# Patient Record
Sex: FEMALE | Race: WHITE | Employment: OTHER | URBAN - METROPOLITAN AREA
[De-identification: names, ages, dates, MRNs, and addresses within clinical notes are randomized per-mention and may not be internally consistent; named-entity substitution may affect disease eponyms.]

---

## 2017-08-04 ENCOUNTER — OFFICE VISIT (OUTPATIENT)
Dept: NEUROLOGY | Age: 57
End: 2017-08-04

## 2017-08-04 VITALS
DIASTOLIC BLOOD PRESSURE: 90 MMHG | RESPIRATION RATE: 14 BRPM | BODY MASS INDEX: 29.08 KG/M2 | OXYGEN SATURATION: 98 % | SYSTOLIC BLOOD PRESSURE: 140 MMHG | HEIGHT: 62 IN | WEIGHT: 158 LBS | HEART RATE: 82 BPM

## 2017-08-04 DIAGNOSIS — G89.4 CHRONIC PAIN SYNDROME: ICD-10-CM

## 2017-08-04 DIAGNOSIS — Q85.01 NEUROFIBROMATOSIS, TYPE 1 (HCC): Primary | ICD-10-CM

## 2017-08-04 RX ORDER — FENTANYL 75 UG/H
1 PATCH TRANSDERMAL
COMMUNITY
End: 2017-08-04 | Stop reason: SDUPTHER

## 2017-08-04 RX ORDER — OXYCODONE HYDROCHLORIDE 30 MG/1
30 TABLET ORAL
Qty: 120 TAB | Refills: 0 | Status: SHIPPED | OUTPATIENT
Start: 2017-08-04 | End: 2022-03-28

## 2017-08-04 RX ORDER — ESCITALOPRAM OXALATE 10 MG/1
10 TABLET ORAL DAILY
COMMUNITY
End: 2021-09-22

## 2017-08-04 RX ORDER — OXYCODONE HYDROCHLORIDE 30 MG/1
30 TABLET ORAL
Qty: 120 TAB | Refills: 0 | Status: SHIPPED | OUTPATIENT
Start: 2017-08-04 | End: 2017-08-04 | Stop reason: SDUPTHER

## 2017-08-04 RX ORDER — SIMVASTATIN 40 MG/1
TABLET, FILM COATED ORAL
COMMUNITY
End: 2022-03-28

## 2017-08-04 RX ORDER — VENLAFAXINE HYDROCHLORIDE 150 MG/1
150 CAPSULE, EXTENDED RELEASE ORAL DAILY
COMMUNITY

## 2017-08-04 RX ORDER — GABAPENTIN 600 MG/1
TABLET ORAL 3 TIMES DAILY
COMMUNITY
End: 2021-10-01

## 2017-08-04 RX ORDER — CARVEDILOL 6.25 MG/1
TABLET ORAL 2 TIMES DAILY WITH MEALS
COMMUNITY

## 2017-08-04 RX ORDER — OXYCODONE HYDROCHLORIDE 30 MG/1
30 TABLET ORAL
COMMUNITY
End: 2017-08-04 | Stop reason: SDUPTHER

## 2017-08-04 RX ORDER — LEVOTHYROXINE SODIUM 125 UG/1
TABLET ORAL
COMMUNITY

## 2017-08-04 RX ORDER — FENTANYL 75 UG/H
1 PATCH TRANSDERMAL
Qty: 20 PATCH | Refills: 1 | Status: SHIPPED | OUTPATIENT
Start: 2017-08-04 | End: 2017-08-04 | Stop reason: SDUPTHER

## 2017-08-04 RX ORDER — BUSPIRONE HYDROCHLORIDE 30 MG/1
30 TABLET ORAL 2 TIMES DAILY
COMMUNITY

## 2017-08-04 RX ORDER — CARBAMAZEPINE 300 MG/1
300 CAPSULE, EXTENDED RELEASE ORAL 2 TIMES DAILY
COMMUNITY

## 2017-08-04 RX ORDER — FENTANYL 75 UG/H
2 PATCH TRANSDERMAL
Qty: 20 PATCH | Refills: 1 | Status: SHIPPED | OUTPATIENT
Start: 2017-08-04 | End: 2021-09-22

## 2017-08-04 NOTE — PATIENT INSTRUCTIONS

## 2017-08-04 NOTE — PROGRESS NOTES
Chief Complaint   Patient presents with    Neurologic Problem         HISTORY OF PRESENT ILLNESS  Shawnee Tapia is a 60 y.o. female who  was diagnosed with neurofibromatosis type I at age 41. She has been living in [place] until recently she has. Extensive nerve sheath tumors at the nerve roots along the cervical and lumbar spine. She has had cervical decompression surgery to help allow for tumor growth. She has chronic pain in her spine and extremities and has also developed balance difficulties. She used to follow-up with pain management in [place] and is now trying to find someone who can manage her. She is on a high dose of fentanyl patch and oxycodone. She does not have any tumors in the brain, she states. No skin lesions or ocular lesions. There is no family history. She also has seizure disorder and her seizures are mainly partial complex type/staring spells. She does not remember having any generalized tonic-clonic events with loss of consciousness. She is on carbamazepine. Past Medical History:   Diagnosis Date    Arthritis     Depression     Epilepsy ([facility]     Hypertension     Thyroid disease      Current Outpatient Prescriptions   Medication Sig    carBAMazepine ER (CARBATROL ER) 300 mg capsule Take 300 mg by mouth two (2) times a day.  gabapentin (NEURONTIN) 600 mg tablet Take  by mouth three (3) times daily.  carvedilol (COREG) 6.25 mg tablet Take  by mouth two (2) times daily (with meals).  escitalopram oxalate (LEXAPRO) 10 mg tablet Take 10 mg by mouth daily.  simvastatin (ZOCOR) 40 mg tablet Take  by mouth nightly.  levothyroxine (SYNTHROID) 125 mcg tablet Take  by mouth Daily (before breakfast).  busPIRone (BUSPAR) 30 mg tablet Take 30 mg by mouth daily.  venlafaxine-SR (EFFEXOR XR) 150 mg capsule Take  by mouth daily.  fentaNYL (DURAGESIC) 75 mcg/hr 1 Patch by TransDERmal route every seventy-two (72) hours. Max Daily Amount: 1 Patch.     oxyCODONE IR (OXY-IR) 30 mg immediate release tablet Take 1 Tab by mouth every six (6) hours as needed for Pain. Max Daily Amount: 120 mg. No current facility-administered medications for this visit. Allergies   Allergen Reactions    Codeine Nausea and Vomiting    Methadone Hives    Morphine Other (comments)     psychosis     History reviewed. No pertinent family history. Social History   Substance Use Topics    Smoking status: Never Smoker    Smokeless tobacco: Never Used    Alcohol use No     Past Surgical History:   Procedure Laterality Date    HX GYN      HX ORTHOPAEDIC      NEUROLOGICAL PROCEDURE UNLISTED           REVIEW OF SYSTEMS  Review of Systems - History obtained from the patient  Psychological ROS: negative  ENT ROS: negative  Hematological and Lymphatic ROS: negative  Endocrine ROS: negative  Respiratory ROS: no cough, shortness of breath, or wheezing  Cardiovascular ROS: no chest pain or dyspnea on exertion  Gastrointestinal ROS: no abdominal pain, change in bowel habits, or black or bloody stools  Genito-Urinary ROS: no dysuria, trouble voiding, or hematuria  Musculoskeletal ROS: negative  positive for - pain in back - bilateral, leg - both sides and neck - bilateral  Dermatological ROS: negative      PHYSICAL EXAMINATION:    Visit Vitals    /90    Pulse 82    Resp 14    Ht 5' 1.5\" (1.562 m)    Wt 71.7 kg (158 lb)    SpO2 98%    BMI 29.37 kg/m2     General:  Well defined, nourished, and groomed individual in no acute distress. Neck: Supple, nontender, thyroid within normal limits, no JVD, no bruits, no pain with resistance to active range of motion. Heart: Regular rate and rhythm, no murmurs, rub, or gallop. Normal S1S2. Lungs:  Clear to auscultation bilaterally with equal chest expansion, no cough, no wheeze  Musculoskeletal:  Extremities revealed no edema and had full range of motion of joints.     Psych:  Good mood and bright affect    NEUROLOGICAL EXAMINATION: Mental Status:   Alert and oriented to person, place, and time with recent and remote memory intact. Attention span and concentration are normal. Speech is fluent with a full fund of knowledge. Cranial Nerves:    II, III, IV, VI:  Visual acuity grossly intact. Visual fields are normal.    Pupils are equal, round, and reactive to light and accommodation. Extra-ocular movements are full and fluid. Fundoscopic exam was benign, no ptosis or nystagmus. V-XII: Hearing is grossly intact. Facial features are symmetric, with normal sensation and strength. The palate rises symmetrically and the tongue protrudes midline. Sternocleidomastoids 5/5. Motor Examination: Normal tone, bulk, and strength. 5/5 muscle strength throughout. No cogwheel rigidity or clonus present. Sensory exam:  Normal throughout to pinprick, temperature, and vibration sense. Normal proprioception. Coordination:  Finger to nose and rapid arm movement testing was normal.   No resting or intention tremor    Gait and Station:  Steady while walking on toes, heels, and with tandem walking. Normal arm swing. Positive Rhomberg. No muscle wasting or fasiculations noted. Reflexes:  DTRs 2+ throughout. Toes downgoing. LABS / IMAGING  Reports of cervical and lumbar spine imaging was reviewed. There are extensive nerve root neurofibromas from C2 through C7 and also in L3, L4 and L5    ASSESSMENT    ICD-10-CM ICD-9-CM    1. [facility] Q85.01 237.71 fentaNYL (DURAGESIC) 75 mcg/hr      oxyCODONE IR (OXY-IR) 30 mg immediate release tablet   2. Chronic pain syndrome G89.4 338.4 fentaNYL (DURAGESIC) 75 mcg/hr      oxyCODONE IR (OXY-IR) 30 mg immediate release tablet       DISCUSSION  Ms. Sulema Russell has chronic pain syndrome related to neurofibromatosis type I. She is status post cervical decompression surgery to allow for tumor growth. She is currently on a high dose of narcotic analgesics.    Her main issue at this time is pain control  I discussed that she would be best served by seeing a pain specialist and I have temporarily provided her with a refill  She should also discuss other treatment options such as localized injections so that some of these medications could be weaned off    Basilio Moctezuma MD  Diplomate, [address] [place] [address] Rd., [address] of Psychiatry & Neurology (Neurology)  Diplomate, American Board of Psychiatry & Neurology (Clinical Neurophysiology)  Diplomate, American Board of Electrodiagnostic Medicine

## 2017-08-04 NOTE — MR AVS SNAPSHOT
Visit Information Date & Time Provider Department Dept. Phone Encounter #  
 8/4/2017  1:00 PM Gloria Chan MD [facility] at [facility] [phone] Follow-up Instructions Return if symptoms worsen or fail to improve. Upcoming Health Maintenance Date Due Hepatitis C Screening 1960 DTaP/Tdap/Td series (1 - Tdap) 8/11/1979 PAP AKA CERVICAL CYTOLOGY 8/11/1979 BREAST CANCER SCRN MAMMOGRAM 8/11/2008 FOBT Q 1 YEAR AGE 50-75 8/11/2008 INFLUENZA AGE 9 TO ADULT 8/1/2017 Allergies as of 8/4/2017  Review Complete On: 8/4/2017 By: Gloria Chan MD  
  
 Severity Noted Reaction Type Reactions Codeine  08/04/2017    Nausea and Vomiting Methadone  08/04/2017    Hives Morphine  08/04/2017    Other (comments) psychosis Current Immunizations  Never Reviewed No immunizations on file. Not reviewed this visit You Were Diagnosed With   
  
 Codes Comments [facility]    -  Primary ICD-10-CM: Q85.01 
ICD-9-CM: 237.71 Chronic pain syndrome     ICD-10-CM: G89.4 ICD-9-CM: 338. 4 Vitals BP Pulse Resp Height(growth percentile) Weight(growth percentile) SpO2  
 140/90 82 14 5' 1.5\" (1.562 m) 158 lb (71.7 kg) 98% BMI OB Status Smoking Status 29.37 kg/m2 Postmenopausal Never Smoker Vitals History BMI and BSA Data Body Mass Index Body Surface Area  
 29.37 kg/m 2 1.76 m 2 Your Updated Medication List  
  
   
This list is accurate as of: 8/4/17  1:42 PM.  Always use your most recent med list.  
  
  
  
  
 busPIRone 30 mg tablet Commonly known as:  BUSPAR Take 30 mg by mouth daily. carBAMazepine  mg capsule Commonly known as:  CARBATROL ER Take 300 mg by mouth two (2) times a day. carvedilol 6.25 mg tablet Commonly known as:  Carrolyn Peabody Take  by mouth two (2) times daily (with meals). EFFEXOR  mg capsule Generic drug:  venlafaxine-SR Take  by mouth daily. fentaNYL 75 mcg/hr Commonly known as:  DURAGESIC  
1 Patch by TransDERmal route every seventy-two (72) hours. Max Daily Amount: 1 Patch.  
  
 gabapentin 600 mg tablet Commonly known as:  NEURONTIN Take  by mouth three (3) times daily. LEXAPRO 10 mg tablet Generic drug:  escitalopram oxalate Take 10 mg by mouth daily. oxyCODONE IR 30 mg immediate release tablet Commonly known as:  OXY-IR Take 1 Tab by mouth every six (6) hours as needed for Pain. Max Daily Amount: 120 mg.  
  
 simvastatin 40 mg tablet Commonly known as:  ZOCOR Take  by mouth nightly. SYNTHROID 125 mcg tablet Generic drug:  levothyroxine Take  by mouth Daily (before breakfast). Prescriptions Printed Refills  
 fentaNYL (DURAGESIC) 75 mcg/hr 1 Si Patch by TransDERmal route every seventy-two (72) hours. Max Daily Amount: 1 Patch. Class: Print Route: TransDERmal  
 oxyCODONE IR (OXY-IR) 30 mg immediate release tablet 0 Sig: Take 1 Tab by mouth every six (6) hours as needed for Pain. Max Daily Amount: 120 mg.  
 Class: Print Route: Oral  
  
Follow-up Instructions Return if symptoms worsen or fail to improve. Patient Instructions A Healthy Lifestyle: Care Instructions Your Care Instructions A healthy lifestyle can help you feel good, stay at a healthy weight, and have plenty of energy for both work and play. A healthy lifestyle is something you can share with your whole family. A healthy lifestyle also can lower your risk for serious health problems, such as high blood pressure, heart disease, and diabetes. You can follow a few steps listed below to improve your health and the health of your family. Follow-up care is a key part of your treatment and safety.  Be sure to make and go to all appointments, and call your doctor if you are having problems. Its also a good idea to know your test results and keep a list of the medicines you take. How can you care for yourself at home? · Do not eat too much sugar, fat, or fast foods. You can still have dessert and treats now and then. The goal is moderation. · Start small to improve your eating habits. Pay attention to portion sizes, drink less juice and soda pop, and eat more fruits and vegetables. ¨ Eat a healthy amount of food. A 3-ounce serving of meat, for example, is about the size of a deck of cards. Fill the rest of your plate with vegetables and whole grains. ¨ Limit the amount of soda and sports drinks you have every day. Drink more water when you are thirsty. ¨ Eat at least 5 servings of fruits and vegetables every day. It may seem like a lot, but it is not hard to reach this goal. A serving or helping is 1 piece of fruit, 1 cup of vegetables, or 2 cups of leafy, raw vegetables. Have an apple or some carrot sticks as an afternoon snack instead of a candy bar. Try to have fruits and/or vegetables at every meal. 
· Make exercise part of your daily routine. You may want to start with simple activities, such as walking, bicycling, or slow swimming. Try to be active 30 to 60 minutes every day. You do not need to do all 30 to 60 minutes all at once. For example, you can exercise 3 times a day for 10 or 20 minutes. Moderate exercise is safe for most people, but it is always a good idea to talk to your doctor before starting an exercise program. 
· Keep moving. Caleen Newer the lawn, work in the garden, or [place]. Take the stairs instead of the elevator at work. · If you smoke, quit. People who smoke have an increased risk for heart attack, stroke, cancer, and other lung illnesses. Quitting is hard, but there are ways to boost your chance of quitting tobacco for good. ¨ Use nicotine gum, patches, or lozenges. ¨ Ask your doctor about stop-smoking programs and medicines. ¨ Keep trying. In addition to reducing your risk of diseases in the future, you will notice some benefits soon after you stop using tobacco. If you have shortness of breath or asthma symptoms, they will likely get better within a few weeks after you quit. · Limit how much alcohol you drink. Moderate amounts of alcohol (up to 2 drinks a day for men, 1 drink a day for women) are okay. But drinking too much can lead to liver problems, high blood pressure, and other health problems. Family health If you have a family, there are many things you can do together to improve your health. · Eat meals together as a family as often as possible. · Eat healthy foods. This includes fruits, vegetables, lean meats and dairy, and whole grains. · Include your family in your fitness plan. Most people think of activities such as jogging or tennis as the way to fitness, but there are many ways you and your family can be more active. Anything that makes you breathe hard and gets your heart pumping is exercise. Here are some tips: 
¨ Walk to do errands or to take your child to school or the bus. ¨ Go for a family bike ride after dinner instead of watching TV. Where can you learn more? Go to http://kayla-elio.info/. Enter A112 in the search box to learn more about \"A Healthy Lifestyle: Care Instructions. \" Current as of: July 26, 2016 Content Version: 11.3 © 2956-2746 Healthwise, Incorporated. Care instructions adapted under license by [employer] have [employer]! (which disclaims liability or warranty for this information). If you have questions about a medical condition or this instruction, always ask your healthcare professional. [place] [address] any warranty or liability for your use of this information. Introducing [facility]! Tristan Lujan introduces [employer] patient portal. Now you can access parts of your medical record, email your doctor's office, and request medication refills online. 1. In your internet browser, go to [email or web address] 2. Click on the First Time User? Click Here link in the Sign In box. You will see the New Member Sign Up page. 3. Enter your [employer] Access Code exactly as it appears below. You will not need to use this code after youve completed the sign-up process. If you do not sign up before the expiration date, you must request a new code. · [employer] Access Code: [email or web address] Expires: 11/2/2017  1:40 PM 
 
4. Enter the last four digits of your Social Security Number (xxxx) and Date of Birth (mm/dd/yyyy) as indicated and click Submit. You will be taken to the next sign-up page. 5. Create a Retrac Enterprisest ID. This will be your Y[employer]Lotto login ID and cannot be changed, so think of one that is secure and easy to remember. 6. Create a [employer] password. You can change your password at any time. 7. Enter your Password Reset Question and Answer. This can be used at a later time if you forget your password. 8. Enter your e-mail address. You will receive e-mail notification when new information is available in [address] 9. Click Sign Up. You can now view and download portions of your medical record. 10. Click the Download Summary menu link to download a portable copy of your medical information. If you have questions, please visit the Frequently Asked Questions section of the [employer] website. Remember, [employer] is NOT to be used for urgent needs. For medical emergencies, dial 911. Now available from your iPhone and Android! Please provide this summary of care documentation to your next provider. If you have any questions after today's visit, please call [phone].

## 2017-08-07 ENCOUNTER — TELEPHONE (OUTPATIENT)
Dept: PALLATIVE CARE | Age: 57
End: 2017-08-07

## 2017-08-07 NOTE — TELEPHONE ENCOUNTER
Patient is calling to speak to referral coordinator to see if she qualifies for our out patient clinic. She is new to area and does not want pain management clinic wants pain care.

## 2017-08-07 NOTE — TELEPHONE ENCOUNTER
Nurse returned pt's call. Pt has neurofibromatosis type 1. She recently moved here from [place] to be with her family. She has been on Fentanyl patch and oxycodone for chronic pain in her spine. Pt was seen by Dr. Sandra Silva MD on 8/4/17. According to Ms. Kiarra Fuentes, Dr. Silvana Person prescribed one month of opioids and gave her a list of pain management doctors. She has called everyone on the list and no one on the list she has manages the pain of neurofibromatosis. Nurse explained out outpatient Palliative Medicine criteria:    A life-limiting illness with life expectancy of less than 2 years and in addition to the limited life expectancy, the presence of 1 or more COPE needs (Care Decisions, Overwhelming Symptoms, Psychosocial Distress, and End-Stage Disease). Nurse will ask out Palliative Medicine physicians tomorrow if they know of any other MD's that handle medical management of chronic pain and will call pt back.

## 2017-08-08 ENCOUNTER — TELEPHONE (OUTPATIENT)
Dept: NEUROLOGY | Age: 57
End: 2017-08-08

## 2017-08-08 NOTE — TELEPHONE ENCOUNTER
Attempted to contact patient by phone to inform her Dr Amanda Ace only offers procedural pain management and not medication mgt. No answer, left call back number on voice mail.

## 2017-08-10 ENCOUNTER — OFFICE VISIT (OUTPATIENT)
Dept: NEUROLOGY | Age: 57
End: 2017-08-10

## 2017-08-10 VITALS
WEIGHT: 158 LBS | DIASTOLIC BLOOD PRESSURE: 72 MMHG | HEIGHT: 62 IN | HEART RATE: 81 BPM | BODY MASS INDEX: 29.08 KG/M2 | OXYGEN SATURATION: 98 % | SYSTOLIC BLOOD PRESSURE: 110 MMHG

## 2017-08-10 DIAGNOSIS — Q85.00 NEUROFIBROMATOSIS (HCC): ICD-10-CM

## 2017-08-10 DIAGNOSIS — G89.4 CHRONIC PAIN SYNDROME: Primary | ICD-10-CM

## 2017-08-10 DIAGNOSIS — Q85.01 NEUROFIBROMATOSIS, TYPE 1 (HCC): ICD-10-CM

## 2017-08-10 NOTE — PROGRESS NOTES
Name:  Yesi Mcrae      :  1960    PCP:   No primary care provider on file. Referring:  Joselin Levin MD/ Neurology  MRN:   [id number]    Chief Complaint:   Chief Complaint   Patient presents with    Neurologic Problem       HISTORY OF PRESENT ILLNESS:     This is a 60 y.o. female who presents for evaluation of chronic, multi-focal pain. Reviewed recent clinic note by Dr. Bi Parham. He notes that she has chronic pain related to neurofibromatosis type I. She is status post cervical decompression surgery to allow for tumor growth and currently on a high dose of narcotic analgesics (Fentanyl 75 mcg/ hour TWO patches every 72 hours, Oxycodone IR 30 mg one tablet every 6 hours prn). Dr Bi Parham had given her a 1 time Rx for her pain medications and advised her that she would need to find another provider to manage her chronic pain symptoms. She recently had to move unexpectedly from [place], [place] to [place] due to family reasons and has been seeking a pain management provider to prescribe her pain medications but has been unable to find one. My nurse called patient yesterday and informed her that I do not do chronic pain medication management but she still wanted to come in and discuss her chronic pain symptoms. She presented with her daughter today to discuss her pain management. Reviewed some her clinic notes from her last pain management provider in [place], Dr. Suzy Calderon (sent all notes from 2017). Patient appears to have been compliant with all her pain management visits. MRIs from C-spine to L-spine show multiple neurofibromas in the spinal canal and foramina. Underwent cervical spinal decompression from C2 to T1.        Complete Review of Systems: + anxiety, constipation, depression, falls, fatigue, hearing loss, joint pain, muscle pain, muscle weakness, neuropathy, decreased appetite, tinnitus, dyspnea, snoring, dysphagia; otherwise as noted in HPI     Allergies   Allergen Reactions    Codeine Nausea and Vomiting    Methadone Hives    Morphine Other (comments)     psychosis     Past Medical History:   Diagnosis Date    Arthritis     Depression     Epile[facility]     Hypertension     Thyroid disease      Current Outpatient Prescriptions   Medication Sig Dispense Refill    carBAMazepine ER (CARBATROL ER) 300 mg capsule Take 300 mg by mouth two (2) times a day.  gabapentin (NEURONTIN) 600 mg tablet Take  by mouth three (3) times daily.  carvedilol (COREG) 6.25 mg tablet Take  by mouth two (2) times daily (with meals).  escitalopram oxalate (LEXAPRO) 10 mg tablet Take 10 mg by mouth daily.  simvastatin (ZOCOR) 40 mg tablet Take  by mouth nightly.  levothyroxine (SYNTHROID) 125 mcg tablet Take  by mouth Daily (before breakfast).  busPIRone (BUSPAR) 30 mg tablet Take 30 mg by mouth daily.  venlafaxine-SR (EFFEXOR XR) 150 mg capsule Take  by mouth daily.  fentaNYL (DURAGESIC) 75 mcg/hr 2 Patches by TransDERmal route every seventy-two (72) hours. Max Daily Amount: 2 Patches. 20 Patch 1    oxyCODONE IR (OXY-IR) 30 mg immediate release tablet Take 1 Tab by mouth every six (6) hours as needed for Pain. Max Daily Amount: 120 mg. 120 Tab 0     Past Surgical History:   Procedure Laterality Date    HX GYN      HX ORTHOPAEDIC      NEUROLOGICAL PROCEDURE UNLISTED       History reviewed. No pertinent family history. Social History     Social History    Marital status: UNKNOWN     Spouse name: N/A    Number of children: N/A    Years of education: N/A     Occupational History    Not on file.      Social History Main Topics    Smoking status: Never Smoker    Smokeless tobacco: Never Used    Alcohol use No    Drug use: Not on file    Sexual activity: Not on file     Other Topics Concern    Not on file     Social History Narrative       PHYSICAL EXAM  Vitals:    08/10/17 0810   BP: 110/72   BP 1 Location: Left arm   BP Patient Position: Sitting   Pulse: 81 SpO2: 98%   Weight: 71.7 kg (158 lb)   Height: 5' 1.5\" (1.562 m)     General: awake, alert, oriented, conversant  Appears distressed due to having difficulty finding a pain management provider    Focused Neurological Exam     Mental status: Alert and oriented to person, place situation. Language: normal fluency and comprehension; no dysarthria. CNs:   Visual fields grossly normal  Extraocular movements intact, no nystagmus  Face appears symmetric and facial strength normal.    Hearing is intact to casual conversation. Sensory: not examined  Motor: Moves all limbs without difficulty, at least 4+/ 5    Reflexes: not examed  Gait: normal      Reviewed notes and imaging reports as noted above    ASSESSMENT AND PLAN    ICD-10-CM ICD-9-CM    1. Chronic pain syndrome G89.4 338.4    2. Neurofibromatosis ([facility] Q85.00 237.70    3. [facility] Q85.01 237.71        60 y.o. female with hx of Neurofibromatosis Type 1, multiple fibromas in spinal canal and formaina  Chronic, multi-focal pain and underwent C-spine laminectomy from C2-T1 while living in [place]  On high-dose pain medications as noted above  Recently received a 1 time Rx of her pain meds from Dr. Silvana Person (8-4-17)    D/w patient-daughter that I cannot help her with her chronic pain medication management and there is not an injection that I can think of that would significantly address/ reduce her chronic multi-focal pain. Advised her that she/ daughter will need to continue looking for a chronic pain management provider with her pain control. Given the names of some providers around town, but patient says she's tried to get appt with them and no answer yet or they're not taking new patients. I then recollected that [facility]elia Gutierres has a Pain Clinic in [place] so I gave them that number and they will pursue that to see if she can get in with them.   No neurology follow-up visits needed with me, though she may choose to follow up with Dr. Thea Hernandez for any non-pain, neurologic issues.         Sincerely,  Linda Morse MD

## 2017-08-11 ENCOUNTER — TELEPHONE (OUTPATIENT)
Dept: NEUROLOGY | Age: 57
End: 2017-08-11

## 2017-08-11 NOTE — TELEPHONE ENCOUNTER
----- Message from Susannah Khalil sent at 8/11/2017 10:04 AM EDT -----  Regarding: Dr. Phelps Ask, daughter would like a call back from the nurse regarding the pain management specialist. All of the pain management specialist that Ijeoma Donaldson provided were not able to help the pt. Mrs. Malena Burt would like to know if there are any other pain specialist the pt can be referred to. Ms. Malena Burt can be reached at [phone].

## 2017-08-14 NOTE — TELEPHONE ENCOUNTER
Spoke with daughter, informed her that  mentioned that she should go to the pain clinic in [facility] that's affiliated with [facility], she verbalized understanding.

## 2017-10-03 ENCOUNTER — OP HISTORICAL/CONVERTED ENCOUNTER (OUTPATIENT)
Dept: OTHER | Age: 57
End: 2017-10-03

## 2017-10-24 ENCOUNTER — OP HISTORICAL/CONVERTED ENCOUNTER (OUTPATIENT)
Dept: OTHER | Age: 57
End: 2017-10-24

## 2017-12-20 ENCOUNTER — OP HISTORICAL/CONVERTED ENCOUNTER (OUTPATIENT)
Dept: OTHER | Age: 57
End: 2017-12-20

## 2019-07-05 ENCOUNTER — OP HISTORICAL/CONVERTED ENCOUNTER (OUTPATIENT)
Dept: OTHER | Age: 59
End: 2019-07-05

## 2021-09-22 ENCOUNTER — APPOINTMENT (OUTPATIENT)
Dept: CT IMAGING | Age: 61
DRG: 871 | End: 2021-09-22
Attending: STUDENT IN AN ORGANIZED HEALTH CARE EDUCATION/TRAINING PROGRAM
Payer: MEDICARE

## 2021-09-22 ENCOUNTER — HOSPITAL ENCOUNTER (INPATIENT)
Age: 61
LOS: 8 days | Discharge: NURSING, REHAB OR LONG-TERM CARE | DRG: 871 | End: 2021-10-01
Attending: STUDENT IN AN ORGANIZED HEALTH CARE EDUCATION/TRAINING PROGRAM | Admitting: HOSPITALIST
Payer: MEDICARE

## 2021-09-22 DIAGNOSIS — R10.9 ABDOMINAL PAIN, UNSPECIFIED ABDOMINAL LOCATION: Primary | ICD-10-CM

## 2021-09-22 DIAGNOSIS — K59.00 CONSTIPATION, UNSPECIFIED CONSTIPATION TYPE: ICD-10-CM

## 2021-09-22 DIAGNOSIS — A41.9 SEPTIC SHOCK (HCC): ICD-10-CM

## 2021-09-22 DIAGNOSIS — K81.9 CHOLECYSTITIS: ICD-10-CM

## 2021-09-22 DIAGNOSIS — R65.21 SEPTIC SHOCK (HCC): ICD-10-CM

## 2021-09-22 DIAGNOSIS — R78.81 GRAM-NEGATIVE BACTEREMIA: ICD-10-CM

## 2021-09-22 DIAGNOSIS — Q85.00 NEUROFIBROMATOSIS (HCC): ICD-10-CM

## 2021-09-22 DIAGNOSIS — G89.4 CHRONIC PAIN SYNDROME: ICD-10-CM

## 2021-09-22 DIAGNOSIS — R10.84 GENERALIZED ABDOMINAL PAIN: ICD-10-CM

## 2021-09-22 DIAGNOSIS — J69.0 ASPIRATION PNEUMONIA, UNSPECIFIED ASPIRATION PNEUMONIA TYPE, UNSPECIFIED LATERALITY, UNSPECIFIED PART OF LUNG (HCC): ICD-10-CM

## 2021-09-22 LAB
ABO + RH BLD: NORMAL
ALBUMIN SERPL-MCNC: 3.7 G/DL (ref 3.5–5)
ALBUMIN/GLOB SERPL: 1.3 {RATIO} (ref 1.1–2.2)
ALP SERPL-CCNC: 133 U/L (ref 45–117)
ALT SERPL-CCNC: 29 U/L (ref 12–78)
ANION GAP SERPL CALC-SCNC: 8 MMOL/L (ref 5–15)
APTT PPP: 29.2 SEC (ref 21.2–34.1)
AST SERPL W P-5'-P-CCNC: 19 U/L (ref 15–37)
BASOPHILS # BLD: 0.1 K/UL (ref 0–0.1)
BASOPHILS NFR BLD: 1 % (ref 0–1)
BILIRUB SERPL-MCNC: 0.3 MG/DL (ref 0.2–1)
BLOOD GROUP ANTIBODIES SERPL: NEGATIVE
BUN SERPL-MCNC: 16 MG/DL (ref 6–20)
BUN/CREAT SERPL: 28 (ref 12–20)
CA-I BLD-MCNC: 9.1 MG/DL (ref 8.5–10.1)
CHLORIDE SERPL-SCNC: 100 MMOL/L (ref 97–108)
CO2 SERPL-SCNC: 27 MMOL/L (ref 21–32)
CREAT SERPL-MCNC: 0.57 MG/DL (ref 0.55–1.02)
DIFFERENTIAL METHOD BLD: ABNORMAL
EOSINOPHIL # BLD: 0.3 K/UL (ref 0–0.4)
EOSINOPHIL NFR BLD: 3 % (ref 0–7)
ERYTHROCYTE [DISTWIDTH] IN BLOOD BY AUTOMATED COUNT: 12.8 % (ref 11.5–14.5)
GLOBULIN SER CALC-MCNC: 2.9 G/DL (ref 2–4)
GLUCOSE SERPL-MCNC: 110 MG/DL (ref 65–100)
HCT VFR BLD AUTO: 41.2 % (ref 35–47)
HGB BLD-MCNC: 13.7 G/DL (ref 11.5–16)
IMM GRANULOCYTES # BLD AUTO: 0.1 K/UL (ref 0–0.04)
IMM GRANULOCYTES NFR BLD AUTO: 1 % (ref 0–0.5)
INR PPP: 1.2 (ref 0.9–1.1)
LYMPHOCYTES # BLD: 1.3 K/UL (ref 0.8–3.5)
LYMPHOCYTES NFR BLD: 10 % (ref 12–49)
MCH RBC QN AUTO: 28 PG (ref 26–34)
MCHC RBC AUTO-ENTMCNC: 33.3 G/DL (ref 30–36.5)
MCV RBC AUTO: 84.1 FL (ref 80–99)
MONOCYTES # BLD: 0.9 K/UL (ref 0–1)
MONOCYTES NFR BLD: 7 % (ref 5–13)
NEUTS SEG # BLD: 9.7 K/UL (ref 1.8–8)
NEUTS SEG NFR BLD: 78 % (ref 32–75)
NRBC # BLD: 0 K/UL (ref 0–0.01)
NRBC BLD-RTO: 0 PER 100 WBC
PLATELET # BLD AUTO: 388 K/UL (ref 150–400)
PMV BLD AUTO: 9.9 FL (ref 8.9–12.9)
POTASSIUM SERPL-SCNC: 3.8 MMOL/L (ref 3.5–5.1)
PROT SERPL-MCNC: 6.6 G/DL (ref 6.4–8.2)
PROTHROMBIN TIME: 14.5 SEC (ref 11.9–14.7)
RBC # BLD AUTO: 4.9 M/UL (ref 3.8–5.2)
SODIUM SERPL-SCNC: 135 MMOL/L (ref 136–145)
SPECIMEN EXP DATE BLD: NORMAL
THERAPEUTIC RANGE,PTTT: NORMAL SEC (ref 82–109)
WBC # BLD AUTO: 12.4 K/UL (ref 3.6–11)

## 2021-09-22 PROCEDURE — 96375 TX/PRO/DX INJ NEW DRUG ADDON: CPT

## 2021-09-22 PROCEDURE — 80053 COMPREHEN METABOLIC PANEL: CPT

## 2021-09-22 PROCEDURE — 74011000636 HC RX REV CODE- 636: Performed by: STUDENT IN AN ORGANIZED HEALTH CARE EDUCATION/TRAINING PROGRAM

## 2021-09-22 PROCEDURE — 96374 THER/PROPH/DIAG INJ IV PUSH: CPT

## 2021-09-22 PROCEDURE — 86901 BLOOD TYPING SEROLOGIC RH(D): CPT

## 2021-09-22 PROCEDURE — 85025 COMPLETE CBC W/AUTO DIFF WBC: CPT

## 2021-09-22 PROCEDURE — 93005 ELECTROCARDIOGRAM TRACING: CPT

## 2021-09-22 PROCEDURE — 74174 CTA ABD&PLVS W/CONTRAST: CPT

## 2021-09-22 PROCEDURE — 85730 THROMBOPLASTIN TIME PARTIAL: CPT

## 2021-09-22 PROCEDURE — 99285 EMERGENCY DEPT VISIT HI MDM: CPT

## 2021-09-22 PROCEDURE — 71275 CT ANGIOGRAPHY CHEST: CPT

## 2021-09-22 PROCEDURE — 85610 PROTHROMBIN TIME: CPT

## 2021-09-22 PROCEDURE — 74011250636 HC RX REV CODE- 250/636: Performed by: STUDENT IN AN ORGANIZED HEALTH CARE EDUCATION/TRAINING PROGRAM

## 2021-09-22 PROCEDURE — 36415 COLL VENOUS BLD VENIPUNCTURE: CPT

## 2021-09-22 RX ORDER — ONDANSETRON 2 MG/ML
4 INJECTION INTRAMUSCULAR; INTRAVENOUS
Status: COMPLETED | OUTPATIENT
Start: 2021-09-22 | End: 2021-09-22

## 2021-09-22 RX ORDER — MORPHINE SULFATE 4 MG/ML
8 INJECTION INTRAVENOUS ONCE
Status: COMPLETED | OUTPATIENT
Start: 2021-09-22 | End: 2021-09-22

## 2021-09-22 RX ORDER — HYDROMORPHONE HYDROCHLORIDE 1 MG/ML
1 INJECTION, SOLUTION INTRAMUSCULAR; INTRAVENOUS; SUBCUTANEOUS ONCE
Status: COMPLETED | OUTPATIENT
Start: 2021-09-22 | End: 2021-09-22

## 2021-09-22 RX ORDER — FAMOTIDINE 20 MG/1
20 TABLET, FILM COATED ORAL 2 TIMES DAILY
COMMUNITY

## 2021-09-22 RX ORDER — CELECOXIB 200 MG/1
100 CAPSULE ORAL 2 TIMES DAILY
COMMUNITY

## 2021-09-22 RX ORDER — GLUCOSAMINE SULFATE 1500 MG
POWDER IN PACKET (EA) ORAL DAILY
COMMUNITY
End: 2022-03-28

## 2021-09-22 RX ORDER — GUAIFENESIN 100 MG/5ML
81 LIQUID (ML) ORAL
COMMUNITY

## 2021-09-22 RX ADMIN — IOPAMIDOL 100 ML: 755 INJECTION, SOLUTION INTRAVENOUS at 23:35

## 2021-09-22 RX ADMIN — HYDROMORPHONE HYDROCHLORIDE 1 MG: 1 INJECTION, SOLUTION INTRAMUSCULAR; INTRAVENOUS; SUBCUTANEOUS at 23:01

## 2021-09-22 RX ADMIN — ONDANSETRON 4 MG: 2 INJECTION INTRAMUSCULAR; INTRAVENOUS at 22:25

## 2021-09-22 RX ADMIN — MORPHINE SULFATE 8 MG: 4 INJECTION INTRAVENOUS at 22:25

## 2021-09-23 ENCOUNTER — APPOINTMENT (OUTPATIENT)
Dept: GENERAL RADIOLOGY | Age: 61
DRG: 871 | End: 2021-09-23
Attending: INTERNAL MEDICINE
Payer: MEDICARE

## 2021-09-23 ENCOUNTER — APPOINTMENT (OUTPATIENT)
Dept: CT IMAGING | Age: 61
DRG: 871 | End: 2021-09-23
Attending: HOSPITALIST
Payer: MEDICARE

## 2021-09-23 PROBLEM — R10.9 ABDOMINAL PAIN: Status: ACTIVE | Noted: 2021-09-23

## 2021-09-23 LAB
ALBUMIN SERPL-MCNC: 3 G/DL (ref 3.5–5)
ALBUMIN/GLOB SERPL: 1.1 {RATIO} (ref 1.1–2.2)
ALP SERPL-CCNC: 198 U/L (ref 45–117)
ALT SERPL-CCNC: 316 U/L (ref 12–78)
AMMONIA PLAS-SCNC: 44 UMOL/L
ANION GAP SERPL CALC-SCNC: 9 MMOL/L (ref 5–15)
APTT PPP: 34.4 SEC (ref 21.2–34.1)
AST SERPL W P-5'-P-CCNC: 399 U/L (ref 15–37)
ATRIAL RATE: 83 BPM
BASOPHILS # BLD: 0 K/UL (ref 0–0.1)
BASOPHILS NFR BLD: 0 % (ref 0–1)
BILIRUB SERPL-MCNC: 1.2 MG/DL (ref 0.2–1)
BUN SERPL-MCNC: 15 MG/DL (ref 6–20)
BUN/CREAT SERPL: 23 (ref 12–20)
CA-I BLD-MCNC: 8.5 MG/DL (ref 8.5–10.1)
CALCULATED P AXIS, ECG09: 34 DEGREES
CALCULATED R AXIS, ECG10: -55 DEGREES
CALCULATED T AXIS, ECG11: 80 DEGREES
CHLORIDE SERPL-SCNC: 102 MMOL/L (ref 97–108)
CO2 SERPL-SCNC: 26 MMOL/L (ref 21–32)
COVID-19 RAPID TEST, COVR: NOT DETECTED
CREAT SERPL-MCNC: 0.66 MG/DL (ref 0.55–1.02)
DIAGNOSIS, 93000: NORMAL
DIFFERENTIAL METHOD BLD: ABNORMAL
EOSINOPHIL # BLD: 0 K/UL (ref 0–0.4)
EOSINOPHIL NFR BLD: 0 % (ref 0–7)
ERYTHROCYTE [DISTWIDTH] IN BLOOD BY AUTOMATED COUNT: 12.9 % (ref 11.5–14.5)
GLOBULIN SER CALC-MCNC: 2.8 G/DL (ref 2–4)
GLUCOSE SERPL-MCNC: 104 MG/DL (ref 65–100)
HCT VFR BLD AUTO: 37 % (ref 35–47)
HGB BLD-MCNC: 12.8 G/DL (ref 11.5–16)
IMM GRANULOCYTES # BLD AUTO: 0 K/UL (ref 0–0.04)
IMM GRANULOCYTES NFR BLD AUTO: 0 % (ref 0–0.5)
INR PPP: 1.3 (ref 0.9–1.1)
LYMPHOCYTES # BLD: 0.4 K/UL (ref 0.8–3.5)
LYMPHOCYTES NFR BLD: 4 % (ref 12–49)
MCH RBC QN AUTO: 28.4 PG (ref 26–34)
MCHC RBC AUTO-ENTMCNC: 34.6 G/DL (ref 30–36.5)
MCV RBC AUTO: 82 FL (ref 80–99)
MONOCYTES # BLD: 0.5 K/UL (ref 0–1)
MONOCYTES NFR BLD: 5 % (ref 5–13)
NEUTS SEG # BLD: 8.5 K/UL (ref 1.8–8)
NEUTS SEG NFR BLD: 91 % (ref 32–75)
NRBC # BLD: 0 K/UL (ref 0–0.01)
NRBC BLD-RTO: 0 PER 100 WBC
P-R INTERVAL, ECG05: 124 MS
PLATELET # BLD AUTO: 269 K/UL (ref 150–400)
PMV BLD AUTO: 9.5 FL (ref 8.9–12.9)
POTASSIUM SERPL-SCNC: 3.4 MMOL/L (ref 3.5–5.1)
PROT SERPL-MCNC: 5.8 G/DL (ref 6.4–8.2)
PROTHROMBIN TIME: 16 SEC (ref 11.9–14.7)
Q-T INTERVAL, ECG07: 388 MS
QRS DURATION, ECG06: 96 MS
QTC CALCULATION (BEZET), ECG08: 455 MS
RBC # BLD AUTO: 4.51 M/UL (ref 3.8–5.2)
SODIUM SERPL-SCNC: 137 MMOL/L (ref 136–145)
SPECIMEN SOURCE: NORMAL
THERAPEUTIC RANGE,PTTT: ABNORMAL SEC (ref 82–109)
VENTRICULAR RATE, ECG03: 83 BPM
WBC # BLD AUTO: 9.5 K/UL (ref 3.6–11)

## 2021-09-23 PROCEDURE — 85025 COMPLETE CBC W/AUTO DIFF WBC: CPT

## 2021-09-23 PROCEDURE — 82140 ASSAY OF AMMONIA: CPT

## 2021-09-23 PROCEDURE — 74011000258 HC RX REV CODE- 258: Performed by: INTERNAL MEDICINE

## 2021-09-23 PROCEDURE — 87077 CULTURE AEROBIC IDENTIFY: CPT

## 2021-09-23 PROCEDURE — 74019 RADEX ABDOMEN 2 VIEWS: CPT

## 2021-09-23 PROCEDURE — 74011250637 HC RX REV CODE- 250/637: Performed by: INTERNAL MEDICINE

## 2021-09-23 PROCEDURE — 87635 SARS-COV-2 COVID-19 AMP PRB: CPT

## 2021-09-23 PROCEDURE — 74011250636 HC RX REV CODE- 250/636: Performed by: STUDENT IN AN ORGANIZED HEALTH CARE EDUCATION/TRAINING PROGRAM

## 2021-09-23 PROCEDURE — 74011250637 HC RX REV CODE- 250/637: Performed by: STUDENT IN AN ORGANIZED HEALTH CARE EDUCATION/TRAINING PROGRAM

## 2021-09-23 PROCEDURE — 87186 SC STD MICRODIL/AGAR DIL: CPT

## 2021-09-23 PROCEDURE — 74011250636 HC RX REV CODE- 250/636: Performed by: INTERNAL MEDICINE

## 2021-09-23 PROCEDURE — 96376 TX/PRO/DX INJ SAME DRUG ADON: CPT

## 2021-09-23 PROCEDURE — 74011250636 HC RX REV CODE- 250/636: Performed by: HOSPITALIST

## 2021-09-23 PROCEDURE — 85610 PROTHROMBIN TIME: CPT

## 2021-09-23 PROCEDURE — 74011250637 HC RX REV CODE- 250/637: Performed by: HOSPITALIST

## 2021-09-23 PROCEDURE — 87040 BLOOD CULTURE FOR BACTERIA: CPT

## 2021-09-23 PROCEDURE — 85730 THROMBOPLASTIN TIME PARTIAL: CPT

## 2021-09-23 PROCEDURE — 65270000029 HC RM PRIVATE

## 2021-09-23 PROCEDURE — 70450 CT HEAD/BRAIN W/O DYE: CPT

## 2021-09-23 PROCEDURE — 36415 COLL VENOUS BLD VENIPUNCTURE: CPT

## 2021-09-23 PROCEDURE — 80053 COMPREHEN METABOLIC PANEL: CPT

## 2021-09-23 RX ORDER — CARVEDILOL 3.12 MG/1
6.25 TABLET ORAL 2 TIMES DAILY WITH MEALS
Status: DISCONTINUED | OUTPATIENT
Start: 2021-09-23 | End: 2021-10-01 | Stop reason: HOSPADM

## 2021-09-23 RX ORDER — SODIUM CHLORIDE 0.9 % (FLUSH) 0.9 %
5-40 SYRINGE (ML) INJECTION AS NEEDED
Status: DISCONTINUED | OUTPATIENT
Start: 2021-09-23 | End: 2021-10-01 | Stop reason: HOSPADM

## 2021-09-23 RX ORDER — ATORVASTATIN CALCIUM 20 MG/1
20 TABLET, FILM COATED ORAL
Status: DISCONTINUED | OUTPATIENT
Start: 2021-09-23 | End: 2021-10-01 | Stop reason: HOSPADM

## 2021-09-23 RX ORDER — POLYETHYLENE GLYCOL 3350 17 G/17G
17 POWDER, FOR SOLUTION ORAL ONCE
Status: COMPLETED | OUTPATIENT
Start: 2021-09-23 | End: 2021-09-23

## 2021-09-23 RX ORDER — MAGNESIUM CITRATE
296 SOLUTION, ORAL ORAL
Status: COMPLETED | OUTPATIENT
Start: 2021-09-23 | End: 2021-09-23

## 2021-09-23 RX ORDER — ACETAMINOPHEN 650 MG/1
650 SUPPOSITORY RECTAL
Status: DISCONTINUED | OUTPATIENT
Start: 2021-09-23 | End: 2021-10-01 | Stop reason: HOSPADM

## 2021-09-23 RX ORDER — LORAZEPAM 2 MG/ML
1 INJECTION INTRAMUSCULAR
Status: DISCONTINUED | OUTPATIENT
Start: 2021-09-23 | End: 2021-09-25

## 2021-09-23 RX ORDER — HYDROMORPHONE HYDROCHLORIDE 1 MG/ML
1 INJECTION, SOLUTION INTRAMUSCULAR; INTRAVENOUS; SUBCUTANEOUS ONCE
Status: COMPLETED | OUTPATIENT
Start: 2021-09-23 | End: 2021-09-23

## 2021-09-23 RX ORDER — ONDANSETRON 2 MG/ML
4 INJECTION INTRAMUSCULAR; INTRAVENOUS
Status: DISCONTINUED | OUTPATIENT
Start: 2021-09-23 | End: 2021-10-01 | Stop reason: HOSPADM

## 2021-09-23 RX ORDER — MELATONIN
1000 DAILY
Status: DISCONTINUED | OUTPATIENT
Start: 2021-09-23 | End: 2021-10-01 | Stop reason: HOSPADM

## 2021-09-23 RX ORDER — OXYCODONE HYDROCHLORIDE 10 MG/1
30 TABLET ORAL ONCE
Status: COMPLETED | OUTPATIENT
Start: 2021-09-23 | End: 2021-09-23

## 2021-09-23 RX ORDER — LANOLIN ALCOHOL/MO/W.PET/CERES
400 CREAM (GRAM) TOPICAL DAILY
Status: DISCONTINUED | OUTPATIENT
Start: 2021-09-24 | End: 2021-10-01 | Stop reason: HOSPADM

## 2021-09-23 RX ORDER — CARBAMAZEPINE 300 MG/1
300 CAPSULE, EXTENDED RELEASE ORAL 2 TIMES DAILY
Status: DISCONTINUED | OUTPATIENT
Start: 2021-09-23 | End: 2021-09-25

## 2021-09-23 RX ORDER — SODIUM CHLORIDE 9 MG/ML
500 INJECTION, SOLUTION INTRAVENOUS ONCE
Status: COMPLETED | OUTPATIENT
Start: 2021-09-24 | End: 2021-09-23

## 2021-09-23 RX ORDER — OXYCODONE HYDROCHLORIDE 10 MG/1
30 TABLET ORAL
Status: DISCONTINUED | OUTPATIENT
Start: 2021-09-23 | End: 2021-10-01 | Stop reason: HOSPADM

## 2021-09-23 RX ORDER — SODIUM CHLORIDE 9 MG/ML
75 INJECTION, SOLUTION INTRAVENOUS CONTINUOUS
Status: DISCONTINUED | OUTPATIENT
Start: 2021-09-23 | End: 2021-09-24

## 2021-09-23 RX ORDER — DOCUSATE SODIUM 100 MG/1
200 CAPSULE, LIQUID FILLED ORAL DAILY
Status: DISCONTINUED | OUTPATIENT
Start: 2021-09-23 | End: 2021-09-26

## 2021-09-23 RX ORDER — KETOROLAC TROMETHAMINE 15 MG/ML
15 INJECTION, SOLUTION INTRAMUSCULAR; INTRAVENOUS
Status: DISCONTINUED | OUTPATIENT
Start: 2021-09-23 | End: 2021-09-23

## 2021-09-23 RX ORDER — POLYETHYLENE GLYCOL 3350 17 G/17G
17 POWDER, FOR SOLUTION ORAL DAILY
Status: DISCONTINUED | OUTPATIENT
Start: 2021-09-23 | End: 2021-09-23

## 2021-09-23 RX ORDER — ACETAMINOPHEN 325 MG/1
650 TABLET ORAL
Status: DISCONTINUED | OUTPATIENT
Start: 2021-09-23 | End: 2021-10-01 | Stop reason: HOSPADM

## 2021-09-23 RX ORDER — GABAPENTIN 300 MG/1
600 CAPSULE ORAL 3 TIMES DAILY
Status: DISCONTINUED | OUTPATIENT
Start: 2021-09-23 | End: 2021-10-01

## 2021-09-23 RX ORDER — SODIUM CHLORIDE 0.9 % (FLUSH) 0.9 %
5-40 SYRINGE (ML) INJECTION EVERY 8 HOURS
Status: DISCONTINUED | OUTPATIENT
Start: 2021-09-23 | End: 2021-09-23

## 2021-09-23 RX ORDER — HYDROMORPHONE HYDROCHLORIDE 1 MG/ML
2 INJECTION, SOLUTION INTRAMUSCULAR; INTRAVENOUS; SUBCUTANEOUS ONCE
Status: COMPLETED | OUTPATIENT
Start: 2021-09-23 | End: 2021-09-23

## 2021-09-23 RX ORDER — HYDROMORPHONE HYDROCHLORIDE 1 MG/ML
0.5 INJECTION, SOLUTION INTRAMUSCULAR; INTRAVENOUS; SUBCUTANEOUS
Status: DISCONTINUED | OUTPATIENT
Start: 2021-09-23 | End: 2021-10-01 | Stop reason: HOSPADM

## 2021-09-23 RX ORDER — FAMOTIDINE 20 MG/1
20 TABLET, FILM COATED ORAL 2 TIMES DAILY
Status: DISCONTINUED | OUTPATIENT
Start: 2021-09-23 | End: 2021-10-01 | Stop reason: HOSPADM

## 2021-09-23 RX ORDER — VENLAFAXINE HYDROCHLORIDE 75 MG/1
150 CAPSULE, EXTENDED RELEASE ORAL DAILY
Status: DISCONTINUED | OUTPATIENT
Start: 2021-09-23 | End: 2021-09-26

## 2021-09-23 RX ORDER — BUSPIRONE HYDROCHLORIDE 10 MG/1
30 TABLET ORAL DAILY
Status: DISCONTINUED | OUTPATIENT
Start: 2021-09-23 | End: 2021-09-23

## 2021-09-23 RX ORDER — GUAIFENESIN 100 MG/5ML
81 LIQUID (ML) ORAL DAILY
Status: DISCONTINUED | OUTPATIENT
Start: 2021-09-23 | End: 2021-10-01 | Stop reason: HOSPADM

## 2021-09-23 RX ADMIN — OXYCODONE HYDROCHLORIDE 30 MG: 10 TABLET ORAL at 09:00

## 2021-09-23 RX ADMIN — SODIUM CHLORIDE 75 ML/HR: 9 INJECTION, SOLUTION INTRAVENOUS at 12:18

## 2021-09-23 RX ADMIN — HYDROMORPHONE HYDROCHLORIDE 1 MG: 1 INJECTION, SOLUTION INTRAMUSCULAR; INTRAVENOUS; SUBCUTANEOUS at 00:10

## 2021-09-23 RX ADMIN — LORAZEPAM 1 MG: 2 INJECTION INTRAMUSCULAR; INTRAVENOUS at 16:50

## 2021-09-23 RX ADMIN — POLYETHYLENE GLYCOL 3350 17 G: 17 POWDER, FOR SOLUTION ORAL at 02:08

## 2021-09-23 RX ADMIN — GABAPENTIN 600 MG: 300 CAPSULE ORAL at 09:00

## 2021-09-23 RX ADMIN — HYDROMORPHONE HYDROCHLORIDE 0.5 MG: 1 INJECTION, SOLUTION INTRAMUSCULAR; INTRAVENOUS; SUBCUTANEOUS at 18:07

## 2021-09-23 RX ADMIN — DOCUSATE SODIUM 200 MG: 100 CAPSULE, LIQUID FILLED ORAL at 09:00

## 2021-09-23 RX ADMIN — ACETAMINOPHEN 650 MG: 650 SUPPOSITORY RECTAL at 21:07

## 2021-09-23 RX ADMIN — OXYCODONE HYDROCHLORIDE 30 MG: 10 TABLET ORAL at 02:57

## 2021-09-23 RX ADMIN — Medication 1000 UNITS: at 09:00

## 2021-09-23 RX ADMIN — CARVEDILOL 6.25 MG: 3.12 TABLET, FILM COATED ORAL at 09:00

## 2021-09-23 RX ADMIN — HYDROMORPHONE HYDROCHLORIDE 2 MG: 1 INJECTION, SOLUTION INTRAMUSCULAR; INTRAVENOUS; SUBCUTANEOUS at 04:28

## 2021-09-23 RX ADMIN — FAMOTIDINE 20 MG: 20 TABLET ORAL at 09:00

## 2021-09-23 RX ADMIN — BUSPIRONE HYDROCHLORIDE 15 MG: 10 TABLET ORAL at 09:00

## 2021-09-23 RX ADMIN — ONDANSETRON 4 MG: 2 INJECTION INTRAMUSCULAR; INTRAVENOUS at 11:39

## 2021-09-23 RX ADMIN — KETOROLAC TROMETHAMINE 15 MG: 15 INJECTION, SOLUTION INTRAMUSCULAR; INTRAVENOUS at 12:18

## 2021-09-23 RX ADMIN — CARBAMAZEPINE 300 MG: 300 CAPSULE, EXTENDED RELEASE ORAL at 09:00

## 2021-09-23 RX ADMIN — PIPERACILLIN AND TAZOBACTAM 3.38 G: 3; .375 INJECTION, POWDER, LYOPHILIZED, FOR SOLUTION INTRAVENOUS at 18:07

## 2021-09-23 RX ADMIN — LACTULOSE 15 ML: 20 SOLUTION ORAL at 08:59

## 2021-09-23 RX ADMIN — SODIUM CHLORIDE 500 ML/HR: 9 INJECTION, SOLUTION INTRAVENOUS at 21:10

## 2021-09-23 RX ADMIN — BUSPIRONE HYDROCHLORIDE 15 MG: 10 TABLET ORAL at 04:02

## 2021-09-23 RX ADMIN — MAGNESIUM CITRATE 296 ML: 1.75 LIQUID ORAL at 04:04

## 2021-09-23 RX ADMIN — ASPIRIN 81 MG CHEWABLE TABLET 81 MG: 81 TABLET CHEWABLE at 09:00

## 2021-09-23 RX ADMIN — LEVOTHYROXINE SODIUM 125 MCG: 0.03 TABLET ORAL at 05:57

## 2021-09-23 RX ADMIN — VENLAFAXINE HYDROCHLORIDE 150 MG: 75 CAPSULE, EXTENDED RELEASE ORAL at 09:00

## 2021-09-23 RX ADMIN — Medication 10 ML: at 05:58

## 2021-09-23 NOTE — MED STUDENT NOTES
Hospitalist Progress Note         Gini Infante          Daily Progress Note: 9/23/2021      Subjective: The patient is seen for follow up. Patient is a 58 yo female with a past medical history of neurofibromatosis type I, chronic pain syndrome, neuropathic pain, benign essential HTN, depression, and hypothyroidism who presented to the ED with acute abdominal pain that was unlike anything she had experienced before. She is on high dose immediate release oxycodone for chronic pain but this episode was not relieved by it. Normal CT angiogram of the abdomen and pelvis. Sequela of neurofibromatosis. Constipation without obstruction    Normal CTA of the chest with no pulmonary embolism or acute aortic abnormalities identified. no acute cardiopulmonary findings. Sequela of neurofibromatosis. Patient was seen in room in acute distress. She had her knees to her chest and was moaning in pain. She says that the pain is still sharp and constant and rates it as an 8/10. She points to her sides and hypogastric region when asked where the pain is. She has not passed any bowel movements. She is on colace 200mg po every day, lactulose 10mg/15mL po tid  Daughter at bedside.           Problem List:  Problem List as of 9/23/2021 Date Reviewed: 9/23/2021        Codes Class Noted - Resolved    Abdominal pain ICD-10-CM: R10.9  ICD-9-CM: 789.00  9/23/2021 - Present        Chronic pain syndrome ICD-10-CM: G89.4  ICD-9-CM: 338.4  8/10/2017 - Present        [facility] ICD-10-CM: Q85.00  ICD-9-CM: 237.70  8/10/2017 - Present              Medications reviewed  Current Facility-Administered Medications   Medication Dose Route Frequency    carBAMazepine ER (CARBATROL ER) capsule 300 mg  300 mg Oral BID    gabapentin (NEURONTIN) capsule 600 mg  600 mg Oral TID    carvediloL (COREG) tablet 6.25 mg  6.25 mg Oral BID WITH MEALS    atorvastatin (LIPITOR) tablet 20 mg  20 mg Oral QHS    levothyroxine (SYNTHROID) tablet 125 mcg  125 mcg Oral 6am    venlafaxine-SR (EFFEXOR-XR) capsule 150 mg  150 mg Oral DAILY    oxyCODONE IR (ROXICODONE) tablet 30 mg  30 mg Oral Q6H PRN    famotidine (PEPCID) tablet 20 mg  20 mg Oral BID    aspirin chewable tablet 81 mg  81 mg Oral DAILY    cholecalciferol (VITAMIN D3) (1000 Units /25 mcg) tablet 1,000 Units  1,000 Units Oral DAILY    sodium chloride (NS) flush 5-40 mL  5-40 mL IntraVENous PRN    acetaminophen (TYLENOL) tablet 650 mg  650 mg Oral Q6H PRN    ondansetron (ZOFRAN) injection 4 mg  4 mg IntraVENous Q4H PRN    docusate sodium (COLACE) capsule 200 mg  200 mg Oral DAILY    [START ON 2021] magnesium oxide (MAG-OX) tablet 400 mg  400 mg Oral DAILY    busPIRone (BUSPAR) tablet 15 mg  15 mg Oral TID    lactulose (CHRONULAC) 10 gram/15 mL solution 15 mL  15 mL Oral TID    ketorolac (TORADOL) injection 15 mg  15 mg IntraVENous Q6H PRN       Review of Systems:   A comprehensive review of systems was negative except for that written in the HPI. Objective:   Physical Exam:     Visit Vitals  BP (!) 153/81 (BP 1 Location: Right upper arm, BP Patient Position: At rest)   Pulse 77   Temp 97.1 °F (36.2 °C)   Resp 20   Ht 5' 1\" (1.549 m)   Wt 144 lb 13.5 oz (65.7 kg)   SpO2 99%   Breastfeeding No   BMI 27.37 kg/m²      O2 Device: None (Room air)    Temp (24hrs), Av.8 °F (36.6 °C), Min:97.1 °F (36.2 °C), Max:98.2 °F (36.8 °C)    No intake/output data recorded. No intake/output data recorded. General:  Alert, cooperative, no distress, appears stated age. Lungs:   Clear to auscultation bilaterally. Chest wall:  No tenderness or deformity. Heart:  Regular rate and rhythm, S1, S2 normal, no murmur, click, rub or gallop. Abdomen:   Soft, non-tender. Bowel sounds normal. No masses,  No organomegaly. Extremities: Extremities normal, atraumatic, no cyanosis or edema. Pulses: 2+ and symmetric all extremities.    Skin: Skin color, texture, turgor normal. No rashes or lesions   Neurologic: CNII-XII intact. No gross sensory or motor deficits     Data Review:       Recent Days:  Recent Labs     09/22/21 2150   WBC 12.4*   HGB 13.7   HCT 41.2        Recent Labs     09/22/21 2224 09/22/21 2150   NA  --  135*   K  --  3.8   CL  --  100   CO2  --  27   GLU  --  110*   BUN  --  16   CREA  --  0.57   CA  --  9.1   ALB  --  3.7   TBILI  --  0.3   ALT  --  29   INR 1.2*  --      No results for input(s): PH, PCO2, PO2, HCO3, FIO2 in the last 72 hours. 24 Hour Results:  Recent Results (from the past 24 hour(s))   EKG, 12 LEAD, INITIAL    Collection Time: 09/22/21  9:30 PM   Result Value Ref Range    Ventricular Rate 83 BPM    Atrial Rate 83 BPM    P-R Interval 124 ms    QRS Duration 96 ms    Q-T Interval 388 ms    QTC Calculation (Bezet) 455 ms    Calculated P Axis 34 degrees    Calculated R Axis -55 degrees    Calculated T Axis 80 degrees    Diagnosis       Normal sinus rhythm  Low voltage QRS  Left anterior fascicular block  Septal infarct , age undetermined  Abnormal ECG  No previous ECGs available  [id number] on 9/23/2021 10:22:59 AM     CBC WITH AUTOMATED DIFF    Collection Time: 09/22/21  9:50 PM   Result Value Ref Range    WBC 12.4 (H) 3.6 - 11.0 K/uL    RBC 4.90 3.80 - 5.20 M/uL    HGB 13.7 11.5 - 16.0 g/dL    HCT 41.2 35.0 - 47.0 %    MCV 84.1 80.0 - 99.0 FL    MCH 28.0 26.0 - 34.0 PG    MCHC 33.3 30.0 - 36.5 g/dL    RDW 12.8 11.5 - 14.5 %    PLATELET 618 906 - 853 K/uL    MPV 9.9 8.9 - 12.9 FL    NRBC 0.0 0.0  WBC    ABSOLUTE NRBC 0.00 0.00 - 0.01 K/uL    NEUTROPHILS 78 (H) 32 - 75 %    LYMPHOCYTES 10 (L) 12 - 49 %    MONOCYTES 7 5 - 13 %    EOSINOPHILS 3 0 - 7 %    BASOPHILS 1 0 - 1 %    IMMATURE GRANULOCYTES 1 (H) 0 - 0.5 %    ABS. NEUTROPHILS 9.7 (H) 1.8 - 8.0 K/UL    ABS. LYMPHOCYTES 1.3 0.8 - 3.5 K/UL    ABS. MONOCYTES 0.9 0.0 - 1.0 K/UL    ABS. EOSINOPHILS 0.3 0.0 - 0.4 K/UL    ABS.  BASOPHILS 0.1 0.0 - 0.1 K/UL    ABS. IMM. GRANS. 0.1 (H) 0.00 - 0.04 K/UL    DF AUTOMATED     METABOLIC PANEL, COMPREHENSIVE    Collection Time: 09/22/21  9:50 PM   Result Value Ref Range    Sodium 135 (L) 136 - 145 mmol/L    Potassium 3.8 3.5 - 5.1 mmol/L    Chloride 100 97 - 108 mmol/L    CO2 27 21 - 32 mmol/L    Anion gap 8 5 - 15 mmol/L    Glucose 110 (H) 65 - 100 mg/dL    BUN 16 6 - 20 mg/dL    Creatinine 0.57 0.55 - 1.02 mg/dL    BUN/Creatinine ratio 28 (H) 12 - 20      GFR est AA >60 >60 ml/min/1.73m2    GFR est non-AA >60 >60 ml/min/1.73m2    Calcium 9.1 8.5 - 10.1 mg/dL    Bilirubin, total 0.3 0.2 - 1.0 mg/dL    AST (SGOT) 19 15 - 37 U/L    ALT (SGPT) 29 12 - 78 U/L    Alk. phosphatase 133 (H) 45 - 117 U/L    Protein, total 6.6 6.4 - 8.2 g/dL    Albumin 3.7 3.5 - 5.0 g/dL    Globulin 2.9 2.0 - 4.0 g/dL    A-G Ratio 1.3 1.1 - 2.2     TYPE & SCREEN    Collection Time: 09/22/21 10:24 PM   Result Value Ref Range    Crossmatch Expiration 09/25/2021,2359     ABO/Rh(D) [place] Positive     Antibody screen Negative    PROTHROMBIN TIME + INR    Collection Time: 09/22/21 10:24 PM   Result Value Ref Range    Prothrombin time 14.5 11.9 - 14.7 sec    INR 1.2 (H) 0.9 - 1.1     PTT    Collection Time: 09/22/21 10:24 PM   Result Value Ref Range    aPTT 29.2 21.2 - 34.1 sec    aPTT, therapeutic range   82 - 109 sec           Assessment/   Severe abdominal pain   Severe constipation  Neurofibromatosis type I on regular follow-up with neurology at [facility] [place]. She is also on regular follow-up with the pain specialist outpatient. Severe neuropathic pain  Hypothyroidism  Benign essential hypertension  Depression    Plan:  Monitor for bowel movement   Consult GI for bowel clean out    Care Plan discussed with: Patient/Family    Total time spent with patient: 30 minutes. Elizabeth Anderson  *ATTENTION:  This note has been created by a medical student for educational purposes only.   Please do not refer to the content of this note for clinical decision-making, billing, or other purposes. Please see attending physicians note to obtain clinical information on this patient. *

## 2021-09-23 NOTE — ROUTINE PROCESS
TRANSFER - OUT REPORT:    Verbal report given to Shelly Ding RN on Ken Harding  being transferred to  for routine progression of care       Report consisted of patients Situation, Background, Assessment and   Recommendations(SBAR). Information from the following report(s) SBAR, ED Summary, Procedure Summary, Intake/Output, MAR and Recent Results was reviewed with the receiving nurse. Lines:       Opportunity for questions and clarification was provided.       Patient transported with:   [employer]

## 2021-09-23 NOTE — PROGRESS NOTES
Reason for Admission:  Abdominal pain                     RUR Score:  6%                   Plan for utilizing home health: Will need home health most likely        PCP: First and Last name:  OtherHenry MD     Name of Practice: [facility]   Are you a current patient: Yes/No: yes   Approximate date of last visit: late august/early september    Can you participate in a virtual visit with your PCP: yes                    Current Advanced Directive/Advance Care Plan: Full Code      Healthcare Decision Maker:     Arsh Ramirez [phone]  Click here to complete [address] including selection of the Healthcare Decision Maker Relationship (ie \"Primary\")                             Transition of Care Plan:                    Met with patients arsh Montoya. Patient lives with her daughter along with daughters  and two sons in a 2 story house where she lives on the first floor. She uses a rollator primarily and a wheel chair when she leaves the house. Has a shower chair and has 1 grab bar in and out of shower. Shower is on 2 floor of house and is difficult for patient to get to. Her daughter takes her to appointments. They have used home health before in the past for PT and OT but daughter has expressed needs for PCAs. Daughter gave me the ok to send information to our public benefits department to screen for medicaid. Information has been sent. DC plan is home with home health at this time.

## 2021-09-23 NOTE — ED PROVIDER NOTES
EMERGENCY DEPARTMENT HISTORY AND PHYSICAL EXAM      Date: 9/22/2021  Patient Name: Emmanuelle Lincoln      History of Presenting Illness     Chief Complaint   Patient presents with    Abdominal Pain       History Provided By: Patient and Patient's Daughter    HPI: Emmanuelle Lincoln, 59 y.o. female with PMH of neurofibromatosis, hypertension, hypothyroidism, seizure disorder, depression, chronic pain syndrome due to neurofibromatosis presenting for acute abdominal pain. Patient reports that has been having abdominal pain epigastric, that feels sharp and radiates to her back, no known aggravating or alleviating factors with no associated nausea, vomiting, diarrhea or constipation. Patient reports that she had chronic abdominal pain but this is completely different from baseline. Patient is denying chest pain, shortness of breath, coughing, fever, runny nose, nasal congestion, being close to contacts who are sick. There are no other complaints, changes, or physical findings at this time.     PCP: Henry Santamaria MD    Current Facility-Administered Medications   Medication Dose Route Frequency Provider Last Rate Last Admin    carBAMazepine ER (CARBATROL ER) capsule 300 mg  300 mg Oral BID Zoya Barksdale MD        gabapentin (NEURONTIN) capsule 600 mg  600 mg Oral TID Zoya Barksdale MD        carvediloL (COREG) tablet 6.25 mg  6.25 mg Oral BID WITH MEALS Zoya Barksdale MD        atorvastatin (LIPITOR) tablet 20 mg  20 mg Oral QHS Zoya Barksdale MD        levothyroxine (SYNTHROID) tablet 125 mcg  125 mcg Oral Ken Disla MD   125 mcg at 09/23/21 0557    venlafaxine-SR (EFFEXOR-XR) capsule 150 mg  150 mg Oral DAILY Zoya Barksdale MD        oxyCODONE IR (ROXICODONE) tablet 30 mg  30 mg Oral Q6H PRN Zoya Barksdale MD        famotidine (PEPCID) tablet 20 mg  20 mg Oral BID Zoya Barksdale MD        aspirin chewable tablet 81 mg  81 mg Oral DAILY River Eric Angelo MD        cholecalciferol (VITAMIN D3) (1000 Units /25 mcg) tablet 1,000 Units  1,000 Units Oral DAILY Viktoria Castañeda MD        sodium chloride (NS) flush 5-40 mL  5-40 mL IntraVENous PRN Viktoria Castañeda MD        acetaminophen (TYLENOL) tablet 650 mg  650 mg Oral Q6H PRN Viktoria Castañeda MD        [facility] injection 4 mg  4 mg IntraVENous Q4H PRN Viktoria Castañeda MD        docusate sodium (COLACE) capsule 200 mg  200 mg Oral DAILY Viktoria Castañeda MD       [facility] [START ON 9/24/2021] magnesium oxide (MAG-OX) tablet 400 mg  400 mg Oral DAILY Viktoria Castañeda MD        busPIRone (BUSPAR) tablet 15 mg  15 mg Oral TID Viktoria Castañeda MD   15 mg at 09/23/21 0402    lactulose (CHRONULAC) 10 gram/15 mL solution 15 mL  15 mL Oral TID Vicente Kasper MD           Past History     Past Medical History:  Past Medical History:   Diagnosis Date    Arthritis     Depression     Epilepsy ([address]     Hypertension     Ill-defined condition     neurofibromatosis    Thyroid disease        Past Surgical History:  Past Surgical History:   Procedure Laterality Date    HX GYN      HX ORTHOPAEDIC      NEUROLOGICAL PROCEDURE UNLISTED         Family History:  Family History   Problem Relation Age of Onset    Cancer Mother         glioblastoma    Lung Disease Father     Cancer Father         mesothelioma, testicular cancer       Social History:  Social History     Tobacco Use    Smoking status: Never Smoker    Smokeless tobacco: Never Used   Substance Use Topics    Alcohol use: No    Drug use: Never       Allergies: Allergies   Allergen Reactions    Codeine Nausea and Vomiting    Methadone Hives    Morphine Other (comments)     psychosis         Review of Systems     Review of Systems   Constitutional: Negative for activity change, chills and fever. HENT: Negative for congestion and facial swelling.     Eyes: Negative for discharge and visual disturbance. Respiratory: Negative for chest tightness and shortness of breath. Cardiovascular: Negative for chest pain and leg swelling. Gastrointestinal: Positive for abdominal pain. Negative for diarrhea and vomiting. Genitourinary: Negative for dysuria and flank pain. Musculoskeletal: Negative for back pain and gait problem. Skin: Negative for rash and wound. Neurological: Negative for dizziness, weakness and headaches. Physical Exam     Physical Exam  Constitutional:       Comments: Alert, writhing in bed, elderly  female   HENT:      Head: Normocephalic and atraumatic. Mouth/Throat:      Mouth: Mucous membranes are moist.      Pharynx: Oropharynx is clear. Eyes:      Extraocular Movements: Extraocular movements intact. Pupils: Pupils are equal, round, and reactive to light. Cardiovascular:      Rate and Rhythm: Normal rate and regular rhythm. Pulmonary:      Effort: Pulmonary effort is normal.      Breath sounds: Normal breath sounds. Abdominal:      General: Bowel sounds are normal. There is distension. Palpations: Abdomen is soft. Tenderness: There is abdominal tenderness in the epigastric area. Skin:     General: Skin is warm and dry. Neurological:      General: No focal deficit present. Mental Status: She is oriented to person, place, and time.          Lab and Diagnostic Study Results     Labs -     Recent Results (from the past 12 hour(s))   CBC WITH AUTOMATED DIFF    Collection Time: 09/22/21  9:50 PM   Result Value Ref Range    WBC 12.4 (H) 3.6 - 11.0 K/uL    RBC 4.90 3.80 - 5.20 M/uL    HGB 13.7 11.5 - 16.0 g/dL    HCT 41.2 35.0 - 47.0 %    MCV 84.1 80.0 - 99.0 FL    MCH 28.0 26.0 - 34.0 PG    MCHC 33.3 30.0 - 36.5 g/dL    RDW 12.8 11.5 - 14.5 %    PLATELET 735 720 - 346 K/uL    MPV 9.9 8.9 - 12.9 FL    NRBC 0.0 0.0  WBC    ABSOLUTE NRBC 0.00 0.00 - 0.01 K/uL    NEUTROPHILS 78 (H) 32 - 75 %    LYMPHOCYTES 10 (L) 12 - 49 % MONOCYTES 7 5 - 13 %    EOSINOPHILS 3 0 - 7 %    BASOPHILS 1 0 - 1 %    IMMATURE GRANULOCYTES 1 (H) 0 - 0.5 %    ABS. NEUTROPHILS 9.7 (H) 1.8 - 8.0 K/UL    ABS. LYMPHOCYTES 1.3 0.8 - 3.5 K/UL    ABS. MONOCYTES 0.9 0.0 - 1.0 K/UL    ABS. EOSINOPHILS 0.3 0.0 - 0.4 K/UL    ABS. BASOPHILS 0.1 0.0 - 0.1 K/UL    ABS. IMM. GRANS. 0.1 (H) 0.00 - 0.04 K/UL    DF AUTOMATED     METABOLIC PANEL, COMPREHENSIVE    Collection Time: 09/22/21  9:50 PM   Result Value Ref Range    Sodium 135 (L) 136 - 145 mmol/L    Potassium 3.8 3.5 - 5.1 mmol/L    Chloride 100 97 - 108 mmol/L    CO2 27 21 - 32 mmol/L    Anion gap 8 5 - 15 mmol/L    Glucose 110 (H) 65 - 100 mg/dL    BUN 16 6 - 20 mg/dL    Creatinine 0.57 0.55 - 1.02 mg/dL    BUN/Creatinine ratio 28 (H) 12 - 20      GFR est AA >60 >60 ml/min/1.73m2    GFR est non-AA >60 >60 ml/min/1.73m2    Calcium 9.1 8.5 - 10.1 mg/dL    Bilirubin, total 0.3 0.2 - 1.0 mg/dL    AST (SGOT) 19 15 - 37 U/L    ALT (SGPT) 29 12 - 78 U/L    Alk. phosphatase 133 (H) 45 - 117 U/L    Protein, total 6.6 6.4 - 8.2 g/dL    Albumin 3.7 3.5 - 5.0 g/dL    Globulin 2.9 2.0 - 4.0 g/dL    A-G Ratio 1.3 1.1 - 2.2     TYPE & SCREEN    Collection Time: 09/22/21 10:24 PM   Result Value Ref Range    Crossmatch Expiration 09/25/2021,2359     ABO/Rh(D) Kai Aimee Positive     Antibody screen Negative    PROTHROMBIN TIME + INR    Collection Time: 09/22/21 10:24 PM   Result Value Ref Range    Prothrombin time 14.5 11.9 - 14.7 sec    INR 1.2 (H) 0.9 - 1.1     PTT    Collection Time: 09/22/21 10:24 PM   Result Value Ref Range    aPTT 29.2 21.2 - 34.1 sec    aPTT, therapeutic range   82 - 109 sec       Radiologic Studies -   [unfilled]  CT Results  (Last 48 hours)               09/22/21 2325  CTA CHEST W OR W WO CONT Final result    Impression:  Normal CTA of the chest with no pulmonary embolism or acute aortic   abnormalities identified. no acute cardiopulmonary findings. Sequela of   neurofibromatosis.                HISTORY:  upper abdominal pain radiating to back. r/o dissection   Dose reduction technique: All CT scans at this facility are performed using dose reduction optimization   technique as appropriate on the exam including the following: Automated exposure   control, adjustment of the MA and/or KV according to patient size of use of   iterative reconstructive technique. .       TECHNIQUE: CTA CHEST W OR W WO CONT, CTA ABD PELV W WO CONT. CT angiogram of the   abdomen and pelvis performed and maximum intensity projection images (MIPS)   generated. 100 cc Isovue-370 injected. COMPARISON: None   LIMITATIONS: None       CHEST: See above       AORTA: No aneurysm or dissection. CELIAC AXIS: Patent. SUPERIOR MESENTERIC ARTERY: Patent. RENAL ARTERIES: Patent. INFERIOR MESENTERIC ARTERY: Patent. ILIAC ARTERIES: Patent. Visualized femoral vasculature:  Patent. LIVER: Normal.     GALLBLADDER: Normal.      BILIARY TREE: Normal.      PANCREAS: Normal.     SPLEEN: Normal.     ADRENAL GLANDS: The adrenal glands themselves appear normal.   KIDNEYS/URETERS/BLADDER: Normal. Soft tissue masses along the course of the   bilateral renal arteries similar to retroperitoneal and prevertebral masses   elsewhere   RETROPERITONEUM: Normal.    BOWEL/MESENTERY: Large amount of stool is present throughout the length of the   colon. I do not identify a jamir bowel obstruction. Numerous soft tissue masses   throughout the central abdominal mesentery are noted and may indicate adenopathy   and/or be related to the patient's numerous nerve sheath tumors. APPENDIX: Identified and normal.   PERITONEAL CAVITY: Normal.     REPRODUCTIVE ORGANS: Normal.     BONE/TISSUES: No acute osseous abnormality.  There are however numerous soft   tissue masses of the peripheral nerves at the level of the neural foramina and   extending anterior to them and particularly noted in the presacral region, a couple of these are calcified. These do anteriorly displaced some of the pelvic   contents including the bladder, uterus and sigmoid colon. OTHER: None       IMPRESSION: Normal CT angiogram of the abdomen and pelvis. Sequela of   neurofibromatosis. Constipation without obstruction. [facility]23[facility] 12:12 AM.                   Narrative:  HISTORY:  upper abdominal pain radiating to back. r/o dissection. Per my   conversation with the ER physician the patient has a history of   neurofibromatosis. Dose reduction technique: All CT scans at this facility are performed using dose reduction optimization   technique as appropriate on the exam including the following: Automated exposure   control, adjustment of the MA and/or KV according to patient size and/or use of   iterative reconstructive technique. TECHNIQUE: CTA CHEST W OR W WO CONT, CTA ABD PELV W WO CONT. CT angiogram of   the chest is performed with maximum intensity projection images (MIPS)   generated. 100 cc Isovue-370 injected. COMPARISON: None   LIMITATIONS: None       LINES/TUBES/MEDICAL SUPPORT DEVICES:   None         LUNG PARENCHYMA: Normal   TRACHEA/BRONCHI: Normal   PULMONARY VESSELS: Normal. No definitive persistent central filling defects   identified on multiple contiguous images to diagnose pulmonary embolism. PLEURA: Normal   MEDIASTINUM: Normal   HEART: Normal   AORTA/GREAT VESSELS: No aortic aneurysm or dissection. ESOPHAGUS: Normal   AXILLAE: Normal   BONES/TISSUES: No acute osseous abnormality. There are however numerous presumed   peripheral nerve sheath tumors throughout the thoracic spine characterize as   hypodense soft tissue masses in the posterior mediastinum at at and anterior to   the neural foramen throughout. UPPER ABDOMEN: See below.    OTHER: None           09/22/21 7625  CTA ABD PELV W WO CONT Final result    Impression:  Normal CTA of the chest with no pulmonary embolism or acute aortic   abnormalities identified. no acute cardiopulmonary findings. Sequela of   neurofibromatosis. HISTORY:  upper abdominal pain radiating to back. r/o dissection   Dose reduction technique: All CT scans at this facility are performed using dose reduction optimization   technique as appropriate on the exam including the following: Automated exposure   control, adjustment of the MA and/or KV according to patient size of use of   iterative reconstructive technique. .       TECHNIQUE: CTA CHEST W OR W WO CONT, CTA ABD PELV W WO CONT. CT angiogram of the   abdomen and pelvis performed and maximum intensity projection images (MIPS)   generated. 100 cc Isovue-370 injected. COMPARISON: None   LIMITATIONS: None       CHEST: See above       AORTA: No aneurysm or dissection. CELIAC AXIS: Patent. SUPERIOR MESENTERIC ARTERY: Patent. RENAL ARTERIES: Patent. INFERIOR MESENTERIC ARTERY: Patent. ILIAC ARTERIES: Patent. Visualized femoral vasculature:  Patent. LIVER: Normal.     GALLBLADDER: Normal.      BILIARY TREE: Normal.      PANCREAS: Normal.     SPLEEN: Normal.     ADRENAL GLANDS: The adrenal glands themselves appear normal.   KIDNEYS/URETERS/BLADDER: Normal. Soft tissue masses along the course of the   bilateral renal arteries similar to retroperitoneal and prevertebral masses   elsewhere   RETROPERITONEUM: Normal.    BOWEL/MESENTERY: Large amount of stool is present throughout the length of the   colon. I do not identify a jamir bowel obstruction. Numerous soft tissue masses   throughout the central abdominal mesentery are noted and may indicate adenopathy   and/or be related to the patient's numerous nerve sheath tumors. APPENDIX: Identified and normal.   PERITONEAL CAVITY: Normal.     REPRODUCTIVE ORGANS: Normal.     BONE/TISSUES: No acute osseous abnormality.  There are however numerous soft   tissue masses of the peripheral nerves at the level of the neural foramina and   extending anterior to them and particularly noted in the presacral region, a   couple of these are calcified. These do anteriorly displaced some of the pelvic   contents including the bladder, uterus and sigmoid colon. OTHER: None       IMPRESSION: Normal CT angiogram of the abdomen and pelvis. Sequela of   neurofibromatosis. Constipation without obstruction. Results called to [facility] on 9/23/2021 12:12 AM.                   Narrative:  HISTORY:  upper abdominal pain radiating to back. r/o dissection. Per my   conversation with the ER physician the patient has a history of   neurofibromatosis. Dose reduction technique: All CT scans at this facility are performed using dose reduction optimization   technique as appropriate on the exam including the following: Automated exposure   control, adjustment of the MA and/or KV according to patient size and/or use of   iterative reconstructive technique. TECHNIQUE: CTA CHEST W OR W WO CONT, CTA ABD PELV W WO CONT. CT angiogram of   the chest is performed with maximum intensity projection images (MIPS)   generated. 100 cc Isovue-370 injected. COMPARISON: None   LIMITATIONS: None       LINES/TUBES/MEDICAL SUPPORT DEVICES:   None         LUNG PARENCHYMA: Normal   TRACHEA/BRONCHI: Normal   PULMONARY VESSELS: Normal. No definitive persistent central filling defects   identified on multiple contiguous images to diagnose pulmonary embolism. PLEURA: Normal   MEDIASTINUM: Normal   HEART: Normal   AORTA/GREAT VESSELS: No aortic aneurysm or dissection. ESOPHAGUS: Normal   AXILLAE: Normal   BONES/TISSUES: No acute osseous abnormality.  There are however numerous presumed   peripheral nerve sheath tumors throughout the thoracic spine characterize as   hypodense soft tissue masses in the posterior mediastinum at at and anterior to   the neural foramen throughout. UPPER ABDOMEN: See below. OTHER: None               CXR Results  (Last 48 hours)    None          Medical Decision Making and ED Course   - I am the first and primary provider for this patient AND AM THE PRIMARY PROVIDER OF RECORD. - I reviewed the vital signs, available nursing notes, past medical history, past surgical history, family history and social history. - Initial assessment performed. The patients presenting problems have been discussed, and the staff are in agreement with the care plan formulated and outlined with them. I have encouraged them to ask questions as they arise throughout their visit. Vital Signs-Reviewed the patient's vital signs. Patient Vitals for the past 12 hrs:   Temp Pulse Resp BP SpO2   09/23/21 0713 97.1 °F (36.2 °C) 77 20 (!) 153/81 99 %   09/23/21 0516 98.1 °F (36.7 °C) 80 18 (!) 152/87 99 %   09/23/21 0341 -- 71 17 (!) 160/78 100 %   09/23/21 0000 -- 80 17 (!) 142/95 97 %   09/22/21 2300 -- 79 15 (!) 149/84 97 %   09/22/21 2131 98.2 °F (36.8 °C) 81 18 (!) 168/107 97 %         Records Reviewed: Nursing Notes and Old Medical Records    Provider Notes (Medical Decision Making):   27-year-old female with PMH as above presented to [address] [place] [address] with epigastric abdominal pain radiating to the back. Patient appears to be writhing in bed. Concerns for this point is acute pancreatitis, aortic dissection, or small bowel obstruction. Other differential diagnoses were considered and does not appear to be likely. At this point, we will start by getting CBC, chemistry, PT/INR, type and screen anticipation of blood transfusion and if the patient actually has a dissection. Additionally, we will get CTA chest/abdomen/pelvis dissection protocol. Pain control will be with intravenous opiates. ED Course:   I did review the patient work-up.   Her CBC was normal, coags were normal, chemistry also within normal.  Her CT did not show signs of dissection, pancreatitis or small bowel obstruction. However, the radiologist reports large stool burden without obstruction. Thus, will give the patient soapsuds enema and reassess. Update 3 AM  After 2 Fleet enemas, unsuccessful with have a bowel movement. Patient still requiring medications for pain control. I will admit the patient for pain control and bowel cleanout. Disposition     Disposition: Condition stable  Admitted to Floor Medical Floor the case was discussed with the admitting physician Dr. William Garay          Diagnosis     Clinical Impression:   1. Abdominal pain, unspecified abdominal location    2. Constipation, unspecified constipation type        Attestations: Parish Mora MD    Please note that this dictation was completed with [employer], the computer voice recognition software. Quite often unanticipated grammatical, syntax, homophones, and other interpretive errors are inadvertently transcribed by the computer software. Please disregard these errors. Please excuse any errors that have escaped final proofreading. Thank you.

## 2021-09-23 NOTE — PROGRESS NOTES
Hospitalist Progress Note         Yeny Rosario MD          Daily Progress Note: 9/23/2021      Subjective: The patient is seen for same day follow up. Patient is a 60 yo female with a past medical history of neurofibromatosis type I, chronic pain syndrome, neuropathic pain, benign essential HTN, depression, and hypothyroidism who presented to the ED with acute abdominal pain that was unlike anything she had experienced before. She is on high dose immediate release oxycodone for chronic pain but this episode was not relieved by it.      Normal CT angiogram of the abdomen and pelvis. Sequela of neurofibromatosis. Constipation without obstruction     Normal CTA of the chest with no pulmonary embolism or acute aortic abnormalities identified.  no acute cardiopulmonary findings. Sequela of neurofibromatosis.     Patient was seen in room in acute distress. She had her knees to her chest and was moaning in pain. She says that the pain is still sharp and constant and rates it as an 8/10. She points to her sides and hypogastric region when asked where the pain is. She has not passed any bowel movements. She is on colace 200mg po every day, lactulose 10mg/15mL po tid  Daughter at bedside. No bowel movement from last night.   Received enemas x2 in the ED without good results          Problem List:  Problem List as of 9/23/2021 Date Reviewed: 9/23/2021        Codes Class Noted - Resolved    Abdominal pain ICD-10-CM: R10.9  ICD-9-CM: 789.00  9/23/2021 - Present        Chronic pain syndrome ICD-10-CM: G89.4  ICD-9-CM: 338.4  8/10/2017 - Present        [facility] ICD-10-CM: Q85.00  ICD-9-CM: 237.70  8/10/2017 - Present              Medications reviewed  Current Facility-Administered Medications   Medication Dose Route Frequency    carBAMazepine ER (CARBATROL ER) capsule 300 mg  300 mg Oral BID    gabapentin (NEURONTIN) capsule 600 mg  600 mg Oral TID    carvediloL (COREG) tablet 6.25 mg  6.25 mg Oral BID WITH MEALS    atorvastatin (LIPITOR) tablet 20 mg  20 mg Oral QHS    levothyroxine (SYNTHROID) tablet 125 mcg  125 mcg Oral 6am    venlafaxine-SR (EFFEXOR-XR) capsule 150 mg  150 mg Oral DAILY    oxyCODONE IR (ROXICODONE) tablet 30 mg  30 mg Oral Q6H PRN    famotidine (PEPCID) tablet 20 mg  20 mg Oral BID    aspirin chewable tablet 81 mg  81 mg Oral DAILY    cholecalciferol (VITAMIN D3) (1000 Units /25 mcg) tablet 1,000 Units  1,000 Units Oral DAILY    sodium chloride (NS) flush 5-40 mL  5-40 mL IntraVENous PRN    acetaminophen (TYLENOL) tablet 650 mg  650 mg Oral Q6H PRN    ondansetron (ZOFRAN) injection 4 mg  4 mg IntraVENous Q4H PRN    docusate sodium (COLACE) capsule 200 mg  200 mg Oral DAILY    [START ON 2021] magnesium oxide (MAG-OX) tablet 400 mg  400 mg Oral DAILY    busPIRone (BUSPAR) tablet 15 mg  15 mg Oral TID    lactulose (CHRONULAC) 10 gram/15 mL solution 15 mL  15 mL Oral TID    0.9% sodium chloride infusion  75 mL/hr IntraVENous CONTINUOUS    LORazepam (ATIVAN) injection 1 mg  1 mg IntraVENous Q4H PRN    HYDROmorphone (DILAUDID) syringe 0.5 mg  0.5 mg IntraVENous Q4H PRN    piperacillin-tazobactam (ZOSYN) 3.375 g in 0.9% sodium chloride (MBP/ADV) 100 mL MBP  3.375 g IntraVENous Q8H       Review of Systems:   A comprehensive review of systems was negative except for that written in the HPI. Objective:   Physical Exam:     Visit Vitals  /69 (BP 1 Location: Right upper arm, BP Patient Position: At rest)   Pulse 82   Temp 99.7 °F (37.6 °C)   Resp 20   Ht 5' 1\" (1.549 m)   Wt 65.7 kg (144 lb 13.5 oz)   SpO2 95%   Breastfeeding No   BMI 27.37 kg/m²      O2 Device: None (Room air)    Temp (24hrs), Av.3 °F (36.8 °C), Min:97.1 °F (36.2 °C), Max:99.7 °F (37.6 °C)     07 -  1900  In: 200 [P.O.:200]  Out: -    No intake/output data recorded. General:  Alert, cooperative, no distress, appears stated age.    Lungs:   Clear to auscultation bilaterally. Chest wall:  No tenderness or deformity. Heart:  Regular rate and rhythm, S1, S2 normal, no murmur, click, rub or gallop. Abdomen:   Soft, non-tender. Bowel sounds normal. No masses,  No organomegaly. Extremities: Extremities normal, atraumatic, no cyanosis or edema. Pulses: 2+ and symmetric all extremities. Skin: Skin color, texture, turgor normal. No rashes or lesions   Neurologic: CNII-XII intact. No gross sensory or motor deficits     Data Review:       Recent Days:  Recent Labs     09/22/21 2150   WBC 12.4*   HGB 13.7   HCT 41.2        Recent Labs     09/22/21 2224 09/22/21 2150   NA  --  135*   K  --  3.8   CL  --  100   CO2  --  27   GLU  --  110*   BUN  --  16   CREA  --  0.57   CA  --  9.1   ALB  --  3.7   TBILI  --  0.3   ALT  --  29   INR 1.2*  --      No results for input(s): PH, PCO2, PO2, HCO3, FIO2 in the last 72 hours.     24 Hour Results:  Recent Results (from the past 24 hour(s))   EKG, 12 LEAD, INITIAL    Collection Time: 09/22/21  9:30 PM   Result Value Ref Range    Ventricular Rate 83 BPM    Atrial Rate 83 BPM    P-R Interval 124 ms    QRS Duration 96 ms    Q-T Interval 388 ms    QTC Calculation (Bezet) 455 ms    Calculated P Axis 34 degrees    Calculated R Axis -55 degrees    Calculated T Axis 80 degrees    Diagnosis       Normal sinus rhythm  Low voltage QRS  Left anterior fascicular block  Septal infarct , age undetermined  Abnormal ECG  No previous ECGs available  [id number] on 9/23/2021 10:22:59 AM     CBC WITH AUTOMATED DIFF    Collection Time: 09/22/21  9:50 PM   Result Value Ref Range    WBC 12.4 (H) 3.6 - 11.0 K/uL    RBC 4.90 3.80 - 5.20 M/uL    HGB 13.7 11.5 - 16.0 g/dL    HCT 41.2 35.0 - 47.0 %    MCV 84.1 80.0 - 99.0 FL    MCH 28.0 26.0 - 34.0 PG    MCHC 33.3 30.0 - 36.5 g/dL    RDW 12.8 11.5 - 14.5 %    PLATELET 527 330 - 662 K/uL    MPV 9.9 8.9 - 12.9 FL    NRBC 0.0 0.0  WBC    ABSOLUTE NRBC 0.00 0.00 - 0.01 K/uL    NEUTROPHILS 78 (H) 32 - 75 %    LYMPHOCYTES 10 (L) 12 - 49 %    MONOCYTES 7 5 - 13 %    EOSINOPHILS 3 0 - 7 %    BASOPHILS 1 0 - 1 %    IMMATURE GRANULOCYTES 1 (H) 0 - 0.5 %    ABS. NEUTROPHILS 9.7 (H) 1.8 - 8.0 K/UL    ABS. LYMPHOCYTES 1.3 0.8 - 3.5 K/UL    ABS. MONOCYTES 0.9 0.0 - 1.0 K/UL    ABS. EOSINOPHILS 0.3 0.0 - 0.4 K/UL    ABS. BASOPHILS 0.1 0.0 - 0.1 K/UL    ABS. IMM. GRANS. 0.1 (H) 0.00 - 0.04 K/UL    DF AUTOMATED     METABOLIC PANEL, COMPREHENSIVE    Collection Time: 09/22/21  9:50 PM   Result Value Ref Range    Sodium 135 (L) 136 - 145 mmol/L    Potassium 3.8 3.5 - 5.1 mmol/L    Chloride 100 97 - 108 mmol/L    CO2 27 21 - 32 mmol/L    Anion gap 8 5 - 15 mmol/L    Glucose 110 (H) 65 - 100 mg/dL    BUN 16 6 - 20 mg/dL    Creatinine 0.57 0.55 - 1.02 mg/dL    BUN/Creatinine ratio 28 (H) 12 - 20      GFR est AA >60 >60 ml/min/1.73m2    GFR est non-AA >60 >60 ml/min/1.73m2    Calcium 9.1 8.5 - 10.1 mg/dL    Bilirubin, total 0.3 0.2 - 1.0 mg/dL    AST (SGOT) 19 15 - 37 U/L    ALT (SGPT) 29 12 - 78 U/L    Alk.  phosphatase 133 (H) 45 - 117 U/L    Protein, total 6.6 6.4 - 8.2 g/dL    Albumin 3.7 3.5 - 5.0 g/dL    Globulin 2.9 2.0 - 4.0 g/dL    A-G Ratio 1.3 1.1 - 2.2     TYPE & SCREEN    Collection Time: 09/22/21 10:24 PM   Result Value Ref Range    Crossmatch Expiration 09/25/2021,2359     ABO/Rh(D) [place] Positive     Antibody screen Negative    PROTHROMBIN TIME + INR    Collection Time: 09/22/21 10:24 PM   Result Value Ref Range    Prothrombin time 14.5 11.9 - 14.7 sec    INR 1.2 (H) 0.9 - 1.1     PTT    Collection Time: 09/22/21 10:24 PM   Result Value Ref Range    aPTT 29.2 21.2 - 34.1 sec    aPTT, therapeutic range   82 - 109 sec           Assessment/     Severe abdominal pain likely constipation related  Chronic pain syndrome likely due to neurofibromatosis  Benign essential hypertension  Hypothyroidism  History of depression      Plan:  Continue supportive care including lactulose  Toradol 50 mg x 1 dose given earlier  Empirically begin Zosyn 3.35 mg every 8 hours  We will begin Dilaudid 0.5 mg every 6 hours as needed  1 mg IV Ativan as needed for withdrawal  General surgical consultation for constipation  We will consult neurologist for input  Plan of care discussed with daughter in the presence of Eduardo Jesse Maria discussed with: Nurse    Total time spent with patient: 30 minutes.     Ren Jones MD

## 2021-09-23 NOTE — H&P
History and Physical              Subjective :   Chief Complaint : Abdominal pain. Chronic pain syndrome subjective neurofibromatosis. Source of information : Daughter at bedside as patient is keep crying and unable to hold and talk to me. History of present illness:   57-year-old female with a history of neurofibromatosis and chronic pain on high doses of immediate release oxycodone presents to the emergency room today complaining of significant abdominal pain. States usually the pain medications keep her pain well controlled, but today she continued to have this pain as her stomach is twisting and nauseated. The pain is so severe she was just continuously moaning and holding the abdomen. Unable to get information from her. According to the daughter she is being followed by the nephrology regularly. Past Medical History:   Diagnosis Date    Arthritis     Depression     Epilepsy ([address]     Hypertension     Ill-defined condition     neurofibromatosis    Thyroid disease      Past Surgical History:   Procedure Laterality Date    HX GYN      HX ORTHOPAEDIC      NEUROLOGICAL PROCEDURE UNLISTED       Family History   Problem Relation Age of Onset    Cancer Mother         glioblastoma    Lung Disease Father     Cancer Father         mesothelioma, testicular cancer      Social History     Tobacco Use    Smoking status: Never Smoker    Smokeless tobacco: Never Used   Substance Use Topics    Alcohol use: No       Prior to Admission medications    Medication Sig Start Date End Date Taking? Authorizing Provider   famotidine (Pepcid) 20 mg tablet Take 20 mg by mouth two (2) times a day. Yes Rosalio, MD Henry   celecoxib (CELEBREX) 200 mg capsule Take 200 mg by mouth two (2) times a day. Yes Rosalio, MD Henry   aspirin 81 mg chewable tablet Take 81 mg by mouth daily. Yes Rosalio, MD Henry   cholecalciferol (Vitamin D3) 25 mcg (1,000 unit) cap Take  by mouth daily.    Yes Henry Santamaria MD   carBAMazepine ER (CARBATROL ER) 300 mg capsule Take 300 mg by mouth two (2) times a day. Yes Provider, Historical   gabapentin (NEURONTIN) 600 mg tablet Take  by mouth three (3) times daily. Yes Provider, Historical   carvedilol (COREG) 6.25 mg tablet Take  by mouth two (2) times daily (with meals). Yes Provider, Historical   simvastatin (ZOCOR) 40 mg tablet Take  by mouth nightly. Yes Provider, Historical   levothyroxine (SYNTHROID) 125 mcg tablet Take  by mouth Daily (before breakfast). Yes Provider, Historical   busPIRone (BUSPAR) 30 mg tablet Take 30 mg by mouth daily. Yes Provider, Historical   venlafaxine-SR (EFFEXOR XR) 150 mg capsule Take  by mouth daily. Yes Provider, Historical   oxyCODONE IR (OXY-IR) 30 mg immediate release tablet Take 1 Tab by mouth every six (6) hours as needed for Pain. Max Daily Amount: 120 mg. 8/4/17  Yes Gweneth Hodgkins, MD     Allergies   Allergen Reactions    Codeine Nausea and Vomiting    Methadone Hives    Morphine Other (comments)     psychosis             Review of Systems: Abdominal pain as mentioned, unable to get any other information from patient as she was constantly moaning and crying with pain and not speaking. Vitals:     Patient Vitals for the past 12 hrs:   Temp Pulse Resp BP SpO2   09/23/21 0516 98.1 °F (36.7 °C) 80 18 (!) 152/87 99 %   09/23/21 0341 -- 71 17 (!) 160/78 100 %   09/23/21 0000 -- 80 17 (!) 142/95 97 %   09/22/21 2300 -- 79 15 (!) 149/84 97 %   09/22/21 2131 98.2 °F (36.8 °C) 81 18 (!) 168/107 97 %       Physical Exam:   General : No acute distress noted  HEENT :  dry  oral mucosa, atraumatic normocephalic, Normal ear and nose. Neck : Supple, no JVD, no masses noted, no carotid bruit. Lungs : Breath sounds with moderate air entry bilaterally, no wheezes or rales, no accessory muscle use. CVS : Rhythm rate regular, S1+, S2+, no murmur or gallop. Abdomen : Soft, nontender, no organomegaly, bowel sounds active.   Extremities : No edema noted,  pedal pulses not palpable. Musculoskeletal : Fair range of motion, no joint swelling or effusion, muscle tone and power appears normal.   Skin : Moist, warm, skin turgor, no pathological rash. Lymphatic : No cervical lymphadenopathy. Neurological : Awake, alert, oriented to time place person. [facility] : Mood and affect appears appropriate to the situation. Data Review:   Recent Results (from the past 24 hour(s))   CBC WITH AUTOMATED DIFF    Collection Time: 09/22/21  9:50 PM   Result Value Ref Range    WBC 12.4 (H) 3.6 - 11.0 K/uL    RBC 4.90 3.80 - 5.20 M/uL    HGB 13.7 11.5 - 16.0 g/dL    HCT 41.2 35.0 - 47.0 %    MCV 84.1 80.0 - 99.0 FL    MCH 28.0 26.0 - 34.0 PG    MCHC 33.3 30.0 - 36.5 g/dL    RDW 12.8 11.5 - 14.5 %    PLATELET 527 546 - 432 K/uL    MPV 9.9 8.9 - 12.9 FL    NRBC 0.0 0.0  WBC    ABSOLUTE NRBC 0.00 0.00 - 0.01 K/uL    NEUTROPHILS 78 (H) 32 - 75 %    LYMPHOCYTES 10 (L) 12 - 49 %    MONOCYTES 7 5 - 13 %    EOSINOPHILS 3 0 - 7 %    BASOPHILS 1 0 - 1 %    IMMATURE GRANULOCYTES 1 (H) 0 - 0.5 %    ABS. NEUTROPHILS 9.7 (H) 1.8 - 8.0 K/UL    ABS. LYMPHOCYTES 1.3 0.8 - 3.5 K/UL    ABS. MONOCYTES 0.9 0.0 - 1.0 K/UL    ABS. EOSINOPHILS 0.3 0.0 - 0.4 K/UL    ABS. BASOPHILS 0.1 0.0 - 0.1 K/UL    ABS. IMM. GRANS. 0.1 (H) 0.00 - 0.04 K/UL    DF AUTOMATED     METABOLIC PANEL, COMPREHENSIVE    Collection Time: 09/22/21  9:50 PM   Result Value Ref Range    Sodium 135 (L) 136 - 145 mmol/L    Potassium 3.8 3.5 - 5.1 mmol/L    Chloride 100 97 - 108 mmol/L    CO2 27 21 - 32 mmol/L    Anion gap 8 5 - 15 mmol/L    Glucose 110 (H) 65 - 100 mg/dL    BUN 16 6 - 20 mg/dL    Creatinine 0.57 0.55 - 1.02 mg/dL    BUN/Creatinine ratio 28 (H) 12 - 20      GFR est AA >60 >60 ml/min/1.73m2    GFR est non-AA >60 >60 ml/min/1.73m2    Calcium 9.1 8.5 - 10.1 mg/dL    Bilirubin, total 0.3 0.2 - 1.0 mg/dL    AST (SGOT) 19 15 - 37 U/L    ALT (SGPT) 29 12 - 78 U/L    Alk.  phosphatase 133 (H) 45 - 117 U/L Protein, total 6.6 6.4 - 8.2 g/dL    Albumin 3.7 3.5 - 5.0 g/dL    Globulin 2.9 2.0 - 4.0 g/dL    A-G Ratio 1.3 1.1 - 2.2     TYPE & SCREEN    Collection Time: 09/22/21 10:24 PM   Result Value Ref Range    Crossmatch Expiration 09/25/2021,2359     ABO/Rh(D) [place] Positive     Antibody screen Negative    PROTHROMBIN TIME + INR    Collection Time: 09/22/21 10:24 PM   Result Value Ref Range    Prothrombin time 14.5 11.9 - 14.7 sec    INR 1.2 (H) 0.9 - 1.1     PTT    Collection Time: 09/22/21 10:24 PM   Result Value Ref Range    aPTT 29.2 21.2 - 34.1 sec    aPTT, therapeutic range   82 - 109 sec       Radiologic Studies :   CT Results  (Last 48 hours)               09/22/21 2325  CTA CHEST W OR W WO CONT Final result    Impression:  Normal CTA of the chest with no pulmonary embolism or acute aortic   abnormalities identified. no acute cardiopulmonary findings. Sequela of   neurofibromatosis. HISTORY:  upper abdominal pain radiating to back. r/o dissection   Dose reduction technique: All CT scans at this facility are performed using dose reduction optimization   technique as appropriate on the exam including the following: Automated exposure   control, adjustment of the MA and/or KV according to patient size of use of   iterative reconstructive technique. .       TECHNIQUE: CTA CHEST W OR W WO CONT, CTA ABD PELV W WO CONT. CT angiogram of the   abdomen and pelvis performed and maximum intensity projection images (MIPS)   generated. 100 cc Isovue-370 injected. COMPARISON: None   LIMITATIONS: None       CHEST: See above       AORTA: No aneurysm or dissection. CELIAC AXIS: Patent. SUPERIOR MESENTERIC ARTERY: Patent. RENAL ARTERIES: Patent. INFERIOR MESENTERIC ARTERY: Patent. ILIAC ARTERIES: Patent. Visualized femoral vasculature:  Patent.                 LIVER: Normal.     GALLBLADDER: Normal.      BILIARY TREE: Normal.      PANCREAS: Normal. SPLEEN: Normal.     ADRENAL GLANDS: The adrenal glands themselves appear normal.   KIDNEYS/URETERS/BLADDER: Normal. Soft tissue masses along the course of the   bilateral renal arteries similar to retroperitoneal and prevertebral masses   elsewhere   RETROPERITONEUM: Normal.    BOWEL/MESENTERY: Large amount of stool is present throughout the length of the   colon. I do not identify a jamir bowel obstruction. Numerous soft tissue masses   throughout the central abdominal mesentery are noted and may indicate adenopathy   and/or be related to the patient's numerous nerve sheath tumors. APPENDIX: Identified and normal.   PERITONEAL CAVITY: Normal.     REPRODUCTIVE ORGANS: Normal.     BONE/TISSUES: No acute osseous abnormality. There are however numerous soft   tissue masses of the peripheral nerves at the level of the neural foramina and   extending anterior to them and particularly noted in the presacral region, a   couple of these are calcified. These do anteriorly displaced some of the pelvic   contents including the bladder, uterus and sigmoid colon. OTHER: None       IMPRESSION: Normal CT angiogram of the abdomen and pelvis. Sequela of   neurofibromatosis. Constipation without obstruction. Results called to Marty on 9/23/2021 12:12 AM.                   Narrative:  HISTORY:  upper abdominal pain radiating to back. r/o dissection. Per my   conversation with the ER physician the patient has a history of   neurofibromatosis. Dose reduction technique: All CT scans at this facility are performed using dose reduction optimization   technique as appropriate on the exam including the following: Automated exposure   control, adjustment of the MA and/or KV according to patient size and/or use of   iterative reconstructive technique. TECHNIQUE: CTA CHEST W OR W WO CONT, CTA ABD PELV W WO CONT.   CT angiogram of   the chest is performed with maximum intensity projection images (MIPS) generated. 100 cc Isovue-370 injected. COMPARISON: None   LIMITATIONS: None       LINES/TUBES/MEDICAL SUPPORT DEVICES:   None         LUNG PARENCHYMA: Normal   TRACHEA/BRONCHI: Normal   PULMONARY VESSELS: Normal. No definitive persistent central filling defects   identified on multiple contiguous images to diagnose pulmonary embolism. PLEURA: Normal   MEDIASTINUM: Normal   HEART: Normal   AORTA/GREAT VESSELS: No aortic aneurysm or dissection. ESOPHAGUS: Normal   AXILLAE: Normal   BONES/TISSUES: No acute osseous abnormality. There are however numerous presumed   peripheral nerve sheath tumors throughout the thoracic spine characterize as   hypodense soft tissue masses in the posterior mediastinum at at and anterior to   the neural foramen throughout. UPPER ABDOMEN: See below. OTHER: None           09/22/21 2325  CTA ABD PELV W WO CONT Final result    Impression:  Normal CTA of the chest with no pulmonary embolism or acute aortic   abnormalities identified. no acute cardiopulmonary findings. Sequela of   neurofibromatosis. HISTORY:  upper abdominal pain radiating to back. r/o dissection   Dose reduction technique: All CT scans at this facility are performed using dose reduction optimization   technique as appropriate on the exam including the following: Automated exposure   control, adjustment of the MA and/or KV according to patient size of use of   iterative reconstructive technique. .       TECHNIQUE: CTA CHEST W OR W WO CONT, CTA ABD PELV W WO CONT. CT angiogram of the   abdomen and pelvis performed and maximum intensity projection images (MIPS)   generated. 100 cc Isovue-370 injected. COMPARISON: None   LIMITATIONS: None       CHEST: See above       AORTA: No aneurysm or dissection. CELIAC AXIS: Patent. SUPERIOR MESENTERIC ARTERY: Patent. RENAL ARTERIES: Patent.       INFERIOR MESENTERIC ARTERY: Patent. ILIAC ARTERIES: Patent. Visualized femoral vasculature:  Patent. LIVER: Normal.     GALLBLADDER: Normal.      BILIARY TREE: Normal.      PANCREAS: Normal.     SPLEEN: Normal.     ADRENAL GLANDS: The adrenal glands themselves appear normal.   KIDNEYS/URETERS/BLADDER: Normal. Soft tissue masses along the course of the   bilateral renal arteries similar to retroperitoneal and prevertebral masses   elsewhere   RETROPERITONEUM: Normal.    BOWEL/MESENTERY: Large amount of stool is present throughout the length of the   colon. I do not identify a jamir bowel obstruction. Numerous soft tissue masses   throughout the central abdominal mesentery are noted and may indicate adenopathy   and/or be related to the patient's numerous nerve sheath tumors. APPENDIX: Identified and normal.   PERITONEAL CAVITY: Normal.     REPRODUCTIVE ORGANS: Normal.     BONE/TISSUES: No acute osseous abnormality. There are however numerous soft   tissue masses of the peripheral nerves at the level of the neural foramina and   extending anterior to them and particularly noted in the presacral region, a   couple of these are calcified. These do anteriorly displaced some of the pelvic   contents including the bladder, uterus and sigmoid colon. OTHER: None       IMPRESSION: Normal CT angiogram of the abdomen and pelvis. Sequela of   neurofibromatosis. Constipation without obstruction. Results called to Marty on 9/23/2021 12:12 AM.                   Narrative:  HISTORY:  upper abdominal pain radiating to back. r/o dissection. Per my   conversation with the ER physician the patient has a history of   neurofibromatosis. Dose reduction technique:    All CT scans at this facility are performed using dose reduction optimization   technique as appropriate on the exam including the following: Automated exposure   control, adjustment of the MA and/or KV according to patient size and/or use of   iterative reconstructive technique. TECHNIQUE: CTA CHEST W OR W WO CONT, CTA ABD PELV W WO CONT. CT angiogram of   the chest is performed with maximum intensity projection images (MIPS)   generated. 100 cc Isovue-370 injected. COMPARISON: None   LIMITATIONS: None       LINES/TUBES/MEDICAL SUPPORT DEVICES:   None         LUNG PARENCHYMA: Normal   TRACHEA/BRONCHI: Normal   PULMONARY VESSELS: Normal. No definitive persistent central filling defects   identified on multiple contiguous images to diagnose pulmonary embolism. PLEURA: Normal   MEDIASTINUM: Normal   HEART: Normal   AORTA/GREAT VESSELS: No aortic aneurysm or dissection. ESOPHAGUS: Normal   AXILLAE: Normal   BONES/TISSUES: No acute osseous abnormality. There are however numerous presumed   peripheral nerve sheath tumors throughout the thoracic spine characterize as   hypodense soft tissue masses in the posterior mediastinum at at and anterior to   the neural foramen throughout. UPPER ABDOMEN: See below. OTHER: None                     Assessment and Plan :     Severe abdominal pain: Etiology most likely from neurofibromatosis versus chronic pain syndrome. Severe constipation: Ordered medications hoping it will improve. Hypothyroidism: On supplementation    Benign essential hypertension: On Coreg which we will continue    Severe neuropathic pain on multiple medications including carbamazepine and Neurontin. Neurofibromatosis type I on regular follow-up with neurology at [facility] [place]. She is also on regular follow-up with the pain specialist outpatient. Admitted to medical floor, full CODE STATUS, daughter makes decisions in case if she cannot, has advanced medical directives in place. Home medications reviewed with the patient. CC : Henry Santamaria MD  Signed By: Jj Koenig MD     September 23, 2021      This dictation was done by dragon, computer voice recognition software.   Often unanticipated grammatical, syntax, [place] phones and other interpretive errors are inadvertently transcribed. Please excuse errors that have escaped final proofreading.

## 2021-09-23 NOTE — PROGRESS NOTES
Patient temperature elevated to 103.4 axillary, placed icepacks on patient and removed blankets. O2 sats 90% and placed oxygen 1L, [employer] message into Dr. Leanne Charles will vitals and patient situation, awaiting return message. 2010-Dr. Leanne Charles called and telephone orders received. Will not give SSE at this time due to getting rectal acetaminophen. 2250-Temperature has come down. patient remains drowsy, opens eyes to voice, then closes them. Will be holding medications at this time, unable to awaken to swallow pills. 2258-Lab results all appear to be back and notified Dr. Leanne Charles of elevated liver enzymes and elevated ammonia level. 0015-Because of new drowsiness from today, even with getting ativan, Dr. Leanne Charles wanted to send patient for CT. Taken down for head CT without contrast.  Upon return noted BP 87/46. Also noted patient still hadn't voided and bladder scan was in the 280 ml's. Dr. Leanne Charles stated to give another liter bolus of fluid. 0430-Patient woke up, could tell me the month, the year, and where she was. Starting having a bowel movement in the bed, assisted to the bathroom and had a moderate sized stool there as well as voided. Not steady on her feet and kept trying to fall backwards when let go of. Stated she was cold and given warm blankets. 0600-Edwin Cheung from neurology into see the patient. She is easily arousable and able to follow instructions at this time.

## 2021-09-23 NOTE — PROGRESS NOTES
Problem: Falls - Risk of  Goal: *Absence of Falls  Description: Document Starleen Freeze Fall Risk and appropriate interventions in the flowsheet.   Outcome: Progressing Towards Goal  Note: Fall Risk Interventions:  Mobility Interventions: Bed/chair exit alarm         Medication Interventions: Bed/chair exit alarm    Elimination Interventions: Bed/chair exit alarm    History of Falls Interventions: Bed/chair exit alarm

## 2021-09-24 ENCOUNTER — APPOINTMENT (OUTPATIENT)
Dept: ULTRASOUND IMAGING | Age: 61
DRG: 871 | End: 2021-09-24
Attending: COLON & RECTAL SURGERY
Payer: MEDICARE

## 2021-09-24 ENCOUNTER — APPOINTMENT (OUTPATIENT)
Dept: GENERAL RADIOLOGY | Age: 61
DRG: 871 | End: 2021-09-24
Attending: INTERNAL MEDICINE
Payer: MEDICARE

## 2021-09-24 LAB
ALBUMIN SERPL-MCNC: 2.4 G/DL (ref 3.5–5)
ALBUMIN SERPL-MCNC: 2.6 G/DL (ref 3.5–5)
ALBUMIN/GLOB SERPL: 1 {RATIO} (ref 1.1–2.2)
ALP SERPL-CCNC: 169 U/L (ref 45–117)
ALT SERPL-CCNC: 176 U/L (ref 12–78)
AMMONIA PLAS-SCNC: 33 UMOL/L
ANION GAP SERPL CALC-SCNC: 10 MMOL/L (ref 5–15)
APPEARANCE UR: ABNORMAL
AST SERPL W P-5'-P-CCNC: 141 U/L (ref 15–37)
BACTERIA URNS QL MICRO: NEGATIVE /HPF
BASOPHILS # BLD: 0 K/UL (ref 0–0.1)
BASOPHILS NFR BLD: 0 % (ref 0–1)
BILIRUB DIRECT SERPL-MCNC: 1.3 MG/DL (ref 0–0.2)
BILIRUB SERPL-MCNC: 1.7 MG/DL (ref 0.2–1)
BILIRUB UR QL: NEGATIVE
BUN SERPL-MCNC: 19 MG/DL (ref 6–20)
BUN/CREAT SERPL: 30 (ref 12–20)
CA-I BLD-MCNC: 7.9 MG/DL (ref 8.5–10.1)
CHLORIDE SERPL-SCNC: 107 MMOL/L (ref 97–108)
CO2 SERPL-SCNC: 22 MMOL/L (ref 21–32)
COLOR UR: ABNORMAL
COVID-19 RAPID TEST, COVR: NOT DETECTED
CREAT SERPL-MCNC: 0.63 MG/DL (ref 0.55–1.02)
CRP SERPL HS-MCNC: >9.5 MG/L
CRP SERPL-MCNC: 20.7 MG/DL (ref 0–0.6)
DIFFERENTIAL METHOD BLD: ABNORMAL
EOSINOPHIL # BLD: 0 K/UL (ref 0–0.4)
EOSINOPHIL NFR BLD: 0 % (ref 0–7)
ERYTHROCYTE [DISTWIDTH] IN BLOOD BY AUTOMATED COUNT: 13.3 % (ref 11.5–14.5)
ERYTHROCYTE [SEDIMENTATION RATE] IN BLOOD: 34 MM/HR
GLOBULIN SER CALC-MCNC: 2.6 G/DL (ref 2–4)
GLUCOSE BLD STRIP.AUTO-MCNC: 79 MG/DL (ref 65–117)
GLUCOSE SERPL-MCNC: 85 MG/DL (ref 65–100)
GLUCOSE UR STRIP.AUTO-MCNC: NEGATIVE MG/DL
HCT VFR BLD AUTO: 31.3 % (ref 35–47)
HGB BLD-MCNC: 10.4 G/DL (ref 11.5–16)
HGB UR QL STRIP: ABNORMAL
IMM GRANULOCYTES # BLD AUTO: 0.1 K/UL (ref 0–0.04)
IMM GRANULOCYTES NFR BLD AUTO: 1 % (ref 0–0.5)
KETONES UR QL STRIP.AUTO: NEGATIVE MG/DL
LACTATE SERPL-SCNC: 2.3 MMOL/L (ref 0.4–2)
LEUKOCYTE ESTERASE UR QL STRIP.AUTO: NEGATIVE
LIPASE SERPL-CCNC: 20 U/L (ref 73–393)
LYMPHOCYTES # BLD: 0.4 K/UL (ref 0.8–3.5)
LYMPHOCYTES NFR BLD: 4 % (ref 12–49)
MAGNESIUM SERPL-MCNC: 2.2 MG/DL (ref 1.6–2.4)
MCH RBC QN AUTO: 28 PG (ref 26–34)
MCHC RBC AUTO-ENTMCNC: 33.2 G/DL (ref 30–36.5)
MCV RBC AUTO: 84.4 FL (ref 80–99)
MONOCYTES # BLD: 0.8 K/UL (ref 0–1)
MONOCYTES NFR BLD: 9 % (ref 5–13)
MRSA DNA SPEC QL NAA+PROBE: NOT DETECTED
NEUTS SEG # BLD: 7.8 K/UL (ref 1.8–8)
NEUTS SEG NFR BLD: 86 % (ref 32–75)
NITRITE UR QL STRIP.AUTO: NEGATIVE
NRBC # BLD: 0 K/UL (ref 0–0.01)
NRBC BLD-RTO: 0 PER 100 WBC
PERFORMED BY, TECHID: NORMAL
PH UR STRIP: 5 [PH] (ref 5–8)
PHOSPHATE SERPL-MCNC: 2.1 MG/DL (ref 2.6–4.7)
PHOSPHATE SERPL-MCNC: 2.1 MG/DL (ref 2.6–4.7)
PLATELET # BLD AUTO: 230 K/UL (ref 150–400)
PMV BLD AUTO: 10.4 FL (ref 8.9–12.9)
POTASSIUM SERPL-SCNC: 3.6 MMOL/L (ref 3.5–5.1)
PROCALCITONIN SERPL-MCNC: 4.94 NG/ML
PROT SERPL-MCNC: 5.2 G/DL (ref 6.4–8.2)
PROT UR STRIP-MCNC: 30 MG/DL
RBC # BLD AUTO: 3.71 M/UL (ref 3.8–5.2)
RBC #/AREA URNS HPF: ABNORMAL /HPF (ref 0–5)
SARS-COV-2, COV2: NORMAL
SODIUM SERPL-SCNC: 139 MMOL/L (ref 136–145)
SP GR UR REFRACTOMETRY: 1.03 (ref 1–1.03)
SPECIMEN SOURCE: NORMAL
TSH SERPL DL<=0.05 MIU/L-ACNC: 0.26 UIU/ML (ref 0.36–3.74)
UA: UC IF INDICATED,UAUC: ABNORMAL
UROBILINOGEN UR QL STRIP.AUTO: 0.1 EU/DL (ref 0.1–1)
WBC # BLD AUTO: 9.1 K/UL (ref 3.6–11)
WBC URNS QL MICRO: ABNORMAL /HPF (ref 0–4)

## 2021-09-24 PROCEDURE — 74011250637 HC RX REV CODE- 250/637: Performed by: HOSPITALIST

## 2021-09-24 PROCEDURE — 99221 1ST HOSP IP/OBS SF/LOW 40: CPT | Performed by: COLON & RECTAL SURGERY

## 2021-09-24 PROCEDURE — 76705 ECHO EXAM OF ABDOMEN: CPT

## 2021-09-24 PROCEDURE — 02HV33Z INSERTION OF INFUSION DEVICE INTO SUPERIOR VENA CAVA, PERCUTANEOUS APPROACH: ICD-10-PCS | Performed by: INTERNAL MEDICINE

## 2021-09-24 PROCEDURE — 87040 BLOOD CULTURE FOR BACTERIA: CPT

## 2021-09-24 PROCEDURE — 83735 ASSAY OF MAGNESIUM: CPT

## 2021-09-24 PROCEDURE — 85652 RBC SED RATE AUTOMATED: CPT

## 2021-09-24 PROCEDURE — 84443 ASSAY THYROID STIM HORMONE: CPT

## 2021-09-24 PROCEDURE — 74011250636 HC RX REV CODE- 250/636: Performed by: INTERNAL MEDICINE

## 2021-09-24 PROCEDURE — 81001 URINALYSIS AUTO W/SCOPE: CPT

## 2021-09-24 PROCEDURE — 0D9670Z DRAINAGE OF STOMACH WITH DRAINAGE DEVICE, VIA NATURAL OR ARTIFICIAL OPENING: ICD-10-PCS | Performed by: RADIOLOGY

## 2021-09-24 PROCEDURE — 74011250637 HC RX REV CODE- 250/637: Performed by: INTERNAL MEDICINE

## 2021-09-24 PROCEDURE — 80076 HEPATIC FUNCTION PANEL: CPT

## 2021-09-24 PROCEDURE — 87635 SARS-COV-2 COVID-19 AMP PRB: CPT

## 2021-09-24 PROCEDURE — 74011000250 HC RX REV CODE- 250: Performed by: INTERNAL MEDICINE

## 2021-09-24 PROCEDURE — 82962 GLUCOSE BLOOD TEST: CPT

## 2021-09-24 PROCEDURE — 74011000258 HC RX REV CODE- 258: Performed by: INTERNAL MEDICINE

## 2021-09-24 PROCEDURE — 36569 INSJ PICC 5 YR+ W/O IMAGING: CPT

## 2021-09-24 PROCEDURE — 86141 C-REACTIVE PROTEIN HS: CPT

## 2021-09-24 PROCEDURE — 36573 INSJ PICC RS&I 5 YR+: CPT | Performed by: INTERNAL MEDICINE

## 2021-09-24 PROCEDURE — 80069 RENAL FUNCTION PANEL: CPT

## 2021-09-24 PROCEDURE — 74011250636 HC RX REV CODE- 250/636: Performed by: HOSPITALIST

## 2021-09-24 PROCEDURE — 99223 1ST HOSP IP/OBS HIGH 75: CPT | Performed by: INTERNAL MEDICINE

## 2021-09-24 PROCEDURE — 83690 ASSAY OF LIPASE: CPT

## 2021-09-24 PROCEDURE — 94762 N-INVAS EAR/PLS OXIMTRY CONT: CPT

## 2021-09-24 PROCEDURE — 84145 PROCALCITONIN (PCT): CPT

## 2021-09-24 PROCEDURE — 87641 MR-STAPH DNA AMP PROBE: CPT

## 2021-09-24 PROCEDURE — 82140 ASSAY OF AMMONIA: CPT

## 2021-09-24 PROCEDURE — 36415 COLL VENOUS BLD VENIPUNCTURE: CPT

## 2021-09-24 PROCEDURE — 71045 X-RAY EXAM CHEST 1 VIEW: CPT

## 2021-09-24 PROCEDURE — 51798 US URINE CAPACITY MEASURE: CPT

## 2021-09-24 PROCEDURE — 83605 ASSAY OF LACTIC ACID: CPT

## 2021-09-24 PROCEDURE — 85025 COMPLETE CBC W/AUTO DIFF WBC: CPT

## 2021-09-24 PROCEDURE — 84100 ASSAY OF PHOSPHORUS: CPT

## 2021-09-24 PROCEDURE — 86140 C-REACTIVE PROTEIN: CPT

## 2021-09-24 PROCEDURE — 65610000006 HC RM INTENSIVE CARE

## 2021-09-24 RX ORDER — NOREPINEPHRINE BITARTRATE/D5W 8 MG/250ML
.5-3 PLASTIC BAG, INJECTION (ML) INTRAVENOUS
Status: DISCONTINUED | OUTPATIENT
Start: 2021-09-24 | End: 2021-09-30

## 2021-09-24 RX ORDER — SODIUM CHLORIDE AND POTASSIUM CHLORIDE .9; .15 G/100ML; G/100ML
SOLUTION INTRAVENOUS CONTINUOUS
Status: DISCONTINUED | OUTPATIENT
Start: 2021-09-24 | End: 2021-09-27 | Stop reason: SDUPTHER

## 2021-09-24 RX ORDER — SODIUM CHLORIDE 9 MG/ML
999 INJECTION, SOLUTION INTRAVENOUS ONCE
Status: COMPLETED | OUTPATIENT
Start: 2021-09-24 | End: 2021-09-24

## 2021-09-24 RX ADMIN — CARVEDILOL 6.25 MG: 3.12 TABLET, FILM COATED ORAL at 16:13

## 2021-09-24 RX ADMIN — PIPERACILLIN AND TAZOBACTAM 3.38 G: 3; .375 INJECTION, POWDER, LYOPHILIZED, FOR SOLUTION INTRAVENOUS at 11:03

## 2021-09-24 RX ADMIN — POTASSIUM CHLORIDE AND SODIUM CHLORIDE: 900; 150 INJECTION, SOLUTION INTRAVENOUS at 08:50

## 2021-09-24 RX ADMIN — ACETAMINOPHEN 650 MG: 650 SUPPOSITORY RECTAL at 06:26

## 2021-09-24 RX ADMIN — BUSPIRONE HYDROCHLORIDE 15 MG: 10 TABLET ORAL at 16:13

## 2021-09-24 RX ADMIN — PIPERACILLIN AND TAZOBACTAM 3.38 G: 3; .375 INJECTION, POWDER, LYOPHILIZED, FOR SOLUTION INTRAVENOUS at 18:04

## 2021-09-24 RX ADMIN — CARBAMAZEPINE 300 MG: 300 CAPSULE, EXTENDED RELEASE ORAL at 21:00

## 2021-09-24 RX ADMIN — Medication 400 MG: at 16:13

## 2021-09-24 RX ADMIN — SODIUM CHLORIDE 75 ML/HR: 9 INJECTION, SOLUTION INTRAVENOUS at 05:13

## 2021-09-24 RX ADMIN — POTASSIUM CHLORIDE AND SODIUM CHLORIDE: 900; 150 INJECTION, SOLUTION INTRAVENOUS at 16:13

## 2021-09-24 RX ADMIN — GABAPENTIN 600 MG: 300 CAPSULE ORAL at 21:19

## 2021-09-24 RX ADMIN — BUSPIRONE HYDROCHLORIDE 15 MG: 10 TABLET ORAL at 21:19

## 2021-09-24 RX ADMIN — VANCOMYCIN HYDROCHLORIDE 1000 MG: 1 INJECTION, POWDER, LYOPHILIZED, FOR SOLUTION INTRAVENOUS at 09:30

## 2021-09-24 RX ADMIN — Medication 4 MCG/MIN: at 13:27

## 2021-09-24 RX ADMIN — Medication 4 MCG/MIN: at 16:11

## 2021-09-24 RX ADMIN — OXYCODONE HYDROCHLORIDE 30 MG: 10 TABLET ORAL at 05:13

## 2021-09-24 RX ADMIN — LEVOTHYROXINE SODIUM 125 MCG: 0.03 TABLET ORAL at 05:13

## 2021-09-24 RX ADMIN — PIPERACILLIN AND TAZOBACTAM 3.38 G: 3; .375 INJECTION, POWDER, LYOPHILIZED, FOR SOLUTION INTRAVENOUS at 01:57

## 2021-09-24 RX ADMIN — SODIUM CHLORIDE 500 ML: 9 INJECTION, SOLUTION INTRAVENOUS at 09:30

## 2021-09-24 RX ADMIN — SODIUM CHLORIDE 999 ML/HR: 9 INJECTION, SOLUTION INTRAVENOUS at 00:43

## 2021-09-24 RX ADMIN — HYDROMORPHONE HYDROCHLORIDE 0.5 MG: 1 INJECTION, SOLUTION INTRAMUSCULAR; INTRAVENOUS; SUBCUTANEOUS at 21:36

## 2021-09-24 RX ADMIN — LORAZEPAM 1 MG: 2 INJECTION INTRAMUSCULAR; INTRAVENOUS at 07:46

## 2021-09-24 RX ADMIN — ATORVASTATIN CALCIUM 20 MG: 20 TABLET, FILM COATED ORAL at 21:19

## 2021-09-24 RX ADMIN — LORAZEPAM 1 MG: 2 INJECTION INTRAMUSCULAR; INTRAVENOUS at 18:40

## 2021-09-24 RX ADMIN — LACTULOSE 15 ML: 20 SOLUTION ORAL at 21:20

## 2021-09-24 RX ADMIN — FAMOTIDINE 20 MG: 20 TABLET ORAL at 20:47

## 2021-09-24 RX ADMIN — GABAPENTIN 600 MG: 300 CAPSULE ORAL at 16:13

## 2021-09-24 RX ADMIN — LACTULOSE 15 ML: 20 SOLUTION ORAL at 16:13

## 2021-09-24 NOTE — CONSULTS
Consult Date: 9/24/2021    Consults  Sepsis    Subjective  This is a 59year old female with history of neurofibromatosis and history of chronic abdominal pain, who presented to the ED with severe acute abdominal pain described as epigastric with no N/V/D. She was afebrile with elevated WBC. XR Abdomen showed fecal retention. CT angiogram of abdomen and pelvis showed numerous soft tissue masses of the peripheral nerves, otherwise constipation without obstruction. CT Chest was unremarkable. CT Head showed no evidence of an acute intracranial abnormality. Blood cultures were ordered. Patient was started on Zosyn and Vancomycin. ID has been consulted for this reason. She subsequently spiked to 103.4. Both procal and CRP markedly elevated. CXR today showing RLL infiltrate. Patient seen in the ICU at which time she is somnolent but appears to be resting comfortably.       Past Medical History:   Diagnosis Date    Arthritis     Depression     Epilepsy ([address]     Hypertension     Ill-defined condition     neurofibromatosis    Thyroid disease       Past Surgical History:   Procedure Laterality Date    HX GYN      HX ORTHOPAEDIC      NEUROLOGICAL PROCEDURE UNLISTED       Family History   Problem Relation Age of Onset    Cancer Mother         glioblastoma    Lung Disease Father     Cancer Father         mesothelioma, testicular cancer      Social History     Tobacco Use    Smoking status: Never Smoker    Smokeless tobacco: Never Used   Substance Use Topics    Alcohol use: No       Current Facility-Administered Medications   Medication Dose Route Frequency Provider Last Rate Last Admin    0.9% sodium chloride with KCl 20 mEq/L infusion   IntraVENous CONTINUOUS Ivania Guillaume  mL/hr at 09/24/21 0850 New Bag at 09/24/21 0850    carBAMazepine ER (CARBATROL ER) capsule 300 mg  300 mg Oral BID Atiya Gagnon MD   300 mg at 09/23/21 0900    gabapentin (NEURONTIN) capsule 600 mg  600 mg Oral TID Tamir Acosta MD   600 mg at 09/23/21 0900    carvediloL (COREG) tablet 6.25 mg  6.25 mg Oral BID WITH MEALS Tamir Acosta MD   6.25 mg at 09/23/21 0900    atorvastatin (LIPITOR) tablet 20 mg  20 mg Oral QHS Tamir Acosta MD        levothyroxine (SYNTHROID) tablet 125 mcg  125 mcg Oral Jw Alexander MD   125 mcg at 09/24/21 0513    venlafaxine-SR (EFFEXOR-XR) capsule 150 mg  150 mg Oral DAILY Tamir Acosta MD   150 mg at 09/23/21 0900    oxyCODONE IR (ROXICODONE) tablet 30 mg  30 mg Oral Q6H PRN Tamir Acosta MD   30 mg at 09/24/21 0513    famotidine (PEPCID) tablet 20 mg  20 mg Oral BID Tamir Acosta MD   20 mg at 09/23/21 0900    aspirin chewable tablet 81 mg  81 mg Oral DAILY Tamir Acosta MD   81 mg at 09/23/21 0900    cholecalciferol (VITAMIN D3) (1000 Units /25 mcg) tablet 1,000 Units  1,000 Units Oral DAILY Tamir Acosta MD   1,000 Units at 09/23/21 0900    sodium chloride (NS) flush 5-40 mL  5-40 mL IntraVENous PRN Tamir Acosta MD        acetaminophen (TYLENOL) tablet 650 mg  650 mg Oral Q6H PRN Tamir Acosta MD        ondansetron [facility]) injection 4 mg  4 mg IntraVENous Q4H PRN Tamir Acosta MD   4 mg at 09/23/21 1139    docusate sodium (COLACE) capsule 200 mg  200 mg Oral DAILY Tamri Acosta MD   200 mg at 09/23/21 0900    magnesium oxide (MAG-OX) tablet 400 mg  400 mg Oral DAILY Tamir Acosta MD        busPIRone (BUSPAR) tablet 15 mg  15 mg Oral TID Tamir Acosta MD   15 mg at 09/23/21 0900    lactulose (CHRONULAC) 10 gram/15 mL solution 15 mL  15 mL Oral TID Cami Stahl MD   15 mL at 09/23/21 0859    LORazepam (ATIVAN) injection 1 mg  1 mg IntraVENous Q4H PRN Cami Stahl MD   1 mg at 09/24/21 0795    HYDROmorphone (DILAUDID) syringe 0.5 mg  0.5 mg IntraVENous Q4H PRN Cami Stahl MD   0.5 mg at 09/23/21 1803    piperacillin-tazobactam (ZOSYN) 3.375 g in 0.9% sodium chloride (MBP/ADV) 100 mL MBP  3.375 g IntraVENous Q8H Jose Manuel Stephens MD 25 mL/hr at 09/24/21 1103 3.375 g at 09/24/21 1103    acetaminophen (TYLENOL) suppository 650 mg  650 mg Rectal Q4H PRN Concepción Rogel MD   650 mg at 09/24/21 4343        Review of Systems   Unable to perform ROS: Acuity of condition       Objective     Vital signs for last 24 hours:  Visit Vitals  BP (!) 70/38 (BP 1 Location: Left upper arm, BP Patient Position: At rest)   Pulse (!) 101   Temp (!) 101 °F (38.3 °C)   Resp 16   Ht 5' 1\" (1.549 m)   Wt 144 lb 13.5 oz (65.7 kg)   SpO2 93%   Breastfeeding No   BMI 27.37 kg/m²       Intake/Output this shift:  Current Shift: No intake/output data recorded. Last 3 Shifts: 09/22 1901 - 09/24 0700  In: 3300 [P.O.:200; I.V.:3100]  Out: -     Data Review:   Recent Results (from the past 24 hour(s))   CBC WITH AUTOMATED DIFF    Collection Time: 09/23/21  9:05 PM   Result Value Ref Range    WBC 9.5 3.6 - 11.0 K/uL    RBC 4.51 3.80 - 5.20 M/uL    HGB 12.8 11.5 - 16.0 g/dL    HCT 37.0 35.0 - 47.0 %    MCV 82.0 80.0 - 99.0 FL    MCH 28.4 26.0 - 34.0 PG    MCHC 34.6 30.0 - 36.5 g/dL    RDW 12.9 11.5 - 14.5 %    PLATELET 331 891 - 248 K/uL    MPV 9.5 8.9 - 12.9 FL    NRBC 0.0 0.0  WBC    ABSOLUTE NRBC 0.00 0.00 - 0.01 K/uL    NEUTROPHILS 91 (H) 32 - 75 %    LYMPHOCYTES 4 (L) 12 - 49 %    MONOCYTES 5 5 - 13 %    EOSINOPHILS 0 0 - 7 %    BASOPHILS 0 0 - 1 %    IMMATURE GRANULOCYTES 0 0 - 0.5 %    ABS. NEUTROPHILS 8.5 (H) 1.8 - 8.0 K/UL    ABS. LYMPHOCYTES 0.4 (L) 0.8 - 3.5 K/UL    ABS. MONOCYTES 0.5 0.0 - 1.0 K/UL    ABS. EOSINOPHILS 0.0 0.0 - 0.4 K/UL    ABS. BASOPHILS 0.0 0.0 - 0.1 K/UL    ABS. IMM.  GRANS. 0.0 0.00 - 0.04 K/UL    DF AUTOMATED     METABOLIC PANEL, COMPREHENSIVE    Collection Time: 09/23/21  9:05 PM   Result Value Ref Range    Sodium 137 136 - 145 mmol/L    Potassium 3.4 (L) 3.5 - 5.1 mmol/L    Chloride 102 97 - 108 mmol/L CO2 26 21 - 32 mmol/L    Anion gap 9 5 - 15 mmol/L    Glucose 104 (H) 65 - 100 mg/dL    BUN 15 6 - 20 mg/dL    Creatinine 0.66 0.55 - 1.02 mg/dL    BUN/Creatinine ratio 23 (H) 12 - 20      GFR est AA >60 >60 ml/min/1.73m2    GFR est non-AA >60 >60 ml/min/1.73m2    Calcium 8.5 8.5 - 10.1 mg/dL    Bilirubin, total 1.2 (H) 0.2 - 1.0 mg/dL    AST (SGOT) 399 (H) 15 - 37 U/L    ALT (SGPT) 316 (H) 12 - 78 U/L    Alk. phosphatase 198 (H) 45 - 117 U/L    Protein, total 5.8 (L) 6.4 - 8.2 g/dL    Albumin 3.0 (L) 3.5 - 5.0 g/dL    Globulin 2.8 2.0 - 4.0 g/dL    A-G Ratio 1.1 1.1 - 2.2     PROTHROMBIN TIME + INR    Collection Time: 09/23/21  9:05 PM   Result Value Ref Range    Prothrombin time 16.0 (H) 11.9 - 14.7 sec    INR 1.3 (H) 0.9 - 1.1     PTT    Collection Time: 09/23/21  9:05 PM   Result Value Ref Range    aPTT 34.4 (H) 21.2 - 34.1 sec    aPTT, therapeutic range   82 - 109 sec   AMMONIA    Collection Time: 09/23/21  9:05 PM   Result Value Ref Range    Ammonia 44 (H) <32 umol/L   COVID-19 RAPID TEST    Collection Time: 09/23/21  9:19 PM   Result Value Ref Range    Specimen source Please find results under separate order      COVID-19 rapid test Not Detected Not Detected         Physical Exam  Vitals (Levophed 4 mcg/min) and nursing note reviewed. Constitutional:       General: She is not in acute distress. Appearance: She is ill-appearing. HENT:      Head: Normocephalic and atraumatic. Right Ear: External ear normal.      Left Ear: External ear normal.      Nose: Nose normal.      Comments: NG tube  Eyes:      Comments: closed   Cardiovascular:      Rate and Rhythm: Regular rhythm. Tachycardia present. Heart sounds: Normal heart sounds. No murmur heard. Pulmonary:      Breath sounds: No wheezing, rhonchi or rales. Abdominal:      General: Bowel sounds are normal.      Palpations: Abdomen is soft. Tenderness: There is no abdominal tenderness.    Genitourinary:     Comments: External urinary catheter  Musculoskeletal:      Cervical back: Neck supple. Right lower leg: No edema. Left lower leg: No edema. Comments: Mitts on both hands   Skin:     Findings: No rash. Neurological:      Comments: somnolent   Psychiatric:      Comments: somnolent       ASSESSMENT/PLAN    1. Septic shock with fever, tachycardia, hypotension requiring vasopressors, elevated lactic acid, leukocytosis, elevated procal and CRP  2. Possible aspiration pneumonia with RLL infiltrate  3. Abdominal pain, etiology unclear  4. Altered mental status  5. Neurofibromatosis  6. Distended gallbladder, RUQ US pending  7. Ruling out Covid-19    Comment:  No clear source of infection at this time but in this setting with RLL infiltrate, aspiration pneumonia should be considered. Urinalysis unremarkable. No significant open wounds. 1. Continue IV Vancomycin and Zosyn  2. Follow-up blood cultures  3. In am, repeat CBC, procal and CRP  4. Continue Levophed to maintain MAP >65  5. Follow-up Covid-19 results    Abdulaziz Schaeffer MD

## 2021-09-24 NOTE — CONSULTS
Asked to evaluate this patient with a past medical history of neurofibromatosis type I, chronic pain, neuropathic pain, hypertension, hypothyroidism who was admitted after she presented to the emergency department with complaints of severe abdominal pain. She was noted to have a mildly elevated white blood cell count 12,400. This is since resolved and come down to 9500 yesterday's labs. Electrolytes were unremarkable. She had a CT scan which demonstrated a fair amount of stool within the colon. She also has a distended gallbladder. I reviewed all the images myself. Surgery consulted for constipation. Physical exam: Abdomen is soft, very minimally tender to deep palpation, nondistended    Recent Results (from the past 24 hour(s))   CBC WITH AUTOMATED DIFF    Collection Time: 09/23/21  9:05 PM   Result Value Ref Range    WBC 9.5 3.6 - 11.0 K/uL    RBC 4.51 3.80 - 5.20 M/uL    HGB 12.8 11.5 - 16.0 g/dL    HCT 37.0 35.0 - 47.0 %    MCV 82.0 80.0 - 99.0 FL    MCH 28.4 26.0 - 34.0 PG    MCHC 34.6 30.0 - 36.5 g/dL    RDW 12.9 11.5 - 14.5 %    PLATELET 382 586 - 869 K/uL    MPV 9.5 8.9 - 12.9 FL    NRBC 0.0 0.0  WBC    ABSOLUTE NRBC 0.00 0.00 - 0.01 K/uL    NEUTROPHILS 91 (H) 32 - 75 %    LYMPHOCYTES 4 (L) 12 - 49 %    MONOCYTES 5 5 - 13 %    EOSINOPHILS 0 0 - 7 %    BASOPHILS 0 0 - 1 %    IMMATURE GRANULOCYTES 0 0 - 0.5 %    ABS. NEUTROPHILS 8.5 (H) 1.8 - 8.0 K/UL    ABS. LYMPHOCYTES 0.4 (L) 0.8 - 3.5 K/UL    ABS. MONOCYTES 0.5 0.0 - 1.0 K/UL    ABS. EOSINOPHILS 0.0 0.0 - 0.4 K/UL    ABS. BASOPHILS 0.0 0.0 - 0.1 K/UL    ABS. IMM.  GRANS. 0.0 0.00 - 0.04 K/UL    DF AUTOMATED     METABOLIC PANEL, COMPREHENSIVE    Collection Time: 09/23/21  9:05 PM   Result Value Ref Range    Sodium 137 136 - 145 mmol/L    Potassium 3.4 (L) 3.5 - 5.1 mmol/L    Chloride 102 97 - 108 mmol/L    CO2 26 21 - 32 mmol/L    Anion gap 9 5 - 15 mmol/L    Glucose 104 (H) 65 - 100 mg/dL    BUN 15 6 - 20 mg/dL    Creatinine 0.66 0.55 - 1.02 mg/dL    BUN/Creatinine ratio 23 (H) 12 - 20      GFR est AA >60 >60 ml/min/1.73m2    GFR est non-AA >60 >60 ml/min/1.73m2    Calcium 8.5 8.5 - 10.1 mg/dL    Bilirubin, total 1.2 (H) 0.2 - 1.0 mg/dL    AST (SGOT) 399 (H) 15 - 37 U/L    ALT (SGPT) 316 (H) 12 - 78 U/L    Alk. phosphatase 198 (H) 45 - 117 U/L    Protein, total 5.8 (L) 6.4 - 8.2 g/dL    Albumin 3.0 (L) 3.5 - 5.0 g/dL    Globulin 2.8 2.0 - 4.0 g/dL    A-G Ratio 1.1 1.1 - 2.2     PROTHROMBIN TIME + INR    Collection Time: 09/23/21  9:05 PM   Result Value Ref Range    Prothrombin time 16.0 (H) 11.9 - 14.7 sec    INR 1.3 (H) 0.9 - 1.1     PTT    Collection Time: 09/23/21  9:05 PM   Result Value Ref Range    aPTT 34.4 (H) 21.2 - 34.1 sec    aPTT, therapeutic range   82 - 109 sec   AMMONIA    Collection Time: 09/23/21  9:05 PM   Result Value Ref Range    Ammonia 44 (H) <32 umol/L   COVID-19 RAPID TEST    Collection Time: 09/23/21  9:19 PM   Result Value Ref Range    Specimen source Please find results under separate order      COVID-19 rapid test Not Detected Not Detected         Assessment and plan:  69-year-old female with an extensive past medical history presenting with abdominal pain and constipation. Patient was started on lactulose and is having multiple loose bowel movements currently. Will recommend decreasing lactulose to twice daily. No need for surgical intervention given the fact that she started moving her bowels. I would recommend a right upper quadrant ultrasound just to evaluate her gallbladder given the fact that is distended on CT.

## 2021-09-24 NOTE — PROGRESS NOTES
Pt arrived to ICU at 1200. Pt placed on monitor vitals obtained, physical assessment completed. (see flowsheet) Discussed pt condition with Dr. Johanna Maldonado orders received and completed per MD orders. Daughter updated. Notified Dr. Johanna Maldonado of lab results. No new orders received in reference to lab results. Will continue to monitor.

## 2021-09-24 NOTE — PROGRESS NOTES
PICC Placement Note    PRE-PROCEDURE VERIFICATION  Correct Procedure: yes  Correct Site:  yes  Temperature: Temp: 98.2 °F (36.8 °C), Temperature Source: Temp Source: Axillary  Recent Labs     09/24/21  1200 09/24/21  1026 09/23/21  2105 09/23/21  2105   BUN  --  19   < > 15   CREA  --  0.63   < > 0.66     --    < > 269   INR  --   --   --  1.3*   WBC 9.1  --    < > 9.5    < > = values in this interval not displayed. Allergies: Codeine, Methadone, and Morphine  Education materials for PICC Care given to family: yes. See Patient Education activity for further details. PICC Booklet placed on chart: yes    PROCEDURE DETAIL  A triple lumen PICC line was started for desire for reliable access. The following documentation is in addition to the PICC properties in the lines/airways flowsheet :  Lot #: [id number]  xylocaine used: yes  Mid-Arm Circumference: 29 (cm)  Internal Catheter Length: 34 (cm)  Internal Catheter Total Length: 35 (cm)  Vein Selection for PICC:right basilic  Central Line Bundle followed yes  Complication Related to Insertion: none    The placement was verified by ECG: yes. The ECG indicates the tip location is on the  Right side and the tip overlies the lower superior vena cava.  CAJ    Line is okay to use: yes    Sherryle Civil, RN

## 2021-09-24 NOTE — PROGRESS NOTES
Comprehensive Nutrition Assessment    Type and Reason for Visit: Initial (NST)    Nutrition Recommendations/Plan:   As medically appropriate, initiate TF of Osmolite 1.2 at 20mL/hr       Increase by 20mL q4hr to goal rate 70mL/hr, continuous  Flush with 100mL free water q4hr  Goal feeds provide 2016kcal, 93g protein, 1978mL fluid (>/= 100% est needs)    Document TF rate, protein modular provision, water flushes, and GRVs in EMR    Nutrition Assessment:  Abd pain pta, dx constipation without obstruction. Placed on lactulose regimen- now with BMs and abd pain improved. CXR today showing worsening RLL infiltrate. Worsening resp distress, somnolence. COVID rapid negative, PCR pending. Transferred to ICU for monitoring. Currently on BiPAP. NG placed. RD to order TF. Labs: Phos 2.1. Meds: IVF, norepi, zosyn. Malnutrition Assessment:  Malnutrition Status:  Mild malnutrition    Context:  Acute illness     Findings of the 6 clinical characteristics of malnutrition:   Energy Intake:  7 - 50% or less of est energy requirements for 5 or more days (limited PO x2-3 days)  Weight Loss:  Unable to assess (suspect no wt loss)     Body Fat Loss:  No significant body fat loss,     Muscle Mass Loss:  No significant muscle mass loss,    Fluid Accumulation:  No significant fluid accumulation,      Estimated Daily Nutrient Needs:  Energy (kcal): 1900kcal (25kcal/kg); Weight Used for Energy Requirements: Current  Protein (g): 90g (1.2g/kg); Weight Used for Protein Requirements: Current  Fluid (ml/day): 1900mL; Method Used for Fluid Requirements: 1 ml/kcal    Nutrition Related Findings:  NFPE not performed as pt COVID r/o. No documented hx dysphagia, n/v, or c/d. Last BM 9/24, +BS. No edema documented.       Wounds:    None       Current Nutrition Therapies:  ADULT DIET Regular; High Fiber    Anthropometric Measures:  · Height:  5' 1\" (154.9 cm)  · Current Body Wt:  75.4 kg (166 lb 3.6 oz) (9/24)   · Ideal Body Wt:  105 lbs:  158.3 % · BMI Category:  Obese class 1 (BMI 30.0-34. 9)     Wt Readings from Last 3 Encounters:   09/24/21 75.4 kg (166 lb 3.6 oz)   08/10/17 71.7 kg (158 lb)   08/04/17 71.7 kg (158 lb)   Limited wt hx indicates wt gain x4 years. Nutrition Diagnosis:   · Inadequate oral intake related to impaired respiratory function as evidenced by NPO or clear liquid status due to medical condition    Nutrition Interventions:   Food and/or Nutrient Delivery: Start tube feeding, Modify current diet  Nutrition Education and Counseling: No recommendations at this time  Coordination of Nutrition Care: Continue to monitor while inpatient    Goals:  Meet >75% EENs in 5-7 days, Lytes wnl, Maintain skin integrity       Nutrition Monitoring and Evaluation:   Behavioral-Environmental Outcomes: None identified  Food/Nutrient Intake Outcomes: Enteral nutrition intake/tolerance  Physical Signs/Symptoms Outcomes: Biochemical data, Skin    Discharge Planning:     Too soon to determine     Electronically signed by Fritz Berg on 9/24/2021 at 3:05 PM    Contact:

## 2021-09-24 NOTE — PROGRESS NOTES
Informed attending regarding the abnormal vital signs-ordered to give NS 500ml bolus and he will put in orders.

## 2021-09-24 NOTE — CONSULTS
NEUROLOGY CONSULT    Name Vesna Aragon Age 59 y.o. MRN [id number]  1960     Referring Physician: Jazmyn Henry MD      Chief Complaint:  Neurofibromatosis type I     This is a 59 y.o.  female with history of neurofibromatosis who was admitted overnight because of severe abdominal pain. Neurology was consulted because of NF. She does not have any history of seizures and did not have any while she was here. Her CT of of the abdomen showed chronic fibromas. Patient was complaining more of lower back pain this morning at the time of my evaluation and anything else. Assessment and Plan:  1. Neurofibromatosis: This is a congenital condition and does not seem to be responsible for any of the current symptoms she is having. She was primarily admitted because of severe abdominal pain, the etiology of which is unclear but could be impacted to stools due to constipation as the result of chronic narcotic. A CT scan of the head done overnight was unremarkable for any acute findings. 2.  Severe abdominal pain: Etiology question of impacted stool  3. Hypertension. No additional neurological work-up is indicated at this point.     Thank you for the consult    Allergies   Allergen Reactions    Codeine Nausea and Vomiting    Methadone Hives    Morphine Other (comments)     psychosis           Current Facility-Administered Medications   Medication Dose Route Frequency    0.9% sodium chloride with KCl 20 mEq/L infusion   IntraVENous CONTINUOUS    sodium chloride 0.9 % bolus infusion 500 mL  500 mL IntraVENous ONCE    vancomycin (VANCOCIN) 1,000 mg in 0.9% sodium chloride 250 mL (VIAL-MATE)  1,000 mg IntraVENous ONCE    carBAMazepine ER (CARBATROL ER) capsule 300 mg  300 mg Oral BID    gabapentin (NEURONTIN) capsule 600 mg  600 mg Oral TID    carvediloL (COREG) tablet 6.25 mg  6.25 mg Oral BID WITH MEALS    atorvastatin (LIPITOR) tablet 20 mg  20 mg Oral QHS    levothyroxine (SYNTHROID) tablet 125 mcg  125 mcg Oral 6am    venlafaxine-SR (EFFEXOR-XR) capsule 150 mg  150 mg Oral DAILY    oxyCODONE IR (ROXICODONE) tablet 30 mg  30 mg Oral Q6H PRN    famotidine (PEPCID) tablet 20 mg  20 mg Oral BID    aspirin chewable tablet 81 mg  81 mg Oral DAILY    cholecalciferol (VITAMIN D3) (1000 Units /25 mcg) tablet 1,000 Units  1,000 Units Oral DAILY    sodium chloride (NS) flush 5-40 mL  5-40 mL IntraVENous PRN    acetaminophen (TYLENOL) tablet 650 mg  650 mg Oral Q6H PRN    ondansetron (ZOFRAN) injection 4 mg  4 mg IntraVENous Q4H PRN    docusate sodium (COLACE) capsule 200 mg  200 mg Oral DAILY    magnesium oxide (MAG-OX) tablet 400 mg  400 mg Oral DAILY    busPIRone (BUSPAR) tablet 15 mg  15 mg Oral TID    lactulose (CHRONULAC) 10 gram/15 mL solution 15 mL  15 mL Oral TID    LORazepam (ATIVAN) injection 1 mg  1 mg IntraVENous Q4H PRN    HYDROmorphone (DILAUDID) syringe 0.5 mg  0.5 mg IntraVENous Q4H PRN    piperacillin-tazobactam (ZOSYN) 3.375 g in 0.9% sodium chloride (MBP/ADV) 100 mL MBP  3.375 g IntraVENous Q8H    acetaminophen (TYLENOL) suppository 650 mg  650 mg Rectal Q4H PRN       Past Medical History:   Diagnosis Date    Arthritis     Depression     Epilepsy ([facility] [address]     Hypertension     Ill-defined condition     neurofibromatosis    Thyroid disease         Social History     Tobacco Use    Smoking status: Never Smoker    Smokeless tobacco: Never Used   Substance Use Topics    Alcohol use: No    Drug use: Never            Exam  Visit Vitals  BP (!) 73/37 (BP 1 Location: Right upper arm, BP Patient Position: At rest)   Pulse (!) 105   Temp (!) 102.3 °F (39.1 °C)   Resp 16   Ht 5' 1\" (1.549 m)   Wt 65.7 kg (144 lb 13.5 oz)   SpO2 93%   Breastfeeding No   BMI 27.37 kg/m²         General: Well developed, well nourished.  Patient in no distress   Head: Normocephalic, atraumatic, anicteric sclera   Neck Normal ROM, No thyromegally                         NeurologicExam:  Mental Status: Alert and awake   Speech: Says few words, looks in discomfort, sees has lower back pain. Cranial Nerves:   Pupils are equal round and reactive to light and accommodation, extraocular movements are intact and full, visual fields are intact by confrontation, no nystagmus noted, face is symmetric, sensation on face is intact and symmetric, hearing is intact and symmetric, tongue and uvula are in midline with normal movements, palate is elevating equally, shoulder shrug is intact and symmetric. Motor:  Full and symmetric strength of upper and lower ext . Normal bulk and tone. Reflexes:   Deep tendon reflexes 2+/4 and symmetric. Sensory:   Symmetric and intact    Gait:  Gait is Deffered. Tremor:   No tremor noted. Cerebellar:  Coordination Could not be tested. Neurovascular: No carotid bruits.  No JVD      Lab Review  Lab Results   Component Value Date/Time    WBC 9.5 09/23/2021 09:05 PM    HCT 37.0 09/23/2021 09:05 PM    HGB 12.8 09/23/2021 09:05 PM    PLATELET 012 76/87/4437 09:05 PM     Lab Results   Component Value Date/Time    Sodium 137 09/23/2021 09:05 PM    Potassium 3.4 (L) 09/23/2021 09:05 PM    Chloride 102 09/23/2021 09:05 PM    CO2 26 09/23/2021 09:05 PM    Glucose 104 (H) 09/23/2021 09:05 PM    BUN 15 09/23/2021 09:05 PM    Creatinine 0.66 09/23/2021 09:05 PM    Calcium 8.5 09/23/2021 09:05 PM     No results found for: B12LT, FOL, RBCF  No results found for: LDL, LDLC, DLDLP  No results found for: HBA1C, FZN5JPTY, GBV8IVGR  No components found for: TROPQUANT  No results found for: SILVIA

## 2021-09-24 NOTE — PROGRESS NOTES
Hospitalist Progress Note         Alejandra Bello MD          Daily Progress Note: 9/24/2021      Subjective:    Patient is a 60 yo female with a past medical history of neurofibromatosis type I, chronic pain syndrome, neuropathic pain, benign essential HTN, depression, and hypothyroidism who presented to the ED with acute abdominal pain that was unlike anything she had experienced before. She is on high dose immediate release oxycodone for chronic pain but this episode was not relieved by it.      Normal CT angiogram of the abdomen and pelvis. Sequela of neurofibromatosis. Constipation without obstruction     Normal CTA of the chest with no pulmonary embolism or acute aortic abnormalities identified.  no acute cardiopulmonary findings. Sequela of neurofibromatosis.       Patient seen in follow-up. Overnight events noted. She became more confused and a CT scan of the brain was ordered. CT brain negative for any acute pathology. She had four bowel movements overnight including one this morning. She is arousable but confused. Now with high-grade temperature of 102.7F. IV Zosyn was empirically started yesterday and IV vancomycin will be started this morning. COVID 19 negative. Daughter at bedside during this evaluation       Problem List:  Problem List as of 9/24/2021 Date Reviewed: 9/23/2021        Codes Class Noted - Resolved    Abdominal pain ICD-10-CM: R10.9  ICD-9-CM: 789.00  9/23/2021 - Present        Chronic pain syndrome ICD-10-CM: G89.4  ICD-9-CM: 338.4  8/10/2017 - Present        Neurofibromatosis ([facility] [address] ICD-10-CM: Q85.00  ICD-9-CM: 237.70  8/10/2017 - Present              Medications reviewed  Current Facility-Administered Medications   Medication Dose Route Frequency    0.9% sodium chloride with KCl 20 mEq/L infusion   IntraVENous CONTINUOUS    sodium chloride 0.9 % bolus infusion 500 mL  500 mL IntraVENous ONCE    vancomycin (VANCOCIN) 1,000 mg in 0.9% sodium chloride 250 mL (VIAL-MATE)  1,000 mg IntraVENous ONCE    carBAMazepine ER (CARBATROL ER) capsule 300 mg  300 mg Oral BID    gabapentin (NEURONTIN) capsule 600 mg  600 mg Oral TID    carvediloL (COREG) tablet 6.25 mg  6.25 mg Oral BID WITH MEALS    atorvastatin (LIPITOR) tablet 20 mg  20 mg Oral QHS    levothyroxine (SYNTHROID) tablet 125 mcg  125 mcg Oral 6am    venlafaxine-SR (EFFEXOR-XR) capsule 150 mg  150 mg Oral DAILY    oxyCODONE IR (ROXICODONE) tablet 30 mg  30 mg Oral Q6H PRN    famotidine (PEPCID) tablet 20 mg  20 mg Oral BID    aspirin chewable tablet 81 mg  81 mg Oral DAILY    cholecalciferol (VITAMIN D3) (1000 Units /25 mcg) tablet 1,000 Units  1,000 Units Oral DAILY    sodium chloride (NS) flush 5-40 mL  5-40 mL IntraVENous PRN    acetaminophen (TYLENOL) tablet 650 mg  650 mg Oral Q6H PRN    ondansetron (ZOFRAN) injection 4 mg  4 mg IntraVENous Q4H PRN    docusate sodium (COLACE) capsule 200 mg  200 mg Oral DAILY    magnesium oxide (MAG-OX) tablet 400 mg  400 mg Oral DAILY    busPIRone (BUSPAR) tablet 15 mg  15 mg Oral TID    lactulose (CHRONULAC) 10 gram/15 mL solution 15 mL  15 mL Oral TID    LORazepam (ATIVAN) injection 1 mg  1 mg IntraVENous Q4H PRN    HYDROmorphone (DILAUDID) syringe 0.5 mg  0.5 mg IntraVENous Q4H PRN    piperacillin-tazobactam (ZOSYN) 3.375 g in 0.9% sodium chloride (MBP/ADV) 100 mL MBP  3.375 g IntraVENous Q8H    acetaminophen (TYLENOL) suppository 650 mg  650 mg Rectal Q4H PRN       Review of Systems:   A comprehensive review of systems was negative except for that written in the HPI. Objective:   Physical Exam:     Visit Vitals  BP (!) 74/40   Pulse (!) 105   Temp (!) 102.7 °F (39.3 °C)   Resp 16   Ht 5' 1\" (1.549 m)   Wt 65.7 kg (144 lb 13.5 oz)   SpO2 93%   Breastfeeding No   BMI 27.37 kg/m²      O2 Device: Nasal cannula    Temp (24hrs), Av.2 °F (37.9 °C), Min:97.2 °F (36.2 °C), Max:103.4 °F (39.7 °C)    No intake/output data recorded.    1901 -  0700  In: 3300 [P.O.:200; I.V.:3100]  Out: -     General:  Arpusable but slow to respond   Lungs:   Clear to auscultation bilaterally. Chest wall:  No tenderness or deformity. Heart:  Regular rate and rhythm, S1, S2 normal, no murmur, click, rub or gallop. Abdomen:   Soft, non-tender. Bowel sounds normal. No masses,  No organomegaly. Extremities: Extremities normal, atraumatic, no cyanosis or edema. Pulses: 2+ and symmetric all extremities. Skin: Skin color, texture, turgor normal. No rashes or lesions   Neurologic: CNII-XII intact. No gross sensory or motor deficits     Data Review:       Recent Days:  Recent Labs     09/23/21 2105 09/22/21 2150   WBC 9.5 12.4*   HGB 12.8 13.7   HCT 37.0 41.2    388     Recent Labs     09/23/21 2105 09/22/21 2224 09/22/21 2150     --  135*   K 3.4*  --  3.8     --  100   CO2 26  --  27   *  --  110*   BUN 15  --  16   CREA 0.66  --  0.57   CA 8.5  --  9.1   ALB 3.0*  --  3.7   TBILI 1.2*  --  0.3   *  --  29   INR 1.3* 1.2*  --      No results for input(s): PH, PCO2, PO2, HCO3, FIO2 in the last 72 hours. 24 Hour Results:  Recent Results (from the past 24 hour(s))   CBC WITH AUTOMATED DIFF    Collection Time: 09/23/21  9:05 PM   Result Value Ref Range    WBC 9.5 3.6 - 11.0 K/uL    RBC 4.51 3.80 - 5.20 M/uL    HGB 12.8 11.5 - 16.0 g/dL    HCT 37.0 35.0 - 47.0 %    MCV 82.0 80.0 - 99.0 FL    MCH 28.4 26.0 - 34.0 PG    MCHC 34.6 30.0 - 36.5 g/dL    RDW 12.9 11.5 - 14.5 %    PLATELET 109 315 - 641 K/uL    MPV 9.5 8.9 - 12.9 FL    NRBC 0.0 0.0  WBC    ABSOLUTE NRBC 0.00 0.00 - 0.01 K/uL    NEUTROPHILS 91 (H) 32 - 75 %    LYMPHOCYTES 4 (L) 12 - 49 %    MONOCYTES 5 5 - 13 %    EOSINOPHILS 0 0 - 7 %    BASOPHILS 0 0 - 1 %    IMMATURE GRANULOCYTES 0 0 - 0.5 %    ABS. NEUTROPHILS 8.5 (H) 1.8 - 8.0 K/UL    ABS. LYMPHOCYTES 0.4 (L) 0.8 - 3.5 K/UL    ABS. MONOCYTES 0.5 0.0 - 1.0 K/UL    ABS. EOSINOPHILS 0.0 0.0 - 0.4 K/UL    ABS. BASOPHILS 0.0 0.0 - 0.1 K/UL    ABS. IMM. GRANS. 0.0 0.00 - 0.04 K/UL    DF AUTOMATED     METABOLIC PANEL, COMPREHENSIVE    Collection Time: 09/23/21  9:05 PM   Result Value Ref Range    Sodium 137 136 - 145 mmol/L    Potassium 3.4 (L) 3.5 - 5.1 mmol/L    Chloride 102 97 - 108 mmol/L    CO2 26 21 - 32 mmol/L    Anion gap 9 5 - 15 mmol/L    Glucose 104 (H) 65 - 100 mg/dL    BUN 15 6 - 20 mg/dL    Creatinine 0.66 0.55 - 1.02 mg/dL    BUN/Creatinine ratio 23 (H) 12 - 20      GFR est AA >60 >60 ml/min/1.73m2    GFR est non-AA >60 >60 ml/min/1.73m2    Calcium 8.5 8.5 - 10.1 mg/dL    Bilirubin, total 1.2 (H) 0.2 - 1.0 mg/dL    AST (SGOT) 399 (H) 15 - 37 U/L    ALT (SGPT) 316 (H) 12 - 78 U/L    Alk. phosphatase 198 (H) 45 - 117 U/L    Protein, total 5.8 (L) 6.4 - 8.2 g/dL    Albumin 3.0 (L) 3.5 - 5.0 g/dL    Globulin 2.8 2.0 - 4.0 g/dL    A-G Ratio 1.1 1.1 - 2.2     PROTHROMBIN TIME + INR    Collection Time: 09/23/21  9:05 PM   Result Value Ref Range    Prothrombin time 16.0 (H) 11.9 - 14.7 sec    INR 1.3 (H) 0.9 - 1.1     PTT    Collection Time: 09/23/21  9:05 PM   Result Value Ref Range    aPTT 34.4 (H) 21.2 - 34.1 sec    aPTT, therapeutic range   82 - 109 sec   AMMONIA    Collection Time: 09/23/21  9:05 PM   Result Value Ref Range    Ammonia 44 (H) <32 umol/L   COVID-19 RAPID TEST    Collection Time: 09/23/21  9:19 PM   Result Value Ref Range    Specimen source Please find results under separate order      COVID-19 rapid test Not Detected Not Detected             Assessment/       Sepsis unclear etiology  Severe abdominal pain likely constipation related  Chronic pain syndrome likely due to neurofibromatosis  Benign essential hypertension  Hypothyroidism  History of depression  Hypokalemia.  Repleted      Plan:  Continue supportive care including IV antibiotics  Obtain repeat blood cultures x2  Continue IV Zosyn/vancomycin pending infectious disease input  Transfer patient to ICU for potential vasopressors  Obtain stat COVID-19 testing  DVT GI prophylaxis  She is full code  Clinical updates provided to daughter at bedside  We will continue to keep patient n.p.o. for now. Will consider placing NG while in the ICU    Care Plan discussed with: Nurse    Total time spent with patient: 30 minutes.     Temi Salas MD

## 2021-09-24 NOTE — PROGRESS NOTES
Patient very combative, removing telemetry and IV lines. Bilateral soft limb restraints ordered by attending, daughter George Magallon informed and called and informed nursing supervisor Bisi Gama. Will continue to monitor.

## 2021-09-24 NOTE — PROGRESS NOTES
Pt remains altered and alternates between lethargic but  responsive to voice when alone in room without stimulation. Once pt is aroused pt becomes restless until pt is left alone then settles back down. Safety measures and fall precautions in place. Will continue to monitor.

## 2021-09-24 NOTE — PROGRESS NOTES
Problem: Falls - Risk of  Goal: *Absence of Falls  Description: Document Delphine Hobbs Fall Risk and appropriate interventions in the flowsheet.   Outcome: Progressing Towards Goal  Note: Fall Risk Interventions:  Mobility Interventions: Bed/chair exit alarm         Medication Interventions: Bed/chair exit alarm    Elimination Interventions: Bed/chair exit alarm    History of Falls Interventions: Bed/chair exit alarm

## 2021-09-24 NOTE — PROGRESS NOTES
Patient found having had another bowel movement in bed by her daughter. Patient confused again and very combative, kicking, hitting, removing gown and telemetry, not following directions. Found to have a temperature of 102.3 this time as well. Ice packs placed to armpits and groin, given ativan, placed 2 soft limb wrist restraints. Call placed to Hospitalist Reji by Byron Infante RN.

## 2021-09-25 LAB
ALBUMIN SERPL-MCNC: 2.1 G/DL (ref 3.5–5)
ALBUMIN/GLOB SERPL: 0.8 {RATIO} (ref 1.1–2.2)
ALP SERPL-CCNC: 144 U/L (ref 45–117)
ALT SERPL-CCNC: 99 U/L (ref 12–78)
APPEARANCE UR: CLEAR
AST SERPL W P-5'-P-CCNC: 62 U/L (ref 15–37)
BACTERIA URNS QL MICRO: NEGATIVE /HPF
BASOPHILS # BLD: 0 K/UL (ref 0–0.1)
BASOPHILS NFR BLD: 0 % (ref 0–1)
BILIRUB DIRECT SERPL-MCNC: 0.6 MG/DL (ref 0–0.2)
BILIRUB SERPL-MCNC: 0.9 MG/DL (ref 0.2–1)
BILIRUB UR QL: NEGATIVE
COLOR UR: ABNORMAL
DIFFERENTIAL METHOD BLD: ABNORMAL
EOSINOPHIL # BLD: 0 K/UL (ref 0–0.4)
EOSINOPHIL NFR BLD: 0 % (ref 0–7)
ERYTHROCYTE [DISTWIDTH] IN BLOOD BY AUTOMATED COUNT: 13.4 % (ref 11.5–14.5)
GLOBULIN SER CALC-MCNC: 2.6 G/DL (ref 2–4)
GLUCOSE BLD STRIP.AUTO-MCNC: 96 MG/DL (ref 65–117)
GLUCOSE UR STRIP.AUTO-MCNC: NEGATIVE MG/DL
HCT VFR BLD AUTO: 30.2 % (ref 35–47)
HGB BLD-MCNC: 9.8 G/DL (ref 11.5–16)
HGB UR QL STRIP: ABNORMAL
IMM GRANULOCYTES # BLD AUTO: 0 K/UL
IMM GRANULOCYTES NFR BLD AUTO: 0 %
KETONES UR QL STRIP.AUTO: 20 MG/DL
LEUKOCYTE ESTERASE UR QL STRIP.AUTO: NEGATIVE
LYMPHOCYTES # BLD: 0.3 K/UL (ref 0.8–3.5)
LYMPHOCYTES NFR BLD: 4 % (ref 12–49)
MCH RBC QN AUTO: 28 PG (ref 26–34)
MCHC RBC AUTO-ENTMCNC: 32.5 G/DL (ref 30–36.5)
MCV RBC AUTO: 86.3 FL (ref 80–99)
MONOCYTES # BLD: 0.8 K/UL (ref 0–1)
MONOCYTES NFR BLD: 10 % (ref 5–13)
MUCOUS THREADS URNS QL MICRO: ABNORMAL /LPF
NEUTS SEG # BLD: 6.8 K/UL (ref 1.8–8)
NEUTS SEG NFR BLD: 86 % (ref 32–75)
NITRITE UR QL STRIP.AUTO: NEGATIVE
NRBC # BLD: 0 K/UL (ref 0–0.01)
NRBC BLD-RTO: 0 PER 100 WBC
PERFORMED BY, TECHID: NORMAL
PH UR STRIP: 5 [PH] (ref 5–8)
PLATELET # BLD AUTO: 191 K/UL (ref 150–400)
PMV BLD AUTO: 10.2 FL (ref 8.9–12.9)
PROCALCITONIN SERPL-MCNC: 4.02 NG/ML
PROT SERPL-MCNC: 4.7 G/DL (ref 6.4–8.2)
PROT UR STRIP-MCNC: NEGATIVE MG/DL
RBC # BLD AUTO: 3.5 M/UL (ref 3.8–5.2)
RBC #/AREA URNS HPF: ABNORMAL /HPF (ref 0–5)
RBC MORPH BLD: ABNORMAL
SP GR UR REFRACTOMETRY: 1.02 (ref 1–1.03)
UROBILINOGEN UR QL STRIP.AUTO: 0.1 EU/DL (ref 0.1–1)
WBC # BLD AUTO: 7.9 K/UL (ref 3.6–11)
WBC URNS QL MICRO: ABNORMAL /HPF (ref 0–4)

## 2021-09-25 PROCEDURE — 99232 SBSQ HOSP IP/OBS MODERATE 35: CPT | Performed by: COLON & RECTAL SURGERY

## 2021-09-25 PROCEDURE — 74011250637 HC RX REV CODE- 250/637: Performed by: INTERNAL MEDICINE

## 2021-09-25 PROCEDURE — 65610000006 HC RM INTENSIVE CARE

## 2021-09-25 PROCEDURE — 99232 SBSQ HOSP IP/OBS MODERATE 35: CPT | Performed by: INTERNAL MEDICINE

## 2021-09-25 PROCEDURE — 36415 COLL VENOUS BLD VENIPUNCTURE: CPT

## 2021-09-25 PROCEDURE — 74011250637 HC RX REV CODE- 250/637: Performed by: PSYCHIATRY & NEUROLOGY

## 2021-09-25 PROCEDURE — 80076 HEPATIC FUNCTION PANEL: CPT

## 2021-09-25 PROCEDURE — 74011000250 HC RX REV CODE- 250: Performed by: INTERNAL MEDICINE

## 2021-09-25 PROCEDURE — 74011250637 HC RX REV CODE- 250/637: Performed by: HOSPITALIST

## 2021-09-25 PROCEDURE — 80074 ACUTE HEPATITIS PANEL: CPT

## 2021-09-25 PROCEDURE — 74011000258 HC RX REV CODE- 258: Performed by: INTERNAL MEDICINE

## 2021-09-25 PROCEDURE — 77010033678 HC OXYGEN DAILY

## 2021-09-25 PROCEDURE — 81001 URINALYSIS AUTO W/SCOPE: CPT

## 2021-09-25 PROCEDURE — 87086 URINE CULTURE/COLONY COUNT: CPT

## 2021-09-25 PROCEDURE — 82962 GLUCOSE BLOOD TEST: CPT

## 2021-09-25 PROCEDURE — 74011250636 HC RX REV CODE- 250/636: Performed by: INTERNAL MEDICINE

## 2021-09-25 PROCEDURE — 51798 US URINE CAPACITY MEASURE: CPT

## 2021-09-25 PROCEDURE — 84145 PROCALCITONIN (PCT): CPT

## 2021-09-25 PROCEDURE — 85025 COMPLETE CBC W/AUTO DIFF WBC: CPT

## 2021-09-25 RX ORDER — QUETIAPINE FUMARATE 25 MG/1
25 TABLET, FILM COATED ORAL
Status: DISCONTINUED | OUTPATIENT
Start: 2021-09-25 | End: 2021-10-01 | Stop reason: HOSPADM

## 2021-09-25 RX ORDER — CARBAMAZEPINE 200 MG/10ML
200 SUSPENSION ORAL EVERY 8 HOURS
Status: DISCONTINUED | OUTPATIENT
Start: 2021-09-25 | End: 2021-09-29

## 2021-09-25 RX ADMIN — PIPERACILLIN AND TAZOBACTAM 3.38 G: 3; .375 INJECTION, POWDER, LYOPHILIZED, FOR SOLUTION INTRAVENOUS at 10:38

## 2021-09-25 RX ADMIN — ATORVASTATIN CALCIUM 20 MG: 20 TABLET, FILM COATED ORAL at 21:14

## 2021-09-25 RX ADMIN — FAMOTIDINE 20 MG: 20 TABLET ORAL at 20:51

## 2021-09-25 RX ADMIN — Medication 400 MG: at 07:46

## 2021-09-25 RX ADMIN — GABAPENTIN 600 MG: 300 CAPSULE ORAL at 16:45

## 2021-09-25 RX ADMIN — Medication 4 MCG/MIN: at 14:46

## 2021-09-25 RX ADMIN — OXYCODONE HYDROCHLORIDE 30 MG: 10 TABLET ORAL at 08:05

## 2021-09-25 RX ADMIN — CARVEDILOL 6.25 MG: 3.12 TABLET, FILM COATED ORAL at 16:45

## 2021-09-25 RX ADMIN — PIPERACILLIN AND TAZOBACTAM 3.38 G: 3; .375 INJECTION, POWDER, LYOPHILIZED, FOR SOLUTION INTRAVENOUS at 17:11

## 2021-09-25 RX ADMIN — GABAPENTIN 600 MG: 300 CAPSULE ORAL at 07:46

## 2021-09-25 RX ADMIN — FAMOTIDINE 20 MG: 20 TABLET ORAL at 07:46

## 2021-09-25 RX ADMIN — LEVOTHYROXINE SODIUM 125 MCG: 0.03 TABLET ORAL at 05:55

## 2021-09-25 RX ADMIN — BUSPIRONE HYDROCHLORIDE 15 MG: 10 TABLET ORAL at 16:45

## 2021-09-25 RX ADMIN — LACTULOSE 15 ML: 20 SOLUTION ORAL at 08:04

## 2021-09-25 RX ADMIN — POTASSIUM CHLORIDE AND SODIUM CHLORIDE: 900; 150 INJECTION, SOLUTION INTRAVENOUS at 23:42

## 2021-09-25 RX ADMIN — POTASSIUM CHLORIDE AND SODIUM CHLORIDE: 900; 150 INJECTION, SOLUTION INTRAVENOUS at 10:38

## 2021-09-25 RX ADMIN — PIPERACILLIN AND TAZOBACTAM 3.38 G: 3; .375 INJECTION, POWDER, LYOPHILIZED, FOR SOLUTION INTRAVENOUS at 02:24

## 2021-09-25 RX ADMIN — CARBAMAZEPINE 200 MG: 200 SUSPENSION ORAL at 14:13

## 2021-09-25 RX ADMIN — GABAPENTIN 600 MG: 300 CAPSULE ORAL at 21:15

## 2021-09-25 RX ADMIN — Medication 1000 UNITS: at 07:46

## 2021-09-25 RX ADMIN — CARBAMAZEPINE 200 MG: 200 SUSPENSION ORAL at 21:14

## 2021-09-25 RX ADMIN — Medication 8 MCG/MIN: at 09:04

## 2021-09-25 RX ADMIN — OXYCODONE HYDROCHLORIDE 30 MG: 10 TABLET ORAL at 19:09

## 2021-09-25 RX ADMIN — CARVEDILOL 6.25 MG: 3.12 TABLET, FILM COATED ORAL at 07:45

## 2021-09-25 RX ADMIN — Medication 6 MCG/MIN: at 14:13

## 2021-09-25 RX ADMIN — BUSPIRONE HYDROCHLORIDE 15 MG: 10 TABLET ORAL at 07:46

## 2021-09-25 RX ADMIN — QUETIAPINE FUMARATE 25 MG: 25 TABLET ORAL at 23:42

## 2021-09-25 RX ADMIN — ASPIRIN 81 MG CHEWABLE TABLET 81 MG: 81 TABLET CHEWABLE at 07:46

## 2021-09-25 RX ADMIN — LORAZEPAM 1 MG: 2 INJECTION INTRAMUSCULAR; INTRAVENOUS at 06:36

## 2021-09-25 RX ADMIN — POTASSIUM CHLORIDE AND SODIUM CHLORIDE: 900; 150 INJECTION, SOLUTION INTRAVENOUS at 02:03

## 2021-09-25 RX ADMIN — BUSPIRONE HYDROCHLORIDE 15 MG: 10 TABLET ORAL at 21:14

## 2021-09-25 NOTE — PROGRESS NOTES
Hospitalist Progress Note         Benjamin Sahu MD          Daily Progress Note: 9/25/2021      Subjective:    Patient is a 58 yo female with a past medical history of neurofibromatosis type I, chronic pain syndrome, neuropathic pain, benign essential HTN, depression, and hypothyroidism who presented to the ED with acute abdominal pain that was unlike anything she had experienced before. She is on high dose immediate release oxycodone for chronic pain but this episode was not relieved by it.      Normal CT angiogram of the abdomen and pelvis. Sequela of neurofibromatosis. Constipation without obstruction     Normal CTA of the chest with no pulmonary embolism or acute aortic abnormalities identified.  no acute cardiopulmonary findings. Sequela of neurofibromatosis.       Patient seen in follow-up. T-max of 99.3 °F overnight. Less agitated. Multiple bowel movements overnight.   Tolerating tube feeds        Problem List:  Problem List as of 9/25/2021 Date Reviewed: 9/23/2021        Codes Class Noted - Resolved    Abdominal pain ICD-10-CM: R10.9  ICD-9-CM: 789.00  9/23/2021 - Present        Chronic pain syndrome ICD-10-CM: G89.4  ICD-9-CM: 338.4  8/10/2017 - Present        Neurofibromatosis ([facility] [address] ICD-10-CM: Q85.00  ICD-9-CM: 237.70  8/10/2017 - Present              Medications reviewed  Current Facility-Administered Medications   Medication Dose Route Frequency    lactulose (CHRONULAC) 10 gram/15 mL solution 15 mL  15 mL Oral DAILY PRN    0.9% sodium chloride with KCl 20 mEq/L infusion   IntraVENous CONTINUOUS    NOREPINephrine (LEVOPHED) 8 mg in 5% dextrose 250mL (32 mcg/mL) infusion  0.5-30 mcg/min IntraVENous TITRATE    carBAMazepine ER (CARBATROL ER) capsule 300 mg  300 mg Oral BID    gabapentin (NEURONTIN) capsule 600 mg  600 mg Oral TID    carvediloL (COREG) tablet 6.25 mg  6.25 mg Oral BID WITH MEALS    atorvastatin (LIPITOR) tablet 20 mg  20 mg Oral QHS    levothyroxine (SYNTHROID) tablet 125 mcg  125 mcg Oral 6am    [Held by provider] venlafaxine-SR (EFFEXOR-XR) capsule 150 mg  150 mg Oral DAILY    oxyCODONE IR (ROXICODONE) tablet 30 mg  30 mg Oral Q6H PRN    famotidine (PEPCID) tablet 20 mg  20 mg Oral BID    aspirin chewable tablet 81 mg  81 mg Oral DAILY    cholecalciferol (VITAMIN D3) (1000 Units /25 mcg) tablet 1,000 Units  1,000 Units Oral DAILY    sodium chloride (NS) flush 5-40 mL  5-40 mL IntraVENous PRN    acetaminophen (TYLENOL) tablet 650 mg  650 mg Oral Q6H PRN    ondansetron (ZOFRAN) injection 4 mg  4 mg IntraVENous Q4H PRN    docusate sodium (COLACE) capsule 200 mg  200 mg Oral DAILY    magnesium oxide (MAG-OX) tablet 400 mg  400 mg Oral DAILY    busPIRone (BUSPAR) tablet 15 mg  15 mg Oral TID    LORazepam (ATIVAN) injection 1 mg  1 mg IntraVENous Q4H PRN    HYDROmorphone (DILAUDID) syringe 0.5 mg  0.5 mg IntraVENous Q4H PRN    piperacillin-tazobactam (ZOSYN) 3.375 g in 0.9% sodium chloride (MBP/ADV) 100 mL MBP  3.375 g IntraVENous Q8H    acetaminophen (TYLENOL) suppository 650 mg  650 mg Rectal Q4H PRN       Review of Systems:   A comprehensive review of systems was negative except for that written in the HPI. Objective:   Physical Exam:     Visit Vitals  BP (!) 85/56   Pulse 93   Temp 99.3 °F (37.4 °C)   Resp 21   Ht 5' 1\" (1.549 m)   Wt 75.4 kg (166 lb 3.6 oz)   SpO2 98%   Breastfeeding No   BMI 31.41 kg/m²    O2 Flow Rate (L/min): 1 l/min O2 Device: Nasal cannula    Temp (24hrs), Av.6 °F (37.6 °C), Min:98.2 °F (36.8 °C), Max:101 °F (38.3 °C)    No intake/output data recorded.  1901 -  0700  In: 4304.4 [I.V.:4214.4]  Out: 1150 [Urine:1150]    General:  Awake   Lungs:   Clear to auscultation bilaterally. Chest wall:  No tenderness or deformity. Heart:  Regular rate and rhythm, S1, S2 normal, no murmur, click, rub or gallop. Abdomen:   Soft, non-tender. Bowel sounds normal. No masses,  No organomegaly. Extremities: Extremities normal, atraumatic, no cyanosis or edema. Pulses: 2+ and symmetric all extremities. Skin: Skin color, texture, turgor normal. No rashes or lesions   Neurologic: CNII-XII intact. No gross sensory or motor deficits     Data Review:       Recent Days:  Recent Labs     09/25/21  0304 09/24/21  1200 09/23/21  2105   WBC 7.9 9.1 9.5   HGB 9.8* 10.4* 12.8   HCT 30.2* 31.3* 37.0    230 269     Recent Labs     09/24/21  1310 09/24/21  1026 09/23/21  2105 09/22/21  2224 09/22/21  2150 09/22/21 2150   NA  --  139 137  --   --  135*   K  --  3.6 3.4*  --   --  3.8   CL  --  107 102  --   --  100   CO2  --  22 26  --   --  27   GLU  --  85 104*  --   --  110*   BUN  --  19 15  --   --  16   CREA  --  0.63 0.66  --   --  0.57   CA  --  7.9* 8.5  --   --  9.1   MG  --  2.2  --   --   --   --    PHOS  --  2.1*  2.1*  --   --   --   --    ALB 2.6* 2.4* 3.0*  --    < > 3.7   TBILI 1.7*  --  1.2*  --   --  0.3   *  --  316*  --   --  29   INR  --   --  1.3* 1.2*  --   --     < > = values in this interval not displayed. No results for input(s): PH, PCO2, PO2, HCO3, FIO2 in the last 72 hours.     24 Hour Results:  Recent Results (from the past 24 hour(s))   LIPASE    Collection Time: 09/24/21 10:26 AM   Result Value Ref Range    Lipase 20 (L) 73 - 393 U/L   MAGNESIUM    Collection Time: 09/24/21 10:26 AM   Result Value Ref Range    Magnesium 2.2 1.6 - 2.4 mg/dL   PHOSPHORUS    Collection Time: 09/24/21 10:26 AM   Result Value Ref Range    Phosphorus 2.1 (L) 2.6 - 4.7 mg/dL   RENAL FUNCTION PANEL    Collection Time: 09/24/21 10:26 AM   Result Value Ref Range    Sodium 139 136 - 145 mmol/L    Potassium 3.6 3.5 - 5.1 mmol/L    Chloride 107 97 - 108 mmol/L    CO2 22 21 - 32 mmol/L    Anion gap 10 5 - 15 mmol/L    Glucose 85 65 - 100 mg/dL    BUN 19 6 - 20 mg/dL    Creatinine 0.63 0.55 - 1.02 mg/dL    BUN/Creatinine ratio 30 (H) 12 - 20      GFR est AA >60 >60 ml/min/1.73m2    GFR est non-AA >60 >60 ml/min/1.73m2    Calcium 7.9 (L) 8.5 - 10.1 mg/dL    Phosphorus 2.1 (L) 2.6 - 4.7 mg/dL    Albumin 2.4 (L) 3.5 - 5.0 g/dL   TSH 3RD GENERATION    Collection Time: 09/24/21 10:26 AM   Result Value Ref Range    TSH 0.26 (L) 0.36 - 3.74 uIU/mL   PROCALCITONIN    Collection Time: 09/24/21 10:26 AM   Result Value Ref Range    Procalcitonin 4.94 (H) 0 ng/mL   CRP, HIGH SENSITIVITY    Collection Time: 09/24/21 10:26 AM   Result Value Ref Range    CRP, High sensitivity >9.5 mg/L   SED RATE (ESR)    Collection Time: 09/24/21 10:26 AM   Result Value Ref Range    Sed rate, automated 34 mm/hr   SARS-COV-2    Collection Time: 09/24/21 10:45 AM   Result Value Ref Range    SARS-CoV-2 Please find results under separate order     COVID-19 RAPID TEST    Collection Time: 09/24/21 10:45 AM   Result Value Ref Range    Specimen source Nasopharyngeal      COVID-19 rapid test Not Detected Not Detected     C REACTIVE PROTEIN, QT    Collection Time: 09/24/21 12:00 PM   Result Value Ref Range    C-Reactive protein 20.70 (H) 0.00 - 0.60 mg/dL   CBC WITH AUTOMATED DIFF    Collection Time: 09/24/21 12:00 PM   Result Value Ref Range    WBC 9.1 3.6 - 11.0 K/uL    RBC 3.71 (L) 3.80 - 5.20 M/uL    HGB 10.4 (L) 11.5 - 16.0 g/dL    HCT 31.3 (L) 35.0 - 47.0 %    MCV 84.4 80.0 - 99.0 FL    MCH 28.0 26.0 - 34.0 PG    MCHC 33.2 30.0 - 36.5 g/dL    RDW 13.3 11.5 - 14.5 %    PLATELET 088 979 - 504 K/uL    MPV 10.4 8.9 - 12.9 FL    NRBC 0.0 0.0  WBC    ABSOLUTE NRBC 0.00 0.00 - 0.01 K/uL    NEUTROPHILS 86 (H) 32 - 75 %    LYMPHOCYTES 4 (L) 12 - 49 %    MONOCYTES 9 5 - 13 %    EOSINOPHILS 0 0 - 7 %    BASOPHILS 0 0 - 1 %    IMMATURE GRANULOCYTES 1 (H) 0 - 0.5 %    ABS. NEUTROPHILS 7.8 1.8 - 8.0 K/UL    ABS. LYMPHOCYTES 0.4 (L) 0.8 - 3.5 K/UL    ABS. MONOCYTES 0.8 0.0 - 1.0 K/UL    ABS. EOSINOPHILS 0.0 0.0 - 0.4 K/UL    ABS. BASOPHILS 0.0 0.0 - 0.1 K/UL    ABS. IMM.  GRANS. 0.1 (H) 0.00 - 0.04 K/UL    DF AUTOMATED     MRSA SCREEN - PCR (NASAL) Collection Time: 09/24/21 12:00 PM   Result Value Ref Range    MRSA by PCR, Nasal Not Detected Not Detected     URINALYSIS W/ REFLEX CULTURE    Collection Time: 09/24/21 12:50 PM    Specimen: Urine   Result Value Ref Range    Color Dark Yellow      Appearance Turbid (A) Clear      Specific gravity 1.030 1.003 - 1.030      pH (UA) 5.0 5.0 - 8.0      Protein 30 (A) Negative mg/dL    Glucose Negative Negative mg/dL    Ketone Negative Negative mg/dL    Bilirubin Negative Negative      Blood Small (A) Negative      Urobilinogen 0.1 0.1 - 1.0 EU/dL    Nitrites Negative Negative      Leukocyte Esterase Negative Negative      UA:UC IF INDICATED Culture not indicated by UA result Culture not indicated by UA result      WBC 0-4 0 - 4 /hpf    RBC 0-5 0 - 5 /hpf    Bacteria Negative Negative /hpf   LACTIC ACID    Collection Time: 09/24/21  1:10 PM   Result Value Ref Range    Lactic acid 2.3 (HH) 0.4 - 2.0 mmol/L   AMMONIA    Collection Time: 09/24/21  1:10 PM   Result Value Ref Range    Ammonia 33 (H) <32 umol/L   HEPATIC FUNCTION PANEL    Collection Time: 09/24/21  1:10 PM   Result Value Ref Range    Protein, total 5.2 (L) 6.4 - 8.2 g/dL    Albumin 2.6 (L) 3.5 - 5.0 g/dL    Globulin 2.6 2.0 - 4.0 g/dL    A-G Ratio 1.0 (L) 1.1 - 2.2      Bilirubin, total 1.7 (H) 0.2 - 1.0 mg/dL    Bilirubin, direct 1.3 (H) 0.0 - 0.2 mg/dL    Alk.  phosphatase 169 (H) 45 - 117 U/L    AST (SGOT) 141 (H) 15 - 37 U/L    ALT (SGPT) 176 (H) 12 - 78 U/L   GLUCOSE, POC    Collection Time: 09/24/21  4:12 PM   Result Value Ref Range    Glucose (POC) 79 65 - 117 mg/dL    Performed by Boo Horta    PROCALCITONIN    Collection Time: 09/25/21  3:04 AM   Result Value Ref Range    Procalcitonin 4.02 (H) 0 ng/mL   CBC WITH AUTOMATED DIFF    Collection Time: 09/25/21  3:04 AM   Result Value Ref Range    WBC 7.9 3.6 - 11.0 K/uL    RBC 3.50 (L) 3.80 - 5.20 M/uL    HGB 9.8 (L) 11.5 - 16.0 g/dL    HCT 30.2 (L) 35.0 - 47.0 %    MCV 86.3 80.0 - 99.0 FL    MCH 28.0 26.0 - 34.0 PG    MCHC 32.5 30.0 - 36.5 g/dL    RDW 13.4 11.5 - 14.5 %    PLATELET 191 369 - 372 K/uL    MPV 10.2 8.9 - 12.9 FL    NRBC 0.0 0.0  WBC    ABSOLUTE NRBC 0.00 0.00 - 0.01 K/uL    NEUTROPHILS 86 (H) 32 - 75 %    LYMPHOCYTES 4 (L) 12 - 49 %    MONOCYTES 10 5 - 13 %    EOSINOPHILS 0 0 - 7 %    BASOPHILS 0 0 - 1 %    IMMATURE GRANULOCYTES 0 %    ABS. NEUTROPHILS 6.8 1.8 - 8.0 K/UL    ABS. LYMPHOCYTES 0.3 (L) 0.8 - 3.5 K/UL    ABS. MONOCYTES 0.8 0.0 - 1.0 K/UL    ABS. EOSINOPHILS 0.0 0.0 - 0.4 K/UL    ABS. BASOPHILS 0.0 0.0 - 0.1 K/UL    ABS. IMM. GRANS. 0.0 K/UL    DF Smear Scanned      RBC COMMENTS Normocytic, Normochromic     GLUCOSE, POC    Collection Time: 09/25/21  7:23 AM   Result Value Ref Range    Glucose (POC) 96 65 - 117 mg/dL    Performed by Silas Every            Assessment/       Sepsis likely secondary to aspiration  Septic shock on Levophed  Severe abdominal pain likely constipation related  Chronic pain syndrome likely due to neurofibromatosis  Benign essential hypertension  Hypothyroidism  History of depression  Hypokalemia. Repleted      Plan:  Continue supportive care including IV antibiotics  Wean off vasopressors as tolerated  Continue IV Zosyn/vancomycin pending blood and urine culturesDVT GI prophylaxis  She is full code  Monitor routine electrolytes    Care Plan discussed with: Nurse    Total time spent with patient: 30 minutes.     Dariel Ross MD

## 2021-09-25 NOTE — PROGRESS NOTES
Infectious Disease Progress Note               Subjective:   Pt seen on f/u for Dr Nathanael Gaviria. She is being followed by ID for septic shock due to aspiration PNA with RLL infiltrate on chest imaging. She reported abdominal pain on admission CT abdo/pel revealed constipation w/o obstruction. Abdominal US () showed Gallstone lodged in the neck of the gallbladder, Sludge within the gallbladder and borderline gallbladder wall thickening. ICU staff informed me of GNR isolated from Bcx. Pt was lethargic and somnolent during my assessment, s/p Ativan.  She is on Zosyn for GNR coverage   Objective:   Physical Exam:     Visit Vitals  /68   Pulse 78   Temp 98.7 °F (37.1 °C)   Resp 18   Ht 5' 1\" (1.549 m)   Wt 166 lb 3.6 oz (75.4 kg)   SpO2 100%   Breastfeeding No   BMI 31.41 kg/m²    O2 Flow Rate (L/min): 1 l/min O2 Device: Nasal cannula    Temp (24hrs), Av.1 °F (37.3 °C), Min:98.2 °F (36.8 °C), Max:100.4 °F (38 °C)     07 - 1900  In: 40   Out: -    1901 -  0700  In: 4304.4 [I.V.:4214.4]  Out: 5287 [Urine:1150]    General: NAD, lethargic, decreased responsiveness   HEENT: SARAH, very dry mucosa   Lungs: CTA b/l, decreased at the bases, no wheeze/rhonchi   Heart: S1S2+, RRR, no murmur  Abdo: Soft, NT, ND, +BS   : + mathew cath   Exts: trace edema, + pulses b/l   Skin: No wounds, No rashes or lesions      Data Review:       Recent Days:  Recent Labs     21  0304 21  1200 21  2105   WBC 7.9 9.1 9.5   HGB 9.8* 10.4* 12.8   HCT 30.2* 31.3* 37.0    230 269     Recent Labs     21  1026 21  2105 21  2150   BUN 19 15 16   CREA 0.63 0.66 0.57       Lab Results   Component Value Date/Time    C-Reactive protein 20.70 (H) 2021 12:00 PM          Microbiology     Results     Procedure Component Value Units Date/Time    CULTURE, URINE [[id number]] Collected: 21 1425    Order Status: Completed Specimen: Urine Updated: 21 4213 CULTURE, BLOOD [[id number]] Collected: 09/24/21 1310    Order Status: Completed Specimen: Blood Updated: 09/25/21 1351     Special Requests: Blood        Culture result: No growth after 3 hours       MRSA SCREEN - PCR (NASAL) [[id number]] Collected: 09/24/21 1200    Order Status: Completed Specimen: Swab Updated: 09/24/21 1536     MRSA by PCR, Nasal Not Detected       COVID-19 RAPID TEST [[id number]] Collected: 09/24/21 1045    Order Status: Completed Specimen: Nasopharyngeal Updated: 09/24/21 1228     Specimen source Nasopharyngeal        COVID-19 rapid test Not Detected        Comment: Rapid Abbott ID Now   Rapid NAAT:  The specimen is NEGATIVE for SARS-CoV-2, the novel coronavirus associated with COVID-19. Negative results should be treated as presumptive and, if inconsistent with clinical signs and symptoms or necessary for patient management, should be tested with an alternative molecular assay. Negative results do not preclude SARS-CoV-2 infection and should not be used as the sole basis for patient management decisions. This test has been authorized by the FDA under   an Emergency Use Authorization (EUA) for use by authorized laboratories.  Fact sheet for Healthcare Providers: ConventionUpdate.co.nz Fact sheet for Patients: ConventionUpdate.co.nz   Methodology: Isothermal Nucleic Acid Amplification         CULTURE, BLOOD, PAIRED [[id number]] Collected: 09/24/21 1026    Order Status: Completed Specimen: Blood Updated: 09/25/21 1351     Special Requests: No Special Requests        Culture result: No growth after 22 hours       COVID-19 RAPID TEST [[id number]] Collected: 09/23/21 2119    Order Status: Completed Specimen: Nasopharyngeal Updated: 09/23/21 2217     Specimen source       Please find results under separate order           COVID-19 rapid test Not Detected        Comment: Rapid Abbott ID Now   Rapid NAAT:  The specimen is NEGATIVE for SARS-CoV-2, the novel coronavirus associated with COVID-19. Negative results should be treated as presumptive and, if inconsistent with clinical signs and symptoms or necessary for patient management, should be tested with an alternative molecular assay. Negative results do not preclude SARS-CoV-2 infection and should not be used as the sole basis for patient management decisions. This test has been authorized by the FDA under   an Emergency Use Authorization (EUA) for use by authorized laboratories. Fact sheet for Healthcare Providers: infirst Healthcaredate.co.nz Fact sheet for Patients: [employer].co.nz   Methodology: Isothermal Nucleic Acid Amplification         CULTURE, BLOOD, PAIRED [[id number]] Collected: 09/23/21 2105    Order Status: Completed Specimen: Blood Updated: 09/25/21 1034     Special Requests: No Special Requests        Culture result:       Gram Negative Rods One of four bottles has been flagged positive by instrument. Bottle has been sent to [facility] laboratory to assess for possible growth. CALLED TO AND READ BACK BY Belkis Garcia RN AT [phone] BY JF                   Diagnostics   CXR Results  (Last 48 hours)               09/24/21 1415  XR CHEST PORT Final result    Impression:  Mild cardiomegaly. New right lower lobe interstitial infiltration. Gastric tube tip overlying the distal stomach. Continued follow-up recommended. Narrative:  History is NG tube placement. Patient has a history of neurofibromatosis. Comparison is with the chest CT of 9/22/2021. An AP portable semierect view of the chest demonstrates a gastric tube with the   tip overlying the distal stomach. Cardiac monitor leads are present. The heart   is mildly enlarged. There is mild right lower lobe interstitial infiltration. There is mild elevation of the right hemidiaphragm. There is no pneumothorax or   effusion. The osseous structures appear within normal limits. Assessment/Plan     1. GNR septicemia, suspected intra-abdominal/ source       Tmax of 102.3F in the past 24 hours, has been afebrile today        WBC normalized, on pressor support. Procal 4.02        Urine Cx (09/25) is pending, GNR isolated from Bcx        Continue on Zosyn pending final Bcx results. Repeat Bcx and routine labs  in AM     2. Suspected cholecystitis: Abdominal pain on admission      Gallstone lodged in the neck of the gallbladder, Sludge within the Gallbladder on US       Mildly elevated liver enzymes. Continue conservative mgt w NPO status and IV abx    3. Suspected aspiration PNA w RLL infiltrate on CXR       Decreased responsiveness, on 2L O2. CoVID Ag test neg (09/24)        4. AMS: Minimally responsive during my assessment      Due to metabolic encephalopathy per neurology     5.  H/o Neurofibromatosis, chronic pain syndrome     Fiona Santos MD    9/25/2021

## 2021-09-25 NOTE — PROGRESS NOTES
Pt having multiple bowel movements tonight. Pt noted not to urinate any since beginning of the shift. Bladder scanner showed greater than 507 ml so pt straight cathed. 850ml out ke urine. Pt seems less agitated. Will continue to monitor. Levophed currently off.

## 2021-09-25 NOTE — PROGRESS NOTES
Neurology Progress Note    Patient ID:  Emmanuelle Lincoln  [id number]  2 y.o.  1960    Subjective: The patient is seen in follow-up she still about the same may be more lethargic today because she was given a dose of Ativan 1 mg overnight due to agitation. Her speech is difficult to understand but some words can be understood. Patient did not have any new symptoms.     Current Facility-Administered Medications   Medication Dose Route Frequency    lactulose (CHRONULAC) 10 gram/15 mL solution 15 mL  15 mL Oral DAILY PRN    carBAMazepine (TEGretol) 200 mg/10 mL suspension 200 mg  200 mg Per NG tube Q8H    QUEtiapine (SEROquel) tablet 25 mg  25 mg Oral Q4H PRN    0.9% sodium chloride with KCl 20 mEq/L infusion   IntraVENous CONTINUOUS    NOREPINephrine (LEVOPHED) 8 mg in 5% dextrose 250mL (32 mcg/mL) infusion  0.5-30 mcg/min IntraVENous TITRATE    gabapentin (NEURONTIN) capsule 600 mg  600 mg Oral TID    carvediloL (COREG) tablet 6.25 mg  6.25 mg Oral BID WITH MEALS    atorvastatin (LIPITOR) tablet 20 mg  20 mg Oral QHS    levothyroxine (SYNTHROID) tablet 125 mcg  125 mcg Oral 6am    [Held by provider] venlafaxine-SR (EFFEXOR-XR) capsule 150 mg  150 mg Oral DAILY    oxyCODONE IR (ROXICODONE) tablet 30 mg  30 mg Oral Q6H PRN    famotidine (PEPCID) tablet 20 mg  20 mg Oral BID    aspirin chewable tablet 81 mg  81 mg Oral DAILY    cholecalciferol (VITAMIN D3) (1000 Units /25 mcg) tablet 1,000 Units  1,000 Units Oral DAILY    sodium chloride (NS) flush 5-40 mL  5-40 mL IntraVENous PRN    acetaminophen (TYLENOL) tablet 650 mg  650 mg Oral Q6H PRN    ondansetron (ZOFRAN) injection 4 mg  4 mg IntraVENous Q4H PRN    [Held by provider] docusate sodium (COLACE) capsule 200 mg  200 mg Oral DAILY    magnesium oxide (MAG-OX) tablet 400 mg  400 mg Oral DAILY    busPIRone (BUSPAR) tablet 15 mg  15 mg Oral TID    HYDROmorphone (DILAUDID) syringe 0.5 mg  0.5 mg IntraVENous Q4H PRN    piperacillin-tazobactam (ZOSYN) 3.375 g in 0.9% sodium chloride (MBP/ADV) 100 mL MBP  3.375 g IntraVENous Q8H    acetaminophen (TYLENOL) suppository 650 mg  650 mg Rectal Q4H PRN     Objective:     Patient Vitals for the past 8 hrs:   BP Temp Pulse Resp SpO2   09/25/21 1300 126/72 -- 74 19 99 %   09/25/21 1200 127/68 -- 73 18 99 %   09/25/21 1100 103/66 98.2 °F (36.8 °C) 75 20 99 %   09/25/21 1000 (!) 91/59 -- 73 16 98 %   09/25/21 0918 (!) 85/56 -- -- -- --   09/25/21 0905 (!) 83/54 -- -- -- --   09/25/21 0900 (!) 79/54 -- 81 15 95 %   09/25/21 0820 -- -- -- -- 98 %   09/25/21 0800 118/71 -- 93 23 98 %   09/25/21 0745 133/66 -- 92 -- --   09/25/21 0700 101/66 99.3 °F (37.4 °C) 89 21 99 %     No intake/output data recorded. 09/23 1901 - 09/25 0700  In: 4304.4 [I.V.:4214.4]  Out: 56 [Urine:1150]    Lab Review   Recent Results (from the past 24 hour(s))   GLUCOSE, POC    Collection Time: 09/24/21  4:12 PM   Result Value Ref Range    Glucose (POC) 79 65 - 117 mg/dL    Performed by Roel Males    PROCALCITONIN    Collection Time: 09/25/21  3:04 AM   Result Value Ref Range    Procalcitonin 4.02 (H) 0 ng/mL   CBC WITH AUTOMATED DIFF    Collection Time: 09/25/21  3:04 AM   Result Value Ref Range    WBC 7.9 3.6 - 11.0 K/uL    RBC 3.50 (L) 3.80 - 5.20 M/uL    HGB 9.8 (L) 11.5 - 16.0 g/dL    HCT 30.2 (L) 35.0 - 47.0 %    MCV 86.3 80.0 - 99.0 FL    MCH 28.0 26.0 - 34.0 PG    MCHC 32.5 30.0 - 36.5 g/dL    RDW 13.4 11.5 - 14.5 %    PLATELET 500 022 - 082 K/uL    MPV 10.2 8.9 - 12.9 FL    NRBC 0.0 0.0  WBC    ABSOLUTE NRBC 0.00 0.00 - 0.01 K/uL    NEUTROPHILS 86 (H) 32 - 75 %    LYMPHOCYTES 4 (L) 12 - 49 %    MONOCYTES 10 5 - 13 %    EOSINOPHILS 0 0 - 7 %    BASOPHILS 0 0 - 1 %    IMMATURE GRANULOCYTES 0 %    ABS. NEUTROPHILS 6.8 1.8 - 8.0 K/UL    ABS. LYMPHOCYTES 0.3 (L) 0.8 - 3.5 K/UL    ABS. MONOCYTES 0.8 0.0 - 1.0 K/UL    ABS. EOSINOPHILS 0.0 0.0 - 0.4 K/UL    ABS. BASOPHILS 0.0 0.0 - 0.1 K/UL    ABS. IMM.  GRANS. 0.0 K/UL    DF Smear Scanned      RBC COMMENTS Normocytic, Normochromic     GLUCOSE, POC    Collection Time: 09/25/21  7:23 AM   Result Value Ref Range    Glucose (POC) 96 65 - 117 mg/dL    Performed by Manny Zimmer Close FUNCTION PANEL    Collection Time: 09/25/21 12:20 PM   Result Value Ref Range    Protein, total 4.7 (L) 6.4 - 8.2 g/dL    Albumin 2.1 (L) 3.5 - 5.0 g/dL    Globulin 2.6 2.0 - 4.0 g/dL    A-G Ratio 0.8 (L) 1.1 - 2.2      Bilirubin, total 0.9 0.2 - 1.0 mg/dL    Bilirubin, direct 0.6 (H) 0.0 - 0.2 mg/dL    Alk. phosphatase 144 (H) 45 - 117 U/L    AST (SGOT) 62 (H) 15 - 37 U/L    ALT (SGPT) 99 (H) 12 - 78 U/L     Additional comments: None. NEUROLOGICAL EXAM:    Appearance: The patient is well developed, well nourished. Mental Status:  Patient is still confused but was able to tell her name. Cranial Nerves:   Intact visual fields. SARAH, EOM's full, no nystagmus, no ptosis. Facial movement is symmetric. Hearing is normal bilaterally. Motor:  Good strength in upper and lower proximal and distal muscles. Normal bulk and tone. Reflexes:   Deep tendon reflexes 2+/4 and symmetrical.   Sensory:   Could not be tested   Gait:  Not tested. Tremor:   No tremor noted. Cerebellar:  Not tested. Neurovascular:  Normal heart sounds and regular rhythm,  and no carotid bruits. Assessment:     Active Problems:    Metabolic encephalopathy secondary to metabolic derangements    Abdominal pain (9/23/2021)    Hypertension    Neurofibromatosis    Plan:   1. Order to switch from Ativan to Seroquel as needed. No additional neurological work-up is indicated at this time.   2.  Continue with the management of the abdominal pain and other medical issues    Thank you,    Signed:  Telma Wilkerson MD  9/25/2021  2:10 PM

## 2021-09-25 NOTE — PROGRESS NOTES
Progress Note    Patient: Gucci Marcum MRN: [id number]  SSN: [id number]    YOB: 1960  Age: 59 y.o. Sex: female      Admit Date: 9/22/2021    LOS: 2 days     Subjective:   Patient seen in bed  Drowsy when I saw her secondary to pain medication  Continues on pressor support    Objective:     Vitals:    09/25/21 0800 09/25/21 0820 09/25/21 0905 09/25/21 0918   BP:   (!) 83/54 (!) 85/56   Pulse: 93      Resp:       Temp:       SpO2:  98%     Weight:       Height:            Intake and Output:  Current Shift: No intake/output data recorded. Last three shifts: 09/23 1901 - 09/25 0700  In: 4304.4 [I.V.:4214.4]  Out: 1150 [Urine:1150]    Review of Systems:  ROS     Physical Exam:   Abdomen is soft, nondistended, tender palpation in the right upper abdomen    Lab/Data Review:  Recent Results (from the past 24 hour(s))   C REACTIVE PROTEIN, QT    Collection Time: 09/24/21 12:00 PM   Result Value Ref Range    C-Reactive protein 20.70 (H) 0.00 - 0.60 mg/dL   CBC WITH AUTOMATED DIFF    Collection Time: 09/24/21 12:00 PM   Result Value Ref Range    WBC 9.1 3.6 - 11.0 K/uL    RBC 3.71 (L) 3.80 - 5.20 M/uL    HGB 10.4 (L) 11.5 - 16.0 g/dL    HCT 31.3 (L) 35.0 - 47.0 %    MCV 84.4 80.0 - 99.0 FL    MCH 28.0 26.0 - 34.0 PG    MCHC 33.2 30.0 - 36.5 g/dL    RDW 13.3 11.5 - 14.5 %    PLATELET 936 811 - 346 K/uL    MPV 10.4 8.9 - 12.9 FL    NRBC 0.0 0.0  WBC    ABSOLUTE NRBC 0.00 0.00 - 0.01 K/uL    NEUTROPHILS 86 (H) 32 - 75 %    LYMPHOCYTES 4 (L) 12 - 49 %    MONOCYTES 9 5 - 13 %    EOSINOPHILS 0 0 - 7 %    BASOPHILS 0 0 - 1 %    IMMATURE GRANULOCYTES 1 (H) 0 - 0.5 %    ABS. NEUTROPHILS 7.8 1.8 - 8.0 K/UL    ABS. LYMPHOCYTES 0.4 (L) 0.8 - 3.5 K/UL    ABS. MONOCYTES 0.8 0.0 - 1.0 K/UL    ABS. EOSINOPHILS 0.0 0.0 - 0.4 K/UL    ABS. BASOPHILS 0.0 0.0 - 0.1 K/UL    ABS. IMM.  GRANS. 0.1 (H) 0.00 - 0.04 K/UL    DF AUTOMATED     MRSA SCREEN - PCR (NASAL)    Collection Time: 09/24/21 12:00 PM   Result Value Ref Range    MRSA by PCR, Nasal Not Detected Not Detected     URINALYSIS W/ REFLEX CULTURE    Collection Time: 09/24/21 12:50 PM    Specimen: Urine   Result Value Ref Range    Color Dark Yellow      Appearance Turbid (A) Clear      Specific gravity 1.030 1.003 - 1.030      pH (UA) 5.0 5.0 - 8.0      Protein 30 (A) Negative mg/dL    Glucose Negative Negative mg/dL    Ketone Negative Negative mg/dL    Bilirubin Negative Negative      Blood Small (A) Negative      Urobilinogen 0.1 0.1 - 1.0 EU/dL    Nitrites Negative Negative      Leukocyte Esterase Negative Negative      UA:UC IF INDICATED Culture not indicated by UA result Culture not indicated by UA result      WBC 0-4 0 - 4 /hpf    RBC 0-5 0 - 5 /hpf    Bacteria Negative Negative /hpf   LACTIC ACID    Collection Time: 09/24/21  1:10 PM   Result Value Ref Range    Lactic acid 2.3 (HH) 0.4 - 2.0 mmol/L   AMMONIA    Collection Time: 09/24/21  1:10 PM   Result Value Ref Range    Ammonia 33 (H) <32 umol/L   HEPATIC FUNCTION PANEL    Collection Time: 09/24/21  1:10 PM   Result Value Ref Range    Protein, total 5.2 (L) 6.4 - 8.2 g/dL    Albumin 2.6 (L) 3.5 - 5.0 g/dL    Globulin 2.6 2.0 - 4.0 g/dL    A-G Ratio 1.0 (L) 1.1 - 2.2      Bilirubin, total 1.7 (H) 0.2 - 1.0 mg/dL    Bilirubin, direct 1.3 (H) 0.0 - 0.2 mg/dL    Alk.  phosphatase 169 (H) 45 - 117 U/L    AST (SGOT) 141 (H) 15 - 37 U/L    ALT (SGPT) 176 (H) 12 - 78 U/L   GLUCOSE, POC    Collection Time: 09/24/21  4:12 PM   Result Value Ref Range    Glucose (POC) 79 65 - 117 mg/dL    Performed by Jostin Hernandez    PROCALCITONIN    Collection Time: 09/25/21  3:04 AM   Result Value Ref Range    Procalcitonin 4.02 (H) 0 ng/mL   CBC WITH AUTOMATED DIFF    Collection Time: 09/25/21  3:04 AM   Result Value Ref Range    WBC 7.9 3.6 - 11.0 K/uL    RBC 3.50 (L) 3.80 - 5.20 M/uL    HGB 9.8 (L) 11.5 - 16.0 g/dL    HCT 30.2 (L) 35.0 - 47.0 %    MCV 86.3 80.0 - 99.0 FL    MCH 28.0 26.0 - 34.0 PG    MCHC 32.5 30.0 - 36.5 g/dL    RDW 13.4 11.5 - 14.5 %    PLATELET 631 207 - 106 K/uL    MPV 10.2 8.9 - 12.9 FL    NRBC 0.0 0.0  WBC    ABSOLUTE NRBC 0.00 0.00 - 0.01 K/uL    NEUTROPHILS 86 (H) 32 - 75 %    LYMPHOCYTES 4 (L) 12 - 49 %    MONOCYTES 10 5 - 13 %    EOSINOPHILS 0 0 - 7 %    BASOPHILS 0 0 - 1 %    IMMATURE GRANULOCYTES 0 %    ABS. NEUTROPHILS 6.8 1.8 - 8.0 K/UL    ABS. LYMPHOCYTES 0.3 (L) 0.8 - 3.5 K/UL    ABS. MONOCYTES 0.8 0.0 - 1.0 K/UL    ABS. EOSINOPHILS 0.0 0.0 - 0.4 K/UL    ABS. BASOPHILS 0.0 0.0 - 0.1 K/UL    ABS. IMM. GRANS. 0.0 K/UL    DF Smear Scanned      RBC COMMENTS Normocytic, Normochromic     GLUCOSE, POC    Collection Time: 09/25/21  7:23 AM   Result Value Ref Range    Glucose (POC) 96 65 - 117 mg/dL    Performed by Erik Gray           Assessment:     Active Problems:    Abdominal pain (9/23/2021)        Plan:   Had extensive discussion with the patient's daughter who was present in the room. I explained that I do not believe her abdominal pain was secondary to constipation as she has a chronic longstanding history of constipation which the daughter agreed with. Her daughter did mention that the patient has had intermittent bloody drainage from the ear canal on the side where she had a acoustic neuroma resected. We will get ENT to take a look at her. Her ultrasound shows borderline gallbladder wall thickening. Also concerned that this might be cholecystitis. We will continue with IV antibiotics n.p.o. Hepatitis panel ordered.       Signed By: Merlin Bart, MD     September 25, 2021

## 2021-09-26 LAB
ANION GAP SERPL CALC-SCNC: 11 MMOL/L (ref 5–15)
BACTERIA SPEC CULT: NORMAL
BASOPHILS # BLD: 0 K/UL (ref 0–0.1)
BASOPHILS NFR BLD: 0 % (ref 0–1)
BUN SERPL-MCNC: 9 MG/DL (ref 6–20)
BUN/CREAT SERPL: 32 (ref 12–20)
CA-I BLD-MCNC: 7.9 MG/DL (ref 8.5–10.1)
CHLORIDE SERPL-SCNC: 108 MMOL/L (ref 97–108)
CO2 SERPL-SCNC: 18 MMOL/L (ref 21–32)
CREAT SERPL-MCNC: 0.28 MG/DL (ref 0.55–1.02)
CRP SERPL-MCNC: 17.4 MG/DL (ref 0–0.6)
DIFFERENTIAL METHOD BLD: ABNORMAL
EOSINOPHIL # BLD: 0 K/UL (ref 0–0.4)
EOSINOPHIL NFR BLD: 0 % (ref 0–7)
ERYTHROCYTE [DISTWIDTH] IN BLOOD BY AUTOMATED COUNT: 13.5 % (ref 11.5–14.5)
GLUCOSE SERPL-MCNC: 78 MG/DL (ref 65–100)
HCT VFR BLD AUTO: 28.6 % (ref 35–47)
HGB BLD-MCNC: 9.3 G/DL (ref 11.5–16)
IMM GRANULOCYTES # BLD AUTO: 0 K/UL (ref 0–0.04)
IMM GRANULOCYTES NFR BLD AUTO: 0 % (ref 0–0.5)
LYMPHOCYTES # BLD: 0.3 K/UL (ref 0.8–3.5)
LYMPHOCYTES NFR BLD: 5 % (ref 12–49)
MCH RBC QN AUTO: 28.3 PG (ref 26–34)
MCHC RBC AUTO-ENTMCNC: 32.5 G/DL (ref 30–36.5)
MCV RBC AUTO: 86.9 FL (ref 80–99)
MONOCYTES # BLD: 0.8 K/UL (ref 0–1)
MONOCYTES NFR BLD: 12 % (ref 5–13)
NEUTS SEG # BLD: 5.4 K/UL (ref 1.8–8)
NEUTS SEG NFR BLD: 83 % (ref 32–75)
NRBC # BLD: 0 K/UL (ref 0–0.01)
NRBC BLD-RTO: 0 PER 100 WBC
PLATELET # BLD AUTO: 178 K/UL (ref 150–400)
PMV BLD AUTO: 11.1 FL (ref 8.9–12.9)
POTASSIUM SERPL-SCNC: 3.8 MMOL/L (ref 3.5–5.1)
PROCALCITONIN SERPL-MCNC: 2.75 NG/ML
RBC # BLD AUTO: 3.29 M/UL (ref 3.8–5.2)
SODIUM SERPL-SCNC: 137 MMOL/L (ref 136–145)
SPECIAL REQUESTS,SREQ: NORMAL
WBC # BLD AUTO: 6.5 K/UL (ref 3.6–11)

## 2021-09-26 PROCEDURE — 85025 COMPLETE CBC W/AUTO DIFF WBC: CPT

## 2021-09-26 PROCEDURE — 87040 BLOOD CULTURE FOR BACTERIA: CPT

## 2021-09-26 PROCEDURE — 36415 COLL VENOUS BLD VENIPUNCTURE: CPT

## 2021-09-26 PROCEDURE — 74011250636 HC RX REV CODE- 250/636: Performed by: INTERNAL MEDICINE

## 2021-09-26 PROCEDURE — 74011250637 HC RX REV CODE- 250/637: Performed by: INTERNAL MEDICINE

## 2021-09-26 PROCEDURE — 74011250637 HC RX REV CODE- 250/637: Performed by: HOSPITALIST

## 2021-09-26 PROCEDURE — 74011250637 HC RX REV CODE- 250/637: Performed by: PSYCHIATRY & NEUROLOGY

## 2021-09-26 PROCEDURE — 74011000258 HC RX REV CODE- 258: Performed by: INTERNAL MEDICINE

## 2021-09-26 PROCEDURE — 74011000250 HC RX REV CODE- 250: Performed by: INTERNAL MEDICINE

## 2021-09-26 PROCEDURE — 99232 SBSQ HOSP IP/OBS MODERATE 35: CPT | Performed by: INTERNAL MEDICINE

## 2021-09-26 PROCEDURE — 80048 BASIC METABOLIC PNL TOTAL CA: CPT

## 2021-09-26 PROCEDURE — 84145 PROCALCITONIN (PCT): CPT

## 2021-09-26 PROCEDURE — 3E043XZ INTRODUCTION OF VASOPRESSOR INTO CENTRAL VEIN, PERCUTANEOUS APPROACH: ICD-10-PCS | Performed by: INTERNAL MEDICINE

## 2021-09-26 PROCEDURE — 77010033678 HC OXYGEN DAILY

## 2021-09-26 PROCEDURE — 65610000006 HC RM INTENSIVE CARE

## 2021-09-26 PROCEDURE — 86140 C-REACTIVE PROTEIN: CPT

## 2021-09-26 RX ORDER — VENLAFAXINE 25 MG/1
75 TABLET ORAL 2 TIMES DAILY WITH MEALS
Status: DISCONTINUED | OUTPATIENT
Start: 2021-09-26 | End: 2021-10-01 | Stop reason: HOSPADM

## 2021-09-26 RX ADMIN — Medication 400 MG: at 09:54

## 2021-09-26 RX ADMIN — Medication 1000 UNITS: at 09:54

## 2021-09-26 RX ADMIN — PIPERACILLIN AND TAZOBACTAM 3.38 G: 3; .375 INJECTION, POWDER, LYOPHILIZED, FOR SOLUTION INTRAVENOUS at 16:56

## 2021-09-26 RX ADMIN — OXYCODONE HYDROCHLORIDE 30 MG: 10 TABLET ORAL at 20:16

## 2021-09-26 RX ADMIN — CARBAMAZEPINE 200 MG: 200 SUSPENSION ORAL at 21:33

## 2021-09-26 RX ADMIN — GABAPENTIN 600 MG: 300 CAPSULE ORAL at 16:48

## 2021-09-26 RX ADMIN — BUSPIRONE HYDROCHLORIDE 15 MG: 10 TABLET ORAL at 09:54

## 2021-09-26 RX ADMIN — VANCOMYCIN HYDROCHLORIDE 1500 MG: 10 INJECTION, POWDER, LYOPHILIZED, FOR SOLUTION INTRAVENOUS at 13:33

## 2021-09-26 RX ADMIN — CARBAMAZEPINE 200 MG: 200 SUSPENSION ORAL at 05:03

## 2021-09-26 RX ADMIN — GABAPENTIN 600 MG: 300 CAPSULE ORAL at 21:33

## 2021-09-26 RX ADMIN — Medication 1 MCG/MIN: at 07:40

## 2021-09-26 RX ADMIN — FAMOTIDINE 20 MG: 20 TABLET ORAL at 20:16

## 2021-09-26 RX ADMIN — CARBAMAZEPINE 200 MG: 200 SUSPENSION ORAL at 13:28

## 2021-09-26 RX ADMIN — PIPERACILLIN AND TAZOBACTAM 3.38 G: 3; .375 INJECTION, POWDER, LYOPHILIZED, FOR SOLUTION INTRAVENOUS at 02:31

## 2021-09-26 RX ADMIN — OXYCODONE HYDROCHLORIDE 30 MG: 10 TABLET ORAL at 13:28

## 2021-09-26 RX ADMIN — PIPERACILLIN AND TAZOBACTAM 3.38 G: 3; .375 INJECTION, POWDER, LYOPHILIZED, FOR SOLUTION INTRAVENOUS at 09:54

## 2021-09-26 RX ADMIN — POTASSIUM CHLORIDE AND SODIUM CHLORIDE: 900; 150 INJECTION, SOLUTION INTRAVENOUS at 16:48

## 2021-09-26 RX ADMIN — POTASSIUM CHLORIDE AND SODIUM CHLORIDE: 900; 150 INJECTION, SOLUTION INTRAVENOUS at 07:39

## 2021-09-26 RX ADMIN — ASPIRIN 81 MG CHEWABLE TABLET 81 MG: 81 TABLET CHEWABLE at 09:54

## 2021-09-26 RX ADMIN — VANCOMYCIN HYDROCHLORIDE 750 MG: 750 INJECTION, POWDER, LYOPHILIZED, FOR SOLUTION INTRAVENOUS at 20:15

## 2021-09-26 RX ADMIN — OXYCODONE HYDROCHLORIDE 30 MG: 10 TABLET ORAL at 07:31

## 2021-09-26 RX ADMIN — GABAPENTIN 600 MG: 300 CAPSULE ORAL at 09:54

## 2021-09-26 RX ADMIN — VENLAFAXINE 75 MG: 25 TABLET ORAL at 16:48

## 2021-09-26 RX ADMIN — CARVEDILOL 6.25 MG: 3.12 TABLET, FILM COATED ORAL at 09:54

## 2021-09-26 RX ADMIN — BUSPIRONE HYDROCHLORIDE 15 MG: 10 TABLET ORAL at 16:48

## 2021-09-26 RX ADMIN — BUSPIRONE HYDROCHLORIDE 15 MG: 10 TABLET ORAL at 21:33

## 2021-09-26 RX ADMIN — FAMOTIDINE 20 MG: 20 TABLET ORAL at 09:54

## 2021-09-26 RX ADMIN — LEVOTHYROXINE SODIUM 125 MCG: 0.03 TABLET ORAL at 05:03

## 2021-09-26 RX ADMIN — CARVEDILOL 6.25 MG: 3.12 TABLET, FILM COATED ORAL at 16:48

## 2021-09-26 RX ADMIN — ATORVASTATIN CALCIUM 20 MG: 20 TABLET, FILM COATED ORAL at 21:32

## 2021-09-26 NOTE — PROGRESS NOTES
Neurology Progress Note    Patient ID:  Lele Morfin  [id number]  62 y.o.  1960    Subjective: The patient is seen in follow-up she still about the same may be slightly more awake today because she is off of Ativan. Her speech is difficult to understand but some words can be understood. She denied any headaches. Patient did not have any new symptoms.     Current Facility-Administered Medications   Medication Dose Route Frequency    vancomycin (VANCOCIN) 1,500 mg in 0.9% sodium chloride 500 mL IVPB  1,500 mg IntraVENous ONCE    Followed by   [facility] vancomycin (VANCOCIN) 750 mg in 0.9% sodium chloride 250 mL (VIAL-MATE)  750 mg IntraVENous Q8H    Vancomycin - Pharmacy to Dose   Other Rx Dosing/Monitoring    [START ON 9/27/2021] Vancomycin - Please draw trough prior to dose 9/27 @ 1200   Other ONCE    venlafaxine (EFFEXOR) tablet 75 mg  75 mg Oral BID WITH MEALS    lactulose (CHRONULAC) 10 gram/15 mL solution 15 mL  15 mL Oral DAILY PRN    carBAMazepine (TEGretol) 200 mg/10 mL suspension 200 mg  200 mg Per NG tube Q8H    QUEtiapine (SEROquel) tablet 25 mg  25 mg Oral Q4H PRN    0.9% sodium chloride with KCl 20 mEq/L infusion   IntraVENous CONTINUOUS    NOREPINephrine (LEVOPHED) 8 mg in 5% dextrose 250mL (32 mcg/mL) infusion  0.5-30 mcg/min IntraVENous TITRATE    gabapentin (NEURONTIN) capsule 600 mg  600 mg Oral TID    carvediloL (COREG) tablet 6.25 mg  6.25 mg Oral BID WITH MEALS    atorvastatin (LIPITOR) tablet 20 mg  20 mg Oral QHS    levothyroxine (SYNTHROID) tablet 125 mcg  125 mcg Oral 6am    oxyCODONE IR (ROXICODONE) tablet 30 mg  30 mg Oral Q6H PRN    famotidine (PEPCID) tablet 20 mg  20 mg Oral BID    aspirin chewable tablet 81 mg  81 mg Oral DAILY    cholecalciferol (VITAMIN D3) (1000 Units /25 mcg) tablet 1,000 Units  1,000 Units Oral DAILY    sodium chloride (NS) flush 5-40 mL  5-40 mL IntraVENous PRN    acetaminophen (TYLENOL) tablet 650 mg  650 mg Oral Q6H PRN    ondansetron [facility]) injection 4 mg  4 mg IntraVENous Q4H PRN    magnesium oxide (MAG-OX) tablet 400 mg  400 mg Oral DAILY    busPIRone (BUSPAR) tablet 15 mg  15 mg Oral TID    HYDROmorphone (DILAUDID) syringe 0.5 mg  0.5 mg IntraVENous Q4H PRN    piperacillin-tazobactam (ZOSYN) 3.375 g in 0.9% sodium chloride (MBP/ADV) 100 mL MBP  3.375 g IntraVENous Q8H    acetaminophen (TYLENOL) suppository 650 mg  650 mg Rectal Q4H PRN     Objective:     Patient Vitals for the past 8 hrs:   BP Pulse SpO2   09/26/21 0954 121/62 75 --   09/26/21 0819 -- -- 98 %   09/26/21 0740 124/66 -- --     09/26 0701 - 09/26 1900  In: 70   Out: -   09/24 1901 - 09/26 0700  In: 1919.2 [I.V.:1829.2]  Out: 2120 [Urine:2120]    Lab Review   Recent Results (from the past 24 hour(s))   C REACTIVE PROTEIN, QT    Collection Time: 09/26/21  3:24 AM   Result Value Ref Range    C-Reactive protein 17.40 (H) 0.00 - 0.60 mg/dL   PROCALCITONIN    Collection Time: 09/26/21  3:24 AM   Result Value Ref Range    Procalcitonin 2.75 (H) 0 ng/mL   METABOLIC PANEL, BASIC    Collection Time: 09/26/21  3:24 AM   Result Value Ref Range    Sodium 137 136 - 145 mmol/L    Potassium 3.8 3.5 - 5.1 mmol/L    Chloride 108 97 - 108 mmol/L    CO2 18 (L) 21 - 32 mmol/L    Anion gap 11 5 - 15 mmol/L    Glucose 78 65 - 100 mg/dL    BUN 9 6 - 20 mg/dL    Creatinine 0.28 (L) 0.55 - 1.02 mg/dL    BUN/Creatinine ratio 32 (H) 12 - 20      GFR est AA >60 >60 ml/min/1.73m2    GFR est non-AA >60 >60 ml/min/1.73m2    Calcium 7.9 (L) 8.5 - 10.1 mg/dL   CBC WITH AUTOMATED DIFF    Collection Time: 09/26/21  3:24 AM   Result Value Ref Range    WBC 6.5 3.6 - 11.0 K/uL    RBC 3.29 (L) 3.80 - 5.20 M/uL    HGB 9.3 (L) 11.5 - 16.0 g/dL    HCT 28.6 (L) 35.0 - 47.0 %    MCV 86.9 80.0 - 99.0 FL    MCH 28.3 26.0 - 34.0 PG    MCHC 32.5 30.0 - 36.5 g/dL    RDW 13.5 11.5 - 14.5 %    PLATELET 160 589 - 005 K/uL    MPV 11.1 8.9 - 12.9 FL    NRBC 0.0 0.0  WBC    ABSOLUTE NRBC 0.00 0.00 - 0.01 K/uL NEUTROPHILS 83 (H) 32 - 75 %    LYMPHOCYTES 5 (L) 12 - 49 %    MONOCYTES 12 5 - 13 %    EOSINOPHILS 0 0 - 7 %    BASOPHILS 0 0 - 1 %    IMMATURE GRANULOCYTES 0 0 - 0.5 %    ABS. NEUTROPHILS 5.4 1.8 - 8.0 K/UL    ABS. LYMPHOCYTES 0.3 (L) 0.8 - 3.5 K/UL    ABS. MONOCYTES 0.8 0.0 - 1.0 K/UL    ABS. EOSINOPHILS 0.0 0.0 - 0.4 K/UL    ABS. BASOPHILS 0.0 0.0 - 0.1 K/UL    ABS. IMM. GRANS. 0.0 0.00 - 0.04 K/UL    DF AUTOMATED       Additional comments: None. NEUROLOGICAL EXAM:  Appearance: The patient is well developed, well nourished. Mental Status:  Patient is still confused but was able to tell her name. Cranial Nerves:   Intact visual fields. SARAH, EOM's full, no nystagmus, no ptosis. Facial movement is symmetric. Hearing is normal bilaterally. Motor:  Good strength in upper and lower proximal and distal muscles. Normal bulk and tone. Reflexes:   Deep tendon reflexes 2+/4 and symmetrical.   Sensory:   Could not be tested   Gait:  Not tested. Tremor:   No tremor noted. Cerebellar:  Not tested. Neurovascular:  Normal heart sounds and regular rhythm,  and no carotid bruits. Assessment:     Active Problems:    Metabolic encephalopathy secondary to metabolic derangements    Abdominal pain (9/23/2021)    Hypertension    Neurofibromatosis    Plan:   1. Continue Seroquel as needed. No additional neurological work-up is indicated at this time. 2.  Continue with the management of the abdominal pain and other medical issues. I'll sign off the case for now, however, please do not hesitate to call if needed again.     Thank you,    Signed:  Elijah Garcia MD  9/26/2021  2:10 PM

## 2021-09-26 NOTE — PROGRESS NOTES
Hospitalist Progress Note         Jerrod House MD          Daily Progress Note: 9/26/2021      Subjective:    Patient is a 60 yo female with a past medical history of neurofibromatosis type I, chronic pain syndrome, neuropathic pain, benign essential HTN, depression, and hypothyroidism who presented to the ED with acute abdominal pain that was unlike anything she had experienced before. She is on high dose immediate release oxycodone for chronic pain but this episode was not relieved by it.      Normal CT angiogram of the abdomen and pelvis. Sequela of neurofibromatosis. Constipation without obstruction     Normal CTA of the chest with no pulmonary embolism or acute aortic abnormalities identified.  no acute cardiopulmonary findings. Sequela of neurofibromatosis.       Patient seen in follow-up. T-max of 99.3 °F overnight. Less agitated.       Problem List:  Problem List as of 9/26/2021 Date Reviewed: 9/23/2021        Codes Class Noted - Resolved    Abdominal pain ICD-10-CM: R10.9  ICD-9-CM: 789.00  9/23/2021 - Present        Chronic pain syndrome ICD-10-CM: G89.4  ICD-9-CM: 338.4  8/10/2017 - Present        [facility] ICD-10-CM: Q85.00  ICD-9-CM: 237.70  8/10/2017 - Present              Medications reviewed  Current Facility-Administered Medications   Medication Dose Route Frequency    lactulose (CHRONULAC) 10 gram/15 mL solution 15 mL  15 mL Oral DAILY PRN    carBAMazepine (TEGretol) 200 mg/10 mL suspension 200 mg  200 mg Per NG tube Q8H    QUEtiapine (SEROquel) tablet 25 mg  25 mg Oral Q4H PRN    0.9% sodium chloride with KCl 20 mEq/L infusion   IntraVENous CONTINUOUS    NOREPINephrine (LEVOPHED) 8 mg in 5% dextrose 250mL (32 mcg/mL) infusion  0.5-30 mcg/min IntraVENous TITRATE    gabapentin (NEURONTIN) capsule 600 mg  600 mg Oral TID    carvediloL (COREG) tablet 6.25 mg  6.25 mg Oral BID WITH MEALS    atorvastatin (LIPITOR) tablet 20 mg  20 mg Oral QHS    levothyroxine (SYNTHROID) tablet 125 mcg  125 mcg Oral 6am    [Held by provider] venlafaxine-SR (EFFEXOR-XR) capsule 150 mg  150 mg Oral DAILY    oxyCODONE IR (ROXICODONE) tablet 30 mg  30 mg Oral Q6H PRN    famotidine (PEPCID) tablet 20 mg  20 mg Oral BID    aspirin chewable tablet 81 mg  81 mg Oral DAILY    cholecalciferol (VITAMIN D3) (1000 Units /25 mcg) tablet 1,000 Units  1,000 Units Oral DAILY    sodium chloride (NS) flush 5-40 mL  5-40 mL IntraVENous PRN    acetaminophen (TYLENOL) tablet 650 mg  650 mg Oral Q6H PRN    ondansetron (ZOFRAN) injection 4 mg  4 mg IntraVENous Q4H PRN    [Held by provider] docusate sodium (COLACE) capsule 200 mg  200 mg Oral DAILY    magnesium oxide (MAG-OX) tablet 400 mg  400 mg Oral DAILY    busPIRone (BUSPAR) tablet 15 mg  15 mg Oral TID    HYDROmorphone (DILAUDID) syringe 0.5 mg  0.5 mg IntraVENous Q4H PRN    piperacillin-tazobactam (ZOSYN) 3.375 g in 0.9% sodium chloride (MBP/ADV) 100 mL MBP  3.375 g IntraVENous Q8H    acetaminophen (TYLENOL) suppository 650 mg  650 mg Rectal Q4H PRN       Review of Systems:   A comprehensive review of systems was negative except for that written in the HPI. Objective:   Physical Exam:     Visit Vitals  /66   Pulse 76   Temp 98.5 °F (36.9 °C)   Resp 18   Ht 5' 1\" (1.549 m)   Wt 75.4 kg (166 lb 3.6 oz)   SpO2 98%   Breastfeeding No   BMI 31.41 kg/m²    O2 Flow Rate (L/min): 1 l/min O2 Device: Nasal cannula    Temp (24hrs), Av.8 °F (37.1 °C), Min:98.2 °F (36.8 °C), Max:99.2 °F (37.3 °C)    No intake/output data recorded.  1901 -  0700  In: 1919.2 [I.V.:1829.2]  Out:  [Urine:]    General:  Arousable but somnolent   Lungs:   Clear to auscultation bilaterally. Chest wall:  No tenderness or deformity. Heart:  Regular rate and rhythm, S1, S2 normal, no murmur, click, rub or gallop. Abdomen:   Soft, non-tender. Bowel sounds normal. No masses,  No organomegaly.    Extremities: Extremities normal, atraumatic, no cyanosis or edema. Pulses: 2+ and symmetric all extremities. Skin: Skin color, texture, turgor normal. No rashes or lesions   Neurologic: CNII-XII intact. No gross sensory or motor deficits     Data Review:       Recent Days:  Recent Labs     09/25/21  0304 09/24/21  1200 09/23/21  2105   WBC 7.9 9.1 9.5   HGB 9.8* 10.4* 12.8   HCT 30.2* 31.3* 37.0    230 269     Recent Labs     09/25/21  1220 09/24/21  1310 09/24/21  1026 09/23/21  2105 09/23/21  2105   NA  --   --  139  --  137   K  --   --  3.6  --  3.4*   CL  --   --  107  --  102   CO2  --   --  22  --  26   GLU  --   --  85  --  104*   BUN  --   --  19  --  15   CREA  --   --  0.63  --  0.66   CA  --   --  7.9*  --  8.5   MG  --   --  2.2  --   --    PHOS  --   --  2.1*  2.1*  --   --    ALB 2.1* 2.6* 2.4*   < > 3.0*   TBILI 0.9 1.7*  --   --  1.2*   ALT 99* 176*  --   --  316*   INR  --   --   --   --  1.3*    < > = values in this interval not displayed. No results for input(s): PH, PCO2, PO2, HCO3, FIO2 in the last 72 hours. 24 Hour Results:  Recent Results (from the past 24 hour(s))   HEPATIC FUNCTION PANEL    Collection Time: 09/25/21 12:20 PM   Result Value Ref Range    Protein, total 4.7 (L) 6.4 - 8.2 g/dL    Albumin 2.1 (L) 3.5 - 5.0 g/dL    Globulin 2.6 2.0 - 4.0 g/dL    A-G Ratio 0.8 (L) 1.1 - 2.2      Bilirubin, total 0.9 0.2 - 1.0 mg/dL    Bilirubin, direct 0.6 (H) 0.0 - 0.2 mg/dL    Alk.  phosphatase 144 (H) 45 - 117 U/L    AST (SGOT) 62 (H) 15 - 37 U/L    ALT (SGPT) 99 (H) 12 - 78 U/L   URINALYSIS W/MICROSCOPIC    Collection Time: 09/25/21  2:25 PM   Result Value Ref Range    Color Yellow/Straw      Appearance Clear Clear      Specific gravity 1.020 1.003 - 1.030      pH (UA) 5.0 5.0 - 8.0      Protein Negative Negative mg/dL    Glucose Negative Negative mg/dL    Ketone 20 (A) Negative mg/dL    Bilirubin Negative Negative      Blood Moderate (A) Negative      Urobilinogen 0.1 0.1 - 1.0 EU/dL    Nitrites Negative Negative      Leukocyte Esterase Negative Negative      WBC 0-4 0 - 4 /hpf    RBC 0-5 0 - 5 /hpf    Bacteria Negative Negative /hpf    Mucus Trace /lpf   C REACTIVE PROTEIN, QT    Collection Time: 09/26/21  3:24 AM   Result Value Ref Range    C-Reactive protein 17.40 (H) 0.00 - 0.60 mg/dL   PROCALCITONIN    Collection Time: 09/26/21  3:24 AM   Result Value Ref Range    Procalcitonin 2.75 (H) 0 ng/mL           Assessment/       Sepsis likely secondary to aspiration pneumonia  Metabolic encephalopathy  Septic shock on Levophed  Severe abdominal pain likely constipation related  Chronic pain syndrome likely due to neurofibromatosis  Benign essential hypertension  Hypothyroidism  History of depression  Hypokalemia. Repleted      Plan:  Continue supportive care including IV antibiotics  Wean off vasopressors as tolerated  Continue IV Zosyn/vancomycin pending blood and urine cultures  DVT GI prophylaxis  She is full code  Monitor routine electrolytes    Care Plan discussed with: Nurse    Total time spent with patient: 30 minutes.     Kristina Marrero MD

## 2021-09-26 NOTE — PROGRESS NOTES
Pt currently off levophed. Pt has periods of agitation primarily when awake. Pt will yell out and try and roll around the bed feet in the air. Prn pain medicine and Seroquel seems to help but pt falls asleep and her blood pressure drops. Otherwise vital signs are stable with a low grade fever. Pt still has garbled speech and is very rigid. Mild tremors. Will continue to monitor.

## 2021-09-26 NOTE — PROGRESS NOTES
Day #1 of Vancomycin  Consult provided for this 59 y.o. female for the indication of sepsis likely secondary to aspiration pneumonia  Abx regimen:  Vancomycin + Zosyn  ID Following: Yes  Concomitant nephrotoxic drugs: None  Frequency of BMP: Daily    Recent Labs     21  0304 21  1200 21  1026 21  2105   WBC 7.9 9.1  --  9.5   CREA  --   --  0.63 0.66   BUN  --   --  19 15     Est CrCl: ~75-80 ml/min; UO: 0.6 ml/kg/hr  Temp (24hrs), Av.8 °F (37.1 °C), Min:98.2 °F (36.8 °C), Max:99.2 °F (37.3 °C)    Cultures:    Blood - NGTD, preliminary   Blood - GPC in clusters in 2/4 bottles, preliminary   Urine - pending   Blood - pending    MRSA Swab: Not detected    Target range AUC/ERLIN 400-600, trough 15-20 mcg/mL    Impression/Plan:   One 1000 mg dose given in ED   Will re-initiate therapy with a loading dose of 1500 mg x 1 to be followed by a maintenance dose of 750 mg q8h  Will order a trough level prior to dose  @ 1200  Antimicrobial stop date TBD

## 2021-09-26 NOTE — PROGRESS NOTES
Infectious Disease Progress Note           Subjective:   Remains stable, intermittent moaning , not following commands.  Off pressor support   Objective:   Physical Exam:     Visit Vitals  BP (!) 105/54   Pulse 71   Temp 98.8 °F (37.1 °C)   Resp 18   Ht 5' 1\" (1.549 m)   Wt 166 lb 3.6 oz (75.4 kg)   SpO2 98%   Breastfeeding No   BMI 31.41 kg/m²    O2 Flow Rate (L/min): 1 l/min O2 Device: Nasal cannula    Temp (24hrs), Av.9 °F (37.2 °C), Min:98.5 °F (36.9 °C), Max:99.2 °F (37.3 °C)    701 - 1900  In: 130   Out: -    1901 - 700  In: 9.2 [I.V.:1829.2]  Out:  [Urine:]    General: NAD, confused, intermittent moaning   HEENT: SARAH, very dry mucosa   Lungs: CTA b/l, decreased at the bases, no wheeze/rhonchi   Heart: S1S2+, RRR, no murmur  Abdo: Soft, NT, ND, +BS   : + mathew cath   Exts: trace edema, + pulses b/l   Skin: No wounds, No rashes or lesions      Data Review:       Recent Days:  Recent Labs     21  0324 21  0304 21  1200   WBC 6.5 7.9 9.1   HGB 9.3* 9.8* 10.4*   HCT 28.6* 30.2* 31.3*    191 230     Recent Labs     21  0324 21  1026 21  2105   BUN 9 19 15   CREA 0.28* 0.63 0.66       Lab Results   Component Value Date/Time    C-Reactive protein 17.40 (H) 2021 03:24 AM          Microbiology     Results     Procedure Component Value Units Date/Time    CULTURE, BLOOD [[id number]] Collected: 21 0330    Order Status: Completed Specimen: Blood Updated: 21    CULTURE, URINE [[id number]] Collected: 21 1425    Order Status: Completed Specimen: Urine Updated: 21 1456    CULTURE, BLOOD [[id number]] Collected: 21 1310    Order Status: Completed Specimen: Blood Updated: 21 1439     Special Requests: Blood        Culture result: No growth 1 day       MRSA SCREEN - PCR (NASAL) [[id number]] Collected: 21 1200    Order Status: Completed Specimen: Swab Updated: 21 1536 MRSA by PCR, Nasal Not Detected       COVID-19 RAPID TEST [[id number]] Collected: 09/24/21 1045    Order Status: Completed Specimen: Nasopharyngeal Updated: 09/24/21 1228     Specimen source Nasopharyngeal        COVID-19 rapid test Not Detected        Comment: Rapid Abbott ID Now   Rapid NAAT:  The specimen is NEGATIVE for SARS-CoV-2, the novel coronavirus associated with COVID-19. Negative results should be treated as presumptive and, if inconsistent with clinical signs and symptoms or necessary for patient management, should be tested with an alternative molecular assay. Negative results do not preclude SARS-CoV-2 infection and should not be used as the sole basis for patient management decisions. This test has been authorized by the FDA under   an Emergency Use Authorization (EUA) for use by authorized laboratories. Fact sheet for Healthcare Providers: ConventionUpdate.co.nz Fact sheet for Patients: ConventionUpdate.co.nz   Methodology: Isothermal Nucleic Acid Amplification         CULTURE, BLOOD, PAIRED [[id number]] Collected: 09/24/21 1026    Order Status: Completed Specimen: Blood Updated: 09/26/21 1148     Special Requests: No Special Requests        Culture result:       Gram Negative Rods  Gram pos cocci in clusters  Two of four bottles have been flagged positive by instrument. Bottles have been sent to [facility] laboratory to assess for possible growth.   CALLED TO AND READ BACK BY  AIRAM ORTIZ RN AT 1799 BY JULIANA              Gram Negative Rods GROWING IN THE 1ST BOTTLE OF 4 (SITE=L UPPER FA)                  Gram Positive Cocci in clusters GROWING IN THE 2ND BOTTLE OF 4 (SITE= RT HAND)          COVID-19 RAPID TEST [[id number]] Collected: 09/23/21 2119    Order Status: Completed Specimen: Nasopharyngeal Updated: 09/23/21 2217     Specimen source       Please find results under separate order           COVID-19 rapid test Not Detected        Comment: Rapid Abbott ID Now Rapid NAAT:  The specimen is NEGATIVE for SARS-CoV-2, the novel coronavirus associated with COVID-19. Negative results should be treated as presumptive and, if inconsistent with clinical signs and symptoms or necessary for patient management, should be tested with an alternative molecular assay. Negative results do not preclude SARS-CoV-2 infection and should not be used as the sole basis for patient management decisions. This test has been authorized by the FDA under   an Emergency Use Authorization (EUA) for use by authorized laboratories. Fact sheet for Healthcare Providers: ConventionPlayviewsdate.co.nz Fact sheet for Patients: ConventionPlayviewsdate.co.nz   Methodology: Isothermal Nucleic Acid Amplification         CULTURE, BLOOD, PAIRED [[id number]]  (Abnormal) Collected: 09/23/21 2104    Order Status: Completed Specimen: Blood Updated: 09/26/21 1248     Special Requests: No Special Requests        Culture result:       Gram Negative Rods GROWING IN THIS ANAEROBIC BOTTLE (NO SITE)                  Gram Negative Rods growing in 1 of 4 bottles drawn CALLED TO AND READ BACK BY Kody Matthew AT 3624 9/25/21. JF                   Diagnostics   CXR Results  (Last 48 hours)    None           Assessment/Plan     1. GNR/GPC in clusters bacteremia, unclear source, though suspect /GI       Afebrile, hemodynamically stable off pressor support       GNR isolated from BCx (09/23), and GNR/GPC from repeat on 09/24      WBC normalized on todays labs, repeat BCx from earlier today is pending       Continue on Zosyn, Vanc added for GPC coverage       Routine labs in the morning     2. Suspected cholecystitis: Gallstone lodged in the neck of the gallbladder, Sludge within the Gallbladder on US       Benign abdominal exam today (09/26)       Continue conservative mgt w NPO status and IV abx    3. Suspected aspiration PNA w RLL infiltrate on CXR       CoVID Ag test neg (09/24).  Maintaining O2 sats on RA       Repeat CXR in the morning, continue antibiotics as above        4. AMS: Remains confuse w intermittent episodes of agitation     5. Constipation on admission: Resolved after bowel regimen     6.  H/o Neurofibromatosis, chronic pain syndrome     Rasta Knapp MD    9/26/2021

## 2021-09-27 ENCOUNTER — APPOINTMENT (OUTPATIENT)
Dept: GENERAL RADIOLOGY | Age: 61
DRG: 871 | End: 2021-09-27
Attending: INTERNAL MEDICINE
Payer: MEDICARE

## 2021-09-27 LAB
ANION GAP SERPL CALC-SCNC: 9 MMOL/L (ref 5–15)
BACTERIA SPEC CULT: ABNORMAL
BACTERIA SPEC CULT: ABNORMAL
BACTERIA SPEC CULT: NORMAL
BASOPHILS # BLD: 0 K/UL (ref 0–0.1)
BASOPHILS NFR BLD: 1 % (ref 0–1)
BUN SERPL-MCNC: 6 MG/DL (ref 6–20)
BUN/CREAT SERPL: 35 (ref 12–20)
CA-I BLD-MCNC: 7.5 MG/DL (ref 8.5–10.1)
CHLORIDE SERPL-SCNC: 109 MMOL/L (ref 97–108)
CO2 SERPL-SCNC: 18 MMOL/L (ref 21–32)
CREAT SERPL-MCNC: 0.17 MG/DL (ref 0.55–1.02)
CRP SERPL-MCNC: 14.6 MG/DL (ref 0–0.6)
DATE LAST DOSE: ABNORMAL
DIFFERENTIAL METHOD BLD: ABNORMAL
EOSINOPHIL # BLD: 0.1 K/UL (ref 0–0.4)
EOSINOPHIL NFR BLD: 1 % (ref 0–7)
ERYTHROCYTE [DISTWIDTH] IN BLOOD BY AUTOMATED COUNT: 13.5 % (ref 11.5–14.5)
GLUCOSE SERPL-MCNC: 72 MG/DL (ref 65–100)
HAV IGM SER QL: NONREACTIVE
HBV CORE IGM SER QL: NONREACTIVE
HBV SURFACE AG SER QL: <0.1 INDEX
HBV SURFACE AG SER QL: NEGATIVE
HCT VFR BLD AUTO: 28.3 % (ref 35–47)
HCV AB SER IA-ACNC: 0.06 INDEX
HCV AB SERPL QL IA: NONREACTIVE
HGB BLD-MCNC: 9.3 G/DL (ref 11.5–16)
IMM GRANULOCYTES # BLD AUTO: 0 K/UL (ref 0–0.04)
IMM GRANULOCYTES NFR BLD AUTO: 1 % (ref 0–0.5)
LYMPHOCYTES # BLD: 0.5 K/UL (ref 0.8–3.5)
LYMPHOCYTES NFR BLD: 8 % (ref 12–49)
MCH RBC QN AUTO: 28.4 PG (ref 26–34)
MCHC RBC AUTO-ENTMCNC: 32.9 G/DL (ref 30–36.5)
MCV RBC AUTO: 86.3 FL (ref 80–99)
MONOCYTES # BLD: 1.1 K/UL (ref 0–1)
MONOCYTES NFR BLD: 18 % (ref 5–13)
NEUTS SEG # BLD: 4.3 K/UL (ref 1.8–8)
NEUTS SEG NFR BLD: 71 % (ref 32–75)
NRBC # BLD: 0 K/UL (ref 0–0.01)
NRBC BLD-RTO: 0 PER 100 WBC
PLATELET # BLD AUTO: 170 K/UL (ref 150–400)
PMV BLD AUTO: 10.2 FL (ref 8.9–12.9)
POTASSIUM SERPL-SCNC: 3.8 MMOL/L (ref 3.5–5.1)
PROCALCITONIN SERPL-MCNC: 1.72 NG/ML
RBC # BLD AUTO: 3.28 M/UL (ref 3.8–5.2)
REPORTED DOSE,DOSE: ABNORMAL UNITS
SODIUM SERPL-SCNC: 136 MMOL/L (ref 136–145)
SP1: NORMAL
SP2: NORMAL
SP3: NORMAL
SPECIAL REQUESTS,SREQ: ABNORMAL
SPECIAL REQUESTS,SREQ: NORMAL
VANCOMYCIN TROUGH SERPL-MCNC: 26.3 UG/ML (ref 5–10)
WBC # BLD AUTO: 6 K/UL (ref 3.6–11)

## 2021-09-27 PROCEDURE — 65610000006 HC RM INTENSIVE CARE

## 2021-09-27 PROCEDURE — 74011000258 HC RX REV CODE- 258: Performed by: INTERNAL MEDICINE

## 2021-09-27 PROCEDURE — 85025 COMPLETE CBC W/AUTO DIFF WBC: CPT

## 2021-09-27 PROCEDURE — 74011250637 HC RX REV CODE- 250/637: Performed by: INTERNAL MEDICINE

## 2021-09-27 PROCEDURE — 74011250636 HC RX REV CODE- 250/636: Performed by: INTERNAL MEDICINE

## 2021-09-27 PROCEDURE — 74011250637 HC RX REV CODE- 250/637: Performed by: HOSPITALIST

## 2021-09-27 PROCEDURE — 86140 C-REACTIVE PROTEIN: CPT

## 2021-09-27 PROCEDURE — 80048 BASIC METABOLIC PNL TOTAL CA: CPT

## 2021-09-27 PROCEDURE — 99233 SBSQ HOSP IP/OBS HIGH 50: CPT | Performed by: INTERNAL MEDICINE

## 2021-09-27 PROCEDURE — 84145 PROCALCITONIN (PCT): CPT

## 2021-09-27 PROCEDURE — 77010033678 HC OXYGEN DAILY

## 2021-09-27 PROCEDURE — 71045 X-RAY EXAM CHEST 1 VIEW: CPT

## 2021-09-27 PROCEDURE — 80202 ASSAY OF VANCOMYCIN: CPT

## 2021-09-27 PROCEDURE — 74011250637 HC RX REV CODE- 250/637: Performed by: PSYCHIATRY & NEUROLOGY

## 2021-09-27 PROCEDURE — 36415 COLL VENOUS BLD VENIPUNCTURE: CPT

## 2021-09-27 PROCEDURE — 99232 SBSQ HOSP IP/OBS MODERATE 35: CPT | Performed by: COLON & RECTAL SURGERY

## 2021-09-27 RX ORDER — SODIUM CHLORIDE AND POTASSIUM CHLORIDE .9; .15 G/100ML; G/100ML
SOLUTION INTRAVENOUS CONTINUOUS
Status: DISCONTINUED | OUTPATIENT
Start: 2021-09-27 | End: 2021-09-27 | Stop reason: SDUPTHER

## 2021-09-27 RX ADMIN — POTASSIUM CHLORIDE AND SODIUM CHLORIDE: 900; 150 INJECTION, SOLUTION INTRAVENOUS at 12:46

## 2021-09-27 RX ADMIN — VANCOMYCIN HYDROCHLORIDE 750 MG: 750 INJECTION, POWDER, LYOPHILIZED, FOR SOLUTION INTRAVENOUS at 04:12

## 2021-09-27 RX ADMIN — FAMOTIDINE 20 MG: 20 TABLET ORAL at 08:28

## 2021-09-27 RX ADMIN — VENLAFAXINE 75 MG: 25 TABLET ORAL at 08:28

## 2021-09-27 RX ADMIN — BUSPIRONE HYDROCHLORIDE 15 MG: 10 TABLET ORAL at 21:27

## 2021-09-27 RX ADMIN — LEVOTHYROXINE SODIUM 125 MCG: 0.03 TABLET ORAL at 05:23

## 2021-09-27 RX ADMIN — CARVEDILOL 6.25 MG: 3.12 TABLET, FILM COATED ORAL at 08:28

## 2021-09-27 RX ADMIN — POTASSIUM CHLORIDE AND SODIUM CHLORIDE: 900; 150 INJECTION, SOLUTION INTRAVENOUS at 04:29

## 2021-09-27 RX ADMIN — BUSPIRONE HYDROCHLORIDE 15 MG: 10 TABLET ORAL at 08:28

## 2021-09-27 RX ADMIN — POTASSIUM CHLORIDE: 2 INJECTION, SOLUTION, CONCENTRATE INTRAVENOUS at 22:53

## 2021-09-27 RX ADMIN — OXYCODONE HYDROCHLORIDE 30 MG: 10 TABLET ORAL at 08:28

## 2021-09-27 RX ADMIN — PIPERACILLIN AND TAZOBACTAM 3.38 G: 3; .375 INJECTION, POWDER, LYOPHILIZED, FOR SOLUTION INTRAVENOUS at 09:31

## 2021-09-27 RX ADMIN — BUSPIRONE HYDROCHLORIDE 15 MG: 10 TABLET ORAL at 15:50

## 2021-09-27 RX ADMIN — CARVEDILOL 6.25 MG: 3.12 TABLET, FILM COATED ORAL at 17:43

## 2021-09-27 RX ADMIN — OXYCODONE HYDROCHLORIDE 30 MG: 10 TABLET ORAL at 21:28

## 2021-09-27 RX ADMIN — Medication 400 MG: at 08:28

## 2021-09-27 RX ADMIN — QUETIAPINE FUMARATE 25 MG: 25 TABLET ORAL at 21:27

## 2021-09-27 RX ADMIN — VENLAFAXINE 75 MG: 25 TABLET ORAL at 17:43

## 2021-09-27 RX ADMIN — ASPIRIN 81 MG CHEWABLE TABLET 81 MG: 81 TABLET CHEWABLE at 08:28

## 2021-09-27 RX ADMIN — OXYCODONE HYDROCHLORIDE 30 MG: 10 TABLET ORAL at 02:05

## 2021-09-27 RX ADMIN — QUETIAPINE FUMARATE 25 MG: 25 TABLET ORAL at 08:28

## 2021-09-27 RX ADMIN — GABAPENTIN 600 MG: 300 CAPSULE ORAL at 21:28

## 2021-09-27 RX ADMIN — Medication 1000 UNITS: at 08:28

## 2021-09-27 RX ADMIN — QUETIAPINE FUMARATE 25 MG: 25 TABLET ORAL at 15:49

## 2021-09-27 RX ADMIN — GABAPENTIN 600 MG: 300 CAPSULE ORAL at 15:50

## 2021-09-27 RX ADMIN — PIPERACILLIN AND TAZOBACTAM 3.38 G: 3; .375 INJECTION, POWDER, LYOPHILIZED, FOR SOLUTION INTRAVENOUS at 18:01

## 2021-09-27 RX ADMIN — FAMOTIDINE 20 MG: 20 TABLET ORAL at 21:28

## 2021-09-27 RX ADMIN — QUETIAPINE FUMARATE 25 MG: 25 TABLET ORAL at 04:13

## 2021-09-27 RX ADMIN — OXYCODONE HYDROCHLORIDE 30 MG: 10 TABLET ORAL at 15:49

## 2021-09-27 RX ADMIN — PIPERACILLIN AND TAZOBACTAM 3.38 G: 3; .375 INJECTION, POWDER, LYOPHILIZED, FOR SOLUTION INTRAVENOUS at 02:05

## 2021-09-27 RX ADMIN — CARBAMAZEPINE 200 MG: 200 SUSPENSION ORAL at 05:13

## 2021-09-27 RX ADMIN — GABAPENTIN 600 MG: 300 CAPSULE ORAL at 08:28

## 2021-09-27 RX ADMIN — CARBAMAZEPINE 200 MG: 200 SUSPENSION ORAL at 15:50

## 2021-09-27 RX ADMIN — ATORVASTATIN CALCIUM 20 MG: 20 TABLET, FILM COATED ORAL at 21:27

## 2021-09-27 RX ADMIN — CARBAMAZEPINE 200 MG: 200 SUSPENSION ORAL at 21:28

## 2021-09-27 NOTE — PROGRESS NOTES
Family meeting w/patient's daughter and writer to discuss potential d/c plan and disposition.      -daughter's will have a family meeting to discuss disposition  -possibly relocating to [place]. Will apply for Medicaid in [place]  -will need to establish care w/all new docs in [place]   -desires for the [facility] personal care aide application (PCA) to be canceled at this time. May desire to re-apply for Medicaid PCA       SW to contact Usha Yanez, financial screener to request Medicaid application for PCA be canceled. DARRION Khan                14:15PM Spoke w/Anne (financial screener) re: Medicaid application. Informed of the following:    -over income for regular Medicaid  -falls within the financial guidelines for personal care aides. Application submitted today, on patient's behalf. Possibility of Social Security check being cut to cover the cost of personal care aide service. State review and will determine if the patient is eligible.         DARRION Khan  [id number]

## 2021-09-27 NOTE — PROGRESS NOTES
Progress Note    Patient: Amy Echeverria MRN: [id number]  SSN: [id number]    YOB: 1960  Age: 59 y.o. Sex: female      Admit Date: 9/22/2021    LOS: 4 days     Subjective:   Patient seen in bed arousable and conversant  Denies abdominal pain    Objective:     Vitals:    09/27/21 1000 09/27/21 1100 09/27/21 1201 09/27/21 1300   BP: 107/62 (!) 111/58 118/62 110/62   Pulse: 79 73 72 72   Resp: 14 13 14 16   Temp:  98.3 °F (36.8 °C)     SpO2: 95% 94% 97% 96%   Weight:       Height:            Intake and Output:  Current Shift: 09/27 0701 - 09/27 1900  In: -   Out: 800 [Urine:800]  Last three shifts: 09/25 1901 - 09/27 0700  In: 1630 [I.V.:1500]  Out: 2426 [Urine:2425]    Review of Systems:  ROS     Physical Exam:   Abdomen is soft, nontender, nondistended    Lab/Data Review:  Recent Results (from the past 24 hour(s))   C REACTIVE PROTEIN, QT    Collection Time: 09/27/21  3:16 AM   Result Value Ref Range    C-Reactive protein 14.60 (H) 0.00 - 0.60 mg/dL   CBC WITH AUTOMATED DIFF    Collection Time: 09/27/21  3:16 AM   Result Value Ref Range    WBC 6.0 3.6 - 11.0 K/uL    RBC 3.28 (L) 3.80 - 5.20 M/uL    HGB 9.3 (L) 11.5 - 16.0 g/dL    HCT 28.3 (L) 35.0 - 47.0 %    MCV 86.3 80.0 - 99.0 FL    MCH 28.4 26.0 - 34.0 PG    MCHC 32.9 30.0 - 36.5 g/dL    RDW 13.5 11.5 - 14.5 %    PLATELET 059 842 - 688 K/uL    MPV 10.2 8.9 - 12.9 FL    NRBC 0.0 0.0  WBC    ABSOLUTE NRBC 0.00 0.00 - 0.01 K/uL    NEUTROPHILS 71 32 - 75 %    LYMPHOCYTES 8 (L) 12 - 49 %    MONOCYTES 18 (H) 5 - 13 %    EOSINOPHILS 1 0 - 7 %    BASOPHILS 1 0 - 1 %    IMMATURE GRANULOCYTES 1 (H) 0 - 0.5 %    ABS. NEUTROPHILS 4.3 1.8 - 8.0 K/UL    ABS. LYMPHOCYTES 0.5 (L) 0.8 - 3.5 K/UL    ABS. MONOCYTES 1.1 (H) 0.0 - 1.0 K/UL    ABS. EOSINOPHILS 0.1 0.0 - 0.4 K/UL    ABS. BASOPHILS 0.0 0.0 - 0.1 K/UL    ABS. IMM.  GRANS. 0.0 0.00 - 0.04 K/UL    DF AUTOMATED     METABOLIC PANEL, BASIC    Collection Time: 09/27/21  3:16 AM   Result Value Ref Range Sodium 136 136 - 145 mmol/L    Potassium 3.8 3.5 - 5.1 mmol/L    Chloride 109 (H) 97 - 108 mmol/L    CO2 18 (L) 21 - 32 mmol/L    Anion gap 9 5 - 15 mmol/L    Glucose 72 65 - 100 mg/dL    BUN 6 6 - 20 mg/dL    Creatinine 0.17 (L) 0.55 - 1.02 mg/dL    BUN/Creatinine ratio 35 (H) 12 - 20      GFR est AA >60 >60 ml/min/1.73m2    GFR est non-AA >60 >60 ml/min/1.73m2    Calcium 7.5 (L) 8.5 - 10.1 mg/dL   PROCALCITONIN    Collection Time: 09/27/21  3:43 AM   Result Value Ref Range    Procalcitonin 1.72 (H) 0 ng/mL   VANCOMYCIN, TROUGH    Collection Time: 09/27/21  9:45 AM   Result Value Ref Range    Vancomycin,trough 26.3 (HH) 5.0 - 10.0 ug/mL    Reported dose date Not provided      Reported dose: Not provided Units          Assessment:     Active Problems:    Abdominal pain (9/23/2021)        Plan:     I believe that the source of the patient's sepsis was early cholecystitis. Her right upper quadrant pain is resolved. Her LFTs likewise have normalized. Bilirubin peaked at 1.7 with direct of 1.3, normalized on labs from September 25. Likewise alkaline phosphatase is normalizing. Patient's inflammatory markers are normalizing.   Continue Zosyn  May start tube feeds    Signed By: Darling Scherer MD     September 27, 2021

## 2021-09-27 NOTE — PROGRESS NOTES
Pt remains in icu. Garbled speech but will wake up. Moves all four extremities. Still having bowel movements. May need to start a bowel regiment still. Pt off levophed. Vss no distress noted. Will continue to monitor.

## 2021-09-27 NOTE — PROGRESS NOTES
Hospitalist Progress Note         Kristina Marrero MD          Daily Progress Note: 9/27/2021      Subjective:    Patient is a 58 yo female with a past medical history of neurofibromatosis type I, chronic pain syndrome, neuropathic pain, benign essential HTN, depression, and hypothyroidism who presented to the ED with acute abdominal pain that was unlike anything she had experienced before. She is on high dose immediate release oxycodone for chronic pain but this episode was not relieved by it.      Normal CT angiogram of the abdomen and pelvis. Sequela of neurofibromatosis. Constipation without obstruction     Normal CTA of the chest with no pulmonary embolism or acute aortic abnormalities identified.  no acute cardiopulmonary findings. Sequela of neurofibromatosis.       Patient seen in follow-up. Awake but confused. T-max of 99.0 °F overnight. Less agitated.       Problem List:  Problem List as of 9/27/2021 Date Reviewed: 9/23/2021        Codes Class Noted - Resolved    Abdominal pain ICD-10-CM: R10.9  ICD-9-CM: 789.00  9/23/2021 - Present        Chronic pain syndrome ICD-10-CM: G89.4  ICD-9-CM: 338.4  8/10/2017 - Present        Neurofibromatosis ([facility] [address] ICD-10-CM: Q85.00  ICD-9-CM: 237.70  8/10/2017 - Present              Medications reviewed  Current Facility-Administered Medications   Medication Dose Route Frequency    vancomycin (VANCOCIN) 750 mg in 0.9% sodium chloride 250 mL (VIAL-MATE)  750 mg IntraVENous Q8H    Vancomycin - Pharmacy to Dose   Other Rx Dosing/Monitoring    Vancomycin - Please draw trough prior to dose 9/27 @ 1200   Other ONCE    venlafaxine (EFFEXOR) tablet 75 mg  75 mg Oral BID WITH MEALS    lactulose (CHRONULAC) 10 gram/15 mL solution 15 mL  15 mL Oral DAILY PRN    carBAMazepine (TEGretol) 200 mg/10 mL suspension 200 mg  200 mg Per NG tube Q8H    QUEtiapine (SEROquel) tablet 25 mg  25 mg Oral Q4H PRN    0.9% sodium chloride with KCl 20 mEq/L infusion IntraVENous CONTINUOUS    NOREPINephrine (LEVOPHED) 8 mg in 5% dextrose 250mL (32 mcg/mL) infusion  0.5-30 mcg/min IntraVENous TITRATE    gabapentin (NEURONTIN) capsule 600 mg  600 mg Oral TID    carvediloL (COREG) tablet 6.25 mg  6.25 mg Oral BID WITH MEALS    atorvastatin (LIPITOR) tablet 20 mg  20 mg Oral QHS    levothyroxine (SYNTHROID) tablet 125 mcg  125 mcg Oral 6am    oxyCODONE IR (ROXICODONE) tablet 30 mg  30 mg Oral Q6H PRN    famotidine (PEPCID) tablet 20 mg  20 mg Oral BID    aspirin chewable tablet 81 mg  81 mg Oral DAILY    cholecalciferol (VITAMIN D3) (1000 Units /25 mcg) tablet 1,000 Units  1,000 Units Oral DAILY    sodium chloride (NS) flush 5-40 mL  5-40 mL IntraVENous PRN    acetaminophen (TYLENOL) tablet 650 mg  650 mg Oral Q6H PRN    ondansetron (ZOFRAN) injection 4 mg  4 mg IntraVENous Q4H PRN    magnesium oxide (MAG-OX) tablet 400 mg  400 mg Oral DAILY    busPIRone (BUSPAR) tablet 15 mg  15 mg Oral TID    HYDROmorphone (DILAUDID) syringe 0.5 mg  0.5 mg IntraVENous Q4H PRN    piperacillin-tazobactam (ZOSYN) 3.375 g in 0.9% sodium chloride (MBP/ADV) 100 mL MBP  3.375 g IntraVENous Q8H    acetaminophen (TYLENOL) suppository 650 mg  650 mg Rectal Q4H PRN       Review of Systems:   A comprehensive review of systems was negative except for that written in the HPI. Objective:   Physical Exam:     Visit Vitals  /62 (BP 1 Location: Right upper arm, BP Patient Position: At rest)   Pulse 79   Temp 98.8 °F (37.1 °C)   Resp 14   Ht 5' 1\" (1.549 m)   Wt 75.4 kg (166 lb 3.6 oz)   SpO2 95%   Breastfeeding No   BMI 31.41 kg/m²    O2 Flow Rate (L/min): 1 l/min (placed on room air) O2 Device: None (Room air)    Temp (24hrs), Av.8 °F (37.1 °C), Min:98.4 °F (36.9 °C), Max:99 °F (37.2 °C)    No intake/output data recorded.  1901 -  0700  In: 1630 [I.V.:1500]  Out: 2426 [Urine:2425]    General:  Arousable but somnolent   Lungs:   Clear to auscultation bilaterally.    Chest wall:  No tenderness or deformity. Heart:  Regular rate and rhythm, S1, S2 normal, no murmur, click, rub or gallop. Abdomen:   Soft, non-tender. Bowel sounds normal. No masses,  No organomegaly. Extremities: Extremities normal, atraumatic, no cyanosis or edema. Pulses: 2+ and symmetric all extremities. Skin: Skin color, texture, turgor normal. No rashes or lesions   Neurologic: CNII-XII intact. No gross sensory or motor deficits     Data Review:       Recent Days:  Recent Labs     09/27/21 0316 09/26/21  0324 09/25/21  0304   WBC 6.0 6.5 7.9   HGB 9.3* 9.3* 9.8*   HCT 28.3* 28.6* 30.2*    178 191     Recent Labs     09/27/21  0316 09/26/21  0324 09/25/21  1220 09/24/21  1310    137  --   --    K 3.8 3.8  --   --    * 108  --   --    CO2 18* 18*  --   --    GLU 72 78  --   --    BUN 6 9  --   --    CREA 0.17* 0.28*  --   --    CA 7.5* 7.9*  --   --    ALB  --   --  2.1* 2.6*   TBILI  --   --  0.9 1.7*   ALT  --   --  99* 176*     No results for input(s): PH, PCO2, PO2, HCO3, FIO2 in the last 72 hours. 24 Hour Results:  Recent Results (from the past 24 hour(s))   C REACTIVE PROTEIN, QT    Collection Time: 09/27/21  3:16 AM   Result Value Ref Range    C-Reactive protein 14.60 (H) 0.00 - 0.60 mg/dL   CBC WITH AUTOMATED DIFF    Collection Time: 09/27/21  3:16 AM   Result Value Ref Range    WBC 6.0 3.6 - 11.0 K/uL    RBC 3.28 (L) 3.80 - 5.20 M/uL    HGB 9.3 (L) 11.5 - 16.0 g/dL    HCT 28.3 (L) 35.0 - 47.0 %    MCV 86.3 80.0 - 99.0 FL    MCH 28.4 26.0 - 34.0 PG    MCHC 32.9 30.0 - 36.5 g/dL    RDW 13.5 11.5 - 14.5 %    PLATELET 831 542 - 310 K/uL    MPV 10.2 8.9 - 12.9 FL    NRBC 0.0 0.0  WBC    ABSOLUTE NRBC 0.00 0.00 - 0.01 K/uL    NEUTROPHILS 71 32 - 75 %    LYMPHOCYTES 8 (L) 12 - 49 %    MONOCYTES 18 (H) 5 - 13 %    EOSINOPHILS 1 0 - 7 %    BASOPHILS 1 0 - 1 %    IMMATURE GRANULOCYTES 1 (H) 0 - 0.5 %    ABS. NEUTROPHILS 4.3 1.8 - 8.0 K/UL    ABS.  LYMPHOCYTES 0.5 (L) 0.8 - 3.5 K/UL    ABS. MONOCYTES 1.1 (H) 0.0 - 1.0 K/UL    ABS. EOSINOPHILS 0.1 0.0 - 0.4 K/UL    ABS. BASOPHILS 0.0 0.0 - 0.1 K/UL    ABS. IMM. GRANS. 0.0 0.00 - 0.04 K/UL    DF AUTOMATED     METABOLIC PANEL, BASIC    Collection Time: 09/27/21  3:16 AM   Result Value Ref Range    Sodium 136 136 - 145 mmol/L    Potassium 3.8 3.5 - 5.1 mmol/L    Chloride 109 (H) 97 - 108 mmol/L    CO2 18 (L) 21 - 32 mmol/L    Anion gap 9 5 - 15 mmol/L    Glucose 72 65 - 100 mg/dL    BUN 6 6 - 20 mg/dL    Creatinine 0.17 (L) 0.55 - 1.02 mg/dL    BUN/Creatinine ratio 35 (H) 12 - 20      GFR est AA >60 >60 ml/min/1.73m2    GFR est non-AA >60 >60 ml/min/1.73m2    Calcium 7.5 (L) 8.5 - 10.1 mg/dL   PROCALCITONIN    Collection Time: 09/27/21  3:43 AM   Result Value Ref Range    Procalcitonin 1.72 (H) 0 ng/mL       IMPRESSION  Gallstone lodged in the neck of the gallbladder. Sludge within the  gallbladder. Borderline gallbladder wall thickening. No pericholecystic fluid. The technologist stated there was no sonographic Linder's sign. Assessment/       Sepsis likely secondary to aspiration pneumonia  Metabolic encephalopathy  Probably acute cholecystitis. Septic shock off Levophed  Severe abdominal pain likely constipation related  Chronic pain syndrome likely due to neurofibromatosis  Benign essential hypertension  Hypothyroidism  History of depression  Hypokalemia. Repleted      Plan:  Continue supportive care including IV antibiotics  Continue IV Zosyn/vancomycin pending blood and urine cultures  DVT GI prophylaxis  She is full code  Start tube feeds now  Monitor routine electrolytes    Consider transfer to [facility] floor later today  Care Plan discussed with: Nurse    Total time spent with patient: 30 minutes.     Natalie Guerrero MD

## 2021-09-27 NOTE — PROGRESS NOTES
Progress Note    Patient: Ace Adjutant MRN: [id number]  SSN: [id number]    YOB: 1960  Age: 59 y.o. Sex: female      Admit Date: 9/22/2021    LOS: 4 days     Subjective:   Patient followed for septic shock with possible aspiration pneumonia. Urine culture negative. Blood culture grew Pantoea agglomerans. US showed gallstone lodged in the neck of the gallbladder with sludge and wall thickening. She is being followed by General Surgery. Currently on IV Vancomycin and Zosyn. Afebrile with now normal WBC, decreasing procal and CRP. She is being followed by Neurology for metabolic encephalopathy, but she is awake and responsive, though still confused. Family member at bedside. Objective:     Vitals:    09/27/21 0932 09/27/21 1000 09/27/21 1100 09/27/21 1201   BP: 112/61 107/62 (!) 111/58 118/62   Pulse: 80 79 73 72   Resp: 12 14 13 14   Temp:   98.3 °F (36.8 °C)    SpO2: 93% 95% 94% 97%   Weight:       Height:            Intake and Output:  Current Shift: No intake/output data recorded. Last three shifts: 09/25 1901 - 09/27 0700  In: 1630 [I.V.:1500]  Out: 2426 [Urine:2425]    Physical Exam:   Vitals and nursing note reviewed. Constitutional:       General: She is not in acute distress. Appearance: She is ill-appearing. HENT:      Head: Normocephalic and atraumatic. Right Ear: External ear normal.      Left Ear: External ear normal.      Nose: Nose normal.      Comments: NG tube  Eyes:      Comments: open   Cardiovascular:      Rate and Rhythm: Regular rhythm. Heart sounds: Normal heart sounds. No murmur heard. Pulmonary:      Breath sounds: No wheezing, rhonchi or rales. Abdominal:      General: Bowel sounds are normal.      Palpations: Abdomen is soft. Tenderness: There is no abdominal tenderness. Genitourinary:     Comments: External urinary catheter  Musculoskeletal:      Cervical back: Neck supple. Right lower leg: No edema.       Left lower leg: No edema. Comments: Mitts on both hands   Skin:     Findings: No rash. Neurological: nonfocal, mild confusion  Psychiatric: no agitation      Lab/Data Review:     WBC 6,000    Procal 1.72 <2.75 <4.02  CRP 14.60 <17.40    Blood cultures (9/24) Pantoea agglomerans and coagulase negative Staphylococcus species  Blood cultures (9/24) No growth 2 days  Blood cultures (9/26) Pending  Urine culture (9/24) No growth FINAL    CXR (9/27) Reduced lung volumes. Central vessel crowding. Suspect mild degree central interstitial edema. Assessment:     Active Problems:    Abdominal pain (9/23/2021)    1. Septic shock with fever, tachycardia, hypotension requiring vasopressors, elevated lactic acid, leukocytosis, elevated procal and CRP, Day #5 IV Zosyn and Vancomycin, resolving  2. Presumptive aspiration pneumonia with RLL infiltrate, Day #5 IV Zosyn and Vancomycin  3. Gram negative bacteremia with Pantoea agglomerans, Day #5 IV Zosyn  4. Abdominal pain, etiology unclear  5. Altered mental status  6. Neurofibromatosis  7. Distended gallbladder with choledocholithiasis     Comment:  Zosyn should be adequate coverage for aspiration pneumonia, Gram negative bacteremia and possible cholecystitis. Coagulase negative Staphylococci in blood culture likely represents contaminant. Plan:   1. Continue IV Zosyn  2. Discontinue Vancomycin   3.  In am, repeat procal and CRP       Signed By: Martha Proctor MD     September 27, 2021

## 2021-09-27 NOTE — PROGRESS NOTES
Day #2 of Vancomycin  Consult provided for this 59 y.o. female for the indication of sepsis likely secondary to aspiration pneumonia  Abx regimen:  Vancomycin + Zosyn  ID Following: Yes  Concomitant nephrotoxic drugs: None  Frequency of BMP: Daily    Recent Labs     21  0316 21  0324 21  0304   WBC 6.0 6.5 7.9   CREA 0.17* 0.28*  --    BUN 6 9  --      Est CrCl: ~75-80 ml/min; UO: 0.6 ml/kg/hr  Temp (24hrs), Av.8 °F (37.1 °C), Min:98.4 °F (36.9 °C), Max:99 °F (37.2 °C)    Cultures:    Blood - Pantoea agglomerans (in anaerobic bottle); GNR in 1/4 bottles   Blood, paired - Pantoea agglomerans in 1/4 bottles; Staph coag neg in 1/4 bottles   MRSA screen: not detected - final   Urine - pending   Blood - pending    MRSA Swab: Not detected    Target range AUC/ERLIN 400-600, trough 15-20 mcg/mL    Impression/Plan:   Current regimen 750 mg IV q8h projected steady state AUC = 642  Scr < 0.3  Will change to 750 mg IV q12h  --projected steady state AUC ~ 432; trough 13  Will order trough trough prior to dose on  @ 1600  Consider discontinuing vanc since blood cultures grew pantoea agglomerans

## 2021-09-28 LAB
ANION GAP SERPL CALC-SCNC: 7 MMOL/L (ref 5–15)
BASOPHILS # BLD: 0 K/UL (ref 0–0.1)
BASOPHILS NFR BLD: 0 % (ref 0–1)
BUN SERPL-MCNC: 3 MG/DL (ref 6–20)
BUN/CREAT SERPL: 13 (ref 12–20)
CA-I BLD-MCNC: 7.8 MG/DL (ref 8.5–10.1)
CHLORIDE SERPL-SCNC: 108 MMOL/L (ref 97–108)
CO2 SERPL-SCNC: 24 MMOL/L (ref 21–32)
CREAT SERPL-MCNC: 0.24 MG/DL (ref 0.55–1.02)
CRP SERPL-MCNC: 13.8 MG/DL (ref 0–0.6)
DIFFERENTIAL METHOD BLD: ABNORMAL
EOSINOPHIL # BLD: 0.1 K/UL (ref 0–0.4)
EOSINOPHIL NFR BLD: 1 % (ref 0–7)
ERYTHROCYTE [DISTWIDTH] IN BLOOD BY AUTOMATED COUNT: 13.2 % (ref 11.5–14.5)
GLUCOSE BLD STRIP.AUTO-MCNC: 110 MG/DL (ref 65–117)
GLUCOSE SERPL-MCNC: 94 MG/DL (ref 65–100)
HCT VFR BLD AUTO: 28.6 % (ref 35–47)
HGB BLD-MCNC: 9.5 G/DL (ref 11.5–16)
IMM GRANULOCYTES # BLD AUTO: 0.1 K/UL (ref 0–0.04)
IMM GRANULOCYTES NFR BLD AUTO: 2 % (ref 0–0.5)
LYMPHOCYTES # BLD: 0.5 K/UL (ref 0.8–3.5)
LYMPHOCYTES NFR BLD: 7 % (ref 12–49)
MCH RBC QN AUTO: 27.8 PG (ref 26–34)
MCHC RBC AUTO-ENTMCNC: 33.2 G/DL (ref 30–36.5)
MCV RBC AUTO: 83.6 FL (ref 80–99)
MONOCYTES # BLD: 1.2 K/UL (ref 0–1)
MONOCYTES NFR BLD: 16 % (ref 5–13)
NEUTS SEG # BLD: 5.6 K/UL (ref 1.8–8)
NEUTS SEG NFR BLD: 74 % (ref 32–75)
NRBC # BLD: 0 K/UL (ref 0–0.01)
NRBC BLD-RTO: 0 PER 100 WBC
PERFORMED BY, TECHID: NORMAL
PLATELET # BLD AUTO: 229 K/UL (ref 150–400)
PMV BLD AUTO: 10.4 FL (ref 8.9–12.9)
POTASSIUM SERPL-SCNC: 3.6 MMOL/L (ref 3.5–5.1)
PROCALCITONIN SERPL-MCNC: 1 NG/ML
RBC # BLD AUTO: 3.42 M/UL (ref 3.8–5.2)
SODIUM SERPL-SCNC: 139 MMOL/L (ref 136–145)
WBC # BLD AUTO: 7.6 K/UL (ref 3.6–11)

## 2021-09-28 PROCEDURE — 86140 C-REACTIVE PROTEIN: CPT

## 2021-09-28 PROCEDURE — 74011250637 HC RX REV CODE- 250/637: Performed by: HOSPITALIST

## 2021-09-28 PROCEDURE — 92526 ORAL FUNCTION THERAPY: CPT

## 2021-09-28 PROCEDURE — 99232 SBSQ HOSP IP/OBS MODERATE 35: CPT | Performed by: COLON & RECTAL SURGERY

## 2021-09-28 PROCEDURE — 74011250637 HC RX REV CODE- 250/637: Performed by: INTERNAL MEDICINE

## 2021-09-28 PROCEDURE — 74011000258 HC RX REV CODE- 258: Performed by: INTERNAL MEDICINE

## 2021-09-28 PROCEDURE — 74011250637 HC RX REV CODE- 250/637: Performed by: PSYCHIATRY & NEUROLOGY

## 2021-09-28 PROCEDURE — 82962 GLUCOSE BLOOD TEST: CPT

## 2021-09-28 PROCEDURE — 85025 COMPLETE CBC W/AUTO DIFF WBC: CPT

## 2021-09-28 PROCEDURE — 97530 THERAPEUTIC ACTIVITIES: CPT

## 2021-09-28 PROCEDURE — 80048 BASIC METABOLIC PNL TOTAL CA: CPT

## 2021-09-28 PROCEDURE — 92610 EVALUATE SWALLOWING FUNCTION: CPT

## 2021-09-28 PROCEDURE — 99232 SBSQ HOSP IP/OBS MODERATE 35: CPT | Performed by: INTERNAL MEDICINE

## 2021-09-28 PROCEDURE — 36415 COLL VENOUS BLD VENIPUNCTURE: CPT

## 2021-09-28 PROCEDURE — 97161 PT EVAL LOW COMPLEX 20 MIN: CPT

## 2021-09-28 PROCEDURE — 99222 1ST HOSP IP/OBS MODERATE 55: CPT | Performed by: OTOLARYNGOLOGY

## 2021-09-28 PROCEDURE — 65270000029 HC RM PRIVATE

## 2021-09-28 PROCEDURE — 97166 OT EVAL MOD COMPLEX 45 MIN: CPT

## 2021-09-28 PROCEDURE — 84145 PROCALCITONIN (PCT): CPT

## 2021-09-28 PROCEDURE — 74011250636 HC RX REV CODE- 250/636: Performed by: INTERNAL MEDICINE

## 2021-09-28 RX ORDER — SODIUM CHLORIDE AND POTASSIUM CHLORIDE .9; .15 G/100ML; G/100ML
SOLUTION INTRAVENOUS CONTINUOUS
Status: DISCONTINUED | OUTPATIENT
Start: 2021-09-28 | End: 2021-10-01 | Stop reason: HOSPADM

## 2021-09-28 RX ADMIN — Medication 400 MG: at 08:49

## 2021-09-28 RX ADMIN — ATORVASTATIN CALCIUM 20 MG: 20 TABLET, FILM COATED ORAL at 20:59

## 2021-09-28 RX ADMIN — POTASSIUM CHLORIDE AND SODIUM CHLORIDE: 900; 150 INJECTION, SOLUTION INTRAVENOUS at 13:00

## 2021-09-28 RX ADMIN — OXYCODONE HYDROCHLORIDE 30 MG: 10 TABLET ORAL at 23:14

## 2021-09-28 RX ADMIN — BUSPIRONE HYDROCHLORIDE 15 MG: 10 TABLET ORAL at 21:04

## 2021-09-28 RX ADMIN — CARVEDILOL 6.25 MG: 3.12 TABLET, FILM COATED ORAL at 08:49

## 2021-09-28 RX ADMIN — PIPERACILLIN AND TAZOBACTAM 3.38 G: 3; .375 INJECTION, POWDER, LYOPHILIZED, FOR SOLUTION INTRAVENOUS at 01:06

## 2021-09-28 RX ADMIN — BUSPIRONE HYDROCHLORIDE 15 MG: 10 TABLET ORAL at 08:48

## 2021-09-28 RX ADMIN — HYDROMORPHONE HYDROCHLORIDE 0.5 MG: 1 INJECTION, SOLUTION INTRAMUSCULAR; INTRAVENOUS; SUBCUTANEOUS at 08:49

## 2021-09-28 RX ADMIN — FAMOTIDINE 20 MG: 20 TABLET ORAL at 08:49

## 2021-09-28 RX ADMIN — CARVEDILOL 6.25 MG: 3.12 TABLET, FILM COATED ORAL at 16:53

## 2021-09-28 RX ADMIN — FAMOTIDINE 20 MG: 20 TABLET ORAL at 20:58

## 2021-09-28 RX ADMIN — HYDROMORPHONE HYDROCHLORIDE 0.5 MG: 1 INJECTION, SOLUTION INTRAMUSCULAR; INTRAVENOUS; SUBCUTANEOUS at 15:15

## 2021-09-28 RX ADMIN — GABAPENTIN 600 MG: 300 CAPSULE ORAL at 08:49

## 2021-09-28 RX ADMIN — GABAPENTIN 600 MG: 300 CAPSULE ORAL at 21:00

## 2021-09-28 RX ADMIN — LEVOTHYROXINE SODIUM 125 MCG: 0.03 TABLET ORAL at 05:53

## 2021-09-28 RX ADMIN — ASPIRIN 81 MG CHEWABLE TABLET 81 MG: 81 TABLET CHEWABLE at 08:49

## 2021-09-28 RX ADMIN — VENLAFAXINE 75 MG: 25 TABLET ORAL at 08:49

## 2021-09-28 RX ADMIN — HYDROMORPHONE HYDROCHLORIDE 0.5 MG: 1 INJECTION, SOLUTION INTRAMUSCULAR; INTRAVENOUS; SUBCUTANEOUS at 21:07

## 2021-09-28 RX ADMIN — PIPERACILLIN AND TAZOBACTAM 3.38 G: 3; .375 INJECTION, POWDER, LYOPHILIZED, FOR SOLUTION INTRAVENOUS at 16:54

## 2021-09-28 RX ADMIN — PIPERACILLIN AND TAZOBACTAM 3.38 G: 3; .375 INJECTION, POWDER, LYOPHILIZED, FOR SOLUTION INTRAVENOUS at 09:00

## 2021-09-28 RX ADMIN — Medication 1000 UNITS: at 08:49

## 2021-09-28 RX ADMIN — CARBAMAZEPINE 200 MG: 200 SUSPENSION ORAL at 05:53

## 2021-09-28 RX ADMIN — QUETIAPINE FUMARATE 25 MG: 25 TABLET ORAL at 05:53

## 2021-09-28 RX ADMIN — POTASSIUM CHLORIDE AND SODIUM CHLORIDE: 900; 150 INJECTION, SOLUTION INTRAVENOUS at 22:35

## 2021-09-28 RX ADMIN — QUETIAPINE FUMARATE 25 MG: 25 TABLET ORAL at 01:10

## 2021-09-28 RX ADMIN — VENLAFAXINE 75 MG: 25 TABLET ORAL at 16:53

## 2021-09-28 RX ADMIN — BUSPIRONE HYDROCHLORIDE 15 MG: 10 TABLET ORAL at 16:54

## 2021-09-28 RX ADMIN — GABAPENTIN 600 MG: 300 CAPSULE ORAL at 16:53

## 2021-09-28 RX ADMIN — CARBAMAZEPINE 200 MG: 200 SUSPENSION ORAL at 21:07

## 2021-09-28 RX ADMIN — OXYCODONE HYDROCHLORIDE 30 MG: 10 TABLET ORAL at 16:54

## 2021-09-28 NOTE — PROGRESS NOTES
Progress Note    Patient: Leanna Webb MRN: [id number]  SSN: [id number]    YOB: 1960  Age: 59 y.o. Sex: female      Admit Date: 9/22/2021    LOS: 5 days     Subjective:   Patient seen in bed  Appears slightly confused however is conversant and does answer questions appropriately  Denies abdominal pain  States she is hungry and wishes to eat    Objective:     Vitals:    09/28/21 0445 09/28/21 0505 09/28/21 0604 09/28/21 0700   BP: 137/64 (!) 152/78 (!) 143/74    Pulse: 92 90 84    Resp: 23 18 20    Temp: 98.8 °F (37.1 °C)   99.1 °F (37.3 °C)   SpO2: 95% 94% 95%    Weight:       Height:            Intake and Output:  Current Shift: No intake/output data recorded.   Last three shifts: 09/26 1901 - 09/28 0700  In: 2100 [I.V.:1500]  Out: 4451 [Urine:4450]    Review of Systems:  ROS     Physical Exam:   Abdomen is soft, nontender, nondistended    Lab/Data Review:  Recent Results (from the past 24 hour(s))   VANCOMYCIN, TROUGH    Collection Time: 09/27/21  9:45 AM   Result Value Ref Range    Vancomycin,trough 26.3 (HH) 5.0 - 10.0 ug/mL    Reported dose date Not provided      Reported dose: Not provided Units   METABOLIC PANEL, BASIC    Collection Time: 09/28/21  4:40 AM   Result Value Ref Range    Sodium 139 136 - 145 mmol/L    Potassium 3.6 3.5 - 5.1 mmol/L    Chloride 108 97 - 108 mmol/L    CO2 24 21 - 32 mmol/L    Anion gap 7 5 - 15 mmol/L    Glucose 94 65 - 100 mg/dL    BUN 3 (L) 6 - 20 mg/dL    Creatinine 0.24 (L) 0.55 - 1.02 mg/dL    BUN/Creatinine ratio 13 12 - 20      GFR est AA >60 >60 ml/min/1.73m2    GFR est non-AA >60 >60 ml/min/1.73m2    Calcium 7.8 (L) 8.5 - 10.1 mg/dL   CBC WITH AUTOMATED DIFF    Collection Time: 09/28/21  4:40 AM   Result Value Ref Range    WBC 7.6 3.6 - 11.0 K/uL    RBC 3.42 (L) 3.80 - 5.20 M/uL    HGB 9.5 (L) 11.5 - 16.0 g/dL    HCT 28.6 (L) 35.0 - 47.0 %    MCV 83.6 80.0 - 99.0 FL    MCH 27.8 26.0 - 34.0 PG    MCHC 33.2 30.0 - 36.5 g/dL    RDW 13.2 11.5 - 14.5 % PLATELET 585 306 - 952 K/uL    MPV 10.4 8.9 - 12.9 FL    NRBC 0.0 0.0  WBC    ABSOLUTE NRBC 0.00 0.00 - 0.01 K/uL    NEUTROPHILS 74 32 - 75 %    LYMPHOCYTES 7 (L) 12 - 49 %    MONOCYTES 16 (H) 5 - 13 %    EOSINOPHILS 1 0 - 7 %    BASOPHILS 0 0 - 1 %    IMMATURE GRANULOCYTES 2 (H) 0 - 0.5 %    ABS. NEUTROPHILS 5.6 1.8 - 8.0 K/UL    ABS. LYMPHOCYTES 0.5 (L) 0.8 - 3.5 K/UL    ABS. MONOCYTES 1.2 (H) 0.0 - 1.0 K/UL    ABS. EOSINOPHILS 0.1 0.0 - 0.4 K/UL    ABS. BASOPHILS 0.0 0.0 - 0.1 K/UL    ABS. IMM.  GRANS. 0.1 (H) 0.00 - 0.04 K/UL    DF AUTOMATED     PROCALCITONIN    Collection Time: 09/28/21  4:40 AM   Result Value Ref Range    Procalcitonin 1.00 (H) 0 ng/mL   C REACTIVE PROTEIN, QT    Collection Time: 09/28/21  4:40 AM   Result Value Ref Range    C-Reactive protein 13.80 (H) 0.00 - 0.60 mg/dL      Assessment:     Active Problems:    Abdominal pain (9/23/2021)        Plan:   Patient may be started on diet  From a surgical perspective she may be discharged home  She can follow-up in my office 2 to 3 weeks after discharge  Surgery will sign off, please reconsult as needed    Signed By: Frantz Solis MD     September 28, 2021

## 2021-09-28 NOTE — PROGRESS NOTES
Bedside shift change report given to [address] [place] [address]  by Yue Alonso . Report included the following information SBAR, Kardex, MAR, Recent Results and Cardiac Rhythm NSR.

## 2021-09-28 NOTE — PROGRESS NOTES
Bedside swallow eval completed. Patient reports continued abdominal pain. Rec diet initiation of full liquids w/ strict asp/GERD precautions and crush meds. Rec MBS to further assess oropharyngeal phase of swallow and r/o aspiration. Patient and daughter report increased dysphagia at home w/ frequent choking episodes. Please see documentation for full report.

## 2021-09-28 NOTE — PROGRESS NOTES
Hospitalist Progress Note         Monet Moon MD          Daily Progress Note: 9/28/2021      Subjective:    Patient is a 60 yo female with a past medical history of neurofibromatosis type I, chronic pain syndrome, neuropathic pain, benign essential HTN, depression, and hypothyroidism who presented to the ED with acute abdominal pain that was unlike anything she had experienced before. She is on high dose immediate release oxycodone for chronic pain but this episode was not relieved by it.      Normal CT angiogram of the abdomen and pelvis. Sequela of neurofibromatosis. Constipation without obstruction     Normal CTA of the chest with no pulmonary embolism or acute aortic abnormalities identified.  no acute cardiopulmonary findings. Sequela of neurofibromatosis.       Patient seen in follow-up. Awake and able to answer simple questions.   Much improved    Problem List:  Problem List as of 9/28/2021 Date Reviewed: 9/23/2021        Codes Class Noted - Resolved    Abdominal pain ICD-10-CM: R10.9  ICD-9-CM: 789.00  9/23/2021 - Present        Chronic pain syndrome ICD-10-CM: G89.4  ICD-9-CM: 338.4  8/10/2017 - Present        [facility] ([facility] ICD-10-CM: Q85.00  ICD-9-CM: 237.70  8/10/2017 - Present              Medications reviewed  Current Facility-Administered Medications   Medication Dose Route Frequency    0.9% sodium chloride 1,000 mL with potassium chloride 20 mEq infusion   IntraVENous CONTINUOUS    venlafaxine (EFFEXOR) tablet 75 mg  75 mg Oral BID WITH MEALS    lactulose (CHRONULAC) 10 gram/15 mL solution 15 mL  15 mL Oral DAILY PRN    carBAMazepine (TEGretol) 200 mg/10 mL suspension 200 mg  200 mg Per NG tube Q8H    QUEtiapine (SEROquel) tablet 25 mg  25 mg Oral Q4H PRN    NOREPINephrine (LEVOPHED) 8 mg in 5% dextrose 250mL (32 mcg/mL) infusion  0.5-30 mcg/min IntraVENous TITRATE    gabapentin (NEURONTIN) capsule 600 mg  600 mg Oral TID    carvediloL (COREG) tablet 6.25 mg  6.25 mg Oral BID WITH MEALS    atorvastatin (LIPITOR) tablet 20 mg  20 mg Oral QHS    levothyroxine (SYNTHROID) tablet 125 mcg  125 mcg Oral 6am    oxyCODONE IR (ROXICODONE) tablet 30 mg  30 mg Oral Q6H PRN    famotidine (PEPCID) tablet 20 mg  20 mg Oral BID    aspirin chewable tablet 81 mg  81 mg Oral DAILY    cholecalciferol (VITAMIN D3) (1000 Units /25 mcg) tablet 1,000 Units  1,000 Units Oral DAILY    sodium chloride (NS) flush 5-40 mL  5-40 mL IntraVENous PRN    acetaminophen (TYLENOL) tablet 650 mg  650 mg Oral Q6H PRN    ondansetron (ZOFRAN) injection 4 mg  4 mg IntraVENous Q4H PRN    magnesium oxide (MAG-OX) tablet 400 mg  400 mg Oral DAILY    busPIRone (BUSPAR) tablet 15 mg  15 mg Oral TID    HYDROmorphone (DILAUDID) syringe 0.5 mg  0.5 mg IntraVENous Q4H PRN    piperacillin-tazobactam (ZOSYN) 3.375 g in 0.9% sodium chloride (MBP/ADV) 100 mL MBP  3.375 g IntraVENous Q8H    acetaminophen (TYLENOL) suppository 650 mg  650 mg Rectal Q4H PRN       Review of Systems:   A comprehensive review of systems was negative except for that written in the HPI. Objective:   Physical Exam:     Visit Vitals  /62 (BP 1 Location: Right upper arm, BP Patient Position: At rest)   Pulse 83   Temp 99.1 °F (37.3 °C)   Resp 18   Ht 5' 1\" (1.549 m)   Wt 75.4 kg (166 lb 3.6 oz)   SpO2 95%   Breastfeeding No   BMI 31.41 kg/m²    O2 Flow Rate (L/min): 1 l/min (placed on room air) O2 Device: None (Room air)    Temp (24hrs), Av.3 °F (36.8 °C), Min:97.1 °F (36.2 °C), Max:99.1 °F (37.3 °C)    No intake/output data recorded.  1901 -  0700  In: 2100 [I.V.:1500]  Out: 4451 [Urine:4450]    General:  Awake    Lungs:   Clear to auscultation bilaterally. Chest wall:  No tenderness or deformity. Heart:  Regular rate and rhythm, S1, S2 normal, no murmur, click, rub or gallop. Abdomen:   Soft, non-tender. Bowel sounds normal. No masses,  No organomegaly.    Extremities: Extremities normal, atraumatic, no cyanosis or edema. Pulses: 2+ and symmetric all extremities. Skin: Skin color, texture, turgor normal. No rashes or lesions   Neurologic: CNII-XII intact. No gross sensory or motor deficits     Data Review:       Recent Days:  Recent Labs     09/28/21 0440 09/27/21 0316 09/26/21  0324   WBC 7.6 6.0 6.5   HGB 9.5* 9.3* 9.3*   HCT 28.6* 28.3* 28.6*    170 178     Recent Labs     09/28/21 0440 09/27/21 0316 09/26/21  0324 09/25/21  1220    136 137  --    K 3.6 3.8 3.8  --     109* 108  --    CO2 24 18* 18*  --    GLU 94 72 78  --    BUN 3* 6 9  --    CREA 0.24* 0.17* 0.28*  --    CA 7.8* 7.5* 7.9*  --    ALB  --   --   --  2.1*   TBILI  --   --   --  0.9   ALT  --   --   --  99*     No results for input(s): PH, PCO2, PO2, HCO3, FIO2 in the last 72 hours.     24 Hour Results:  Recent Results (from the past 24 hour(s))   METABOLIC PANEL, BASIC    Collection Time: 09/28/21  4:40 AM   Result Value Ref Range    Sodium 139 136 - 145 mmol/L    Potassium 3.6 3.5 - 5.1 mmol/L    Chloride 108 97 - 108 mmol/L    CO2 24 21 - 32 mmol/L    Anion gap 7 5 - 15 mmol/L    Glucose 94 65 - 100 mg/dL    BUN 3 (L) 6 - 20 mg/dL    Creatinine 0.24 (L) 0.55 - 1.02 mg/dL    BUN/Creatinine ratio 13 12 - 20      GFR est AA >60 >60 ml/min/1.73m2    GFR est non-AA >60 >60 ml/min/1.73m2    Calcium 7.8 (L) 8.5 - 10.1 mg/dL   CBC WITH AUTOMATED DIFF    Collection Time: 09/28/21  4:40 AM   Result Value Ref Range    WBC 7.6 3.6 - 11.0 K/uL    RBC 3.42 (L) 3.80 - 5.20 M/uL    HGB 9.5 (L) 11.5 - 16.0 g/dL    HCT 28.6 (L) 35.0 - 47.0 %    MCV 83.6 80.0 - 99.0 FL    MCH 27.8 26.0 - 34.0 PG    MCHC 33.2 30.0 - 36.5 g/dL    RDW 13.2 11.5 - 14.5 %    PLATELET 514 457 - 477 K/uL    MPV 10.4 8.9 - 12.9 FL    NRBC 0.0 0.0  WBC    ABSOLUTE NRBC 0.00 0.00 - 0.01 K/uL    NEUTROPHILS 74 32 - 75 %    LYMPHOCYTES 7 (L) 12 - 49 %    MONOCYTES 16 (H) 5 - 13 %    EOSINOPHILS 1 0 - 7 %    BASOPHILS 0 0 - 1 %    IMMATURE GRANULOCYTES 2 (H) 0 - 0.5 %    ABS. NEUTROPHILS 5.6 1.8 - 8.0 K/UL    ABS. LYMPHOCYTES 0.5 (L) 0.8 - 3.5 K/UL    ABS. MONOCYTES 1.2 (H) 0.0 - 1.0 K/UL    ABS. EOSINOPHILS 0.1 0.0 - 0.4 K/UL    ABS. BASOPHILS 0.0 0.0 - 0.1 K/UL    ABS. IMM. GRANS. 0.1 (H) 0.00 - 0.04 K/UL    DF AUTOMATED     PROCALCITONIN    Collection Time: 09/28/21  4:40 AM   Result Value Ref Range    Procalcitonin 1.00 (H) 0 ng/mL   C REACTIVE PROTEIN, QT    Collection Time: 09/28/21  4:40 AM   Result Value Ref Range    C-Reactive protein 13.80 (H) 0.00 - 0.60 mg/dL       IMPRESSION  Gallstone lodged in the neck of the gallbladder. Sludge within the  gallbladder. Borderline gallbladder wall thickening. No pericholecystic fluid. The technologist stated there was no sonographic Linder's sign. Assessment/       Sepsis likely secondary to aspiration pneumonia  Metabolic encephalopathy  Probably acute cholecystitis. Septic shock off Levophed  Severe abdominal pain likely constipation related  Chronic pain syndrome likely due to neurofibromatosis  Benign essential hypertension  Hypothyroidism  History of depression  Hypokalemia. Repleted      Plan:  Continue supportive care including IV antibiotics  Continue IV Zosyn. Discontinue vancomycin   DVT GI prophylaxis  She is full code  Obtain speech therapist eval  Monitor routine electrolytes     transfer to [facility] floor  today  Care Plan discussed with: Nurse    Total time spent with patient: 30 minutes.     Dariel Ross MD

## 2021-09-28 NOTE — PROGRESS NOTES
10: 54AM Inbound call from Peter Berry (financial screener). Informed daughter has decided to let the application for PCA continue the process. Aware if [place] is relocated to [place], [place]if [place] will no longer be any good. Will need to establish residency in the [facility]. DARRION Abraham        10:30AM Outbound call to Peter Berry (financial screener). Inquired if they were able to cancel the application Medicaid for PCA. Financial screening office contacted [facility] and is awaiting a return phone call.          DARRION Abraham

## 2021-09-28 NOTE — PROGRESS NOTES
Pt off restraints has pulled out NGT, same reinserted and placement verified, Restraints reapplied and Tube feeding restarted

## 2021-09-28 NOTE — PROGRESS NOTES
SPEECH LANGUAGE PATHOLOGY BEDSIDE SWALLOW EVALUATIONS  Patient: Emmanuelle Lincoln  (24 y.o. )  Date: 9/28/2021  Primary Diagnosis: Abdominal pain   Precautions:  Aspiration and fall    ASSESSMENT :  Patient is alert, oriented x2, and follows basic commands. Daughter at bedside provides medical hx. Patient w/ L facial droop this is baseline following surgery for L acoustic neuroma. Speech is mild to moderately dysarthric. Reduced lingual ROM and strength also noted. Patient presents w/ mild oropharyngeal dysphagia. Oral phase c/b reduced mastication s/t edentulous and dry mucosa w/ effortful A-P. Pharyngeal phase c/b mild swallow delay and HLE is wfl upon palpation. .   No overt s/s of pen/asp observed. Extensive education provided  w/ demonstration of swallow strategies. Concerns for compliance s/t  hx of poor compliance per daughter and carryover of education s/t cognitive deficits. Patient will benefit from skilled intervention to address the above impairments. Patients rehabilitation potential is considered to be Fair     PLAN :  Recommendations and Planned Interventions:  Rec diet initiation of full liquids w/ strict asp/GERD precautions and crush meds. Rec MBS to further assess oropharyngeal phase of swallow and r/o aspiration. Frequency/Duration: Patient will be followed by speech-language pathology 5 times a week to address goals. Discharge Recommendations: Skilled Nursing Facility     SUBJECTIVE:   Patient and daughter report increased difficulty swallowing (new onset) most notable w/ solids c/b pharyngeal globus w/ coughing up solids. Daughter requesting information for transition to LTC following skilled therapy. OBJECTIVE:   Patient admitted w/ abdominal pain dx w/ gallstone and aspiration pna. Hx of acoustic neuroma s/p surgery per daughter 1 year ago w/ damage to L facial nerve. Patient s/p several surgeries to improve L facial droop and gold weight implant for eye closure.    Past Medical History:   Diagnosis Date    Arthritis     Depression     Epilepsy ([facility]     Hypertension     Ill-defined condition     neurofibromatosis    Thyroid disease        CXR Results  (Last 48 hours)                 09/27/21 1045  XR CHEST PORT Final result    Narrative:  Chest single view. Comparison single view chest September 24, 2021. NG tube noted with its tip below the level of the left hemidiaphragm. Right PICC   now present with its tip overlying SVC region. Reduced lung volumes. Central vessel crowding. Suspect mild degree central   interstitial edema. Cardiac and mediastinal structures unchanged. No   pneumothorax or sizable effusion. Diet prior to admission: Soft/thin  Current Diet:  DIET ADULT TUBE FEEDING     Cognitive and Communication Status:  Neurologic State: Alert, Confused  Orientation Level: Oriented to person, Oriented to place  Cognition: Decreased attention/concentration, Follows commands, Impaired decision making, Impulsive  Perception: Appears intact  Perseveration: No perseveration noted  Safety/Judgement: Decreased awareness of need for safety, Decreased insight into deficits  Swallowing Evaluation:   Oral Assessment:  Oral Assessment  Labial: Left droop  Dentition: Edentulous  Oral Hygiene: dry mucosa   Lingual: Decreased rate;Decreased strength  Velum: No impairment  Mandible: No impairment  P.O. Trials:  Patient Position: upright in bed  Vocal quality prior to P.O.: Hoarse  Consistency Presented: Puree; Solid; Thin liquid        Bolus Acceptance: No impairment  Bolus Formation/Control: Impaired  Type of Impairment: Delayed;Lip closure;Mastication  Propulsion: Discoordination  Oral Residue: Less than 10% of bolus  Initiation of Swallow: Delayed (# of seconds)  Laryngeal Elevation: Functional  Aspiration Signs/Symptoms: None  Pharyngeal Phase Characteristics: Effortful swallow; Feeling of discomfort             Oral Phase Severity: Mild  Pharyngeal Phase Severity : Mild  Voice:     Vocal Quality: Hoarse            Pain:  Pain Scale 1: Numeric (0 - 10)  Pain Intensity 1: 0         After treatment:   Patient left in no apparent distress in bed, Call bell within reach, Nursing notified, Caregiver / family present, and Bed / chair alarm activated    COMMUNICATION/EDUCATION:   Patient and daughter educated regarding diet recs, s/s aspiration, aspiration precautions, and swallow strategies. Daughter demonstrated Good understanding as evidenced by engagement and appropriate questions. The patient's plan of care including recommendations, planned interventions, and recommended diet changes were discussed with: Registered nurse and Physician. Patient/family have participated as able in goal setting and plan of care. Patient/family agree to work toward stated goals and plan of care. Problem: Dysphagia (Adult)  Goal: *Acute Goals and Plan of Care (Insert Text)  Description: Speech Therapy Swallow Goals  Initiated 9/28/2021  -Patient will tolerate full diet with thin liquids without clinical indicators of aspiration given min cues within 7 day(s). -Patient will tolerate PO trials without clinical indicators of aspiration given minimal cues within 7 day(s). -Patient will participate in modified barium swallow study within 7 day(s). -Patient will demonstrate understanding of swallow safety precautions and aspiration precautions, diet recs with minimal cues within 7 day(s).           Outcome: Initial     Thank you for this referral.  Sameul Fleischer, M.S., M.Ed., CCC-SLP  Time Calculation: 41 mins

## 2021-09-28 NOTE — PROGRESS NOTES
OCCUPATIONAL THERAPY EVALUATION  Patient: Leon Oden (40 y.o. female)  Date: 9/28/2021  Primary Diagnosis: Abdominal pain [R10.9]       Precautions: falls, confusion    ASSESSMENT  Pt is a 60 yo female admitted on 9/23/2021 for abdominal pain; currently being treated for abdominal pain, chronic pain syndrome, and neurofibromatosis. Normal CT angiogram of the abdomen and pelvis. Sequela of neurofibromatosis. Constipation without obstruction. Normal CTA of the chest with no pulmonary embolism or acute aortic abnormalities identified; no acute cardiopulmonary findings. Sequela of neurofibromatosis. PMH: OA, depression, epilepsy, HTN, neurofibromatosis, thyroid disease, . Pt A&O x self and place but not time and situation. Per pt and DTR report, pt resides with daughter in a 2 story home (primary bedroom on 1st floor, only shower in home on 2nd floor) with 4 NAZIA, bilateral handrails in place but not useable per daughter, pt was I for ADLS (daughter reports pt was requiring increased assistance over the past several months) but required assist for IADLS, rollator with mobility prior to admission. DME: rollator, shower chair, and grab bars. Pt has suffered 2 falls in past 2 months per daughter, pt denies falls. Based on the objective data described below, the patient presents with decreased standing balance using RW for mobility, decreased activity tolerance, generalized decreased strength, confusion and poor safety awareness, and increased need for A with self care and functional mobility/transfers. Pt semi-supine in bed upon OT/PT arrival, agreeable to evaluation. Pt required mod A with additional time for bed mobility, mod A with additional time supine <> sit, CGA to min A with additional time sit <> stand transfers. Pt amb to and from sink in room with gt belt, RW, and CGA to min A and additional time. Patient stood at sink and min A for grooming to wash face and brush hair.  Patient total A LE dressing to don socks while supine in bed, prior to mobility. Pt did fair with session today with increased time and assistance required for all self care and mobility due to above deficits. Patient would benefit from continued skilled OT services to address above deficits and improve safety and independence with self care and functional mobility/transfers. Recommend discharge to SNF when medically appropriate. Had a discussion with daughter present at end of session who inquired about potential LTC/GASPER options as she feels caring for her mother will not be feasible long-term, reached out to CM to make them aware. Current Level of Function Impacting Discharge (ADLs/self-care): min A grooming and total A LE dressing. Other factors to consider for discharge: time since onset, family support, PLOF        PLAN :  Recommendations and Planned Interventions: self care training, functional mobility training, therapeutic exercise, balance training, therapeutic activities, endurance activities, patient education and family training/education    Frequency/Duration: Patient will be followed by physical therapy:  2-3x/week to address goals. Recommendation for discharge: (in order for the patient to meet his/her long term goals)  Quang Escamilla    This discharge recommendation:  Has been made in collaboration with the attending provider and/or case management    IF patient discharges home will need the following DME: TBD       SUBJECTIVE:   Patient stated Did you hear the people running by last night?  Patient generally confused throughout session with periods of nonsensical speech.     OBJECTIVE DATA SUMMARY:   HISTORY:   Past Medical History:   Diagnosis Date    Arthritis     Depression     Epilepsy ([facility] [address]     Hypertension     Ill-defined condition     neurofibromatosis    Thyroid disease      Past Surgical History:   Procedure Laterality Date    HX GYN      HX ORTHOPAEDIC      NEUROLOGICAL PROCEDURE UNLISTED Expanded or extensive additional review of patient history:     Home Situation  Home Environment: Private residence  # Steps to Enter: 4  Rails to Enter: No (bilateral HR present but not useable per DTR)  Wheelchair Ramp: No  One/Two Story Residence: Two story (primary bedroom 1st, only shower 2nd)  # of Interior Steps: 14  Living Alone: No  Support Systems: Child(aishwarya)  Patient Expects to be Discharged to[de-identified] [employer]  Current DME Used/Available at Home: Donnamae Cain, rollator, Shower chair, Grab bars  Tub or Shower Type: Tub/Shower combination    PLOF: Pt mod I for ADLS, requires A for IADLS, mod I with mobility prior to admission. EXAMINATION OF PERFORMANCE DEFICITS:  Cognitive/Behavioral Status:  Neurologic State: Alert;Confused  Orientation Level: Oriented to person;Oriented to place; Disoriented to time;Disoriented to situation  Cognition: Impaired decision making;Decreased command following;Decreased attention/concentration  Safety/Judgement: Decreased insight into deficits    Skin: bruising on L upper arm, otherwise intact where visible    Edema: none noted    Hearing: Auditory  Auditory Impairment: Hard of hearing, bilateral    Vision/Perceptual:    Not formally assessed at this time    Range of Motion:  AROM: Generally decreased, functional  PROM: Generally decreased, functional    Strength:  Strength: Generally decreased, functional     RUE Strength  Observation: grossly observed to be 3/5     LUE Strength  Observation: grossly observed to be 3/5    Coordination:  Coordination: Generally decreased, functional    Tone & Sensation:  Tone: normal    Balance:  Sitting: Intact; With support  Standing: Impaired; Without support  Standing - Static: Fair;Constant support  Standing - Dynamic : Poor;Constant support    Functional Mobility and Transfers for ADLs:  Bed Mobility:  Rolling: Minimum assistance; Moderate assistance; Additional time  Supine to Sit: Minimum assistance; Moderate assistance; Additional time  Sit to Supine: Minimum assistance; Moderate assistance; Additional time  Scooting: Minimum assistance; Moderate assistance; Additional time    Transfers:  Sit to Stand: Contact guard assistance;Minimum assistance; Additional time  Stand to Sit: Contact guard assistance;Minimum assistance; Additional time  Assistive Device : Gait Belt;Walker, rolling    ADL Assessment:    Oral Facial Hygiene/Grooming: Minimum assistance; Additional time    Lower Body Dressing: Total assistance    ADL Intervention and task modifications:    Grooming  Grooming Assistance: Minimum assistance  Position Performed: Standing  Washing Face: Contact guard assistance  Brushing/Combing Hair: Minimum assistance    Lower Body Dressing Assistance  Dressing Assistance: Total assistance(dependent)  Socks: Total assistance (dependent)  Leg Crossed Method Used: No  Position Performed: Supine    Cognitive Retraining  Safety/Judgement: Decreased insight into deficits    Therapeutic Exercise:  Patient may benefit from UE HEP, initiate at next session as able. Functional Measure:    [address] [place] [id number] AM-PAC \"6 Clicks\"                                                       Daily Activity Inpatient Short Form  How much help from another person does the patient currently need. .. Total; A Lot A Little None   1. Putting on and taking off regular lower body clothing? [x]  1 []  2 []  3 []  4   2. Bathing (including washing, rinsing, drying)? []  1 [x]  2 []  3 []  4   3. Toileting, which includes using toilet, bedpan or urinal? [] 1 [x]  2 []  3 []  4   4. Putting on and taking off regular upper body clothing? []  1 []  2 [x]  3 []  4   5. Taking care of personal grooming such as brushing teeth? []  1 []  2 [x]  3 []  4   6. Eating meals? []  1 []  2 [x]  3 []  4   © 2007, Trustees of [address] [place] [id number], under license to [employer].  All rights reserved     Score: 14/24     Interpretation of Tool:  Represents clinically-significant functional categories (i.e. Activities of daily living). Percentage of Impairment CH    0%   CI    1-19% CJ    20-39% CK    40-59% CL    60-79% CM    80-99% CN     100%   [facility]  Score 6-24 24 23 20-22 15-19 10-14 7-9 6        Occupational Therapy Evaluation Charge Determination   History Examination Decision-Making   MEDIUM Complexity : Expanded review of history including physical, cognitive and psychosocial  history  MEDIUM Complexity : 3-5 performance deficits relating to physical, cognitive , or psychosocial skils that result in activity limitations and / or participation restrictions MEDIUM Complexity : Patient may present with comorbidities that affect occupational performnce. Miniml to moderate modification of tasks or assistance (eg, physical or verbal ) with assesment(s) is necessary to enable patient to complete evaluation       Based on the above components, the patient evaluation is determined to be of the following complexity level: MEDIUM    Pain Ratin/10 lower abdominal pain    Activity Tolerance:   Fair and requires rest breaks    After treatment patient left in no apparent distress:    Supine in bed, Call bell within reach, Bed / chair alarm activated and Side rails x 3    COMMUNICATION/EDUCATION:   The patients plan of care was discussed with: Physical therapist, Registered nurse and Case management. Patient/family have participated as able in goal setting and plan of care. and Patient/family agree to work toward stated goals and plan of care. This patients plan of care is appropriate for delegation to [facility].     OT/PT sessions occurred together for increased patient and clinician safety    Problem: Self Care Deficits Care Plan (Adult)  Goal: *Acute Goals and Plan of Care (Insert Text)  Description: Pt will be mod I sup <> sit in prep for EOB ADLs  Pt will be mod I grooming standing at sink LRAD  Pt will be min A LE dressing sitting EOB/long sit  Pt will be mod I sit <>  prep for toileting LRAD  Pt will be mod I toileting/toilet transfer/cloth mgmt LRAD  Pt will be I following UE HEP in prep for self care tasks   Outcome: Not Met     Thank you for this referral.  Prince Marsahll, OTR/L  Time Calculation: 48 mins

## 2021-09-28 NOTE — CONSULTS
Otolaryngology-HNS Consult    Subjective:    [facility]   59 y.o.   1960     Chief complaint/reason for consult -left ear drainage, bleeding    History of present illness    57-year-old female admitted 6 days ago with abdominal pain. She has a history of neurofibromatosis type I. Somewhat of a poor historian but states she had surgery for acoustic neuroma on the left side at [address] [place] [address]. She reports this was just 6 weeks ago. She denies any ear pain. She does endorse a left facial weakness. She was apparently found to have some bloody discharge from the left ear canal and I have been asked to see her for this. Review of Systems  Review of Systems   Constitutional: Negative for chills and fever. HENT: Positive for ear discharge and hearing loss. Negative for ear pain, nosebleeds and tinnitus. Eyes: Negative for blurred vision and double vision. Respiratory: Negative for cough, sputum production and shortness of breath. Cardiovascular: Negative for chest pain and palpitations. Gastrointestinal: Positive for abdominal pain. Negative for heartburn, nausea and vomiting. Musculoskeletal: Negative for joint pain and neck pain. Skin: Negative. Neurological: Positive for dizziness and headaches. Negative for speech change and weakness. Endo/Heme/Allergies: Negative for environmental allergies. Does not bruise/bleed easily. Psychiatric/Behavioral: Positive for memory loss. The patient is nervous/anxious. The patient does not have insomnia.           Past Medical History:   Diagnosis Date    Arthritis     Depression     Epilepsy ([facility] [address]     Hypertension     Ill-defined condition     neurofibromatosis    Thyroid disease      Past Surgical History:   Procedure Laterality Date    HX GYN      HX ORTHOPAEDIC      NEUROLOGICAL PROCEDURE UNLISTED        Family History   Problem Relation Age of Onset    Cancer Mother         glioblastoma    Lung Disease Father     Cancer Father mesothelioma, testicular cancer     Social History     Tobacco Use    Smoking status: Never Smoker    Smokeless tobacco: Never Used   Substance Use Topics    Alcohol use: No      Prior to Admission medications    Medication Sig Start Date End Date Taking? Authorizing Provider   famotidine (Pepcid) 20 mg tablet Take 20 mg by mouth two (2) times a day. Yes Other, MD Henry   celecoxib (CELEBREX) 200 mg capsule Take 200 mg by mouth two (2) times a day. Yes Other, MD Henry   aspirin 81 mg chewable tablet Take 81 mg by mouth daily. Yes Other, MD Henry   cholecalciferol (Vitamin D3) 25 mcg (1,000 unit) cap Take  by mouth daily. Yes Other, MD Henry   carBAMazepine ER (CARBATROL ER) 300 mg capsule Take 300 mg by mouth two (2) times a day. Yes Provider, Historical   gabapentin (NEURONTIN) 600 mg tablet Take  by mouth three (3) times daily. Yes Provider, Historical   carvedilol (COREG) 6.25 mg tablet Take  by mouth two (2) times daily (with meals). Yes Provider, Historical   simvastatin (ZOCOR) 40 mg tablet Take  by mouth nightly. Yes Provider, Historical   levothyroxine (SYNTHROID) 125 mcg tablet Take  by mouth Daily (before breakfast). Yes Provider, Historical   busPIRone (BUSPAR) 30 mg tablet Take 30 mg by mouth daily. Yes Provider, Historical   venlafaxine-SR (EFFEXOR XR) 150 mg capsule Take  by mouth daily. Yes Provider, Historical   oxyCODONE IR (OXY-IR) 30 mg immediate release tablet Take 1 Tab by mouth every six (6) hours as needed for Pain.  Max Daily Amount: 120 mg. 8/4/17  Yes Jerrod Luong MD        Allergies   Allergen Reactions    Codeine Nausea and Vomiting    Methadone Hives    Morphine Other (comments)     psychosis         Objective:     Visit Vitals  /62 (BP 1 Location: Right upper arm, BP Patient Position: At rest)   Pulse 83   Temp 99.1 °F (37.3 °C)   Resp 18   Ht 5' 1\" (1.549 m)   Wt 166 lb 3.6 oz (75.4 kg)   SpO2 95%   Breastfeeding No   BMI 31.41 kg/m² Physical Exam  Vitals reviewed. Constitutional:       General: She is awake. She is not in acute distress. Appearance: She is obese. She is not ill-appearing. Interventions: She is restrained. HENT:      Head: Normocephalic and atraumatic. Jaw: There is normal jaw occlusion. No trismus, tenderness or malocclusion. Salivary Glands: Right salivary gland is not diffusely enlarged or tender. Left salivary gland is not diffusely enlarged or tender. Right Ear: Hearing, tympanic membrane, ear canal and external ear normal.      Left Ear: Tympanic membrane and external ear normal. Decreased hearing noted. Laceration (left EAC abrasion with dried blood, not occluded) present. No mastoid tenderness. Ears:      Comments: Healed postauricular incision noted, no mastoid tenderness     Nose: No nasal deformity, septal deviation or mucosal edema. Right Nostril: No epistaxis. Left Nostril: No epistaxis. Right Turbinates: Not enlarged, swollen or pale. Left Turbinates: Not enlarged, swollen or pale. Right Sinus: No maxillary sinus tenderness or frontal sinus tenderness. Left Sinus: No maxillary sinus tenderness or frontal sinus tenderness. Mouth/Throat:      Lips: Pink. No lesions. Mouth: Mucous membranes are dry. No oral lesions. Dentition: Normal dentition. No dental caries. Tongue: No lesions. Palate: No mass and lesions. Pharynx: Oropharynx is clear. Uvula midline. No oropharyngeal exudate or posterior oropharyngeal erythema. Tonsils: No tonsillar exudate. 0 on the right. 0 on the left. Eyes:      General: Vision grossly intact. Extraocular Movements: Extraocular movements intact. Right eye: No nystagmus. Left eye: No nystagmus. Conjunctiva/sclera: Conjunctivae normal.      Pupils: Pupils are equal, round, and reactive to light. Neck:      Thyroid: No thyroid mass, thyromegaly or thyroid tenderness. Trachea: Trachea and phonation normal. No tracheal tenderness or tracheal deviation. Cardiovascular:      Rate and Rhythm: Normal rate and regular rhythm. Pulmonary:      Effort: Pulmonary effort is normal. No respiratory distress. Breath sounds: No stridor. Musculoskeletal:         General: No swelling or tenderness. Normal range of motion. Cervical back: No edema or erythema. Lymphadenopathy:      Cervical: No cervical adenopathy. Skin:     General: Skin is warm and dry. Findings: No lesion or rash. Neurological:      General: No focal deficit present. Mental Status: She is oriented to person, place, and time. She is confused. Cranial Nerves: Cranial nerve deficit and facial asymmetry (Left facial weakness, House-Brackman 3/4) present. Psychiatric:         Mood and Affect: Affect is labile. Behavior: Behavior is agitated. Cognition and Memory: Cognition is impaired.            Recent Results (from the past 24 hour(s))   METABOLIC PANEL, BASIC    Collection Time: 09/28/21  4:40 AM   Result Value Ref Range    Sodium 139 136 - 145 mmol/L    Potassium 3.6 3.5 - 5.1 mmol/L    Chloride 108 97 - 108 mmol/L    CO2 24 21 - 32 mmol/L    Anion gap 7 5 - 15 mmol/L    Glucose 94 65 - 100 mg/dL    BUN 3 (L) 6 - 20 mg/dL    Creatinine 0.24 (L) 0.55 - 1.02 mg/dL    BUN/Creatinine ratio 13 12 - 20      GFR est AA >60 >60 ml/min/1.73m2    GFR est non-AA >60 >60 ml/min/1.73m2    Calcium 7.8 (L) 8.5 - 10.1 mg/dL   CBC WITH AUTOMATED DIFF    Collection Time: 09/28/21  4:40 AM   Result Value Ref Range    WBC 7.6 3.6 - 11.0 K/uL    RBC 3.42 (L) 3.80 - 5.20 M/uL    HGB 9.5 (L) 11.5 - 16.0 g/dL    HCT 28.6 (L) 35.0 - 47.0 %    MCV 83.6 80.0 - 99.0 FL    MCH 27.8 26.0 - 34.0 PG    MCHC 33.2 30.0 - 36.5 g/dL    RDW 13.2 11.5 - 14.5 %    PLATELET 811 194 - 903 K/uL    MPV 10.4 8.9 - 12.9 FL    NRBC 0.0 0.0  WBC    ABSOLUTE NRBC 0.00 0.00 - 0.01 K/uL    NEUTROPHILS 74 32 - 75 % LYMPHOCYTES 7 (L) 12 - 49 %    MONOCYTES 16 (H) 5 - 13 %    EOSINOPHILS 1 0 - 7 %    BASOPHILS 0 0 - 1 %    IMMATURE GRANULOCYTES 2 (H) 0 - 0.5 %    ABS. NEUTROPHILS 5.6 1.8 - 8.0 K/UL    ABS. LYMPHOCYTES 0.5 (L) 0.8 - 3.5 K/UL    ABS. MONOCYTES 1.2 (H) 0.0 - 1.0 K/UL    ABS. EOSINOPHILS 0.1 0.0 - 0.4 K/UL    ABS. BASOPHILS 0.0 0.0 - 0.1 K/UL    ABS. IMM. GRANS. 0.1 (H) 0.00 - 0.04 K/UL    DF AUTOMATED     PROCALCITONIN    Collection Time: 09/28/21  4:40 AM   Result Value Ref Range    Procalcitonin 1.00 (H) 0 ng/mL   C REACTIVE PROTEIN, QT    Collection Time: 09/28/21  4:40 AM   Result Value Ref Range    C-Reactive protein 13.80 (H) 0.00 - 0.60 mg/dL          I have personally reviewed all pertinent notes, labs, results, and imaging studies for this patient. Assessment/Plan:     History of neurofibromatosis type I, left acoustic neuroma surgery -patient reports this was 6 weeks prior although her incision seems more mature this is likely done more remotely. She has residual left facial weakness, eye closure is complete with gold weight. CT of the head was overall clear other than sclerotic mastoid. Left ear bleeding -ear canal has abrasion, dried blood. Tympanic membrane is intact and normal.  No further intervention required. Thank you for this consult. Issac Lujan MD, [address] [facility]    [address] [place] [address] [address]  [facility]    [address] [address]  [place], [place] [id number]

## 2021-09-28 NOTE — PROGRESS NOTES
Pt on restraints, has removed mitts and pulled out NGT, pt reoriented,  restraints reapplied and NGT reinserted, TF restarted

## 2021-09-28 NOTE — PROGRESS NOTES
PHYSICAL THERAPY EVALUATION  Patient: Gunnar Lawrence (25 y.o. female)  Date: 9/28/2021  Primary Diagnosis: Abdominal pain [R10.9]        Precautions: falls       ASSESSMENT  Pt is a 58 yo female admitted on 9/23/2021 for abdominal pain; currently being treated for abdominal pain, chronic pain syndrome, and neurofibromatosis. Normal CT angiogram of the abdomen and pelvis. Sequela of neurofibromatosis. Constipation without obstruction. Normal CTA of the chest with no pulmonary embolism or acute aortic abnormalities identified; no acute cardiopulmonary findings. Sequela of neurofibromatosis. PMH: OA, depression, epilepsy, HTN, neurofibromatosis, thyroid disease, . Pt A&O x self and place but not time and situation. Per pt and DTR report pt resides with daughter in a 2 story home (primary bedroom on 1st floor, only shower in home on 2nd floor) with 4 NAZIA, bilateral handrails in place but not useable per daughter, pt was I for ADLS (daughter reports pt was requiring increased assistance over the past several months) but required assist IADLS, rollator with mobility prior to admission. DME: rollator. Pt has suffered 2 falls in past 2 months per daughter, pt denies falls. Based on the objective data described below, the patient presents with generalized weakness, impaired functional mobility, impaired amb, impaired balance, decreased activity tolerance, confusion with poor safety awareness requiring increased time and cues for safety and processing. Pt semi-supine in bed upon PT/OT arrival, agreeable to evaluation. Pt required mod A with additional time for bed mobility, mod A with additional time supine <> sit, CGA to min A with additional time sit <> stand transfers. Pt amb 25 feet (bed<>sink) with gt belt, RW, and CGA to min A and additional time; demonstrating slow, shuffling, unsteady gt pattern with no obvious LOB or knee buckling noted.  Pt did fair with session today with increased time and assistance required for all mobility due to generalized unsteadiness. Pt will benefit from continued skilled PT to address above deficits and return to PLOF. Current PT DC recommendation SNF. Current Level of Function Impacting Discharge (mobility/balance): mod A to CGA    Other factors to consider for discharge: severity of deficits      PLAN :  Recommendations and Planned Interventions: bed mobility training, transfer training, gait training, therapeutic exercises, neuromuscular re-education, patient and family training/education and therapeutic activities      Frequency/Duration: Patient will be followed by physical therapy:  2-3x/week to address goals. Recommendation for discharge: (in order for the patient to meet his/her long term goals)  Quang Escamilla    This discharge recommendation:  Has been made in collaboration with the attending provider and/or case management    IF patient discharges home will need the following DME: none         SUBJECTIVE:   Patient stated it's September? When did that happen?     OBJECTIVE DATA SUMMARY:   HISTORY:    Past Medical History:   Diagnosis Date    Arthritis     Depression     Epilepsy ([facility] [address]     Hypertension     Ill-defined condition     neurofibromatosis    Thyroid disease      Past Surgical History:   Procedure Laterality Date    HX GYN      HX ORTHOPAEDIC      NEUROLOGICAL PROCEDURE UNLISTED         Home Situation  Home Environment: Private residence  # Steps to Enter: 4  Rails to Enter: No (bilateral HR present but not useable per DTR)  Wheelchair Ramp: No  One/Two Story Residence: Two story (primary bedroom 1st, only shower 2nd)  # of Interior Steps: 14  Living Alone: No  Support Systems: Child(aishwarya)  Patient Expects to be Discharged to[de-identified] [employer]  Current DME Used/Available at Home: [address], rollator  Tub or Shower Type: Tub/Shower combination    EXAMINATION/PRESENTATION/DECISION MAKING:   Critical Behavior:  Neurologic State: Alert, Confused  Orientation Level: Oriented to person, Oriented to place, Disoriented to time, Disoriented to situation  Cognition: Impaired decision making, Decreased command following, Decreased attention/concentration     Hearing: Auditory  Auditory Impairment: Hard of hearing, bilateral  Skin:  Intact where visible; large bruise noted left upper arm  Edema: intact where visible  Range Of Motion:  AROM: Generally decreased, functional           PROM: Generally decreased, functional           Strength:    Strength: Generally decreased, functional                    Tone & Sensation:                                  Coordination:  Coordination: Generally decreased, functional  Vision:      Functional Mobility:  Bed Mobility:  Rolling: Minimum assistance; Moderate assistance; Additional time  Supine to Sit: Minimum assistance; Moderate assistance; Additional time  Sit to Supine: Minimum assistance; Moderate assistance; Additional time  Scooting: Minimum assistance; Moderate assistance; Additional time  Transfers:  Sit to Stand: Contact guard assistance;Minimum assistance; Additional time  Stand to Sit: Contact guard assistance;Minimum assistance; Additional time                       Balance:   Sitting: Intact; With support  Standing: Impaired; Without support  Standing - Static: Fair;Constant support  Standing - Dynamic : Poor;Constant support  Ambulation/Gait Training:  Distance (ft): 25 Feet (ft) (bed <> sink)  Assistive Device: Gait belt;Walker, rolling  Ambulation - Level of Assistance: Contact guard assistance;Minimal assistance; Additional time     Gait Description (WDL): Exceptions to [facility]           Base of Support: Narrowed     Speed/Radha: Slow;Shuffled          Therapeutic Exercises:   Not completed this session    Functional Measure:  [employer] AM-PAC 6 Clicks         Basic Mobility Inpatient Short Form  How much difficulty does the patient currently have. .. Unable A Lot A Little None   1.   Turning over in bed (including adjusting bedclothes, sheets and blankets)? [] 1   [x] 2   [] 3   [] 4   2. Sitting down on and standing up from a chair with arms ( e.g., wheelchair, bedside commode, etc.)   [] 1   [x] 2   [] 3   [] 4   3. Moving from lying on back to sitting on the side of the bed? [] 1   [x] 2   [] 3   [] 4          How much help from another person does the patient currently need. .. Total A Lot A Little None   4. Moving to and from a bed to a chair (including a wheelchair)? [] 1   [x] 2   [] 3   [] 4   5. Need to walk in hospital room? [] 1   [x] 2   [] 3   [] 4   6. Climbing 3-5 steps with a railing? [] 1   [x] 2   [] 3   [] 4   © , Trustees of [address] [place] [address], under license to [employer]. All rights reserved     Score:  Initial:  Most Recent: X (Date: 21 )   Interpretation of Tool:  Represents activities that are increasingly more difficult (i.e. Bed mobility, Transfers, Gait). Score 24 23 22-20 19-15 14-10 9-7 6   Modifier CH CI CJ CK CL CM CN         Physical Therapy Evaluation Charge Determination   History Examination Presentation Decision-Making   HIGH Complexity :3+ comorbidities / personal factors will impact the outcome/ POC  HIGH Complexity : 4+ Standardized tests and measures addressing body structure, function, activity limitation and / or participation in recreation  LOW Complexity : Stable, uncomplicated  Other outcome measures ampac 6  mod      Based on the above components, the patient evaluation is determined to be of the following complexity level: LOW     Pain Ratin/10 lower abdominal     Activity Tolerance:   Fair and requires rest breaks    After treatment patient left in no apparent distress:   Supine in bed, Call bell within reach, Caregiver / family present and Side rails x 3 and nsg updated. GOALS:    Problem: Mobility Impaired (Adult and Pediatric)  Goal: *Acute Goals and Plan of Care (Insert Text)  Description: Pt will be I with LE HEP in 7 days.   Pt will perform bed mobility with mod I in 7 days. Pt will perform transfers with mod I in 7 days. Pt will amb  feet with LRAD safely with mod I in 7 days. Outcome: Not Met       COMMUNICATION/EDUCATION:   The patients plan of care was discussed with: Occupational therapist and Registered nurse. Fall prevention education was provided and the patient/caregiver indicated understanding., Patient/family have participated as able in goal setting and plan of care. , and Patient/family agree to work toward stated goals and plan of care. PT/OT sessions occurred together for increased safety of pt and clinician.        Thank you for this referral.  Ariana Alvarez, PT, DPT   Time Calculation: 42 mins

## 2021-09-28 NOTE — PROGRESS NOTES
Progress Note    Patient: Abbie Cruz MRN: [id number]  SSN: [id number]    YOB: 1960  Age: 59 y.o. Sex: female      Admit Date: 9/22/2021    LOS: 5 days     Subjective:   Patient followed for septic shock with possible aspiration pneumonia. Urine culture negative. Blood culture grew Pantoea agglomerans. US showed gallstone lodged in the neck of the gallbladder with sludge and wall thickening. She is being followed by General Surgery. Currently on IV Zosyn alone. Afebrile with now normal WBC, decreasing procal and CRP. Patient transferred out of the ICU. She is resting comfortably except for reported constipation. She is on lactulose. Objective:     Vitals:    09/28/21 0445 09/28/21 0505 09/28/21 0604 09/28/21 0700   BP: 137/64 (!) 152/78 (!) 143/74 134/62   Pulse: 92 90 84 83   Resp: 23 18 20 18   Temp: 98.8 °F (37.1 °C)   99.1 °F (37.3 °C)   SpO2: 95% 94% 95% 95%   Weight:       Height:            Intake and Output:  Current Shift: No intake/output data recorded. Last three shifts: 09/26 1901 - 09/28 0700  In: 2100 [I.V.:1500]  Out: 4451 [Urine:4450]    Physical Exam:   Vitals and nursing note reviewed. Constitutional:       General: She is not in acute distress. Appearance: She is ill-appearing. HENT: unremarkable  Abdominal: mild lower abdominal tenderness   Genitourinary:     Comments: External urinary catheter  Musculoskeletal:      Cervical back: Neck supple. Right lower leg: No edema. Left lower leg: No edema. Skin:     Findings: No rash. Neurological: nonfocal, mild confusion  Psychiatric: no agitation      Lab/Data Review:     WBC 7,600    Procal 1.00 < 1.72 <2.75 <4.02  CRP 13.80 <4.60 <17.40    Blood cultures (9/24) Pantoea agglomerans and coagulase negative Staphylococcus species  Blood cultures (9/24) No growth 3 days  Blood cultures (9/26) No growth 1 day  Urine culture (9/24) No growth FINAL    CXR (9/27) Reduced lung volumes.  Central vessel crowding. Suspect mild degree central interstitial edema. Assessment:     Active Problems:    Abdominal pain (9/23/2021)    1. Septic shock with fever, tachycardia, hypotension requiring vasopressors, elevated lactic acid, leukocytosis, elevated procal and CRP, Day #6 IV Zosyn  2. Presumptive aspiration pneumonia with RLL infiltrate, Day #6 IV Zosyn and Vancomycin  3. Gram negative bacteremia with Pantoea agglomerans, Day #6 IV Zosyn  4. Abdominal pain, etiology unclear  5. Altered mental status  6. Neurofibromatosis  7. Distended gallbladder with choledocholithiasis    Plan:   1. Continue IV Zosyn  2.  In am, repeat procal and CRP       Signed By: Lora Seth MD     September 28, 2021

## 2021-09-28 NOTE — PROGRESS NOTES
Transfer TRANSFER - OUT REPORT:    Verbal report given to Carlos Wallis RN(name) on She Kuo  being transferred to (unit) for routine progression of care       Report consisted of patients Situation, Background, Assessment and   Recommendations(SBAR). Information from the following report(s) SBAR, Intake/Output, MAR and Cardiac Rhythm NSR was reviewed with the receiving nurse. Lines:   PICC Triple Lumen 28/12/18 Right;Basilic (Active)   Central Line Being Utilized Yes 09/28/21 0059   Criteria for Appropriate Use Limited/no vessel suitable for conventional peripheral access 09/28/21 0059   Site Assessment Clean, dry, & intact 09/28/21 0059   Phlebitis Assessment 0 09/28/21 0059   Infiltration Assessment 0 09/28/21 0059   Arm Circumference (cm) 29 cm 09/24/21 1601   Date of Last Dressing Change 09/25/21 09/25/21 1100   Dressing Status Clean, dry, & intact 09/28/21 0059   External Catheter Length (cm) 1 centimeters 09/24/21 1601   Dressing Type Transparent;Disk with Chlorhexadine gluconate (CHG) 09/28/21 0059   Action Taken Open ports on tubing capped 09/28/21 0059   Hub Color/Line Status Purple; Infusing;Positional 09/28/21 0059   Positive Blood Return (Site #1) Yes 09/28/21 0059   Hub Color/Line Status Patent; Flushed 09/28/21 0059   Positive Blood Return (Site #2) Yes 09/28/21 0059   Hub Color/Line Status Capped 09/28/21 0059   Positive Blood Return (Site #3) Yes 09/28/21 0059   Alcohol Cap Used Yes 09/28/21 0059       Peripheral IV 09/24/21 Left Antecubital (Active)   Site Assessment Clean, dry, & intact 09/28/21 0800   Phlebitis Assessment 0 09/28/21 0800   Infiltration Assessment 0 09/28/21 0800   Dressing Status Clean, dry, & intact 09/28/21 0800   Dressing Type Transparent 09/28/21 0800   Hub Color/Line Status Flushed;Capped 09/28/21 0800   Action Taken Open ports on tubing capped 09/28/21 0100   Alcohol Cap Used Yes 09/28/21 0800        Opportunity for questions and clarification was provided. Patient transported with:   [employer]

## 2021-09-28 NOTE — PROGRESS NOTES
Pt remains on restraints,has pulled out NGT again, pt alert reoriented, bedside swallow eval done with thickened water, pt tolerated, TF put on hold, restarints discontinued

## 2021-09-29 ENCOUNTER — APPOINTMENT (OUTPATIENT)
Dept: GENERAL RADIOLOGY | Age: 61
DRG: 871 | End: 2021-09-29
Attending: INTERNAL MEDICINE
Payer: MEDICARE

## 2021-09-29 LAB
CRP SERPL-MCNC: 11.3 MG/DL (ref 0–0.6)
GLUCOSE BLD STRIP.AUTO-MCNC: 119 MG/DL (ref 65–117)
GLUCOSE BLD STRIP.AUTO-MCNC: 93 MG/DL (ref 65–117)
GLUCOSE BLD STRIP.AUTO-MCNC: 95 MG/DL (ref 65–117)
GLUCOSE BLD STRIP.AUTO-MCNC: 98 MG/DL (ref 65–117)
PERFORMED BY, TECHID: ABNORMAL
PERFORMED BY, TECHID: NORMAL
PROCALCITONIN SERPL-MCNC: 0.55 NG/ML

## 2021-09-29 PROCEDURE — 82962 GLUCOSE BLOOD TEST: CPT

## 2021-09-29 PROCEDURE — 36415 COLL VENOUS BLD VENIPUNCTURE: CPT

## 2021-09-29 PROCEDURE — 86140 C-REACTIVE PROTEIN: CPT

## 2021-09-29 PROCEDURE — 74011000258 HC RX REV CODE- 258: Performed by: INTERNAL MEDICINE

## 2021-09-29 PROCEDURE — 74011250637 HC RX REV CODE- 250/637: Performed by: PSYCHIATRY & NEUROLOGY

## 2021-09-29 PROCEDURE — 74011250637 HC RX REV CODE- 250/637: Performed by: INTERNAL MEDICINE

## 2021-09-29 PROCEDURE — 74011250636 HC RX REV CODE- 250/636: Performed by: INTERNAL MEDICINE

## 2021-09-29 PROCEDURE — 93005 ELECTROCARDIOGRAM TRACING: CPT

## 2021-09-29 PROCEDURE — 65270000029 HC RM PRIVATE

## 2021-09-29 PROCEDURE — 74230 X-RAY XM SWLNG FUNCJ C+: CPT

## 2021-09-29 PROCEDURE — 99232 SBSQ HOSP IP/OBS MODERATE 35: CPT | Performed by: INTERNAL MEDICINE

## 2021-09-29 PROCEDURE — 92526 ORAL FUNCTION THERAPY: CPT

## 2021-09-29 PROCEDURE — 92611 MOTION FLUOROSCOPY/SWALLOW: CPT

## 2021-09-29 PROCEDURE — 84145 PROCALCITONIN (PCT): CPT

## 2021-09-29 PROCEDURE — 74011250637 HC RX REV CODE- 250/637: Performed by: HOSPITALIST

## 2021-09-29 PROCEDURE — 74011000250 HC RX REV CODE- 250: Performed by: INTERNAL MEDICINE

## 2021-09-29 RX ORDER — CARBAMAZEPINE 200 MG/1
200 TABLET ORAL EVERY 8 HOURS
Status: DISCONTINUED | OUTPATIENT
Start: 2021-09-29 | End: 2021-10-01 | Stop reason: HOSPADM

## 2021-09-29 RX ADMIN — POTASSIUM CHLORIDE AND SODIUM CHLORIDE: 900; 150 INJECTION, SOLUTION INTRAVENOUS at 23:37

## 2021-09-29 RX ADMIN — GABAPENTIN 600 MG: 300 CAPSULE ORAL at 17:13

## 2021-09-29 RX ADMIN — LEVOTHYROXINE SODIUM 125 MCG: 0.03 TABLET ORAL at 05:36

## 2021-09-29 RX ADMIN — VENLAFAXINE 75 MG: 25 TABLET ORAL at 17:13

## 2021-09-29 RX ADMIN — GABAPENTIN 600 MG: 300 CAPSULE ORAL at 09:30

## 2021-09-29 RX ADMIN — CARBAMAZEPINE 200 MG: 200 TABLET ORAL at 14:20

## 2021-09-29 RX ADMIN — Medication 400 MG: at 09:30

## 2021-09-29 RX ADMIN — PIPERACILLIN AND TAZOBACTAM 3.38 G: 3; .375 INJECTION, POWDER, LYOPHILIZED, FOR SOLUTION INTRAVENOUS at 01:16

## 2021-09-29 RX ADMIN — FAMOTIDINE 20 MG: 20 TABLET ORAL at 21:03

## 2021-09-29 RX ADMIN — CARVEDILOL 6.25 MG: 3.12 TABLET, FILM COATED ORAL at 17:13

## 2021-09-29 RX ADMIN — VENLAFAXINE 75 MG: 25 TABLET ORAL at 09:30

## 2021-09-29 RX ADMIN — PIPERACILLIN AND TAZOBACTAM 3.38 G: 3; .375 INJECTION, POWDER, LYOPHILIZED, FOR SOLUTION INTRAVENOUS at 09:28

## 2021-09-29 RX ADMIN — POTASSIUM CHLORIDE AND SODIUM CHLORIDE: 900; 150 INJECTION, SOLUTION INTRAVENOUS at 15:54

## 2021-09-29 RX ADMIN — CARBAMAZEPINE 200 MG: 200 TABLET ORAL at 21:03

## 2021-09-29 RX ADMIN — QUETIAPINE FUMARATE 25 MG: 25 TABLET ORAL at 11:25

## 2021-09-29 RX ADMIN — OXYCODONE HYDROCHLORIDE 30 MG: 10 TABLET ORAL at 17:13

## 2021-09-29 RX ADMIN — QUETIAPINE FUMARATE 25 MG: 25 TABLET ORAL at 03:59

## 2021-09-29 RX ADMIN — QUETIAPINE FUMARATE 25 MG: 25 TABLET ORAL at 21:03

## 2021-09-29 RX ADMIN — BUSPIRONE HYDROCHLORIDE 15 MG: 10 TABLET ORAL at 21:11

## 2021-09-29 RX ADMIN — BARIUM SULFATE 50 ML: 0.81 POWDER, FOR SUSPENSION ORAL at 10:00

## 2021-09-29 RX ADMIN — BARIUM SULFATE 30 ML: 400 PASTE ORAL at 10:00

## 2021-09-29 RX ADMIN — CARVEDILOL 6.25 MG: 3.12 TABLET, FILM COATED ORAL at 09:30

## 2021-09-29 RX ADMIN — FAMOTIDINE 20 MG: 20 TABLET ORAL at 09:30

## 2021-09-29 RX ADMIN — ATORVASTATIN CALCIUM 20 MG: 20 TABLET, FILM COATED ORAL at 21:03

## 2021-09-29 RX ADMIN — HYDROMORPHONE HYDROCHLORIDE 0.5 MG: 1 INJECTION, SOLUTION INTRAMUSCULAR; INTRAVENOUS; SUBCUTANEOUS at 01:16

## 2021-09-29 RX ADMIN — QUETIAPINE FUMARATE 25 MG: 25 TABLET ORAL at 17:15

## 2021-09-29 RX ADMIN — OXYCODONE HYDROCHLORIDE 30 MG: 10 TABLET ORAL at 05:36

## 2021-09-29 RX ADMIN — PIPERACILLIN AND TAZOBACTAM 3.38 G: 3; .375 INJECTION, POWDER, LYOPHILIZED, FOR SOLUTION INTRAVENOUS at 17:14

## 2021-09-29 RX ADMIN — BUSPIRONE HYDROCHLORIDE 15 MG: 10 TABLET ORAL at 09:30

## 2021-09-29 RX ADMIN — GABAPENTIN 600 MG: 300 CAPSULE ORAL at 21:03

## 2021-09-29 RX ADMIN — BARIUM SULFATE 20 ML: 400 SUSPENSION ORAL at 10:00

## 2021-09-29 RX ADMIN — Medication 1000 UNITS: at 09:30

## 2021-09-29 RX ADMIN — CARBAMAZEPINE 200 MG: 200 TABLET ORAL at 06:42

## 2021-09-29 RX ADMIN — BUSPIRONE HYDROCHLORIDE 15 MG: 10 TABLET ORAL at 17:13

## 2021-09-29 RX ADMIN — ASPIRIN 81 MG CHEWABLE TABLET 81 MG: 81 TABLET CHEWABLE at 09:30

## 2021-09-29 NOTE — PROGRESS NOTES
MBS completed. Rec minced and moist diet w/ mildly thick liquids w/ free water protocol b/w meals following oral care once clear . Rec GI consult as OP s/t chronic GERD, cricopharyngeal hypertrophy w/ dilatation as medically appropriate. ST to follow.

## 2021-09-29 NOTE — PROGRESS NOTES
Comprehensive Nutrition Assessment    Type and Reason for Visit: Reassess (Interim)    Nutrition Recommendations/Plan:     Continue Minced + Moist / mildly thick diet      Texture per SLP  Allow extra snacks as desired    Document PO intakes, BMs in I/Os    Nutrition Assessment:  Abd pain pta, dx constipation without obstruction. Placed on lactulose regimen- now with BMs and abd pain improved. CXR 9/24 showed worsening RLL infiltrate. Worsening resp distress, somnolence. COVID negative. Transferred to ICU for monitoring. Previously on BiPAP with NGT placed. RD ordered TF however not initiated same day d/t pt dislodged tube. Appears NGT replaced 9/25, TF initiated 9/27, achieved goal rate 9/28 prior to NGT dcd and SLP ordered Full/Mildly thick diet. Advanced to MM5/Mildly thick diet s/p MBS (9/29). Per pt and RN at bedside, appetite is good, meal held this AM for testing, pt hungry for lunch. Likes puddings, ice creams, feels motivated to eat solids. No current need for supplements. Labs: H/H: 9.5/28.6, Cr 0.24, POC , Ca 7.8, elevated LFTs. Meds: Statin, coreg, zosyn, dilaudid, oxycodone, mag-ox, pepcid, D3. Malnutrition Assessment:  Malnutrition Status:  Mild malnutrition    Context:  Acute illness       Estimated Daily Nutrient Needs:  Energy (kcal): 1900kcal (25kcal/kg); Weight Used for Energy Requirements: Current  Protein (g): 90g (1.2g/kg); Weight Used for Protein Requirements: Current  Fluid (ml/day): 1900mL; Method Used for Fluid Requirements: 1 ml/kcal      Nutrition Related Findings:  NFPE not performed. Pt appears nourished. SLP following. No documented hx dysphagia, n/v, or c/d. Last BM 9/27 x2. +BS. No edema documented.       Wounds:    None       Current Nutrition Therapies:  ADULT DIET Dysphagia - Minced & Moist; Mildly Thick (Nectar)    Anthropometric Measures:  · Height:  5' 1\" (154.9 cm)  · Current Body Wt:  75.4 kg (166 lb 3.6 oz) (9/24)   · Ideal Body Wt:  105 lbs:  158.3 %   · BMI Category:  Obese class 1 (BMI 30.0-34. 9)     Wt Readings from Last 5 Encounters:   09/29/21 75 kg (165 lb 5.5 oz)   08/10/17 71.7 kg (158 lb)   08/04/17 71.7 kg (158 lb)       Nutrition Diagnosis:   · Inadequate oral intake related to impaired respiratory function as evidenced by NPO or clear liquid status due to medical condition       Nutrition Interventions:   Food and/or Nutrient Delivery: Continue current diet  Nutrition Education and Counseling: No recommendations at this time  Coordination of Nutrition Care: Continue to monitor while inpatient    Goals:  Meet >75% EENs in 5-7 days, Lytes wnl, Maintain skin integrity       Nutrition Monitoring and Evaluation:   Behavioral-Environmental Outcomes: None identified  Food/Nutrient Intake Outcomes: Enteral nutrition intake/tolerance  Physical Signs/Symptoms Outcomes: Biochemical data, Skin    Discharge Planning:     Too soon to determine     Electronically signed by Artem Seth on 9/29/2021 at 1:33 PM    Contact: [phone] or via [employer]

## 2021-09-29 NOTE — PROGRESS NOTES
Problem: Falls - Risk of  Goal: *Absence of Falls  Description: Document Maurilioparesh Patterson Fall Risk and appropriate interventions in the flowsheet.   Outcome: Progressing Towards Goal  Note: Fall Risk Interventions:  Mobility Interventions: Bed/chair exit alarm    Mentation Interventions: Adequate sleep, hydration, pain control    Medication Interventions: Evaluate medications/consider consulting pharmacy    Elimination Interventions: Bed/chair exit alarm    History of Falls Interventions: Bed/chair exit alarm

## 2021-09-29 NOTE — PROGRESS NOTES
Spiritual Care Assessment/Progress Note  [facility]      NAME: Amy Echeverria      MRN: [id number]  AGE: 59 y.o.  SEX: female  Christian Affiliation: Worship   Language: English     9/29/2021     Total Time (in minutes): 20     Spiritual Assessment begun in [facility] through conversation with:         [x]Patient        [] Family    [] Friend(s)        Reason for Consult: Initial/Spiritual assessment, patient floor     Spiritual beliefs: (Please include comment if needed)     [x] Identifies with a lily tradition:         [] Supported by a lily community:            [] Claims no spiritual orientation:           [] Seeking spiritual identity:                [] Adheres to an individual form of spirituality:           [] Not able to assess:                           Identified resources for coping:      [x] Prayer                               [] Music                  [] Guided Imagery     [x] Family/friends                 [] Pet visits     [] Devotional reading                         [] Unknown     [] Other:                                             Interventions offered during this visit: (See comments for more details)    Patient Interventions: Affirmation of emotions/emotional suffering, Affirmation of lily, Catharsis/review of pertinent events in supportive environment, Coping skills reviewed/reinforced, Initial/Spiritual assessment, patient floor, Bridging, Iconic (affirming the presence of God/Higher Power), Life review/legacy, Normalization of emotional/spiritual concerns, Prayer (actual), Prayer (assurance of), Christian beliefs/image of God discussed           Plan of Care:     [] Support spiritual and/or cultural needs    [] Support AMD and/or advance care planning process      [] Support grieving process   [] Coordinate Rites and/or Rituals    [] Coordination with community clergy   [] No spiritual needs identified at this time   [] Detailed Plan of Care below (See Comments)  [] Make referral to Music Therapy  [] Make referral to Pet Therapy     [] Make referral to Addiction services  [] Make referral to [facility]  [] Make referral to Spiritual Care Partner  [] No future visits requested        [x] Follow up upon further referrals     Comments:  Visited patient in the [facility] unit for initial assessment. Patient was alone during the visit. Patient engaged in life review and shared about her life in [place], her supportive daughters as well as issues which seem to concern her. She identified as Islam, recognized the importance of prayer and shared several prayer topics and solicited prayer. Provided chaplaincy education, listened with empathy while exploring her needs as well as normalizing her feelings and anxious emotions. Affirmed her trust in God and supportive family. Provided prayer per her request and advised of  availability. Chaplains will follow up if further referrals are requested. Chaplain Chacha Briggs M.Div.    can be reached by calling the  at [facility]  [phone]

## 2021-09-29 NOTE — PROGRESS NOTES
Hospitalist Progress Note    Subjective:   Daily Progress Note: 9/29/2021 4:02 PM    Hospital Course:     Patient is 58-year-old female with a PMH significant for neurofibromatosis type I, chronic abdominal pain, chronic narcotic use, depression, HTN. Presents to the emergency room with acute severe abdominal pain, elevated WBC and elevated pro-Chan and CRP. X-ray of abdomen revealing fecal retention. CT angiogram of abdomen and pelvis numerous soft tissue masses of the peripheral nerves, constipation without obstruction. CT of chest unremarkable. CT of head unremarkable. She was admitted for further evaluation and management of sepsis, abdominal pain, acute on chronic constipation. Blood cultures obtained started on IV Zosyn and vancomycin. Her condition worsening with fevers of 102.7 and confusion and hypotension. Repeat CT of head negative for acute pathology. Patient was transferred to ICU requiring vasopressor therapy for septic shock possible aspiration pneumonia with altered mental status. NG tube placed. Consults for infectious disease and general surgery. Retroperitoneal ultrasound revealing borderline gallbladder wall thickening possible cholecystitis, gallstone lodged in the neck of gallbladder with sludge noted. Blood culture results grew Pantoea agglomerans. Mental status improving transferred to medical floor surgical floor. ENT consulted for left ear drainage with bleeding. She has a history of left acoustic neuroma surgery left facial weakness with eye closure complete with gold weight. Left ear canal has an abrasion with dried blood, tympanic membrane is intact no further intervention required. PT, OT and speech therapy have consulted recommending SNF placement for further rehab and transition to LTC. MBS pending. Subjective:  Examined patient at the bedside. She is weak but alert and able to communicate. She has chronic left facial droop with slurred words.     Current Facility-Administered Medications   Medication Dose Route Frequency    carBAMazepine (TEGretol) tablet 200 mg  200 mg Oral Q8H    0.9% sodium chloride with KCl 20 mEq/L infusion   IntraVENous CONTINUOUS    venlafaxine (EFFEXOR) tablet 75 mg  75 mg Oral BID WITH MEALS    lactulose (CHRONULAC) 10 gram/15 mL solution 15 mL  15 mL Oral DAILY PRN    QUEtiapine (SEROquel) tablet 25 mg  25 mg Oral Q4H PRN    NOREPINephrine (LEVOPHED) 8 mg in 5% dextrose 250mL (32 mcg/mL) infusion  0.5-30 mcg/min IntraVENous TITRATE    gabapentin (NEURONTIN) capsule 600 mg  600 mg Oral TID    carvediloL (COREG) tablet 6.25 mg  6.25 mg Oral BID WITH MEALS    atorvastatin (LIPITOR) tablet 20 mg  20 mg Oral QHS    levothyroxine (SYNTHROID) tablet 125 mcg  125 mcg Oral 6am    oxyCODONE IR (ROXICODONE) tablet 30 mg  30 mg Oral Q6H PRN    famotidine (PEPCID) tablet 20 mg  20 mg Oral BID    aspirin chewable tablet 81 mg  81 mg Oral DAILY    cholecalciferol (VITAMIN D3) (1000 Units /25 mcg) tablet 1,000 Units  1,000 Units Oral DAILY    sodium chloride (NS) flush 5-40 mL  5-40 mL IntraVENous PRN    acetaminophen (TYLENOL) tablet 650 mg  650 mg Oral Q6H PRN    ondansetron (ZOFRAN) injection 4 mg  4 mg IntraVENous Q4H PRN    magnesium oxide (MAG-OX) tablet 400 mg  400 mg Oral DAILY    busPIRone (BUSPAR) tablet 15 mg  15 mg Oral TID    HYDROmorphone (DILAUDID) syringe 0.5 mg  0.5 mg IntraVENous Q4H PRN    piperacillin-tazobactam (ZOSYN) 3.375 g in 0.9% sodium chloride (MBP/ADV) 100 mL MBP  3.375 g IntraVENous Q8H    acetaminophen (TYLENOL) suppository 650 mg  650 mg Rectal Q4H PRN        REVIEW OF SYSTEMS    Review of Systems   Constitutional: Positive for malaise/fatigue. HENT: Positive for ear pain. Respiratory: Negative for cough. Cardiovascular: Negative for chest pain. Gastrointestinal: Positive for abdominal pain. Musculoskeletal: Positive for myalgias. Neurological: Positive for weakness. Psychiatric/Behavioral: Positive for memory loss. The patient is nervous/anxious. Objective:     Visit Vitals  /68 (BP 1 Location: Left upper arm, BP Patient Position: At rest)   Pulse 77   Temp 98.2 °F (36.8 °C)   Resp 18   Ht 5' 1\" (1.549 m)   Wt 75 kg (165 lb 5.5 oz)   SpO2 96%   Breastfeeding No   BMI 31.24 kg/m²    O2 Flow Rate (L/min): 1 l/min (placed on room air) O2 Device: None (Room air)    Temp (24hrs), Av.3 °F (36.8 °C), Min:98 °F (36.7 °C), Max:99 °F (37.2 °C)      701 - 1900  In: 3210.4 [P.O.:600; I.V.:2610.4]  Out: 2550 [Urine:2550]  1901 -  0700  In: 0946 [P.O.:300; I.V.:875]  Out: 4675 [Urine:4675]    PHYSICAL EXAM:    Physical Exam  Constitutional:       Appearance: She is ill-appearing. Cardiovascular:      Rate and Rhythm: Normal rate. Pulmonary:      Effort: No respiratory distress. Abdominal:      Tenderness: There is abdominal tenderness. Skin:     General: Skin is warm. Neurological:      Comments: Chronic left facial droop          Data Review    Recent Results (from the past 24 hour(s))   PROCALCITONIN    Collection Time: 21  7:18 AM   Result Value Ref Range    Procalcitonin 0.55 (H) 0 ng/mL   C REACTIVE PROTEIN, QT    Collection Time: 21  7:18 AM   Result Value Ref Range    C-Reactive protein 11.30 (H) 0.00 - 0.60 mg/dL   GLUCOSE, POC    Collection Time: 21  7:47 AM   Result Value Ref Range    Glucose (POC) 98 65 - 117 mg/dL    Performed by [address], POC    Collection Time: 21 10:37 AM   Result Value Ref Range    Glucose (POC) 119 (H) 65 - 117 mg/dL    Performed by [address]   Final Result   Episode of penetration with thin consistency. No aspiration. XR CHEST PORT   Final Result      US ABD LTD   Final Result   Gallstone lodged in the neck of the gallbladder. Sludge within the   gallbladder. Borderline gallbladder wall thickening.  No pericholecystic fluid.   The technologist stated there was no sonographic Linder's sign. Poor visualization of the pancreas. Please see full report. XR CHEST PORT   Final Result   Mild cardiomegaly. New right lower lobe interstitial infiltration. Gastric tube tip overlying the distal stomach. Continued follow-up recommended. CT HEAD WO CONT   Final Result   No evidence of an acute intracranial abnormality. XR ABD FLAT/ ERECT   Final Result   1. This examination is negative for free intra-abdominal air. 2.  There is significant fecal retention throughout the colon. CTA CHEST W OR W WO CONT   Final Result   Normal CTA of the chest with no pulmonary embolism or acute aortic   abnormalities identified. no acute cardiopulmonary findings. Sequela of   neurofibromatosis. HISTORY:  upper abdominal pain radiating to back. r/o dissection   Dose reduction technique: All CT scans at this facility are performed using dose reduction optimization   technique as appropriate on the exam including the following: Automated exposure   control, adjustment of the MA and/or KV according to patient size of use of   iterative reconstructive technique. .      TECHNIQUE: CTA CHEST W OR W WO CONT, CTA ABD PELV W WO CONT. CT angiogram of the   abdomen and pelvis performed and maximum intensity projection images (MIPS)   generated. 100 cc [id number]-370 injected. COMPARISON: None   LIMITATIONS: None      CHEST: See above      AORTA: No aneurysm or dissection. CELIAC AXIS: Patent. SUPERIOR MESENTERIC ARTERY: Patent. RENAL ARTERIES: Patent. INFERIOR MESENTERIC ARTERY: Patent. ILIAC ARTERIES: Patent. Visualized femoral vasculature:  Patent.               LIVER: Normal.     GALLBLADDER: Normal.      BILIARY TREE: Normal.      PANCREAS: Normal.     SPLEEN: Normal.     ADRENAL GLANDS: The adrenal glands themselves appear normal.   KIDNEYS/URETERS/BLADDER: Normal. Soft tissue masses along the course of the   bilateral renal arteries similar to retroperitoneal and prevertebral masses   elsewhere   RETROPERITONEUM: Normal.    BOWEL/MESENTERY: Large amount of stool is present throughout the length of the   colon. I do not identify a jamir bowel obstruction. Numerous soft tissue masses   throughout the central abdominal mesentery are noted and may indicate adenopathy   and/or be related to the patient's numerous nerve sheath tumors. APPENDIX: Identified and normal.   PERITONEAL CAVITY: Normal.     REPRODUCTIVE ORGANS: Normal.     BONE/TISSUES: No acute osseous abnormality. There are however numerous soft   tissue masses of the peripheral nerves at the level of the neural foramina and   extending anterior to them and particularly noted in the presacral region, a   couple of these are calcified. These do anteriorly displaced some of the pelvic   contents including the bladder, uterus and sigmoid colon. OTHER: None      IMPRESSION: Normal CT angiogram of the abdomen and pelvis. Sequela of   neurofibromatosis. Constipation without obstruction. Results called to [facility] on 9/23/2021 12:12 AM.               CTA ABD PELV W WO CONT   Final Result   Normal CTA of the chest with no pulmonary embolism or acute aortic   abnormalities identified. no acute cardiopulmonary findings. Sequela of   neurofibromatosis. HISTORY:  upper abdominal pain radiating to back. r/o dissection   Dose reduction technique: All CT scans at this facility are performed using dose reduction optimization   technique as appropriate on the exam including the following: Automated exposure   control, adjustment of the MA and/or KV according to patient size of use of   iterative reconstructive technique. .      TECHNIQUE: CTA CHEST W OR W WO CONT, CTA ABD PELV W WO CONT. CT angiogram of the   abdomen and pelvis performed and maximum intensity projection images (MIPS)   generated. 100 cc Isovue-370 injected. COMPARISON: None   LIMITATIONS: None      CHEST: See above      AORTA: No aneurysm or dissection. CELIAC AXIS: Patent. SUPERIOR MESENTERIC ARTERY: Patent. RENAL ARTERIES: Patent. INFERIOR MESENTERIC ARTERY: Patent. ILIAC ARTERIES: Patent. Visualized femoral vasculature:  Patent. LIVER: Normal.     GALLBLADDER: Normal.      BILIARY TREE: Normal.      PANCREAS: Normal.     SPLEEN: Normal.     ADRENAL GLANDS: The adrenal glands themselves appear normal.   KIDNEYS/URETERS/BLADDER: Normal. Soft tissue masses along the course of the   bilateral renal arteries similar to retroperitoneal and prevertebral masses   elsewhere   RETROPERITONEUM: Normal.    BOWEL/MESENTERY: Large amount of stool is present throughout the length of the   colon. I do not identify a jamir bowel obstruction. Numerous soft tissue masses   throughout the central abdominal mesentery are noted and may indicate adenopathy   and/or be related to the patient's numerous nerve sheath tumors. APPENDIX: Identified and normal.   PERITONEAL CAVITY: Normal.     REPRODUCTIVE ORGANS: Normal.     BONE/TISSUES: No acute osseous abnormality. There are however numerous soft   tissue masses of the peripheral nerves at the level of the neural foramina and   extending anterior to them and particularly noted in the presacral region, a   couple of these are calcified. These do anteriorly displaced some of the pelvic   contents including the bladder, uterus and sigmoid colon. OTHER: None      IMPRESSION: Normal CT angiogram of the abdomen and pelvis. Sequela of   neurofibromatosis. Constipation without obstruction.       Results called to Marty on 9/23/2021 12:12 AM.                   Active Problems:    Abdominal pain (9/23/2021)        Assessment/Plan:     Septic shock  Acute cholecystitis  Aspiration pneumonia  Requiring vasopressor therapy now stable  Continue IV Zosyn, vancomycin discontinued  Gallbladder with wall thickening, sludge and stone in bladder neck  ID following  General surgery signed off no further surgical need at this time    Metabolic encephalopathy  Chronic pain syndrome due to neurofibromatosis  Acute on chronic abdominal pain from constipation  Chronic narcotic use and neurofibromatosis leading to constipation  Mentation has improved  Bowel management needed at discharge  Chronic left-sided facial droop and generalized weakness status post neuroma surgery    Weakness  Dysphagia  PT OT and speech therapy consulted recommending continued skilled facility placement for rehab then transition to long-term care  MBS completed. Rec minced and moist diet w/ mildly thick liquids w/ free water protocol b/w meals following oral care once clear . Rec GI consult as OP s/t chronic GERD, cricopharyngeal hypertrophy w/ dilatation as medically appropriate. ST to follow. ENT consulted for ear drainage-left ear tympanic membrane intact no further work-up required  Noted neuroma surgery for left acoustic with gold weight eye closure        DVT Prophylaxis:  Code Status: Full Code  POA/NOK: Sugar Watters daughter    Disposition and discharge barriers:   · Pending SNF placement with transition to LTC referral sent  Care Plan discussed with: Daughter, patient, nurse, IDR team  _____________________________________________________________________________  Time spent in direct care including coordination of service, review of data and examination: > 35 minutes    ______________________________________________________________________________    Dolores Day NP    This is dictation was done by dragon, computer voice recognition software. Quite often unanticipated grammatical, syntax, homophones and other interpretive errors or inadvertently transcribed by the computer software. Please excuse errors that have escaped final proofreading. Thank you.

## 2021-09-29 NOTE — PROGRESS NOTES
SPEECH PATHOLOGY MODIFIED BARIUM SWALLOW STUDY  Patient: Emmanuelle Lincoln (42 y.o. female)  Date: 9/29/2021  Primary Diagnosis: Abdominal pain [R10.9]        Precautions: Fall    ASSESSMENT :  Based on the objective data described below, the patient presents with mild to moderate oropharyngeal dysphagia. Oral phase c/b reduced mastication s/t edentulous, mild oral residue and reduced A-P transit. Patient w/ L facial droop resulting in reduced labial seal improves w/ straw use. Premature spillage to the level of the pyriforms w/ thin liquids. Remaining trials initiate at the level of the vallecula. Overall, mild swallow delay appreciated. Mild to moderate reduction in BOT retraction, HLE and pharyngeal constriction. Epiglottis is reduced and doesn't fully invert. Laryngeal penetration w/ subsequent aspiration x1. Aspiration clears but not penetration. NO pen/asp w/ remaining trials. Mild to moderate pharyngeal residue w/ solids. Reduces but doesn't fully clear w/ liquid wash and double swallow. Cricopharyngeal  hypertrophy w/ slow movement through proximal esophagus. Concerns for esophageal dysphagia.  tx: [id number]  Extensive education provided to patient and daughter regarding MBS results. Reviewed swallow strategies w/ patient during lunch. She is tolerating mince and moist w/ mildly thick liquids. Educated on free water protocol. Patient will benefit from skilled intervention to address the above impairments. Patients rehabilitation potential is considered to be Fair     PLAN :  Recommendations and Planned Interventions:  Rec mince and moist w/ mildly thick liquids and strict asp/GERD precautions, rec crushed meds. Free water protocol b/w meals following oral care. Frequency/Duration: Patient will be followed by speech-language pathology 5 times a week to address goals. Discharge Recommendations: Skilled Nursing Facility     SUBJECTIVE:   Patient reports she feels better today.      OBJECTIVE: Past Medical History:   Diagnosis Date    Arthritis     Depression     Epilepsy ([address]     Hypertension     Ill-defined condition     neurofibromatosis    Thyroid disease      Past Surgical History:   Procedure Laterality Date    HX GYN      HX ORTHOPAEDIC      NEUROLOGICAL PROCEDURE UNLISTED          CXR Results  (Last 48 hours)                 09/27/21 1045  XR CHEST PORT Final result    Narrative:  Chest single view. Comparison single view chest September 24, 2021. NG tube noted with its tip below the level of the left hemidiaphragm. Right PICC   now present with its tip overlying SVC region. Reduced lung volumes. Central vessel crowding. Suspect mild degree central   interstitial edema. Cardiac and mediastinal structures unchanged. No   pneumothorax or sizable effusion. Diet prior to admission: Soft/thin  Current Diet:  DIET ADULT     Radiologist: Jena Adams  Film Views: Lateral  Patient Position: upright in chair    Video Flouroscopic Procedures  [x] Lateral View   [] A-P View [] Scanned to level of Sternum    [] Seated at 90 deg. [] Other:    Presentation:   [] Spoon   [] Cup   [x] Straw   [] Syringe   [] Consecutive Swallows  [] Other:    Consistencies:   [x] Ba+ liquid   [x] Ba+ liquid (nectar)   [] Ba+ liquid (honey)     [x] Ba+ pudding   [x] Ba+ crunched cookie   [] Ba+ cookie   [] Other:         Decreased Tongue Base Retraction?: Yes  Laryngeal Elevation: Reduced excursion with laryngeal vestibule gap  Aspiration/Penetration Score: 6 (Aspiration-Contrast passes below the cords/glottis, and is cleared)      Pharyngeal-Esophageal Segment: Decreased relaxation of upper esophageal segment; Suspected esophageal dysphagia  Pharyngeal Dysfunction: Crico-pharyngeal dysfunction;Decreased tongue base retraction;Decreased strength;Decreased elevation/closure;Decreased pharyngeal wall constriction    Oral Phase Severity: Moderate  Pharyngeal Phase Severity: Mild moderate      COMMUNICATION/EDUCATION:   Patient receptive of education regarding MBS results, diet recs and swallow strategies. She demonstrated Fair understanding as evidenced by confusion. The patients plan of care including findings from [facility], recommendations, planned interventions, and recommended diet changes were discussed with: Registered nurse and Physician. Patient/family agree to work toward stated goals and plan of care. Problem: Dysphagia (Adult)  Goal: *Acute Goals and Plan of Care (Insert Text)  Description: Speech Therapy Swallow Goals  Initiated 9/28/2021  -Patient will tolerate full diet with thin liquids without clinical indicators of aspiration given min cues within 7 day(s). -Patient will tolerate PO trials without clinical indicators of aspiration given minimal cues within 7 day(s). -Patient will participate in modified barium swallow study within 7 day(s). -Patient will demonstrate understanding of swallow safety precautions and aspiration precautions, diet recs with minimal cues within 7 day(s).             Outcome: Progressing Towards Goal     Thank you for this referral.  Jan Hewitt M.S., M.Ed., CCC-SLP  Time Calculation: 15 mins (MBS)  TIME calculation: 10 mins (andreina tx)

## 2021-09-29 NOTE — PROGRESS NOTES
Progress Note    Patient: Amy Echeverria MRN: [id number]  SSN: [id number]    YOB: 1960  Age: 59 y.o. Sex: female      Admit Date: 9/22/2021    LOS: 6 days     Subjective:   Patient followed for septic shock with presumptive aspiration pneumonia and Gram negative bacteremia with Pantoea agglomerans. She is afebrile with normal WBC and decreasing procal and CRP. Patient reporting postprandial tightness in her abdomen. US showed gallstone lodged in the neck of the gallbladder with sludge and wall thickening. She is being followed by General Surgery and is scheduled to follow-up post-discharge. Alk. Phos has been trending downward. Currently on IV Zosyn alone. Daughter at bedside. Swallowing study showed no aspiration. Objective:     Vitals:    09/29/21 0300 09/29/21 0836 09/29/21 1038 09/29/21 1231   BP: 135/75 (!) 140/69 123/67    Pulse: 76 71 81    Resp: 18 18 17    Temp: 98.2 °F (36.8 °C) 98 °F (36.7 °C) 98.1 °F (36.7 °C)    SpO2: 94% 96% 93%    Weight:    165 lb 5.5 oz (75 kg)   Height:    5' 1\" (1.549 m)        Intake and Output:  Current Shift: 09/29 0701 - 09/29 1900  In: -   Out: 750 [Urine:750]  Last three shifts: 09/27 1901 - 09/29 0700  In: 1543 [P.O.:300; I.V.:875]  Out: 4675 [Urine:4675]    Physical Exam:   Vitals and nursing note reviewed. Constitutional:       General: She is not in acute distress. Appearance: She is ill-appearing. HENT: unremarkable  Abdominal: nontender, no distention  Genitourinary:     Comments: External urinary catheter  Musculoskeletal:      Right lower leg: No edema. Left lower leg: No edema. Skin:     Findings: No rash.    Neurological: nonfocal, no confusion  Psychiatric: no agitation      Lab/Data Review:     WBC 7,600    Procal 0.55 <1.00 < 1.72 <2.75 <4.02  CRP 11.30 <13.80 <4.60 <17.40    Blood cultures (9/24) Pantoea agglomerans and coagulase negative Staphylococcus species  Blood cultures (9/24) No growth 4 days  Blood cultures (9/26) No growth 2 days  Urine culture (9/24) No growth FINAL    No new CXR  Assessment:     Active Problems:    Abdominal pain (9/23/2021)    1. Septic shock with fever, tachycardia, hypotension requiring vasopressors, elevated lactic acid, leukocytosis, elevated procal and CRP, Day #7 IV Zosyn  2. Presumptive aspiration pneumonia with RLL infiltrate, Day #7 IV Zosyn    3. Gram negative bacteremia with Pantoea agglomerans, Day #7 IV Zosyn  4. Abdominal pain, etiology unclear  5. Altered mental status, resolved  6. Neurofibromatosis  7. Distended gallbladder with choledocholithiasis    Plan:   1. Continue IV Zosyn for 7 more days  2.  In am, repeat procal and CRP, LFTs       Signed By: Dougie Dubois MD     September 29, 2021

## 2021-09-29 NOTE — PROGRESS NOTES
CM met with the patient's daughter, Natalia Colvin, at the bedside to discuss SNF choices. She gave choice for [facility], [facility], [facility], and [facility] Referrals have been sent.

## 2021-09-30 LAB
ALBUMIN SERPL-MCNC: 2.2 G/DL (ref 3.5–5)
ALBUMIN/GLOB SERPL: 0.7 {RATIO} (ref 1.1–2.2)
ALP SERPL-CCNC: 129 U/L (ref 45–117)
ALT SERPL-CCNC: 29 U/L (ref 12–78)
ANION GAP SERPL CALC-SCNC: 7 MMOL/L (ref 5–15)
AST SERPL W P-5'-P-CCNC: 16 U/L (ref 15–37)
ATRIAL RATE: 76 BPM
BASOPHILS # BLD: 0 K/UL (ref 0–0.1)
BASOPHILS NFR BLD: 0 % (ref 0–1)
BILIRUB DIRECT SERPL-MCNC: 0.2 MG/DL (ref 0–0.2)
BILIRUB SERPL-MCNC: 0.4 MG/DL (ref 0.2–1)
BUN SERPL-MCNC: 2 MG/DL (ref 6–20)
BUN/CREAT SERPL: 7 (ref 12–20)
CA-I BLD-MCNC: 8.2 MG/DL (ref 8.5–10.1)
CALCULATED P AXIS, ECG09: 26 DEGREES
CALCULATED R AXIS, ECG10: -34 DEGREES
CALCULATED T AXIS, ECG11: 65 DEGREES
CHLORIDE SERPL-SCNC: 107 MMOL/L (ref 97–108)
CO2 SERPL-SCNC: 27 MMOL/L (ref 21–32)
CREAT SERPL-MCNC: 0.27 MG/DL (ref 0.55–1.02)
CRP SERPL-MCNC: 10.1 MG/DL (ref 0–0.6)
DIAGNOSIS, 93000: NORMAL
DIFFERENTIAL METHOD BLD: ABNORMAL
EOSINOPHIL # BLD: 0.3 K/UL (ref 0–0.4)
EOSINOPHIL NFR BLD: 3 % (ref 0–7)
ERYTHROCYTE [DISTWIDTH] IN BLOOD BY AUTOMATED COUNT: 13.5 % (ref 11.5–14.5)
GLOBULIN SER CALC-MCNC: 3.1 G/DL (ref 2–4)
GLUCOSE BLD STRIP.AUTO-MCNC: 101 MG/DL (ref 65–117)
GLUCOSE BLD STRIP.AUTO-MCNC: 107 MG/DL (ref 65–117)
GLUCOSE BLD STRIP.AUTO-MCNC: 94 MG/DL (ref 65–117)
GLUCOSE SERPL-MCNC: 86 MG/DL (ref 65–100)
HCT VFR BLD AUTO: 31.4 % (ref 35–47)
HGB BLD-MCNC: 10.2 G/DL (ref 11.5–16)
IMM GRANULOCYTES # BLD AUTO: 0 K/UL
IMM GRANULOCYTES NFR BLD AUTO: 0 %
LYMPHOCYTES # BLD: 0.7 K/UL (ref 0.8–3.5)
LYMPHOCYTES NFR BLD: 9 % (ref 12–49)
MCH RBC QN AUTO: 27.4 PG (ref 26–34)
MCHC RBC AUTO-ENTMCNC: 32.5 G/DL (ref 30–36.5)
MCV RBC AUTO: 84.4 FL (ref 80–99)
MONOCYTES # BLD: 0.7 K/UL (ref 0–1)
MONOCYTES NFR BLD: 8 % (ref 5–13)
MYELOCYTES NFR BLD MANUAL: 2 %
NEUTS SEG # BLD: 6.5 K/UL (ref 1.8–8)
NEUTS SEG NFR BLD: 78 % (ref 32–75)
NRBC # BLD: 0.02 K/UL (ref 0–0.01)
NRBC BLD-RTO: 0.2 PER 100 WBC
P-R INTERVAL, ECG05: 138 MS
PERFORMED BY, TECHID: NORMAL
PLATELET # BLD AUTO: 368 K/UL (ref 150–400)
PLATELET COMMENTS,PCOM: ABNORMAL
PMV BLD AUTO: 9.5 FL (ref 8.9–12.9)
POTASSIUM SERPL-SCNC: 3.8 MMOL/L (ref 3.5–5.1)
PROCALCITONIN SERPL-MCNC: 0.3 NG/ML
PROT SERPL-MCNC: 5.3 G/DL (ref 6.4–8.2)
Q-T INTERVAL, ECG07: 402 MS
QRS DURATION, ECG06: 82 MS
QTC CALCULATION (BEZET), ECG08: 452 MS
RBC # BLD AUTO: 3.72 M/UL (ref 3.8–5.2)
RBC MORPH BLD: ABNORMAL
SODIUM SERPL-SCNC: 141 MMOL/L (ref 136–145)
TROPONIN I SERPL-MCNC: <0.05 NG/ML
TROPONIN I SERPL-MCNC: <0.05 NG/ML
VENTRICULAR RATE, ECG03: 76 BPM
WBC # BLD AUTO: 8.3 K/UL (ref 3.6–11)

## 2021-09-30 PROCEDURE — 80048 BASIC METABOLIC PNL TOTAL CA: CPT

## 2021-09-30 PROCEDURE — 80076 HEPATIC FUNCTION PANEL: CPT

## 2021-09-30 PROCEDURE — 85025 COMPLETE CBC W/AUTO DIFF WBC: CPT

## 2021-09-30 PROCEDURE — 74011250637 HC RX REV CODE- 250/637: Performed by: HOSPITALIST

## 2021-09-30 PROCEDURE — 84484 ASSAY OF TROPONIN QUANT: CPT

## 2021-09-30 PROCEDURE — 86140 C-REACTIVE PROTEIN: CPT

## 2021-09-30 PROCEDURE — 74011000258 HC RX REV CODE- 258: Performed by: INTERNAL MEDICINE

## 2021-09-30 PROCEDURE — 65270000029 HC RM PRIVATE

## 2021-09-30 PROCEDURE — 74011250636 HC RX REV CODE- 250/636: Performed by: INTERNAL MEDICINE

## 2021-09-30 PROCEDURE — 74011250637 HC RX REV CODE- 250/637: Performed by: INTERNAL MEDICINE

## 2021-09-30 PROCEDURE — 84145 PROCALCITONIN (PCT): CPT

## 2021-09-30 PROCEDURE — 36415 COLL VENOUS BLD VENIPUNCTURE: CPT

## 2021-09-30 PROCEDURE — 74011250637 HC RX REV CODE- 250/637: Performed by: PSYCHIATRY & NEUROLOGY

## 2021-09-30 PROCEDURE — 99232 SBSQ HOSP IP/OBS MODERATE 35: CPT | Performed by: INTERNAL MEDICINE

## 2021-09-30 PROCEDURE — 82962 GLUCOSE BLOOD TEST: CPT

## 2021-09-30 RX ADMIN — POTASSIUM CHLORIDE AND SODIUM CHLORIDE: 900; 150 INJECTION, SOLUTION INTRAVENOUS at 09:44

## 2021-09-30 RX ADMIN — PIPERACILLIN AND TAZOBACTAM 3.38 G: 3; .375 INJECTION, POWDER, LYOPHILIZED, FOR SOLUTION INTRAVENOUS at 01:30

## 2021-09-30 RX ADMIN — CARVEDILOL 6.25 MG: 3.12 TABLET, FILM COATED ORAL at 09:45

## 2021-09-30 RX ADMIN — PIPERACILLIN AND TAZOBACTAM 3.38 G: 3; .375 INJECTION, POWDER, LYOPHILIZED, FOR SOLUTION INTRAVENOUS at 17:24

## 2021-09-30 RX ADMIN — BUSPIRONE HYDROCHLORIDE 15 MG: 10 TABLET ORAL at 17:24

## 2021-09-30 RX ADMIN — ASPIRIN 81 MG CHEWABLE TABLET 81 MG: 81 TABLET CHEWABLE at 09:44

## 2021-09-30 RX ADMIN — QUETIAPINE FUMARATE 25 MG: 25 TABLET ORAL at 09:45

## 2021-09-30 RX ADMIN — CARBAMAZEPINE 200 MG: 200 TABLET ORAL at 05:00

## 2021-09-30 RX ADMIN — ATORVASTATIN CALCIUM 20 MG: 20 TABLET, FILM COATED ORAL at 21:56

## 2021-09-30 RX ADMIN — CARBAMAZEPINE 200 MG: 200 TABLET ORAL at 21:54

## 2021-09-30 RX ADMIN — OXYCODONE HYDROCHLORIDE 30 MG: 10 TABLET ORAL at 04:54

## 2021-09-30 RX ADMIN — FAMOTIDINE 20 MG: 20 TABLET ORAL at 21:55

## 2021-09-30 RX ADMIN — Medication 10 ML: at 21:58

## 2021-09-30 RX ADMIN — CARBAMAZEPINE 200 MG: 200 TABLET ORAL at 13:39

## 2021-09-30 RX ADMIN — FAMOTIDINE 20 MG: 20 TABLET ORAL at 09:44

## 2021-09-30 RX ADMIN — OXYCODONE HYDROCHLORIDE 30 MG: 10 TABLET ORAL at 21:57

## 2021-09-30 RX ADMIN — PIPERACILLIN AND TAZOBACTAM 3.38 G: 3; .375 INJECTION, POWDER, LYOPHILIZED, FOR SOLUTION INTRAVENOUS at 09:44

## 2021-09-30 RX ADMIN — VENLAFAXINE 75 MG: 25 TABLET ORAL at 17:24

## 2021-09-30 RX ADMIN — BUSPIRONE HYDROCHLORIDE 15 MG: 10 TABLET ORAL at 09:45

## 2021-09-30 RX ADMIN — GABAPENTIN 600 MG: 300 CAPSULE ORAL at 21:55

## 2021-09-30 RX ADMIN — BUSPIRONE HYDROCHLORIDE 15 MG: 10 TABLET ORAL at 21:56

## 2021-09-30 RX ADMIN — CARVEDILOL 6.25 MG: 3.12 TABLET, FILM COATED ORAL at 17:24

## 2021-09-30 RX ADMIN — QUETIAPINE FUMARATE 25 MG: 25 TABLET ORAL at 21:55

## 2021-09-30 RX ADMIN — GABAPENTIN 600 MG: 300 CAPSULE ORAL at 17:24

## 2021-09-30 RX ADMIN — VENLAFAXINE 75 MG: 25 TABLET ORAL at 09:44

## 2021-09-30 RX ADMIN — LEVOTHYROXINE SODIUM 125 MCG: 0.03 TABLET ORAL at 05:00

## 2021-09-30 RX ADMIN — Medication 400 MG: at 09:44

## 2021-09-30 RX ADMIN — Medication 1000 UNITS: at 09:45

## 2021-09-30 RX ADMIN — GABAPENTIN 600 MG: 300 CAPSULE ORAL at 09:44

## 2021-09-30 NOTE — PROGRESS NOTES
Hospitalist Progress Note    Subjective:   Daily Progress Note: 9/30/2021 4:02 PM    Hospital Course:     Patient is 78-year-old female with a PMH significant for neurofibromatosis type I, chronic abdominal pain, chronic narcotic use, depression, HTN. Presents to the emergency room with acute severe abdominal pain, elevated WBC and elevated pro-Chan and CRP. X-ray of abdomen revealing fecal retention. CT angiogram of abdomen and pelvis numerous soft tissue masses of the peripheral nerves, constipation without obstruction. CT of chest unremarkable. CT of head unremarkable. She was admitted for further evaluation and management of sepsis, abdominal pain, acute on chronic constipation. Blood cultures obtained started on IV Zosyn and vancomycin. Her condition worsening with fevers of 102.7 and confusion and hypotension. Repeat CT of head negative for acute pathology. Patient was transferred to ICU requiring vasopressor therapy for septic shock possible aspiration pneumonia with altered mental status. NG tube placed. Consults for infectious disease and general surgery. Retroperitoneal ultrasound revealing borderline gallbladder wall thickening possible cholecystitis, gallstone lodged in the neck of gallbladder with sludge noted. Blood culture results grew Pantoea agglomerans. Mental status improving transferred to medical floor surgical floor. ENT consulted for left ear drainage with bleeding. She has a history of left acoustic neuroma surgery left facial weakness with eye closure complete with gold weight. Left ear canal has an abrasion with dried blood, tympanic membrane is intact no further intervention required. PT, OT and speech therapy have consulted recommending SNF placement for further rehab and transition to LTC. MBS negative for aspiration. Subjective: Follow up examination of patient at the bedside. She is weak but alert and able to communicate.   She has chronic left facial droop with slurred words. She wants to try and stand today. Current Facility-Administered Medications   Medication Dose Route Frequency    carBAMazepine (TEGretol) tablet 200 mg  200 mg Oral Q8H    0.9% sodium chloride with KCl 20 mEq/L infusion   IntraVENous CONTINUOUS    venlafaxine (EFFEXOR) tablet 75 mg  75 mg Oral BID WITH MEALS    lactulose (CHRONULAC) 10 gram/15 mL solution 15 mL  15 mL Oral DAILY PRN    QUEtiapine (SEROquel) tablet 25 mg  25 mg Oral Q4H PRN    NOREPINephrine (LEVOPHED) 8 mg in 5% dextrose 250mL (32 mcg/mL) infusion  0.5-30 mcg/min IntraVENous TITRATE    gabapentin (NEURONTIN) capsule 600 mg  600 mg Oral TID    carvediloL (COREG) tablet 6.25 mg  6.25 mg Oral BID WITH MEALS    atorvastatin (LIPITOR) tablet 20 mg  20 mg Oral QHS    levothyroxine (SYNTHROID) tablet 125 mcg  125 mcg Oral 6am    oxyCODONE IR (ROXICODONE) tablet 30 mg  30 mg Oral Q6H PRN    famotidine (PEPCID) tablet 20 mg  20 mg Oral BID    aspirin chewable tablet 81 mg  81 mg Oral DAILY    cholecalciferol (VITAMIN D3) (1000 Units /25 mcg) tablet 1,000 Units  1,000 Units Oral DAILY    sodium chloride (NS) flush 5-40 mL  5-40 mL IntraVENous PRN    acetaminophen (TYLENOL) tablet 650 mg  650 mg Oral Q6H PRN    ondansetron (ZOFRAN) injection 4 mg  4 mg IntraVENous Q4H PRN    magnesium oxide (MAG-OX) tablet 400 mg  400 mg Oral DAILY    busPIRone (BUSPAR) tablet 15 mg  15 mg Oral TID    HYDROmorphone (DILAUDID) syringe 0.5 mg  0.5 mg IntraVENous Q4H PRN    piperacillin-tazobactam (ZOSYN) 3.375 g in 0.9% sodium chloride (MBP/ADV) 100 mL MBP  3.375 g IntraVENous Q8H    acetaminophen (TYLENOL) suppository 650 mg  650 mg Rectal Q4H PRN        REVIEW OF SYSTEMS    Review of Systems   Constitutional: Positive for malaise/fatigue. HENT: Positive for ear pain. Respiratory: Negative for cough. Cardiovascular: Negative for chest pain. Gastrointestinal: Positive for abdominal pain. Musculoskeletal: Positive for myalgias. Neurological: Positive for weakness. Psychiatric/Behavioral: Positive for memory loss. The patient is nervous/anxious. Objective:     Visit Vitals  /77 (BP 1 Location: Left upper arm, BP Patient Position: At rest)   Pulse 73   Temp 99.1 °F (37.3 °C)   Resp 18   Ht 5' 1\" (1.549 m)   Wt 75 kg (165 lb 5.5 oz)   SpO2 94%   Breastfeeding No   BMI 31.24 kg/m²    O2 Flow Rate (L/min): 1 l/min (placed on room air) O2 Device: None (Room air)    Temp (24hrs), Av.7 °F (37.1 °C), Min:98.2 °F (36.8 °C), Max:99.4 °F (37.4 °C)      701 -  1900  In: 0   Out: 400 [Urine:400]  1901 -  0700  In: 5727.5 [P.O.:1140; I.V.:4587.5]  Out: 7150 [Urine:7150]    PHYSICAL EXAM:    Physical Exam  Constitutional:       Appearance: She is ill-appearing. Cardiovascular:      Rate and Rhythm: Normal rate. Pulmonary:      Effort: No respiratory distress. Abdominal:      Tenderness: There is abdominal tenderness. Skin:     General: Skin is warm.    Neurological:      Comments: Chronic left facial droop          Data Review    Recent Results (from the past 24 hour(s))   GLUCOSE, POC    Collection Time: 21  4:09 PM   Result Value Ref Range    Glucose (POC) 95 65 - 117 mg/dL    Performed by Bertha Escamilla    GLUCOSE, POC    Collection Time: 21  7:59 PM   Result Value Ref Range    Glucose (POC) 93 65 - 117 mg/dL    Performed by LEXIE MARINA    TROPONIN I    Collection Time: 21 12:12 AM   Result Value Ref Range    Troponin-I, Qt. <0.05 <0.05 ng/mL   C REACTIVE PROTEIN, QT    Collection Time: 21  3:17 AM   Result Value Ref Range    C-Reactive protein 10.10 (H) 0.00 - 0.60 mg/dL   PROCALCITONIN    Collection Time: 21  3:17 AM   Result Value Ref Range    Procalcitonin 0.30 (H) 0 ng/mL   CBC WITH AUTOMATED DIFF    Collection Time: 21  3:17 AM   Result Value Ref Range    WBC 8.3 3.6 - 11.0 K/uL    RBC 3.72 (L) 3.80 - 5.20 M/uL    HGB 10.2 (L) 11.5 - 16.0 g/dL    HCT 31.4 (L) 35.0 - 47.0 %    MCV 84.4 80.0 - 99.0 FL    MCH 27.4 26.0 - 34.0 PG    MCHC 32.5 30.0 - 36.5 g/dL    RDW 13.5 11.5 - 14.5 %    PLATELET 710 863 - 077 K/uL    MPV 9.5 8.9 - 12.9 FL    NRBC 0.2 (H) 0.0  WBC    ABSOLUTE NRBC 0.02 (H) 0.00 - 0.01 K/uL    NEUTROPHILS 78 (H) 32 - 75 %    LYMPHOCYTES 9 (L) 12 - 49 %    MONOCYTES 8 5 - 13 %    EOSINOPHILS 3 0 - 7 %    BASOPHILS 0 0 - 1 %    MYELOCYTES 2 (H) 0 %    IMMATURE GRANULOCYTES 0 %    ABS. NEUTROPHILS 6.5 1.8 - 8.0 K/UL    ABS. LYMPHOCYTES 0.7 (L) 0.8 - 3.5 K/UL    ABS. MONOCYTES 0.7 0.0 - 1.0 K/UL    ABS. EOSINOPHILS 0.3 0.0 - 0.4 K/UL    ABS. BASOPHILS 0.0 0.0 - 0.1 K/UL    ABS. IMM. GRANS. 0.0 K/UL    DF Manual      PLATELET COMMENTS Large Platelets      RBC COMMENTS Normocytic, Normochromic     HEPATIC FUNCTION PANEL    Collection Time: 09/30/21  3:17 AM   Result Value Ref Range    Protein, total 5.3 (L) 6.4 - 8.2 g/dL    Albumin 2.2 (L) 3.5 - 5.0 g/dL    Globulin 3.1 2.0 - 4.0 g/dL    A-G Ratio 0.7 (L) 1.1 - 2.2      Bilirubin, total 0.4 0.2 - 1.0 mg/dL    Bilirubin, direct 0.2 0.0 - 0.2 mg/dL    Alk.  phosphatase 129 (H) 45 - 117 U/L    AST (SGOT) 16 15 - 37 U/L    ALT (SGPT) 29 12 - 78 U/L   TROPONIN I    Collection Time: 09/30/21  3:17 AM   Result Value Ref Range    Troponin-I, Qt. <0.05 <2.91 ng/mL   METABOLIC PANEL, BASIC    Collection Time: 09/30/21  3:17 AM   Result Value Ref Range    Sodium 141 136 - 145 mmol/L    Potassium 3.8 3.5 - 5.1 mmol/L    Chloride 107 97 - 108 mmol/L    CO2 27 21 - 32 mmol/L    Anion gap 7 5 - 15 mmol/L    Glucose 86 65 - 100 mg/dL    BUN 2 (L) 6 - 20 mg/dL    Creatinine 0.27 (L) 0.55 - 1.02 mg/dL    BUN/Creatinine ratio 7 (L) 12 - 20      GFR est AA >60 >60 ml/min/1.73m2    GFR est non-AA >60 >60 ml/min/1.73m2    Calcium 8.2 (L) 8.5 - 10.1 mg/dL   GLUCOSE, POC    Collection Time: 09/30/21  8:37 AM   Result Value Ref Range    Glucose (POC) 107 65 - 117 mg/dL    Performed by Jazz BERMUDEZ [facility] VIDEO Final Result   Episode of penetration with thin consistency. No aspiration. XR CHEST PORT   Final Result      US ABD LTD   Final Result   Gallstone lodged in the neck of the gallbladder. Sludge within the   gallbladder. Borderline gallbladder wall thickening. No pericholecystic fluid. The technologist stated there was no sonographic Linder's sign. Poor visualization of the pancreas. Please see full report. XR CHEST PORT   Final Result   Mild cardiomegaly. New right lower lobe interstitial infiltration. Gastric tube tip overlying the distal stomach. Continued follow-up recommended. CT HEAD WO CONT   Final Result   No evidence of an acute intracranial abnormality. XR ABD FLAT/ ERECT   Final Result   1. This examination is negative for free intra-abdominal air. 2.  There is significant fecal retention throughout the colon. CTA CHEST W OR W WO CONT   Final Result   Normal CTA of the chest with no pulmonary embolism or acute aortic   abnormalities identified. no acute cardiopulmonary findings. Sequela of   neurofibromatosis. HISTORY:  upper abdominal pain radiating to back. r/o dissection   Dose reduction technique: All CT scans at this facility are performed using dose reduction optimization   technique as appropriate on the exam including the following: Automated exposure   control, adjustment of the MA and/or KV according to patient size of use of   iterative reconstructive technique. .      TECHNIQUE: CTA CHEST W OR W WO CONT, CTA ABD PELV W WO CONT. CT angiogram of the   abdomen and pelvis performed and maximum intensity projection images (MIPS)   generated. 100 cc [id number]-[id number] injected. COMPARISON: None   LIMITATIONS: None      CHEST: See above      AORTA: No aneurysm or dissection. CELIAC AXIS: Patent. SUPERIOR MESENTERIC ARTERY: Patent. RENAL ARTERIES: Patent. INFERIOR MESENTERIC ARTERY: Patent. ILIAC ARTERIES: Patent. Visualized femoral vasculature:  Patent. LIVER: Normal.     GALLBLADDER: Normal.      BILIARY TREE: Normal.      PANCREAS: Normal.     SPLEEN: Normal.     ADRENAL GLANDS: The adrenal glands themselves appear normal.   KIDNEYS/URETERS/BLADDER: Normal. Soft tissue masses along the course of the   bilateral renal arteries similar to retroperitoneal and prevertebral masses   elsewhere   RETROPERITONEUM: Normal.    BOWEL/MESENTERY: Large amount of stool is present throughout the length of the   colon. I do not identify a jamir bowel obstruction. Numerous soft tissue masses   throughout the central abdominal mesentery are noted and may indicate adenopathy   and/or be related to the patient's numerous nerve sheath tumors. APPENDIX: Identified and normal.   PERITONEAL CAVITY: Normal.     REPRODUCTIVE ORGANS: Normal.     BONE/TISSUES: No acute osseous abnormality. There are however numerous soft   tissue masses of the peripheral nerves at the level of the neural foramina and   extending anterior to them and particularly noted in the presacral region, a   couple of these are calcified. These do anteriorly displaced some of the pelvic   contents including the bladder, uterus and sigmoid colon. OTHER: None      IMPRESSION: Normal CT angiogram of the abdomen and pelvis. Sequela of   neurofibromatosis. Constipation without obstruction. Results called to [facility] on 9/23/2021 12:12 AM.               CTA ABD PELV W WO CONT   Final Result   Normal CTA of the chest with no pulmonary embolism or acute aortic   abnormalities identified. no acute cardiopulmonary findings. Sequela of   neurofibromatosis. HISTORY:  upper abdominal pain radiating to back. r/o dissection   Dose reduction technique:    All CT scans at this facility are performed using dose reduction optimization   technique as appropriate on the exam including the following: Automated exposure control, adjustment of the MA and/or KV according to patient size of use of   iterative reconstructive technique. .      TECHNIQUE: CTA CHEST W OR W WO CONT, CTA ABD PELV W WO CONT. CT angiogram of the   abdomen and pelvis performed and maximum intensity projection images (MIPS)   generated. 100 cc Isovue-[id number] injected. COMPARISON: None   LIMITATIONS: None      CHEST: See above      AORTA: No aneurysm or dissection. CELIAC AXIS: Patent. SUPERIOR MESENTERIC ARTERY: Patent. RENAL ARTERIES: Patent. INFERIOR MESENTERIC ARTERY: Patent. ILIAC ARTERIES: Patent. Visualized femoral vasculature:  Patent. LIVER: Normal.     GALLBLADDER: Normal.      BILIARY TREE: Normal.      PANCREAS: Normal.     SPLEEN: Normal.     ADRENAL GLANDS: The adrenal glands themselves appear normal.   KIDNEYS/URETERS/BLADDER: Normal. Soft tissue masses along the course of the   bilateral renal arteries similar to retroperitoneal and prevertebral masses   elsewhere   RETROPERITONEUM: Normal.    BOWEL/MESENTERY: Large amount of stool is present throughout the length of the   colon. I do not identify a jamir bowel obstruction. Numerous soft tissue masses   throughout the central abdominal mesentery are noted and may indicate adenopathy   and/or be related to the patient's numerous nerve sheath tumors. APPENDIX: Identified and normal.   PERITONEAL CAVITY: Normal.     REPRODUCTIVE ORGANS: Normal.     BONE/TISSUES: No acute osseous abnormality. There are however numerous soft   tissue masses of the peripheral nerves at the level of the neural foramina and   extending anterior to them and particularly noted in the presacral region, a   couple of these are calcified. These do anteriorly displaced some of the pelvic   contents including the bladder, uterus and sigmoid colon. OTHER: None      IMPRESSION: Normal CT angiogram of the abdomen and pelvis.  Sequela of neurofibromatosis. Constipation without obstruction. Results called to [facility] on 9/23/2021 12:12 AM.                   Active Problems:    Abdominal pain (9/23/2021)        Assessment/Plan:     Septic shock  Acute cholecystitis  Aspiration pneumonia  Requiring vasopressor therapy now stable  Continue IV Zosyn for 7 more days  Gallbladder with wall thickening, sludge and stone in bladder neck  ID following  General surgery signed off no further surgical need at this time, will follow-up outpatient    Metabolic encephalopathy  Chronic pain syndrome due to neurofibromatosis  Acute on chronic abdominal pain from constipation  Distended bladder with choledocholithiasis  Chronic narcotic use and neurofibromatosis leading to constipation  Mentation has improved  Bowel management needed at discharge  Chronic left-sided facial droop and generalized weakness status post neuroma surgery  General surgery will follow up outpatient after discharge and completion of antibiotics    Weakness  Dysphagia  PT OT and speech therapy consulted recommending continued skilled facility placement for rehab then transition to long-term care  MBS completed. Rec minced and moist diet w/ mildly thick liquids w/ free water protocol b/w meals following oral care once clear . Rec GI consult as OP s/t chronic GERD, cricopharyngeal hypertrophy w/ dilatation as medically appropriate. ST to follow.    ENT consulted for ear drainage-left ear tympanic membrane intact no further work-up required  Noted neuroma surgery for left acoustic with gold weight eye closure        DVT Prophylaxis:  Code Status: Full Code  POA/NOK: Craig Lowry daughter    Disposition and discharge barriers:   · Pending SNF placement with transition to LTC referral sent  · Needs IV Zosyn for 7 more days  · No surgical plan at this time for gallbladder follow-up outpatient with general surgery  Care Plan discussed with: Daughter, patient, nurse, IDR team  _____________________________________________________________________________  Time spent in direct care including coordination of service, review of data and examination: > 35 minutes    ______________________________________________________________________________    Sims Mcardle, NP    This is dictation was done by dragon, computer voice recognition software. Quite often unanticipated grammatical, syntax, homophones and other interpretive errors or inadvertently transcribed by the computer software. Please excuse errors that have escaped final proofreading. Thank you.

## 2021-09-30 NOTE — PROGRESS NOTES
CM awaiting responses from a few more skilled nursing facilities at this time. Currently no accepting facility at this time. ADDENDUM:  [facility] [place] have declined the patient. CM awaiting [facility]. In the meantime CM obtained two additional choices from the patient's daughter for [facility] and [facility] [employer]. Choices obtained and referrals have been sent.

## 2021-09-30 NOTE — PROGRESS NOTES
Progress Note    Patient: Nate Parker MRN: [id number]  SSN: [id number]    YOB: 1960  Age: 59 y.o. Sex: female      Admit Date: 9/22/2021    LOS: 7 days     Subjective:   Patient followed for septic shock with presumptive aspiration pneumonia and Gram negative bacteremia with Pantoea agglomerans. She is afebrile with normal WBC and decreasing procal and CRP. Patient reported postprandial tightness in her abdomen, however, LFTs have returned to normal or resolving. Currently on IV Zosyn alone. Daughter at bedside. Objective:     Vitals:    09/30/21 0424 09/30/21 0806 09/30/21 1209 09/30/21 1449   BP: (!) 148/93 132/77 135/75 130/80   Pulse: 79 73 74 70   Resp: 18 18 18 18   Temp: 99.4 °F (37.4 °C) 99.1 °F (37.3 °C) 99 °F (37.2 °C) 98.6 °F (37 °C)   SpO2: 96% 94% 95% 96%   Weight:       Height:            Intake and Output:  Current Shift: 09/30 0701 - 09/30 1900  In: 400 [P.O.:400]  Out: 2525 [Urine:2525]  Last three shifts: 09/28 1901 - 09/30 0700  In: 5727.5 [P.O.:1140; I.V.:4587.5]  Out: 7150 [Urine:7150]    Physical Exam:   Vitals and nursing note reviewed. Constitutional:       General: She is not in acute distress. Appearance: She is ill-appearing. HENT: unremarkable  Abdominal: nontender, no distention  Genitourinary:     Comments: External urinary catheter  Musculoskeletal:      Right lower leg: No edema. Left lower leg: No edema. Skin:     Findings: No rash. Neurological: nonfocal, no confusion  Psychiatric: no agitation      Lab/Data Review:     WBC 8,300  LFTs 29/16/129/0.4    Procal 0.30 < 0.55 <1.00 < 1.72 <2.75 <4.02  CRP 10.10 <11.30 <13.80 <4.60 <17.40    Blood cultures (9/24) Pantoea agglomerans and coagulase negative Staphylococcus species  Blood cultures (9/24) No growth 5 days  Blood cultures (9/26) No growth 3 days  Urine culture (9/24) No growth FINAL    No new CXR  Assessment:     Active Problems:    Abdominal pain (9/23/2021)    1.  Septic shock with fever, tachycardia, hypotension requiring vasopressors, elevated lactic acid, leukocytosis, elevated procal and CRP, Day #8 IV Zosyn  2. Presumptive aspiration pneumonia with RLL infiltrate, Day #8 IV Zosyn    3. Gram negative bacteremia with Pantoea agglomerans, Day #8 IV Zosyn  4. Abdominal pain, etiology unclear  5. Altered mental status, resolved  6. Neurofibromatosis  7. Distended gallbladder with choledocholithiasis with decreasing LFTs    Comment:  Decreasing Alk. Phos mitigates against active biliary disease. Plan:   1. Continue IV Zosyn for 6 more days  2.  Cleared for discharge from ID standpoint       Signed By: Derrick Salguero MD     September 30, 2021

## 2021-09-30 NOTE — PROGRESS NOTES
Problem: Falls - Risk of  Goal: *Absence of Falls  Description: Document Carolina Munoz Fall Risk and appropriate interventions in the flowsheet.   Outcome: Progressing Towards Goal  Note: Fall Risk Interventions:  Mobility Interventions: Bed/chair exit alarm    Mentation Interventions: Adequate sleep, hydration, pain control, Door open when patient unattended, Bed/chair exit alarm    Medication Interventions: Bed/chair exit alarm, Evaluate medications/consider consulting pharmacy    Elimination Interventions: Call light in reach    History of Falls Interventions: Bed/chair exit alarm         Problem: Pain  Goal: *Control of Pain  Outcome: Progressing Towards Goal  Goal: *PALLIATIVE CARE:  Alleviation of Pain  Outcome: Progressing Towards Goal

## 2021-10-01 VITALS
DIASTOLIC BLOOD PRESSURE: 70 MMHG | RESPIRATION RATE: 18 BRPM | SYSTOLIC BLOOD PRESSURE: 147 MMHG | HEIGHT: 61 IN | TEMPERATURE: 97.5 F | BODY MASS INDEX: 31.22 KG/M2 | OXYGEN SATURATION: 95 % | HEART RATE: 71 BPM | WEIGHT: 165.34 LBS

## 2021-10-01 LAB
ANION GAP SERPL CALC-SCNC: 8 MMOL/L (ref 5–15)
BACTERIA SPEC CULT: NORMAL
BASOPHILS # BLD: 0.1 K/UL (ref 0–0.1)
BASOPHILS NFR BLD: 1 % (ref 0–1)
BUN SERPL-MCNC: 3 MG/DL (ref 6–20)
BUN/CREAT SERPL: 9 (ref 12–20)
CA-I BLD-MCNC: 8.3 MG/DL (ref 8.5–10.1)
CHLORIDE SERPL-SCNC: 105 MMOL/L (ref 97–108)
CO2 SERPL-SCNC: 26 MMOL/L (ref 21–32)
CREAT SERPL-MCNC: 0.35 MG/DL (ref 0.55–1.02)
DIFFERENTIAL METHOD BLD: ABNORMAL
EOSINOPHIL # BLD: 0.2 K/UL (ref 0–0.4)
EOSINOPHIL NFR BLD: 2 % (ref 0–7)
ERYTHROCYTE [DISTWIDTH] IN BLOOD BY AUTOMATED COUNT: 13.6 % (ref 11.5–14.5)
GLUCOSE BLD STRIP.AUTO-MCNC: 86 MG/DL (ref 65–117)
GLUCOSE BLD STRIP.AUTO-MCNC: 87 MG/DL (ref 65–117)
GLUCOSE BLD STRIP.AUTO-MCNC: 96 MG/DL (ref 65–117)
GLUCOSE SERPL-MCNC: 93 MG/DL (ref 65–100)
HCT VFR BLD AUTO: 33.2 % (ref 35–47)
HGB BLD-MCNC: 10.8 G/DL (ref 11.5–16)
IMM GRANULOCYTES # BLD AUTO: 0 K/UL
IMM GRANULOCYTES NFR BLD AUTO: 0 %
LYMPHOCYTES # BLD: 1.7 K/UL (ref 0.8–3.5)
LYMPHOCYTES NFR BLD: 20 % (ref 12–49)
MCH RBC QN AUTO: 27.6 PG (ref 26–34)
MCHC RBC AUTO-ENTMCNC: 32.5 G/DL (ref 30–36.5)
MCV RBC AUTO: 84.9 FL (ref 80–99)
MONOCYTES # BLD: 1.3 K/UL (ref 0–1)
MONOCYTES NFR BLD: 16 % (ref 5–13)
NEUTS SEG # BLD: 5.1 K/UL (ref 1.8–8)
NEUTS SEG NFR BLD: 61 % (ref 32–75)
NRBC # BLD: 0.02 K/UL (ref 0–0.01)
NRBC BLD-RTO: 0.2 PER 100 WBC
PERFORMED BY, TECHID: NORMAL
PLATELET # BLD AUTO: 409 K/UL (ref 150–400)
PMV BLD AUTO: 9.4 FL (ref 8.9–12.9)
POTASSIUM SERPL-SCNC: 3.9 MMOL/L (ref 3.5–5.1)
RBC # BLD AUTO: 3.91 M/UL (ref 3.8–5.2)
RBC MORPH BLD: ABNORMAL
SODIUM SERPL-SCNC: 139 MMOL/L (ref 136–145)
SPECIAL REQUESTS,SREQ: NORMAL
WBC # BLD AUTO: 8.4 K/UL (ref 3.6–11)

## 2021-10-01 PROCEDURE — 82962 GLUCOSE BLOOD TEST: CPT

## 2021-10-01 PROCEDURE — 74011000258 HC RX REV CODE- 258: Performed by: INTERNAL MEDICINE

## 2021-10-01 PROCEDURE — 80048 BASIC METABOLIC PNL TOTAL CA: CPT

## 2021-10-01 PROCEDURE — 36415 COLL VENOUS BLD VENIPUNCTURE: CPT

## 2021-10-01 PROCEDURE — 85025 COMPLETE CBC W/AUTO DIFF WBC: CPT

## 2021-10-01 PROCEDURE — 74011250636 HC RX REV CODE- 250/636: Performed by: NURSE PRACTITIONER

## 2021-10-01 PROCEDURE — 74011250637 HC RX REV CODE- 250/637: Performed by: HOSPITALIST

## 2021-10-01 PROCEDURE — 74011250637 HC RX REV CODE- 250/637: Performed by: INTERNAL MEDICINE

## 2021-10-01 PROCEDURE — 99232 SBSQ HOSP IP/OBS MODERATE 35: CPT | Performed by: INTERNAL MEDICINE

## 2021-10-01 PROCEDURE — 74011250637 HC RX REV CODE- 250/637: Performed by: PSYCHIATRY & NEUROLOGY

## 2021-10-01 PROCEDURE — 74011250636 HC RX REV CODE- 250/636: Performed by: INTERNAL MEDICINE

## 2021-10-01 PROCEDURE — 74011250637 HC RX REV CODE- 250/637: Performed by: NURSE PRACTITIONER

## 2021-10-01 RX ORDER — GABAPENTIN 400 MG/1
800 CAPSULE ORAL 3 TIMES DAILY
Status: DISCONTINUED | OUTPATIENT
Start: 2021-10-01 | End: 2021-10-01 | Stop reason: HOSPADM

## 2021-10-01 RX ORDER — BUTALBITAL, ACETAMINOPHEN AND CAFFEINE 50; 325; 40 MG/1; MG/1; MG/1
1 TABLET ORAL
Status: DISCONTINUED | OUTPATIENT
Start: 2021-10-01 | End: 2021-10-01 | Stop reason: HOSPADM

## 2021-10-01 RX ORDER — GABAPENTIN 400 MG/1
800 CAPSULE ORAL 3 TIMES DAILY
Qty: 180 CAPSULE | Refills: 0 | Status: SHIPPED | OUTPATIENT
Start: 2021-10-01 | End: 2021-10-31

## 2021-10-01 RX ORDER — OXYCODONE 36 MG/1
1 CAPSULE, EXTENDED RELEASE ORAL 2 TIMES DAILY
COMMUNITY
Start: 2021-09-19

## 2021-10-01 RX ORDER — OXYCODONE HCL 40 MG/1
40 TABLET, FILM COATED, EXTENDED RELEASE ORAL 2 TIMES DAILY
Status: DISCONTINUED | OUTPATIENT
Start: 2021-10-01 | End: 2021-10-01 | Stop reason: HOSPADM

## 2021-10-01 RX ORDER — LANOLIN ALCOHOL/MO/W.PET/CERES
400 CREAM (GRAM) TOPICAL DAILY
Qty: 30 TABLET | Refills: 0 | Status: SHIPPED | OUTPATIENT
Start: 2021-10-02 | End: 2022-03-28

## 2021-10-01 RX ORDER — GABAPENTIN 800 MG/1
400 TABLET ORAL 3 TIMES DAILY
COMMUNITY
Start: 2021-09-09

## 2021-10-01 RX ORDER — OXYCODONE HCL 40 MG/1
40 TABLET, FILM COATED, EXTENDED RELEASE ORAL 2 TIMES DAILY
Qty: 60 TABLET | Refills: 0 | Status: SHIPPED | OUTPATIENT
Start: 2021-10-01 | End: 2021-10-31

## 2021-10-01 RX ADMIN — GABAPENTIN 600 MG: 300 CAPSULE ORAL at 08:36

## 2021-10-01 RX ADMIN — Medication 400 MG: at 08:36

## 2021-10-01 RX ADMIN — OXYCODONE HYDROCHLORIDE 30 MG: 10 TABLET ORAL at 06:02

## 2021-10-01 RX ADMIN — GABAPENTIN 800 MG: 400 CAPSULE ORAL at 15:34

## 2021-10-01 RX ADMIN — OXYCODONE HYDROCHLORIDE 40 MG: 40 TABLET, FILM COATED, EXTENDED RELEASE ORAL at 11:19

## 2021-10-01 RX ADMIN — Medication 10 ML: at 01:29

## 2021-10-01 RX ADMIN — LEVOTHYROXINE SODIUM 125 MCG: 0.03 TABLET ORAL at 06:03

## 2021-10-01 RX ADMIN — PIPERACILLIN AND TAZOBACTAM 3.38 G: 3; .375 INJECTION, POWDER, LYOPHILIZED, FOR SOLUTION INTRAVENOUS at 11:20

## 2021-10-01 RX ADMIN — POTASSIUM CHLORIDE AND SODIUM CHLORIDE: 900; 150 INJECTION, SOLUTION INTRAVENOUS at 08:41

## 2021-10-01 RX ADMIN — Medication 1000 UNITS: at 08:36

## 2021-10-01 RX ADMIN — FAMOTIDINE 20 MG: 20 TABLET ORAL at 08:36

## 2021-10-01 RX ADMIN — Medication 10 ML: at 06:10

## 2021-10-01 RX ADMIN — CARVEDILOL 6.25 MG: 3.12 TABLET, FILM COATED ORAL at 08:36

## 2021-10-01 RX ADMIN — CARBAMAZEPINE 200 MG: 200 TABLET ORAL at 06:02

## 2021-10-01 RX ADMIN — BUSPIRONE HYDROCHLORIDE 15 MG: 10 TABLET ORAL at 15:34

## 2021-10-01 RX ADMIN — CARBAMAZEPINE 200 MG: 200 TABLET ORAL at 15:36

## 2021-10-01 RX ADMIN — VENLAFAXINE 75 MG: 25 TABLET ORAL at 08:36

## 2021-10-01 RX ADMIN — BUSPIRONE HYDROCHLORIDE 15 MG: 10 TABLET ORAL at 08:36

## 2021-10-01 RX ADMIN — PIPERACILLIN AND TAZOBACTAM 3.38 G: 3; .375 INJECTION, POWDER, LYOPHILIZED, FOR SOLUTION INTRAVENOUS at 01:29

## 2021-10-01 RX ADMIN — ASPIRIN 81 MG CHEWABLE TABLET 81 MG: 81 TABLET CHEWABLE at 08:36

## 2021-10-01 RX ADMIN — GABAPENTIN 800 MG: 400 CAPSULE ORAL at 11:19

## 2021-10-01 NOTE — PROGRESS NOTES
Patient has been accepted to [facility] [address] and [facility]. Notified patient's daughter, Carlyn Miller, and she is going to tour [facility] now and will let CM know final decision for discharge today.

## 2021-10-01 NOTE — PROGRESS NOTES
CM received call from the patient's daughter, Maurice Kenny, [phone] indicating they prefer the patient go to [facility] on the [place]. He will be going to Room 408D and nursing report can be called to [phone]. Medicare pt has received, reviewed, and signed 2nd IM letter informing them of their right to appeal the discharge. Signed copied has been placed on pt bedside chart. Discharge plan of care/case management plan validated with provider discharge order.

## 2021-10-01 NOTE — PROGRESS NOTES
Problem: Falls - Risk of  Goal: *Absence of Falls  Description: Document Melchor Martin Fall Risk and appropriate interventions in the flowsheet.   Outcome: Progressing Towards Goal  Note: Fall Risk Interventions:  Mobility Interventions: Bed/chair exit alarm, Patient to call before getting OOB, Utilize walker, cane, or other assistive device    Mentation Interventions: Bed/chair exit alarm, Adequate sleep, hydration, pain control, Family/sitter at bedside, Gait belt with transfers/ambulation, Increase mobility, More frequent rounding, Room close to nurse's station    Medication Interventions: Bed/chair exit alarm, Patient to call before getting OOB, Teach patient to arise slowly    Elimination Interventions: Bed/chair exit alarm, Call light in reach, Patient to call for help with toileting needs    History of Falls Interventions: Bed/chair exit alarm, Room close to nurse's station

## 2021-10-01 NOTE — PROGRESS NOTES
Comprehensive Nutrition Assessment    Type and Reason for Visit: Reassess (Goal)    Nutrition Recommendations/Plan:     Continue Minced + Moist / mildly thick diet      Texture per SLP  Allow extra snacks as desired     Provide/adjust bowel regimen as needed  Document PO intakes, BMs in I/Os    Nutrition Assessment:  Abd pain pta, dx constipation without obstruction. Placed on lactulose regimen- now with BMs and abd pain improved. CXR 9/24 showed worsening RLL infiltrate. Worsening resp distress, somnolence. COVID negative. Transferred to ICU for monitoring. Previously on BiPAP with NGT placed. RD ordered TF however not initiated same day d/t pt dislodged tube. Appears NGT replaced 9/25, TF initiated 9/27, achieved goal rate 9/28 prior to NGT dcd and SLP ordered Full/Mildly thick diet. Advanced to MM5/Mildly thick diet s/p MBS (9/29) - remains ordered. Pt reports appetite is decreased d/t feeling constipated again, only eating small amounts at mealtime. Discussed with RN. On last visit, pt and RN at bedside stated appetite is good. Likes puddings, ice creams. Labs: H/H: 10.8/33.2, BUN 3, Cr 0.35, BG WNL, Ca 8.3. Meds: Statin, coreg, zosyn, dilaudid, oxycodone, mag-ox, pepcid, D3. Malnutrition Assessment:  Malnutrition Status:  Mild malnutrition    Context:  Acute illness     Findings of the 6 clinical characteristics of malnutrition:   Energy Intake:  7 - 50% or less of est energy requirements for 5 or more days  Weight Loss:  No significant weight loss     Body Fat Loss:  No significant body fat loss,     Muscle Mass Loss:  No significant muscle mass loss,    Fluid Accumulation:  No significant fluid accumulation,        Estimated Daily Nutrient Needs:  Energy (kcal): 1900kcal (25kcal/kg); Weight Used for Energy Requirements: Current  Protein (g): 90g (1.2g/kg); Weight Used for Protein Requirements: Current  Fluid (ml/day): 1900mL;  Method Used for Fluid Requirements: 1 ml/kcal      Nutrition Related Findings: NFPE not performed. Pt appears nourished. SLP following. No documented hx dysphagia, n/v, pt reporting constipation, RN made aware. Last BM 9/27 x2, none recorded since. +BS. No edema documented. Wounds:    None       Current Nutrition Therapies:  ADULT DIET Dysphagia - Minced & Moist; Mildly Thick (Nectar); No fish; allow extra snacks, puddings etc    Anthropometric Measures:  · Height:  5' 1\" (154.9 cm)  · Current Body Wt:  75 kg (165 lb 5.5 oz) (9/29)   · Ideal Body Wt:  105 lbs:  157.5 %   · BMI Category:  Obese class 1 (BMI 30.0-34. 9)     Wt Readings from Last 5 Encounters:   09/29/21 75 kg (165 lb 5.5 oz)   08/10/17 71.7 kg (158 lb)   08/04/17 71.7 kg (158 lb)       Nutrition Diagnosis:   · Inadequate protein-energy intake related to  (suboptimal PO intakes) as evidenced by intake 26-50%, constipation    · Biting/chewing (masticatory) difficulty related to cognitive or neurological impairment as evidenced by swallowing study results (SLP rec'd MM5/Mildly thick diet)      Nutrition Interventions:   Food and/or Nutrient Delivery: Continue current diet  Nutrition Education and Counseling: No recommendations at this time  Coordination of Nutrition Care: Continue to monitor while inpatient    Goals:  Meet >75% EENs in 5-7 days, Lytes wnl, Maintain skin integrity       Nutrition Monitoring and Evaluation:   Behavioral-Environmental Outcomes: None identified  Food/Nutrient Intake Outcomes: Food and nutrient intake  Physical Signs/Symptoms Outcomes: Constipation, Weight, Meal time behavior    Discharge Planning:     Too soon to determine     Electronically signed by Magaly Whitley on 10/1/2021 at 3:27 PM    Contact: [phone] [phone] or via [employer]

## 2021-10-01 NOTE — PROGRESS NOTES
Progress Note    Patient: Jose Enrique Rivera MRN: [id number]  SSN: [id number]    YOB: 1960  Age: 59 y.o. Sex: female      Admit Date: 9/22/2021    LOS: 8 days     Subjective:   Patient followed for septic shock with presumptive aspiration pneumonia and Gram negative bacteremia with Pantoea agglomerans. She is afebrile with normal WBC and decreasing procal and CRP. Currently on IV Zosyn alone. Patient resting comfortably with no complaints. Daughter at bedside. Objective:     Vitals:    09/30/21 2044 09/30/21 2324 10/01/21 0416 10/01/21 0829   BP: (!) 140/76 (!) 140/78 (!) 152/86 135/75   Pulse: 73 76 81 76   Resp: 18 18 18    Temp: 98.2 °F (36.8 °C) 98.6 °F (37 °C) 98 °F (36.7 °C) 97 °F (36.1 °C)   SpO2: 95% 95% 95% 95%   Weight:       Height:            Intake and Output:  Current Shift: No intake/output data recorded. Last three shifts: 09/29 1901 - 10/01 0700  In: 2092.1 [P.O.:890; I.V.:1202.1]  Out: 8725 [Urine:8725]    Physical Exam:   Vitals and nursing note reviewed. Constitutional:       General: She is not in acute distress. Appearance: She is ill-appearing. HENT: unremarkable  Abdominal: nontender, no distention  Genitourinary:     Comments: External urinary catheter  Musculoskeletal:      Right lower leg: No edema. Left lower leg: No edema. Skin:     Findings: No rash. Neurological: nonfocal, no confusion  Psychiatric: no agitation      Lab/Data Review:     WBC 8,400  LFTs 29/16/129/0.4    Procal  Pending < 0.30 < 0.55 <1.00 < 1.72 <2.75 <4.02  CRP Pending < 10.10 <11.30 <13.80 <4.60 <17.40    Blood cultures (9/24) Pantoea agglomerans and coagulase negative Staphylococcus species  Blood cultures (9/24) No growth FINAL  Blood cultures (9/26) No growth 4 days  Urine culture (9/24) No growth FINAL    No new CXR  Assessment:     Active Problems:    Abdominal pain (9/23/2021)    1.  Septic shock with fever, tachycardia, hypotension requiring vasopressors, elevated lactic acid, leukocytosis, elevated procal and CRP, Day #9 IV Zosyn  2. Presumptive aspiration pneumonia with RLL infiltrate, Day #9 IV Zosyn    3. Gram negative bacteremia with Pantoea agglomerans, Day #9 IV Zosyn  4. Abdominal pain, etiology unclear  5. Altered mental status, resolved  6. Neurofibromatosis  7. Distended gallbladder with choledocholithiasis with decreasing LFTs    Comment: Continues to do well clinically. Plan:   1. Continue IV Zosyn for 5 more days  2.  Cleared for discharge from ID standpoint       Signed By: Golden Traore MD     October 1, 2021

## 2021-10-01 NOTE — DISCHARGE SUMMARY
Hospitalist Discharge Summary     Patient ID:    Chavo Vasquez  [id number]  43 y.o.  1960    Admit date: 9/22/2021    Discharge date : 10/1/2021    Chronic Diagnoses:    Problem List as of 10/1/2021 Date Reviewed: 9/23/2021        Codes Class Noted - Resolved    Abdominal pain ICD-10-CM: R10.9  ICD-9-CM: 789.00  9/23/2021 - Present        Chronic pain syndrome ICD-10-CM: G89.4  ICD-9-CM: 338.4  8/10/2017 - Present        Neurofibromatosis ([facility] [address] ICD-10-CM: Q85.00  ICD-9-CM: 237.70  8/10/2017 - Present          22    Final Diagnoses: Active Problems:    Abdominal pain (9/23/2021)        Reason for Hospitalization/Hospital Course:   Patient is 70-year-old female with a PMH significant for neurofibromatosis type I, chronic abdominal pain, chronic narcotic use, depression, HTN. Presents to the emergency room with acute severe abdominal pain, elevated WBC and elevated pro-Chan and CRP. X-ray of abdomen revealing fecal retention. CT angiogram of abdomen and pelvis numerous soft tissue masses of the peripheral nerves, constipation without obstruction. CT of chest unremarkable. CT of head unremarkable. She was admitted for further evaluation and management of sepsis, abdominal pain, acute on chronic constipation. Blood cultures obtained started on IV Zosyn and vancomycin. Her condition worsening with fevers of 102.7 and confusion and hypotension. Repeat CT of head negative for acute pathology. Patient was transferred to ICU requiring vasopressor therapy for septic shock possible aspiration pneumonia with altered mental status. NG tube placed. Consults for infectious disease and general surgery. Retroperitoneal ultrasound revealing borderline gallbladder wall thickening possible cholecystitis, gallstone lodged in the neck of gallbladder with sludge noted. Blood culture results grew Pantoea agglomerans. Mental status improving transferred to medical floor surgical floor.   ENT consulted for left ear drainage with bleeding. She has a history of left acoustic neuroma surgery left facial weakness with eye closure complete with gold weight. Left ear canal has an abrasion with dried blood, tympanic membrane is intact no further intervention required. PT, OT and speech therapy have consulted recommending SNF placement for further rehab and transition to LTC. MBS negative for aspiration. The patient is stable for discharge. She will continue with IV Zosyn every 8 hours for a total of 14 days of therapy. Per conversation with her daughter/POA the patient is followed by pain management physician Dr. Femi Hand. She is under a pain management contract which includes Xtampza ER 86 mg 2 times a day, oxycodone 30 mg 3 times a day as needed for breakthrough pain, gabapentin 800 mg 3 times a day. She is to resume this regimen. She will follow-up outpatient with general surgery in 2 to 4 weeks for further evaluation of gallbladder. Her diet order is a dysphagia minced and moist with mildly thick nectar, no fish, allow for extra snacks puddings etc.  Crush meds in applesauce. Strict aspiration/GERD precautions. Discharge Medications:   Current Discharge Medication List      START taking these medications    Details   magnesium oxide (MAG-OX) 400 mg tablet Take 1 Tablet by mouth daily. Qty: 30 Tablet, Refills: 0  Start date: 10/2/2021      piperacillin-tazobactam 3.375 gram 3.375 g IVPB 3.375 g by IntraVENous route every eight (8) hours for 6 days. Qty: 18 Dose, Refills: 0  Start date: 10/1/2021, End date: 10/7/2021         CONTINUE these medications which have NOT CHANGED    Details   famotidine (Pepcid) 20 mg tablet Take 20 mg by mouth two (2) times a day. celecoxib (CELEBREX) 200 mg capsule Take 200 mg by mouth two (2) times a day. aspirin 81 mg chewable tablet Take 81 mg by mouth daily. cholecalciferol (Vitamin D3) 25 mcg (1,000 unit) cap Take  by mouth daily. carBAMazepine ER (CARBATROL ER) 300 mg capsule Take 300 mg by mouth two (2) times a day. carvedilol (COREG) 6.25 mg tablet Take  by mouth two (2) times daily (with meals). simvastatin (ZOCOR) 40 mg tablet Take  by mouth nightly. levothyroxine (SYNTHROID) 125 mcg tablet Take  by mouth Daily (before breakfast). busPIRone (BUSPAR) 30 mg tablet Take 30 mg by mouth daily. venlafaxine-SR (EFFEXOR XR) 150 mg capsule Take  by mouth daily. oxyCODONE IR (OXY-IR) 30 mg immediate release tablet Take 1 Tab by mouth every six (6) hours as needed for Pain. Max Daily Amount: 120 mg.  Qty: 120 Tab, Refills: 0    Associated Diagnoses: Neurofibromatosis, type 1 ([address] Chronic pain syndrome      Xtampza ER 36 mg capsule Take 1 Capsule by mouth two (2) times a day.      gabapentin (NEURONTIN) 800 mg tablet Take 800 mg by mouth three (3) times daily. Follow up Care:    1. Other, MD Henry in 1-2 weeks. Follow-up Information     Follow up With Specialties Details Why Contact Info    Caren Vasquez MD Pain Management   [facility]      Umesh Mensah MD Colon and Rectal Surgery, General Surgery In 1 month Follow-up for gallbladder outpatient [address] [place] [address]  [phone]      Other, Susy Solis MD    Patient can only remember the practice name and not the physician              Patient Follow Up Instructions: Activity: Activity as tolerated  Diet:   dysphagia minced and moist with mildly thick nectar, no fish, allow for extra snacks puddings etc.  Crush meds in applesauce. Strict aspiration/GERD precautions.     Wound Care: None needed     Condition at Discharge:  Stable  __________________________________________________________________    Disposition  [place]  ____________________________________________________________________    Code Status:  Full Code  ___________________________________________________________________    Discharge Exam:  Patient seen and examined by me on discharge day. Pertinent Findings:  Gen:    Not in distress  Chest: Clear lungs  CVS:   Regular rhythm. No edema  Abd:  Soft, not distended, not tender  Neuro:  Alert        CONSULTATIONS: ID, GI and ENT    Significant Diagnostic Studies:   Recent Results (from the past 24 hour(s))   GLUCOSE, POC    Collection Time: 09/30/21 12:05 PM   Result Value Ref Range    Glucose (POC) 101 65 - 117 mg/dL    Performed by [address] [place] [address], POC    Collection Time: 09/30/21  8:46 PM   Result Value Ref Range    Glucose (POC) 94 65 - 117 mg/dL    Performed by LEXIE MARINA    CBC WITH AUTOMATED DIFF    Collection Time: 10/01/21  6:39 AM   Result Value Ref Range    WBC 8.4 3.6 - 11.0 K/uL    RBC 3.91 3.80 - 5.20 M/uL    HGB 10.8 (L) 11.5 - 16.0 g/dL    HCT 33.2 (L) 35.0 - 47.0 %    MCV 84.9 80.0 - 99.0 FL    MCH 27.6 26.0 - 34.0 PG    MCHC 32.5 30.0 - 36.5 g/dL    RDW 13.6 11.5 - 14.5 %    PLATELET 623 (H) 356 - 400 K/uL    MPV 9.4 8.9 - 12.9 FL    NRBC 0.2 (H) 0.0  WBC    ABSOLUTE NRBC 0.02 (H) 0.00 - 0.01 K/uL    NEUTROPHILS 61 32 - 75 %    LYMPHOCYTES 20 12 - 49 %    MONOCYTES 16 (H) 5 - 13 %    EOSINOPHILS 2 0 - 7 %    BASOPHILS 1 0 - 1 %    IMMATURE GRANULOCYTES 0 %    ABS. NEUTROPHILS 5.1 1.8 - 8.0 K/UL    ABS. LYMPHOCYTES 1.7 0.8 - 3.5 K/UL    ABS. MONOCYTES 1.3 (H) 0.0 - 1.0 K/UL    ABS. EOSINOPHILS 0.2 0.0 - 0.4 K/UL    ABS. BASOPHILS 0.1 0.0 - 0.1 K/UL    ABS. IMM.  GRANS. 0.0 K/UL    DF Smear Scanned      RBC COMMENTS Normocytic, Normochromic     METABOLIC PANEL, BASIC    Collection Time: 10/01/21  6:39 AM   Result Value Ref Range    Sodium 139 136 - 145 mmol/L    Potassium 3.9 3.5 - 5.1 mmol/L    Chloride 105 97 - 108 mmol/L    CO2 26 21 - 32 mmol/L    Anion gap 8 5 - 15 mmol/L    Glucose 93 65 - 100 mg/dL    BUN 3 (L) 6 - 20 mg/dL    Creatinine 0.35 (L) 0.55 - 1.02 mg/dL BUN/Creatinine ratio 9 (L) 12 - 20      GFR est AA >60 >60 ml/min/1.73m2    GFR est non-AA >60 >60 ml/min/1.73m2    Calcium 8.3 (L) 8.5 - 10.1 mg/dL   GLUCOSE, POC    Collection Time: 10/01/21  7:38 AM   Result Value Ref Range    Glucose (POC) 87 65 - 117 mg/dL    Performed by Diabeto      XR SWALLOW FUNC VIDEO   Final Result   Episode of penetration with thin consistency. No aspiration. XR CHEST PORT   Final Result      US ABD LTD   Final Result   Gallstone lodged in the neck of the gallbladder. Sludge within the   gallbladder. Borderline gallbladder wall thickening. No pericholecystic fluid. The technologist stated there was no sonographic Linder's sign. Poor visualization of the pancreas. Please see full report. XR CHEST PORT   Final Result   Mild cardiomegaly. New right lower lobe interstitial infiltration. Gastric tube tip overlying the distal stomach. Continued follow-up recommended. CT HEAD WO CONT   Final Result   No evidence of an acute intracranial abnormality. XR ABD FLAT/ ERECT   Final Result   1. This examination is negative for free intra-abdominal air. 2.  There is significant fecal retention throughout the colon. CTA CHEST W OR W WO CONT   Final Result   Normal CTA of the chest with no pulmonary embolism or acute aortic   abnormalities identified. no acute cardiopulmonary findings. Sequela of   neurofibromatosis. HISTORY:  upper abdominal pain radiating to back. r/o dissection   Dose reduction technique: All CT scans at this facility are performed using dose reduction optimization   technique as appropriate on the exam including the following: Automated exposure   control, adjustment of the MA and/or KV according to patient size of use of   iterative reconstructive technique. .      TECHNIQUE: CTA CHEST W OR W WO CONT, CTA ABD PELV W WO CONT.  CT angiogram of the   abdomen and pelvis performed and maximum intensity projection images (MIPS)   generated. 100 cc [id number]-[id number] injected. COMPARISON: None   LIMITATIONS: None      CHEST: See above      AORTA: No aneurysm or dissection. CELIAC AXIS: Patent. SUPERIOR MESENTERIC ARTERY: Patent. RENAL ARTERIES: Patent. INFERIOR MESENTERIC ARTERY: Patent. ILIAC ARTERIES: Patent. Visualized femoral vasculature:  Patent. LIVER: Normal.     GALLBLADDER: Normal.      BILIARY TREE: Normal.      PANCREAS: Normal.     SPLEEN: Normal.     ADRENAL GLANDS: The adrenal glands themselves appear normal.   KIDNEYS/URETERS/BLADDER: Normal. Soft tissue masses along the course of the   bilateral renal arteries similar to retroperitoneal and prevertebral masses   elsewhere   RETROPERITONEUM: Normal.    BOWEL/MESENTERY: Large amount of stool is present throughout the length of the   colon. I do not identify a jamir bowel obstruction. Numerous soft tissue masses   throughout the central abdominal mesentery are noted and may indicate adenopathy   and/or be related to the patient's numerous nerve sheath tumors. APPENDIX: Identified and normal.   PERITONEAL CAVITY: Normal.     REPRODUCTIVE ORGANS: Normal.     BONE/TISSUES: No acute osseous abnormality. There are however numerous soft   tissue masses of the peripheral nerves at the level of the neural foramina and   extending anterior to them and particularly noted in the presacral region, a   couple of these are calcified. These do anteriorly displaced some of the pelvic   contents including the bladder, uterus and sigmoid colon. OTHER: None      IMPRESSION: Normal CT angiogram of the abdomen and pelvis. Sequela of   neurofibromatosis. Constipation without obstruction. Results called to Marty on 9/23/2021 12:12 AM.               CTA ABD PELV W WO CONT   Final Result   Normal CTA of the chest with no pulmonary embolism or acute aortic   abnormalities identified.    no acute cardiopulmonary findings. Sequela of   neurofibromatosis. HISTORY:  upper abdominal pain radiating to back. r/o dissection   Dose reduction technique: All CT scans at this facility are performed using dose reduction optimization   technique as appropriate on the exam including the following: Automated exposure   control, adjustment of the MA and/or KV according to patient size of use of   iterative reconstructive technique. .      TECHNIQUE: CTA CHEST W OR W WO CONT, CTA ABD PELV W WO CONT. CT angiogram of the   abdomen and pelvis performed and maximum intensity projection images (MIPS)   generated. 100 cc [id number]-[id number] injected. COMPARISON: None   LIMITATIONS: None      CHEST: See above      AORTA: No aneurysm or dissection. CELIAC AXIS: Patent. SUPERIOR MESENTERIC ARTERY: Patent. RENAL ARTERIES: Patent. INFERIOR MESENTERIC ARTERY: Patent. ILIAC ARTERIES: Patent. Visualized femoral vasculature:  Patent. LIVER: Normal.     GALLBLADDER: Normal.      BILIARY TREE: Normal.      PANCREAS: Normal.     SPLEEN: Normal.     ADRENAL GLANDS: The adrenal glands themselves appear normal.   KIDNEYS/URETERS/BLADDER: Normal. Soft tissue masses along the course of the   bilateral renal arteries similar to retroperitoneal and prevertebral masses   elsewhere   RETROPERITONEUM: Normal.    BOWEL/MESENTERY: Large amount of stool is present throughout the length of the   colon. I do not identify a jamir bowel obstruction. Numerous soft tissue masses   throughout the central abdominal mesentery are noted and may indicate adenopathy   and/or be related to the patient's numerous nerve sheath tumors. APPENDIX: Identified and normal.   PERITONEAL CAVITY: Normal.     REPRODUCTIVE ORGANS: Normal.     BONE/TISSUES: No acute osseous abnormality.  There are however numerous soft   tissue masses of the peripheral nerves at the level of the neural foramina and extending anterior to them and particularly noted in the presacral region, a   couple of these are calcified. These do anteriorly displaced some of the pelvic   contents including the bladder, uterus and sigmoid colon. OTHER: None      IMPRESSION: Normal CT angiogram of the abdomen and pelvis. Sequela of   neurofibromatosis. Constipation without obstruction.       Results called to [facility] on 9/23/2021 12:12 AM.                   Time spent in direct and indirect care including coordination of services: Greater than 35 minutes    Signed:  Elvin Abebe NP  10/1/2021  9:40 AM

## 2021-10-01 NOTE — ROUTINE PROCESS
Report given to BERRY Hahn. Patient discharged per wheelchair via ground ambulance. Daughter with the patient during the entire discharge process.

## 2021-10-01 NOTE — PROGRESS NOTES
Hospitalist Progress Note    Subjective:   Daily Progress Note: 10/1/2021 4:02 PM    Hospital Course:     Patient is 75-year-old female with a PMH significant for neurofibromatosis type I, chronic abdominal pain, chronic narcotic use, depression, HTN. Presents to the emergency room with acute severe abdominal pain, elevated WBC and elevated pro-Chan and CRP. X-ray of abdomen revealing fecal retention. CT angiogram of abdomen and pelvis numerous soft tissue masses of the peripheral nerves, constipation without obstruction. CT of chest unremarkable. CT of head unremarkable. She was admitted for further evaluation and management of sepsis, abdominal pain, acute on chronic constipation. Blood cultures obtained started on IV Zosyn and vancomycin. Her condition worsening with fevers of 102.7 and confusion and hypotension. Repeat CT of head negative for acute pathology. Patient was transferred to ICU requiring vasopressor therapy for septic shock possible aspiration pneumonia with altered mental status. NG tube placed. Consults for infectious disease and general surgery. Retroperitoneal ultrasound revealing borderline gallbladder wall thickening possible cholecystitis, gallstone lodged in the neck of gallbladder with sludge noted. Blood culture results grew Pantoea agglomerans. Mental status improving transferred to medical floor surgical floor. ENT consulted for left ear drainage with bleeding. She has a history of left acoustic neuroma surgery left facial weakness with eye closure complete with gold weight. Left ear canal has an abrasion with dried blood, tympanic membrane is intact no further intervention required. PT, OT and speech therapy have consulted recommending SNF placement for further rehab and transition to LTC. MBS negative for aspiration. The patient is stable for discharge. She will continue with IV Zosyn every 8 hours for a total of 14 days of therapy.   Per conversation with her daughter/POA the patient is followed by pain management physician Dr. Sherice Rangel. She is under a pain management contract which includes Xtampza ER 86 mg 2 times a day, oxycodone 30 mg 3 times a day as needed for breakthrough pain, gabapentin 800 mg 3 times a day. She is to resume this regimen. She will follow-up outpatient with general surgery in 2 to 4 weeks for further evaluation of gallbladder. Her diet order is a dysphagia minced and moist with mildly thick nectar, no fish, allow for extra snacks puddings etc.  Crush meds in applesauce. Strict aspiration/GERD precautions. Subjective: Follow up examination of patient at the bedside. Mentation much improved. Today she complains of headache.     Current Facility-Administered Medications   Medication Dose Route Frequency    gabapentin (NEURONTIN) capsule 800 mg  800 mg Oral TID    oxyCODONE ER (OxyCONTIN) tablet 40 mg  40 mg Oral BID    butalbital-acetaminophen-caffeine (FIORICET, ESGIC) -40 mg per tablet 1 Tablet  1 Tablet Oral Q6H PRN    carBAMazepine (TEGretol) tablet 200 mg  200 mg Oral Q8H    0.9% sodium chloride with KCl 20 mEq/L infusion   IntraVENous CONTINUOUS    venlafaxine (EFFEXOR) tablet 75 mg  75 mg Oral BID WITH MEALS    lactulose (CHRONULAC) 10 gram/15 mL solution 15 mL  15 mL Oral DAILY PRN    QUEtiapine (SEROquel) tablet 25 mg  25 mg Oral Q4H PRN    carvediloL (COREG) tablet 6.25 mg  6.25 mg Oral BID WITH MEALS    atorvastatin (LIPITOR) tablet 20 mg  20 mg Oral QHS    levothyroxine (SYNTHROID) tablet 125 mcg  125 mcg Oral 6am    oxyCODONE IR (ROXICODONE) tablet 30 mg  30 mg Oral Q6H PRN    famotidine (PEPCID) tablet 20 mg  20 mg Oral BID    aspirin chewable tablet 81 mg  81 mg Oral DAILY    cholecalciferol (VITAMIN D3) (1000 Units /25 mcg) tablet 1,000 Units  1,000 Units Oral DAILY    sodium chloride (NS) flush 5-40 mL  5-40 mL IntraVENous PRN    acetaminophen (TYLENOL) tablet 650 mg  650 mg Oral Q6H PRN    ondansetron (ZOFRAN) injection 4 mg  4 mg IntraVENous Q4H PRN    magnesium oxide (MAG-OX) tablet 400 mg  400 mg Oral DAILY    busPIRone (BUSPAR) tablet 15 mg  15 mg Oral TID    HYDROmorphone (DILAUDID) syringe 0.5 mg  0.5 mg IntraVENous Q4H PRN    piperacillin-tazobactam (ZOSYN) 3.375 g in 0.9% sodium chloride (MBP/ADV) 100 mL MBP  3.375 g IntraVENous Q8H    acetaminophen (TYLENOL) suppository 650 mg  650 mg Rectal Q4H PRN        REVIEW OF SYSTEMS    Review of Systems   Constitutional: Positive for malaise/fatigue. HENT: Negative for ear pain. Respiratory: Negative for cough. Cardiovascular: Negative for chest pain. Gastrointestinal: Negative for abdominal pain. Musculoskeletal: Positive for myalgias. Chronic pain   Neurological: Positive for weakness and headaches. Psychiatric/Behavioral: The patient is nervous/anxious. Objective:     Visit Vitals  /75 (BP 1 Location: Left upper arm, BP Patient Position: At rest)   Pulse 76   Temp 97 °F (36.1 °C)   Resp 18   Ht 5' 1\" (1.549 m)   Wt 75 kg (165 lb 5.5 oz)   SpO2 95%   Breastfeeding No   BMI 31.24 kg/m²    O2 Flow Rate (L/min): 1 l/min (placed on room air) O2 Device: None (Room air)    Temp (24hrs), Av.2 °F (36.8 °C), Min:97 °F (36.1 °C), Max:99 °F (37.2 °C)      No intake/output data recorded.  1901 - 10/01 0700  In: 2092.1 [P.O.:890; I.V.:1202.1]  Out: 8725 [Urine:8725]    PHYSICAL EXAM:    Physical Exam  Constitutional:       Appearance: She is ill-appearing. Cardiovascular:      Rate and Rhythm: Normal rate. Pulmonary:      Effort: No respiratory distress. Abdominal:      Tenderness: There is abdominal tenderness. Skin:     General: Skin is warm.    Neurological:      Comments: Chronic left facial droop          Data Review    Recent Results (from the past 24 hour(s))   GLUCOSE, POC    Collection Time: 21 12:05 PM   Result Value Ref Range    Glucose (POC) 101 65 - 117 mg/dL    Performed by [employer] Madelyn    GLUCOSE, POC    Collection Time: 09/30/21  8:46 PM   Result Value Ref Range    Glucose (POC) 94 65 - 117 mg/dL    Performed by LEXIE MARINA    CBC WITH AUTOMATED DIFF    Collection Time: 10/01/21  6:39 AM   Result Value Ref Range    WBC 8.4 3.6 - 11.0 K/uL    RBC 3.91 3.80 - 5.20 M/uL    HGB 10.8 (L) 11.5 - 16.0 g/dL    HCT 33.2 (L) 35.0 - 47.0 %    MCV 84.9 80.0 - 99.0 FL    MCH 27.6 26.0 - 34.0 PG    MCHC 32.5 30.0 - 36.5 g/dL    RDW 13.6 11.5 - 14.5 %    PLATELET 034 (H) 097 - 400 K/uL    MPV 9.4 8.9 - 12.9 FL    NRBC 0.2 (H) 0.0  WBC    ABSOLUTE NRBC 0.02 (H) 0.00 - 0.01 K/uL    NEUTROPHILS 61 32 - 75 %    LYMPHOCYTES 20 12 - 49 %    MONOCYTES 16 (H) 5 - 13 %    EOSINOPHILS 2 0 - 7 %    BASOPHILS 1 0 - 1 %    IMMATURE GRANULOCYTES 0 %    ABS. NEUTROPHILS 5.1 1.8 - 8.0 K/UL    ABS. LYMPHOCYTES 1.7 0.8 - 3.5 K/UL    ABS. MONOCYTES 1.3 (H) 0.0 - 1.0 K/UL    ABS. EOSINOPHILS 0.2 0.0 - 0.4 K/UL    ABS. BASOPHILS 0.1 0.0 - 0.1 K/UL    ABS. IMM. GRANS. 0.0 K/UL    DF Smear Scanned      RBC COMMENTS Normocytic, Normochromic     METABOLIC PANEL, BASIC    Collection Time: 10/01/21  6:39 AM   Result Value Ref Range    Sodium 139 136 - 145 mmol/L    Potassium 3.9 3.5 - 5.1 mmol/L    Chloride 105 97 - 108 mmol/L    CO2 26 21 - 32 mmol/L    Anion gap 8 5 - 15 mmol/L    Glucose 93 65 - 100 mg/dL    BUN 3 (L) 6 - 20 mg/dL    Creatinine 0.35 (L) 0.55 - 1.02 mg/dL    BUN/Creatinine ratio 9 (L) 12 - 20      GFR est AA >60 >60 ml/min/1.73m2    GFR est non-AA >60 >60 ml/min/1.73m2    Calcium 8.3 (L) 8.5 - 10.1 mg/dL   GLUCOSE, POC    Collection Time: 10/01/21  7:38 AM   Result Value Ref Range    Glucose (POC) 87 65 - 117 mg/dL    Performed by Helen Matta        XR SWALLOW FUNC VIDEO   Final Result   Episode of penetration with thin consistency. No aspiration. XR CHEST PORT   Final Result      US ABD LTD   Final Result   Gallstone lodged in the neck of the gallbladder. Sludge within the   gallbladder. Borderline gallbladder wall thickening. No pericholecystic fluid. The technologist stated there was no sonographic Linder's sign. Poor visualization of the pancreas. Please see full report. XR CHEST PORT   Final Result   Mild cardiomegaly. New right lower lobe interstitial infiltration. Gastric tube tip overlying the distal stomach. Continued follow-up recommended. CT HEAD WO CONT   Final Result   No evidence of an acute intracranial abnormality. XR ABD FLAT/ ERECT   Final Result   1. This examination is negative for free intra-abdominal air. 2.  There is significant fecal retention throughout the colon. CTA CHEST W OR W WO CONT   Final Result   Normal CTA of the chest with no pulmonary embolism or acute aortic   abnormalities identified. no acute cardiopulmonary findings. Sequela of   neurofibromatosis. HISTORY:  upper abdominal pain radiating to back. r/o dissection   Dose reduction technique: All CT scans at this facility are performed using dose reduction optimization   technique as appropriate on the exam including the following: Automated exposure   control, adjustment of the MA and/or KV according to patient size of use of   iterative reconstructive technique. .      TECHNIQUE: CTA CHEST W OR W WO CONT, CTA ABD PELV W WO CONT. CT angiogram of the   abdomen and pelvis performed and maximum intensity projection images (MIPS)   generated. 100 cc [id number]-370 injected. COMPARISON: None   LIMITATIONS: None      CHEST: See above      AORTA: No aneurysm or dissection. CELIAC AXIS: Patent. SUPERIOR MESENTERIC ARTERY: Patent. RENAL ARTERIES: Patent. INFERIOR MESENTERIC ARTERY: Patent. ILIAC ARTERIES: Patent. Visualized femoral vasculature:  Patent.               LIVER: Normal.     GALLBLADDER: Normal.      BILIARY TREE: Normal.      PANCREAS: Normal.     SPLEEN: Normal.     ADRENAL GLANDS: The adrenal glands themselves appear normal.   KIDNEYS/URETERS/BLADDER: Normal. Soft tissue masses along the course of the   bilateral renal arteries similar to retroperitoneal and prevertebral masses   elsewhere   RETROPERITONEUM: Normal.    BOWEL/MESENTERY: Large amount of stool is present throughout the length of the   colon. I do not identify a jamir bowel obstruction. Numerous soft tissue masses   throughout the central abdominal mesentery are noted and may indicate adenopathy   and/or be related to the patient's numerous nerve sheath tumors. APPENDIX: Identified and normal.   PERITONEAL CAVITY: Normal.     REPRODUCTIVE ORGANS: Normal.     BONE/TISSUES: No acute osseous abnormality. There are however numerous soft   tissue masses of the peripheral nerves at the level of the neural foramina and   extending anterior to them and particularly noted in the presacral region, a   couple of these are calcified. These do anteriorly displaced some of the pelvic   contents including the bladder, uterus and sigmoid colon. OTHER: None      IMPRESSION: Normal CT angiogram of the abdomen and pelvis. Sequela of   neurofibromatosis. Constipation without obstruction. Results called to [facility] on 9/23/2021 12:12 AM.               CTA ABD PELV W WO CONT   Final Result   Normal CTA of the chest with no pulmonary embolism or acute aortic   abnormalities identified. no acute cardiopulmonary findings. Sequela of   neurofibromatosis. HISTORY:  upper abdominal pain radiating to back. r/o dissection   Dose reduction technique: All CT scans at this facility are performed using dose reduction optimization   technique as appropriate on the exam including the following: Automated exposure   control, adjustment of the MA and/or KV according to patient size of use of   iterative reconstructive technique. .      TECHNIQUE: CTA CHEST W OR W WO CONT, CTA ABD PELV W WO CONT.  CT angiogram of the   abdomen and pelvis performed and maximum intensity projection images (MIPS)   generated. 100 cc Isovue-370 injected. COMPARISON: None   LIMITATIONS: None      CHEST: See above      AORTA: No aneurysm or dissection. CELIAC AXIS: Patent. SUPERIOR MESENTERIC ARTERY: Patent. RENAL ARTERIES: Patent. INFERIOR MESENTERIC ARTERY: Patent. ILIAC ARTERIES: Patent. Visualized femoral vasculature:  Patent. LIVER: Normal.     GALLBLADDER: Normal.      BILIARY TREE: Normal.      PANCREAS: Normal.     SPLEEN: Normal.     ADRENAL GLANDS: The adrenal glands themselves appear normal.   KIDNEYS/URETERS/BLADDER: Normal. Soft tissue masses along the course of the   bilateral renal arteries similar to retroperitoneal and prevertebral masses   elsewhere   RETROPERITONEUM: Normal.    BOWEL/MESENTERY: Large amount of stool is present throughout the length of the   colon. I do not identify a jamir bowel obstruction. Numerous soft tissue masses   throughout the central abdominal mesentery are noted and may indicate adenopathy   and/or be related to the patient's numerous nerve sheath tumors. APPENDIX: Identified and normal.   PERITONEAL CAVITY: Normal.     REPRODUCTIVE ORGANS: Normal.     BONE/TISSUES: No acute osseous abnormality. There are however numerous soft   tissue masses of the peripheral nerves at the level of the neural foramina and   extending anterior to them and particularly noted in the presacral region, a   couple of these are calcified. These do anteriorly displaced some of the pelvic   contents including the bladder, uterus and sigmoid colon. OTHER: None      IMPRESSION: Normal CT angiogram of the abdomen and pelvis. Sequela of   neurofibromatosis. Constipation without obstruction.       [facility] 12:12 AM.                   Active Problems:    Abdominal pain (9/23/2021)        Assessment/Plan:     Septic shock  Acute cholecystitis  Aspiration pneumonia  Requiring vasopressor therapy now stable  Continue IV Zosyn for 7 more days  Gallbladder with wall thickening, sludge and stone in bladder neck  ID following  General surgery signed off no further surgical need at this time, will follow-up outpatient    Metabolic encephalopathy  Chronic pain syndrome due to neurofibromatosis  Acute on chronic abdominal pain from constipation  Distended bladder with choledocholithiasis  Chronic narcotic use and neurofibromatosis leading to constipation  Mentation has improved  Bowel management needed at discharge  Chronic left-sided facial droop and generalized weakness status post neuroma surgery  General surgery will follow up outpatient after discharge and completion of antibiotics    Weakness  Dysphagia  PT OT and speech therapy consulted recommending continued skilled facility placement for rehab then transition to long-term care  MBS completed. Rec minced and moist diet w/ mildly thick liquids w/ free water protocol b/w meals following oral care once clear . Rec GI consult as OP s/t chronic GERD, cricopharyngeal hypertrophy w/ dilatation as medically appropriate. ST to follow.    ENT consulted for ear drainage-left ear tympanic membrane intact no further work-up required  Noted neuroma surgery for left acoustic with gold weight eye closure        DVT Prophylaxis:  Code Status: Full Code  POA/NOK: [place] daughter    Disposition and discharge barriers:   · Pending SNF placement with transition to LTC referral sent  · Needs IV Zosyn for 6 more days  · No surgical plan at this time for gallbladder follow-up outpatient with general surgery  Care Plan discussed with: Daughter, patient, nurse, IDR team  _____________________________________________________________________________  Time spent in direct care including coordination of service, review of data and examination: > 35 minutes    ______________________________________________________________________________    Earnest Areas, NP    This is dictation was done by dragon, computer voice recognition software. Quite often unanticipated grammatical, syntax, homophones and other interpretive errors or inadvertently transcribed by the computer software. Please excuse errors that have escaped final proofreading. Thank you.

## 2021-10-03 LAB
BACTERIA SPEC CULT: NORMAL
SPECIAL REQUESTS,SREQ: NORMAL

## 2021-11-03 ENCOUNTER — OFFICE VISIT (OUTPATIENT)
Dept: SURGERY | Age: 61
End: 2021-11-03
Payer: MEDICARE

## 2021-11-03 ENCOUNTER — HOSPITAL ENCOUNTER (OUTPATIENT)
Dept: GENERAL RADIOLOGY | Age: 61
Discharge: HOME | End: 2021-11-03
Payer: MEDICARE

## 2021-11-03 VITALS
TEMPERATURE: 98.4 F | HEIGHT: 61 IN | DIASTOLIC BLOOD PRESSURE: 64 MMHG | SYSTOLIC BLOOD PRESSURE: 109 MMHG | WEIGHT: 137.2 LBS | BODY MASS INDEX: 25.9 KG/M2 | RESPIRATION RATE: 16 BRPM | OXYGEN SATURATION: 97 % | HEART RATE: 84 BPM

## 2021-11-03 DIAGNOSIS — J11.00 INFLUENZA AND PNEUMONIA: Primary | ICD-10-CM

## 2021-11-03 DIAGNOSIS — K80.20 CALCULUS OF GALLBLADDER WITHOUT CHOLECYSTITIS WITHOUT OBSTRUCTION: ICD-10-CM

## 2021-11-03 DIAGNOSIS — J11.00 INFLUENZA AND PNEUMONIA: ICD-10-CM

## 2021-11-03 DIAGNOSIS — J69.0 ASPIRATION PNEUMONIA OF RIGHT LOWER LOBE, UNSPECIFIED ASPIRATION PNEUMONIA TYPE (HCC): ICD-10-CM

## 2021-11-03 PROCEDURE — G8510 SCR DEP NEG, NO PLAN REQD: HCPCS | Performed by: COLON & RECTAL SURGERY

## 2021-11-03 PROCEDURE — G8419 CALC BMI OUT NRM PARAM NOF/U: HCPCS | Performed by: COLON & RECTAL SURGERY

## 2021-11-03 PROCEDURE — 99213 OFFICE O/P EST LOW 20 MIN: CPT | Performed by: COLON & RECTAL SURGERY

## 2021-11-03 PROCEDURE — G8427 DOCREV CUR MEDS BY ELIG CLIN: HCPCS | Performed by: COLON & RECTAL SURGERY

## 2021-11-03 PROCEDURE — 3017F COLORECTAL CA SCREEN DOC REV: CPT | Performed by: COLON & RECTAL SURGERY

## 2021-11-03 PROCEDURE — 71046 X-RAY EXAM CHEST 2 VIEWS: CPT

## 2021-11-03 RX ORDER — ATORVASTATIN CALCIUM 40 MG/1
40 TABLET, FILM COATED ORAL DAILY
COMMUNITY

## 2021-11-03 RX ORDER — GLUCOSAMINE/CHONDR SU A SOD 750-600 MG
1 TABLET ORAL
COMMUNITY
End: 2022-03-28

## 2021-11-03 RX ORDER — DOCUSATE SODIUM 100 MG/1
100 CAPSULE, LIQUID FILLED ORAL 2 TIMES DAILY
COMMUNITY
End: 2022-03-28

## 2021-11-03 NOTE — PROGRESS NOTES
OFFICE VISIT NOTE    She Kuo is a 59 y.o. female who presents to the office today for:    Chief Complaint   Patient presents with   [address] Follow up Constipation        Patient is 57-year-old female who follows up for recent hospitalization. She presents to the emergency department at [facility] with complaints of severe abdominal pain. She had a mildly elevated white count on presentation. Her CT demonstrated distended colon with stool and a distended gallbladder. She then became quite septic and ill and diagnosed with aspiration pneumonia. She had ultrasound of right upper quadrant showed a stone in the gallbladder neck but no evidence of cholecystitis. She does have a longstanding history of constipation as she is on fairly heavy narcotic analgesics for her neurofibromatosis. Today she denies any significant abdominal pain. She is tolerating a diet. She denies any nausea or vomiting. Her past medical history seen for history of neurofibromatosis 1, hypertension, chronic pain, hypothyroidism.       Past Medical History:   Diagnosis Date    Arthritis     Depression     Epilepsy ([address]     Hypertension     Ill-defined condition     neurofibromatosis    Thyroid disease        Past Surgical History:   Procedure Laterality Date    HX GYN      HX ORTHOPAEDIC      NEUROLOGICAL PROCEDURE UNLISTED         Family History   Problem Relation Age of Onset    Cancer Mother         glioblastoma    Lung Disease Father     Cancer Father         mesothelioma, testicular cancer       Social History     Socioeconomic History    Marital status: UNKNOWN     Spouse name: Not on file    Number of children: Not on file    Years of education: Not on file    Highest education level: Associate degree: occupational, technical, or vocational program   Occupational History    Not on file   Tobacco Use    Smoking status: Never Smoker    Smokeless tobacco: Never Used   Vaping Use    Vaping Use: Former    Substances: CBD (only for 2 months)   Substance and Sexual Activity    Alcohol use: No    Drug use: Never    Sexual activity: Not on file   Other Topics Concern     Service Not Asked    Blood Transfusions Not Asked    Caffeine Concern Not Asked    Occupational Exposure Not Asked    Hobby Hazards Not Asked    Sleep Concern Not Asked    Stress Concern Not Asked    Weight Concern Not Asked    Special Diet Not Asked    Back Care Not Asked    Exercise Not Asked    Bike Helmet Not Asked   [address] Not Asked    Self-Exams Not Asked   Social History Narrative    Not on file     Social Determinants of Health     Financial Resource Strain:     Difficulty of Paying Living Expenses: Not on file   Food Insecurity:     Worried About Running Out of Food in the Last Year: Not on file    Migue of Food in the Last Year: Not on file   Transportation Needs:     Lack of Transportation (Medical): Not on file    Lack of Transportation (Non-Medical):  Not on file   Physical Activity:     Days of Exercise per Week: Not on file    Minutes of Exercise per Session: Not on file   Stress:     Feeling of Stress : Not on file   Social Connections:     Frequency of Communication with Friends and Family: Not on file    Frequency of Social Gatherings with Friends and Family: Not on file    Attends Alevism Services: Not on file    Active Member of Clubs or Organizations: Not on file    Attends Club or Organization Meetings: Not on file    Marital Status: Not on file   Intimate Partner Violence:     Fear of Current or Ex-Partner: Not on file    Emotionally Abused: Not on file    Physically Abused: Not on file    Sexually Abused: Not on file   Housing Stability:     Unable to Pay for Housing in the Last Year: Not on file    Number of [place] in the Last Year: Not on file    Unstable Housing in the Last Year: Not on file       Allergies   Allergen Reactions    Codeine Nausea and Vomiting    Methadone Hives    Morphine Other (comments)     psychosis       Current Outpatient Medications   Medication Sig    atorvastatin (LIPITOR) 40 mg tablet Take 40 mg by mouth daily.  Biotin 2,500 mcg cap Take 1 Capsule by mouth.  docusate sodium (COLACE) 100 mg capsule Take 100 mg by mouth two (2) times a day. Vergia Miser ER 36 mg capsule Take 1 Capsule by mouth two (2) times a day.  gabapentin (NEURONTIN) 800 mg tablet Take 800 mg by mouth three (3) times daily.  famotidine (Pepcid) 20 mg tablet Take 20 mg by mouth two (2) times a day.  celecoxib (CELEBREX) 200 mg capsule Take 100 mg by mouth two (2) times a day.  aspirin 81 mg chewable tablet Take 81 mg by mouth daily.  cholecalciferol (Vitamin D3) 25 mcg (1,000 unit) cap Take  by mouth daily.  carBAMazepine ER (CARBATROL ER) 300 mg capsule Take 300 mg by mouth two (2) times a day.  carvedilol (COREG) 6.25 mg tablet Take  by mouth two (2) times daily (with meals).  levothyroxine (SYNTHROID) 125 mcg tablet Take  by mouth Daily (before breakfast).  busPIRone (BUSPAR) 30 mg tablet Take 30 mg by mouth daily.  venlafaxine-SR (EFFEXOR XR) 150 mg capsule Take  by mouth daily.  oxyCODONE IR (OXY-IR) 30 mg immediate release tablet Take 1 Tab by mouth every six (6) hours as needed for Pain. Max Daily Amount: 120 mg.    magnesium oxide (MAG-OX) 400 mg tablet Take 1 Tablet by mouth daily. (Patient not taking: Reported on 11/3/2021)    simvastatin (ZOCOR) 40 mg tablet Take  by mouth nightly. (Patient not taking: Reported on 11/3/2021)     No current facility-administered medications for this visit. Review of Systems   Constitutional: Positive for malaise/fatigue. HENT: Positive for hearing loss, sinus pain and tinnitus. Eyes: Negative. Respiratory: Negative.     Cardiovascular: Negative. Gastrointestinal: Positive for constipation. Genitourinary: Negative. Musculoskeletal: Positive for back pain, joint pain and myalgias. Skin: Negative. Neurological: Positive for tingling. Neurofibromatosis   Endo/Heme/Allergies:        Hypothyroidism   Psychiatric/Behavioral: Positive for depression and memory loss. The patient is nervous/anxious and has insomnia. /64 (BP 1 Location: Left arm, BP Patient Position: Sitting)   Pulse 84   Temp 98.4 °F (36.9 °C) (Temporal)   Resp 16   Ht 5' 1\" (1.549 m)   Wt 137 lb 3.2 oz (62.2 kg)   SpO2 97%   BMI 25.92 kg/m²     Physical Exam  Constitutional:       General: She is not in acute distress. Appearance: She is not ill-appearing. HENT:      Head: Normocephalic and atraumatic. Cardiovascular:      Rate and Rhythm: Regular rhythm. Heart sounds: Normal heart sounds. Pulmonary:      Breath sounds: Normal breath sounds. Abdominal:      General: There is no distension. Palpations: Abdomen is soft. There is no mass. Tenderness: There is no abdominal tenderness. Musculoskeletal:         General: Normal range of motion. Cervical back: Normal range of motion and neck supple. Skin:     General: Skin is warm and dry. Neurological:      Mental Status: She is alert. Mental status is at baseline. Psychiatric:         Mood and Affect: Mood normal.         Thought Content:  Thought content normal.         Judgment: Judgment normal.         Problem List Items Addressed This Visit        Digestive    Calculus of gallbladder without cholecystitis without obstruction    Relevant Medications    docusate sodium (COLACE) 100 mg capsule       Respiratory    Aspiration pneumonia (HCC)      Other Visit Diagnoses     Influenza and pneumonia    -  Primary    Relevant Orders    XR CHEST PA LAT (Completed)          Assessment and Plan:  Told the patient that I like to send her for repeat chest x-ray just to make sure her aspiration pneumonia is resolved. In addition for constipation she has been taking Linzess and docusate twice daily and senna 2 tabs each p.m. and magnesium oxide. She states that with this she was having very loose watery diarrhea. She has then stopped medium oxide and senna and been taking only docusate and Linzess and she has had no bowel movement for 5 days. I recommended that she start the senna 2 tablets each evening with MiraLAX 1 capful in a glass of water each p.m. She will call me in 2 days to let me know how this is working out.  In terms of gallbladder at this point I will defer on surgery she is having any symptoms          Timm Olszewski, MD

## 2021-11-03 NOTE — PROGRESS NOTES
Chief Complaint   Patient presents with   [address] Follow up Constipation      Visit Vitals  /64 (BP 1 Location: Left arm, BP Patient Position: Sitting)   Pulse 84   Temp 98.4 °F (36.9 °C) (Temporal)   Resp 16   Ht 5' 1\" (1.549 m)   Wt 137 lb 3.2 oz (62.2 kg)   SpO2 97%   BMI 25.92 kg/m²

## 2021-11-08 ENCOUNTER — TELEPHONE (OUTPATIENT)
Dept: SURGERY | Age: 61
End: 2021-11-08

## 2021-11-08 NOTE — TELEPHONE ENCOUNTER
Patients daughter called stated was calling to give the nurse an update on the patient.  Please call Amna Magana [phone]

## 2021-11-08 NOTE — TELEPHONE ENCOUNTER
Spoke with Daughter, Ms. Albert switched to Mirlax and Senna on Thursday November 3rd. She still has not had a bowl movement. She would also like to know the results of her Chest Xray. I informed Chelle Fountain that I will call  to make sure it is okay to give her that info and also what he would like to do about the Constipation.

## 2021-11-08 NOTE — TELEPHONE ENCOUNTER
Spoke with Tim Moran he informed me to let the daughter know that she needs to start the [address] [place] [address] again. Tried to call daughter back but went to voicemail.

## 2022-03-01 ENCOUNTER — TRANSCRIBE ORDER (OUTPATIENT)
Dept: SCHEDULING | Age: 62
End: 2022-03-01

## 2022-03-01 DIAGNOSIS — G89.29 CHRONIC HIP PAIN: ICD-10-CM

## 2022-03-01 DIAGNOSIS — Q85.01 NEUROFIBROMATOSIS, TYPE 1 (HCC): Primary | ICD-10-CM

## 2022-03-01 DIAGNOSIS — M25.559 CHRONIC HIP PAIN: ICD-10-CM

## 2022-03-01 DIAGNOSIS — M25.551 RIGHT HIP PAIN: ICD-10-CM

## 2022-03-10 NOTE — PROGRESS NOTES
Spoke with Dr. Eliz Mcdaniels about starting tube feed for nutrition. MD said to hold off. Will re-evaluate initiation of tube feed tomorrow. Yes

## 2022-03-19 PROBLEM — R10.9 ABDOMINAL PAIN: Status: ACTIVE | Noted: 2021-09-23

## 2022-03-19 PROBLEM — G89.4 CHRONIC PAIN SYNDROME: Status: ACTIVE | Noted: 2017-08-10

## 2022-03-19 PROBLEM — Q85.00 NEUROFIBROMATOSIS (HCC): Status: ACTIVE | Noted: 2017-08-10

## 2022-03-19 PROBLEM — J69.0 ASPIRATION PNEUMONIA (HCC): Status: ACTIVE | Noted: 2021-11-03

## 2022-03-20 PROBLEM — K80.20 CALCULUS OF GALLBLADDER WITHOUT CHOLECYSTITIS WITHOUT OBSTRUCTION: Status: ACTIVE | Noted: 2021-11-03

## 2022-03-28 ENCOUNTER — APPOINTMENT (OUTPATIENT)
Dept: GENERAL RADIOLOGY | Age: 62
DRG: 419 | End: 2022-03-28
Attending: EMERGENCY MEDICINE
Payer: MEDICARE

## 2022-03-28 ENCOUNTER — HOSPITAL ENCOUNTER (INPATIENT)
Age: 62
LOS: 4 days | Discharge: HOME HEALTH | DRG: 419 | End: 2022-04-03
Attending: EMERGENCY MEDICINE | Admitting: FAMILY MEDICINE
Payer: MEDICARE

## 2022-03-28 ENCOUNTER — APPOINTMENT (OUTPATIENT)
Dept: CT IMAGING | Age: 62
DRG: 419 | End: 2022-03-28
Attending: EMERGENCY MEDICINE
Payer: MEDICARE

## 2022-03-28 ENCOUNTER — APPOINTMENT (OUTPATIENT)
Dept: CT IMAGING | Age: 62
DRG: 419 | End: 2022-03-28
Attending: STUDENT IN AN ORGANIZED HEALTH CARE EDUCATION/TRAINING PROGRAM
Payer: MEDICARE

## 2022-03-28 ENCOUNTER — APPOINTMENT (OUTPATIENT)
Dept: ULTRASOUND IMAGING | Age: 62
DRG: 419 | End: 2022-03-28
Attending: EMERGENCY MEDICINE
Payer: MEDICARE

## 2022-03-28 DIAGNOSIS — R07.9 CHEST PAIN, UNSPECIFIED TYPE: Primary | ICD-10-CM

## 2022-03-28 DIAGNOSIS — K80.20 CALCULUS OF GALLBLADDER WITHOUT CHOLECYSTITIS WITHOUT OBSTRUCTION: ICD-10-CM

## 2022-03-28 LAB
ALBUMIN SERPL-MCNC: 3.6 G/DL (ref 3.5–5)
ALBUMIN/GLOB SERPL: 1.3 {RATIO} (ref 1.1–2.2)
ALP SERPL-CCNC: 127 U/L (ref 45–117)
ALT SERPL-CCNC: 30 U/L (ref 12–78)
ANION GAP SERPL CALC-SCNC: 4 MMOL/L (ref 5–15)
AST SERPL W P-5'-P-CCNC: 20 U/L (ref 15–37)
ATRIAL RATE: 76 BPM
BASOPHILS # BLD: 0.1 K/UL (ref 0–0.1)
BASOPHILS NFR BLD: 1 % (ref 0–1)
BILIRUB SERPL-MCNC: 0.3 MG/DL (ref 0.2–1)
BUN SERPL-MCNC: 13 MG/DL (ref 6–20)
BUN/CREAT SERPL: 22 (ref 12–20)
CA-I BLD-MCNC: 8.7 MG/DL (ref 8.5–10.1)
CALCULATED R AXIS, ECG10: -60 DEGREES
CALCULATED T AXIS, ECG11: 73 DEGREES
CHLORIDE SERPL-SCNC: 104 MMOL/L (ref 97–108)
CO2 SERPL-SCNC: 29 MMOL/L (ref 21–32)
CREAT SERPL-MCNC: 0.59 MG/DL (ref 0.55–1.02)
DIAGNOSIS, 93000: NORMAL
DIFFERENTIAL METHOD BLD: NORMAL
EOSINOPHIL # BLD: 0.2 K/UL (ref 0–0.4)
EOSINOPHIL NFR BLD: 3 % (ref 0–7)
ERYTHROCYTE [DISTWIDTH] IN BLOOD BY AUTOMATED COUNT: 13.7 % (ref 11.5–14.5)
GLOBULIN SER CALC-MCNC: 2.7 G/DL (ref 2–4)
GLUCOSE SERPL-MCNC: 75 MG/DL (ref 65–100)
HCT VFR BLD AUTO: 36.3 % (ref 35–47)
HGB BLD-MCNC: 12 G/DL (ref 11.5–16)
IMM GRANULOCYTES # BLD AUTO: 0 K/UL (ref 0–0.04)
IMM GRANULOCYTES NFR BLD AUTO: 0 % (ref 0–0.5)
LIPASE SERPL-CCNC: 56 U/L (ref 73–393)
LYMPHOCYTES # BLD: 1.3 K/UL (ref 0.8–3.5)
LYMPHOCYTES NFR BLD: 19 % (ref 12–49)
MCH RBC QN AUTO: 27.8 PG (ref 26–34)
MCHC RBC AUTO-ENTMCNC: 33.1 G/DL (ref 30–36.5)
MCV RBC AUTO: 84 FL (ref 80–99)
MONOCYTES # BLD: 0.7 K/UL (ref 0–1)
MONOCYTES NFR BLD: 10 % (ref 5–13)
NEUTS SEG # BLD: 4.8 K/UL (ref 1.8–8)
NEUTS SEG NFR BLD: 67 % (ref 32–75)
NRBC # BLD: 0 K/UL (ref 0–0.01)
NRBC BLD-RTO: 0 PER 100 WBC
PLATELET # BLD AUTO: 342 K/UL (ref 150–400)
PMV BLD AUTO: 9.4 FL (ref 8.9–12.9)
POTASSIUM SERPL-SCNC: 3.5 MMOL/L (ref 3.5–5.1)
PROT SERPL-MCNC: 6.3 G/DL (ref 6.4–8.2)
Q-T INTERVAL, ECG07: 376 MS
QRS DURATION, ECG06: 70 MS
QTC CALCULATION (BEZET), ECG08: 425 MS
RBC # BLD AUTO: 4.32 M/UL (ref 3.8–5.2)
SARS-COV-2, COV2: NORMAL
SODIUM SERPL-SCNC: 137 MMOL/L (ref 136–145)
TROPONIN-HIGH SENSITIVITY: 7 NG/L (ref 0–51)
VENTRICULAR RATE, ECG03: 77 BPM
WBC # BLD AUTO: 7.1 K/UL (ref 3.6–11)

## 2022-03-28 PROCEDURE — 84484 ASSAY OF TROPONIN QUANT: CPT

## 2022-03-28 PROCEDURE — 85025 COMPLETE CBC W/AUTO DIFF WBC: CPT

## 2022-03-28 PROCEDURE — 36415 COLL VENOUS BLD VENIPUNCTURE: CPT

## 2022-03-28 PROCEDURE — 74176 CT ABD & PELVIS W/O CONTRAST: CPT

## 2022-03-28 PROCEDURE — 74011250637 HC RX REV CODE- 250/637: Performed by: EMERGENCY MEDICINE

## 2022-03-28 PROCEDURE — 71045 X-RAY EXAM CHEST 1 VIEW: CPT

## 2022-03-28 PROCEDURE — G0378 HOSPITAL OBSERVATION PER HR: HCPCS

## 2022-03-28 PROCEDURE — 87636 SARSCOV2 & INF A&B AMP PRB: CPT

## 2022-03-28 PROCEDURE — 74011000636 HC RX REV CODE- 636: Performed by: EMERGENCY MEDICINE

## 2022-03-28 PROCEDURE — 83690 ASSAY OF LIPASE: CPT

## 2022-03-28 PROCEDURE — 80053 COMPREHEN METABOLIC PANEL: CPT

## 2022-03-28 PROCEDURE — 93005 ELECTROCARDIOGRAM TRACING: CPT

## 2022-03-28 PROCEDURE — 96375 TX/PRO/DX INJ NEW DRUG ADDON: CPT

## 2022-03-28 PROCEDURE — 99285 EMERGENCY DEPT VISIT HI MDM: CPT

## 2022-03-28 PROCEDURE — 74011000258 HC RX REV CODE- 258: Performed by: EMERGENCY MEDICINE

## 2022-03-28 PROCEDURE — 96374 THER/PROPH/DIAG INJ IV PUSH: CPT

## 2022-03-28 PROCEDURE — 71275 CT ANGIOGRAPHY CHEST: CPT

## 2022-03-28 PROCEDURE — 74011250636 HC RX REV CODE- 250/636: Performed by: EMERGENCY MEDICINE

## 2022-03-28 PROCEDURE — 76705 ECHO EXAM OF ABDOMEN: CPT

## 2022-03-28 RX ORDER — BUSPIRONE HYDROCHLORIDE 10 MG/1
30 TABLET ORAL 2 TIMES DAILY
Status: DISCONTINUED | OUTPATIENT
Start: 2022-03-29 | End: 2022-04-03 | Stop reason: HOSPADM

## 2022-03-28 RX ORDER — HYDROMORPHONE HYDROCHLORIDE 1 MG/ML
1 INJECTION, SOLUTION INTRAMUSCULAR; INTRAVENOUS; SUBCUTANEOUS ONCE
Status: COMPLETED | OUTPATIENT
Start: 2022-03-28 | End: 2022-03-28

## 2022-03-28 RX ORDER — ATORVASTATIN CALCIUM 40 MG/1
40 TABLET, FILM COATED ORAL DAILY
Status: DISCONTINUED | OUTPATIENT
Start: 2022-03-29 | End: 2022-04-03 | Stop reason: HOSPADM

## 2022-03-28 RX ORDER — LORATADINE 10 MG/1
10 TABLET ORAL
COMMUNITY

## 2022-03-28 RX ORDER — VENLAFAXINE HYDROCHLORIDE 75 MG/1
150 CAPSULE, EXTENDED RELEASE ORAL DAILY
Status: DISCONTINUED | OUTPATIENT
Start: 2022-03-29 | End: 2022-04-03 | Stop reason: HOSPADM

## 2022-03-28 RX ORDER — CARBAMAZEPINE 300 MG/1
300 CAPSULE, EXTENDED RELEASE ORAL 2 TIMES DAILY
Status: DISCONTINUED | OUTPATIENT
Start: 2022-03-29 | End: 2022-04-03 | Stop reason: HOSPADM

## 2022-03-28 RX ORDER — FAMOTIDINE 20 MG/1
20 TABLET, FILM COATED ORAL 2 TIMES DAILY
Status: DISCONTINUED | OUTPATIENT
Start: 2022-03-29 | End: 2022-04-03 | Stop reason: HOSPADM

## 2022-03-28 RX ORDER — GUAIFENESIN 100 MG/5ML
81 LIQUID (ML) ORAL DAILY
Status: DISCONTINUED | OUTPATIENT
Start: 2022-03-29 | End: 2022-03-28

## 2022-03-28 RX ORDER — AMOXICILLIN 250 MG
2 CAPSULE ORAL DAILY
COMMUNITY

## 2022-03-28 RX ORDER — OXYCODONE AND ACETAMINOPHEN 7.5; 325 MG/1; MG/1
1 TABLET ORAL
COMMUNITY

## 2022-03-28 RX ORDER — CARVEDILOL 3.12 MG/1
3.12 TABLET ORAL 2 TIMES DAILY WITH MEALS
Status: DISCONTINUED | OUTPATIENT
Start: 2022-03-29 | End: 2022-04-03 | Stop reason: HOSPADM

## 2022-03-28 RX ORDER — ASPIRIN 325 MG
325 TABLET ORAL
Status: COMPLETED | OUTPATIENT
Start: 2022-03-28 | End: 2022-03-28

## 2022-03-28 RX ORDER — OXYCODONE AND ACETAMINOPHEN 5; 325 MG/1; MG/1
1 TABLET ORAL
Status: COMPLETED | OUTPATIENT
Start: 2022-03-28 | End: 2022-03-28

## 2022-03-28 RX ORDER — DICLOFENAC SODIUM 10 MG/G
4 GEL TOPICAL 4 TIMES DAILY
COMMUNITY

## 2022-03-28 RX ORDER — DICLOFENAC EPOLAMINE 0.01 G/1
1 PATCH TOPICAL EVERY 12 HOURS
COMMUNITY

## 2022-03-28 RX ORDER — SODIUM CHLORIDE, SODIUM LACTATE, POTASSIUM CHLORIDE, CALCIUM CHLORIDE 600; 310; 30; 20 MG/100ML; MG/100ML; MG/100ML; MG/100ML
100 INJECTION, SOLUTION INTRAVENOUS CONTINUOUS
Status: DISCONTINUED | OUTPATIENT
Start: 2022-03-28 | End: 2022-04-01

## 2022-03-28 RX ORDER — SODIUM CHLORIDE 9 MG/ML
50 INJECTION, SOLUTION INTRAVENOUS CONTINUOUS
Status: DISCONTINUED | OUTPATIENT
Start: 2022-03-28 | End: 2022-04-03 | Stop reason: HOSPADM

## 2022-03-28 RX ADMIN — IOPAMIDOL 100 ML: 755 INJECTION, SOLUTION INTRAVENOUS at 19:51

## 2022-03-28 RX ADMIN — SODIUM CHLORIDE 75 ML/HR: 9 INJECTION, SOLUTION INTRAVENOUS at 21:33

## 2022-03-28 RX ADMIN — HYDROMORPHONE HYDROCHLORIDE 1 MG: 1 INJECTION, SOLUTION INTRAMUSCULAR; INTRAVENOUS; SUBCUTANEOUS at 21:45

## 2022-03-28 RX ADMIN — ASPIRIN 325 MG ORAL TABLET 325 MG: 325 PILL ORAL at 21:57

## 2022-03-28 RX ADMIN — PIPERACILLIN AND TAZOBACTAM 3.38 G: 3; .375 INJECTION, POWDER, LYOPHILIZED, FOR SOLUTION INTRAVENOUS at 21:57

## 2022-03-28 RX ADMIN — SODIUM CHLORIDE 1000 ML: 9 INJECTION, SOLUTION INTRAVENOUS at 21:56

## 2022-03-28 RX ADMIN — OXYCODONE HYDROCHLORIDE AND ACETAMINOPHEN 1 TABLET: 5; 325 TABLET ORAL at 20:35

## 2022-03-28 NOTE — Clinical Note
Patient Class[de-identified] OBSERVATION [104]   Type of Bed: Telemetry [19]   Cardiac Monitoring Required?: Yes   Reason for Observation: chest pain   Admitting Diagnosis: Chest pain [[id number]]   Admitting Physician: Romario Jones [[id number]]   Attending Physician: Romario Jones [[id number]]

## 2022-03-28 NOTE — ED TRIAGE NOTES
Chest pain and back pain starting this am, pain radiating into chin. Reports pain has worsened throughout the day. Nauseated this am. Denies dizziness/syncope.

## 2022-03-28 NOTE — ED NOTES
Rounded on pt. Rec'd bedside report. Pt alert, oriented to reported baseline, NAD, bed low, locked, call bell within reach, side rails up (x2). Currently denies CP/SOB, reports that it had begun feeling better prior transfer/PTA. Daughter bedside. No further needs expressed at this time.

## 2022-03-28 NOTE — ED PROVIDER NOTES
EMERGENCY DEPARTMENT HISTORY AND PHYSICAL EXAM        Date: 3/28/2022  Patient Name: Manish Ram    History of Presenting Illness     Chief Complaint   Patient presents with    Chest Pain     History Provided By: Patient    HPI: Manish Ram, 59 y.o. female with history of arthritis, depression, epilepsy, hypertension, neurofibromatosis, and thyroid disease who presents with chest pain. States that symptoms started this morning. Pain is in the center of her chest and rates the back. Pain is 9/10, constant, without exacerbating symptoms. Pain has been improving over the day. No fever, cough, or other associated symptoms. Denies leg swelling unilaterally, history of PE/DVT, or pleuritic chest pain. PCP: Henry Santamaria MD    Current Facility-Administered Medications   Medication Dose Route Frequency Provider Last Rate Last Admin    HYDROmorphone (DILAUDID) injection 1 mg  1 mg IntraVENous ONCE Yifan Harp, DO        aspirin tablet 325 mg  325 mg Oral NOW Yifan Harp, DO        0.9% sodium chloride infusion  75 mL/hr IntraVENous CONTINUOUS Wilhemena Jessica C DO 75 mL/hr at 03/28/22 2133 75 mL/hr at 03/28/22 2133    piperacillin-tazobactam (ZOSYN) 3.375 g in 0.9% sodium chloride (MBP/ADV) 100 mL MBP  3.375 g IntraVENous NOW Yifan Harp DO        sodium chloride 0.9 % bolus infusion 1,000 mL  1,000 mL IntraVENous ONCE Toula Gosselin, DO         Current Outpatient Medications   Medication Sig Dispense Refill    linaCLOtide (Linzess) 145 mcg cap capsule Take 145 mcg by mouth Daily (before breakfast).  senna-docusate (PERICOLACE) 8.6-50 mg per tablet Take 2 Tablets by mouth daily.  atorvastatin (LIPITOR) 40 mg tablet Take 40 mg by mouth daily. [place] [facility] 36 mg capsule Take 1 Capsule by mouth two (2) times a day.  gabapentin (NEURONTIN) 800 mg tablet Take 800 mg by mouth three (3) times daily.  famotidine (Pepcid) 20 mg tablet Take 20 mg by mouth two (2) times a day.       celecoxib (CELEBREX) 200 mg capsule Take 100 mg by mouth two (2) times a day.  aspirin 81 mg chewable tablet Take 81 mg by mouth daily.  carBAMazepine ER (CARBATROL ER) 300 mg capsule Take 300 mg by mouth two (2) times a day.  carvedilol (COREG) 6.25 mg tablet Take  by mouth two (2) times daily (with meals).  levothyroxine (SYNTHROID) 125 mcg tablet Take  by mouth Daily (before breakfast).  busPIRone (BUSPAR) 30 mg tablet Take 30 mg by mouth two (2) times a day.  venlafaxine-SR (EFFEXOR XR) 150 mg capsule Take 150 mg by mouth daily.  naloxone 8 mg/actuation spry 4 mg by Nasal route as needed.  oxyCODONE-acetaminophen (Percocet) 7.5-325 mg per tablet Take 1 Tablet by mouth every eight (8) hours as needed for Pain.  loratadine (Claritin) 10 mg tablet Take 10 mg by mouth daily as needed for Allergies.  diclofenac (FLECTOR) 1.3 % pt12 1 Patch by TransDERmal route every twelve (12) hours every twelve (12) hours.  diclofenac (Voltaren) 1 % gel Apply 4 g to affected area four (4) times daily. Past History     Past Medical History:  Past Medical History:   Diagnosis Date    Arthritis     Depression     Epilepsy ([facility] [address]     Hypertension     Ill-defined condition     neurofibromatosis    Thyroid disease        Past Surgical History:  Past Surgical History:   Procedure Laterality Date    HX GYN      HX ORTHOPAEDIC      NEUROLOGICAL PROCEDURE UNLISTED         Family History:  Family History   Problem Relation Age of Onset    Cancer Mother         glioblastoma    Lung Disease Father     Cancer Father         mesothelioma, testicular cancer       Social History:  Social History     Tobacco Use    Smoking status: Never Smoker    Smokeless tobacco: Never Used   Vaping Use    Vaping Use: Former    Substances: CBD (only for 2 months)   Substance Use Topics    Alcohol use: No    Drug use: Never       Allergies:   Allergies   Allergen Reactions    Codeine Nausea and Vomiting    Methadone Hives    Morphine Other (comments)     psychosis         Review of Systems   Review of Systems   Constitutional: Negative for fever. HENT: Negative for congestion. Eyes: Negative for visual disturbance. Respiratory: Negative for shortness of breath. Cardiovascular: Positive for chest pain. Gastrointestinal: Negative for abdominal pain. Genitourinary: Negative for dysuria. Musculoskeletal: Positive for back pain. Negative for arthralgias. Skin: Negative for rash. Neurological: Negative for headaches. Physical Exam   Constitutional: No acute distress. Well-nourished. Skin: No rash. ENT: No rhinorrhea. No cough. Head is normocephalic and atraumatic. Eye: No proptosis or conjunctival injections. Respiratory: No apparent respiratory distress. Lung sounds are clear bilaterally. No wheezing. Gastrointestinal: Nondistended. Nontender abdomen. Negative Linder sign. Musculoskeletal: No obvious bony deformities. Cardio: Regular rate and rhythm. No murmurs. 2+ radial pulses bilaterally.     Diagnostic Study Results     Labs -     Recent Results (from the past 24 hour(s))   EKG, 12 LEAD, INITIAL    Collection Time: 03/28/22  6:19 PM   Result Value Ref Range    Ventricular Rate 77 BPM    Atrial Rate 76 BPM    QRS Duration 70 ms    Q-T Interval 376 ms    QTC Calculation (Bezet) 425 ms    Calculated R Axis -60 degrees    Calculated T Axis 73 degrees    Diagnosis       Normal sinus rhythm  When compared with ECG of 29-SEP-2021 22:33,  No significant change was found  Confirmed by Ector Rosenthal M.D., [facility] ([id number]) on 3/28/2022 6:44:29 PM     CBC WITH AUTOMATED DIFF    Collection Time: 03/28/22  6:21 PM   Result Value Ref Range    WBC 7.1 3.6 - 11.0 K/uL    RBC 4.32 3.80 - 5.20 M/uL    HGB 12.0 11.5 - 16.0 g/dL    HCT 36.3 35.0 - 47.0 %    MCV 84.0 80.0 - 99.0 FL    MCH 27.8 26.0 - 34.0 PG    MCHC 33.1 30.0 - 36.5 g/dL    RDW 13.7 11.5 - 14.5 %    PLATELET 550 575 - 400 K/uL    MPV 9.4 8.9 - 12.9 FL    NRBC 0.0 0.0  WBC    ABSOLUTE NRBC 0.00 0.00 - 0.01 K/uL    NEUTROPHILS 67 32 - 75 %    LYMPHOCYTES 19 12 - 49 %    MONOCYTES 10 5 - 13 %    EOSINOPHILS 3 0 - 7 %    BASOPHILS 1 0 - 1 %    IMMATURE GRANULOCYTES 0 0 - 0.5 %    ABS. NEUTROPHILS 4.8 1.8 - 8.0 K/UL    ABS. LYMPHOCYTES 1.3 0.8 - 3.5 K/UL    ABS. MONOCYTES 0.7 0.0 - 1.0 K/UL    ABS. EOSINOPHILS 0.2 0.0 - 0.4 K/UL    ABS. BASOPHILS 0.1 0.0 - 0.1 K/UL    ABS. IMM. GRANS. 0.0 0.00 - 0.04 K/UL    DF AUTOMATED     METABOLIC PANEL, COMPREHENSIVE    Collection Time: 03/28/22  6:21 PM   Result Value Ref Range    Sodium 137 136 - 145 mmol/L    Potassium 3.5 3.5 - 5.1 mmol/L    Chloride 104 97 - 108 mmol/L    CO2 29 21 - 32 mmol/L    Anion gap 4 (L) 5 - 15 mmol/L    Glucose 75 65 - 100 mg/dL    BUN 13 6 - 20 mg/dL    Creatinine 0.59 0.55 - 1.02 mg/dL    BUN/Creatinine ratio 22 (H) 12 - 20      GFR est AA >60 >60 ml/min/1.73m2    GFR est non-AA >60 >60 ml/min/1.73m2    Calcium 8.7 8.5 - 10.1 mg/dL    Bilirubin, total 0.3 0.2 - 1.0 mg/dL    AST (SGOT) 20 15 - 37 U/L    ALT (SGPT) 30 12 - 78 U/L    Alk. phosphatase 127 (H) 45 - 117 U/L    Protein, total 6.3 (L) 6.4 - 8.2 g/dL    Albumin 3.6 3.5 - 5.0 g/dL    Globulin 2.7 2.0 - 4.0 g/dL    A-G Ratio 1.3 1.1 - 2.2     TROPONIN-HIGH SENSITIVITY    Collection Time: 03/28/22  6:21 PM   Result Value Ref Range    Troponin-High Sensitivity 7 0 - 51 ng/L       Radiologic Studies -   CTA CHEST W OR W WO CONT   Final Result   1. No pulmonary embolus. 2. Interval increase of peribronchial and perivascular soft tissue thickening   possibly lymphoid or neurofibromatosis in origin. Unchanged left lung   bronchovascular nodule, posterior mediastinal and retroperitoneal abnormal soft   tissue. 3. Bilateral hazy airspace edema or pneumonia. 4. Haziness of the gallbladder. Correlate clinically for cholecystitis. XR CHEST PORT   Final Result   No acute process or active disease. US GALLBLADDER    (Results Pending)     CT Results  (Last 48 hours)               03/28/22 1951  CTA CHEST W OR W WO CONT Final result    Impression:  1. No pulmonary embolus. 2. Interval increase of peribronchial and perivascular soft tissue thickening   possibly lymphoid or neurofibromatosis in origin. Unchanged left lung   bronchovascular nodule, posterior mediastinal and retroperitoneal abnormal soft   tissue. 3. Bilateral hazy airspace edema or pneumonia. 4. Haziness of the gallbladder. Correlate clinically for cholecystitis. Narrative: Additional history from chest x-ray requested chest pain. Additional history   from prior chest CT request ER physician reports patient has neurofibromatosis. Comparison chest x-ray 3/28/2022 and chest CTA 9/22/2021. Chest CTA Technique: Axial chest with IV contrast. Multiplanar chest   reformatting and chest MIPs. 100 cc Isovue 370 administered IV. Dose reduction: All CT scans at this facility are performed using dose reduction   optimization techniques as appropriate to a performed exam including the   following: Automated exposure control, adjustments of the mA and/or kV according   to patient's size, or use of iterative reconstruction technique. FINDINGS: Normal heart size. Trace pericardial fluid. Thoracic aorta without   aneurysm or dissection. No pulmonary arterial filling defect. Thoracic esophagus   is collapsed. Hazy bilateral pulmonary airspace disease and bronchial thickening   is present. There is also increased abnormal soft tissue about the central   bronchovascular tree,; and unchanged soft tissue posterior mediastinum, and   middle mediastinum. A relatively central nodule is present in the left   bronchovascular bundle 11.0 x 0.8 cm series 406 image 71, unchanged. Major   endobronchial tree clear. Trace bilateral pleural effusions and/or pleural   thickening unchanged. No pneumothorax. No axillary adenopathy.  Included imaging   of the upper abdomen demonstrates haziness of the gallbladder wall. Unchanged   abnormal retroperitoneal soft tissue. Degenerative changes bony structures. CXR Results  (Last 48 hours)               03/28/22 1835  XR CHEST PORT Final result    Impression:  No acute process or active disease. Narrative:  A single portable view of the chest is compared to a prior exam from 11/3/2021. The heart, mediastinum and pulmonary vasculature are normal. The lungs are well   expanded and clear. The bony thorax is intact. When compared to the prior exam,   there is no significant change. Medical Decision Making and ED Course     I reviewed the available vital signs, nursing notes, past medical history, past surgical history, family history, and social history. Vital Signs - Reviewed the patient's vital signs. Patient Vitals for the past 12 hrs:   Temp Pulse Resp BP SpO2   03/28/22 2036 -- 70 13 122/74 98 %   03/28/22 2028 -- 71 -- -- 99 %   03/28/22 2000 -- 71 17 129/62 --   03/28/22 1823 -- -- -- 128/69 --   03/28/22 1821 97.5 °F (36.4 °C) 78 18 -- 98 %       EKG interpretation: Obtained on 3/28/2022 1819. PWUQ [id number] by myself. Appears to likely be normal sinus rhythm however P waves are difficult to distinguish. Rate is 77 bpm.  QRS duration 70 ms. QTc 425 ms. Normal axis. No ST segment abnormalities. Medical Decision Making:   Presented with chest pain. The differential diagnosis is ACS, atypical chest pain, costochondritis, pleurisy, acute cholecystitis, pulmonary embolism. Work-up so far is negative. CTA of the chest, right upper quadrant ultrasound, and lipase are currently pending. I discussed this with the overnight ED physician who will follow up on the studies. I do however feel this patient should be admitted for her chest pain. She was given 325 mg aspirin. Discussed this case with Dr. Craig Mcdonald who will accept.          Disposition     Discharged home    Diagnosis     Clinical impression:   1. Chest pain, unspecified type           Attestation:  Please note that this dictation was completed with [employer], the computer voice recognition software. Quite often unanticipated grammatical, syntax, homophones, and other interpretive errors are inadvertently transcribed by the computer software. Please disregard these errors. Please excuse any errors that have escaped final proofreading. Thank you.   Tiffany Kline, DO

## 2022-03-29 ENCOUNTER — APPOINTMENT (OUTPATIENT)
Dept: MRI IMAGING | Age: 62
DRG: 419 | End: 2022-03-29
Attending: SURGERY
Payer: MEDICARE

## 2022-03-29 ENCOUNTER — APPOINTMENT (OUTPATIENT)
Dept: NUCLEAR MEDICINE | Age: 62
DRG: 419 | End: 2022-03-29
Attending: FAMILY MEDICINE
Payer: MEDICARE

## 2022-03-29 LAB
FLUAV RNA SPEC QL NAA+PROBE: NOT DETECTED
FLUBV RNA SPEC QL NAA+PROBE: NOT DETECTED
SARS-COV-2, COV2: NOT DETECTED
TROPONIN-HIGH SENSITIVITY: 5 NG/L (ref 0–51)

## 2022-03-29 PROCEDURE — 36415 COLL VENOUS BLD VENIPUNCTURE: CPT

## 2022-03-29 PROCEDURE — G0378 HOSPITAL OBSERVATION PER HR: HCPCS

## 2022-03-29 PROCEDURE — 96375 TX/PRO/DX INJ NEW DRUG ADDON: CPT

## 2022-03-29 PROCEDURE — 84484 ASSAY OF TROPONIN QUANT: CPT

## 2022-03-29 PROCEDURE — 74011250636 HC RX REV CODE- 250/636: Performed by: SURGERY

## 2022-03-29 PROCEDURE — A9537 TC99M MEBROFENIN: HCPCS

## 2022-03-29 PROCEDURE — 96376 TX/PRO/DX INJ SAME DRUG ADON: CPT

## 2022-03-29 PROCEDURE — 74011250637 HC RX REV CODE- 250/637: Performed by: FAMILY MEDICINE

## 2022-03-29 PROCEDURE — 74011250636 HC RX REV CODE- 250/636: Performed by: FAMILY MEDICINE

## 2022-03-29 PROCEDURE — 74011000258 HC RX REV CODE- 258: Performed by: FAMILY MEDICINE

## 2022-03-29 RX ORDER — ERGOCALCIFEROL 1.25 MG/1
50000 CAPSULE ORAL
COMMUNITY

## 2022-03-29 RX ORDER — HYDROMORPHONE HYDROCHLORIDE 1 MG/ML
1 INJECTION, SOLUTION INTRAMUSCULAR; INTRAVENOUS; SUBCUTANEOUS ONCE
Status: COMPLETED | OUTPATIENT
Start: 2022-03-29 | End: 2022-03-29

## 2022-03-29 RX ORDER — HYDROMORPHONE HYDROCHLORIDE 1 MG/ML
1 INJECTION, SOLUTION INTRAMUSCULAR; INTRAVENOUS; SUBCUTANEOUS
Status: DISCONTINUED | OUTPATIENT
Start: 2022-03-29 | End: 2022-03-29

## 2022-03-29 RX ORDER — KIT FOR THE PREPARATION OF TECHNETIUM TC 99M MEBROFENIN 45 MG/10ML
7.1 INJECTION, POWDER, LYOPHILIZED, FOR SOLUTION INTRAVENOUS
Status: COMPLETED | OUTPATIENT
Start: 2022-03-29 | End: 2022-03-29

## 2022-03-29 RX ORDER — GABAPENTIN 400 MG/1
400 CAPSULE ORAL 3 TIMES DAILY
Status: DISCONTINUED | OUTPATIENT
Start: 2022-03-29 | End: 2022-04-03 | Stop reason: HOSPADM

## 2022-03-29 RX ORDER — HYDROMORPHONE HYDROCHLORIDE 1 MG/ML
0.5 INJECTION, SOLUTION INTRAMUSCULAR; INTRAVENOUS; SUBCUTANEOUS
Status: DISCONTINUED | OUTPATIENT
Start: 2022-03-29 | End: 2022-03-30

## 2022-03-29 RX ORDER — VENLAFAXINE 75 MG/1
75 TABLET ORAL DAILY
COMMUNITY

## 2022-03-29 RX ADMIN — HYDROMORPHONE HYDROCHLORIDE 1 MG: 1 INJECTION, SOLUTION INTRAMUSCULAR; INTRAVENOUS; SUBCUTANEOUS at 15:45

## 2022-03-29 RX ADMIN — SODIUM CHLORIDE 75 ML/HR: 9 INJECTION, SOLUTION INTRAVENOUS at 05:15

## 2022-03-29 RX ADMIN — HYDROMORPHONE HYDROCHLORIDE 1 MG: 1 INJECTION, SOLUTION INTRAMUSCULAR; INTRAVENOUS; SUBCUTANEOUS at 10:09

## 2022-03-29 RX ADMIN — GABAPENTIN 400 MG: 400 CAPSULE ORAL at 21:44

## 2022-03-29 RX ADMIN — PIPERACILLIN AND TAZOBACTAM 3.38 G: 3; .375 INJECTION, POWDER, LYOPHILIZED, FOR SOLUTION INTRAVENOUS at 12:44

## 2022-03-29 RX ADMIN — HYDROMORPHONE HYDROCHLORIDE 0.5 MG: 1 INJECTION, SOLUTION INTRAMUSCULAR; INTRAVENOUS; SUBCUTANEOUS at 23:34

## 2022-03-29 RX ADMIN — PIPERACILLIN AND TAZOBACTAM 3.38 G: 3; .375 INJECTION, POWDER, LYOPHILIZED, FOR SOLUTION INTRAVENOUS at 21:44

## 2022-03-29 RX ADMIN — FAMOTIDINE 20 MG: 20 TABLET, FILM COATED ORAL at 21:53

## 2022-03-29 RX ADMIN — KIT FOR THE PREPARATION OF TECHNETIUM TC 99M MEBROFENIN 7.1 MILLICURIE: 45 INJECTION, POWDER, LYOPHILIZED, FOR SOLUTION INTRAVENOUS at 13:45

## 2022-03-29 RX ADMIN — HYDROMORPHONE HYDROCHLORIDE 1 MG: 1 INJECTION, SOLUTION INTRAMUSCULAR; INTRAVENOUS; SUBCUTANEOUS at 19:53

## 2022-03-29 RX ADMIN — PIPERACILLIN AND TAZOBACTAM 3.38 G: 3; .375 INJECTION, POWDER, LYOPHILIZED, FOR SOLUTION INTRAVENOUS at 05:15

## 2022-03-29 RX ADMIN — BUSPIRONE HYDROCHLORIDE 30 MG: 10 TABLET ORAL at 21:44

## 2022-03-29 NOTE — PROGRESS NOTES
Problem: Falls - Risk of  Goal: *Absence of Falls  Description: Document Joseph Ray Fall Risk and appropriate interventions in the flowsheet.   Outcome: Progressing Towards Goal  Note: Fall Risk Interventions:  Mobility Interventions: Bed/chair exit alarm,Patient to call before getting OOB         Medication Interventions: Bed/chair exit alarm,Patient to call before getting OOB    Elimination Interventions: Bed/chair exit alarm,Call light in reach,Patient to call for help with toileting needs              Problem: Patient Education: Go to Patient Education Activity  Goal: Patient/Family Education  Outcome: Progressing Towards Goal

## 2022-03-29 NOTE — CONSULTS
[facility] SURGERY CONSULT          Chief Complaint:lower chest pain and epigastric abdominal pain    History of Present Illness:    Ms. Ned Infante is a 59y.o. year old * female presents to ER with 1 day history of chest and upper abdominal pain radiating to back. No nausea or vomiting. No fever or chills. Past Medical History:   Past Medical History:   Diagnosis Date    Arthritis     Depression     Epilepsy ([address]     Hypertension     Ill-defined condition     neurofibromatosis    Thyroid disease        Past Surgical History:   Past Surgical History:   Procedure Laterality Date    HX GYN      HX ORTHOPAEDIC      NEUROLOGICAL PROCEDURE UNLISTED          Allergy:  Allergies   Allergen Reactions    Codeine Nausea and Vomiting    Methadone Hives    Morphine Other (comments)     psychosis       Social History:  reports that she has never smoked. She has never used smokeless tobacco. She reports that she does not drink alcohol and does not use drugs.      Family History:  Family History   Problem Relation Age of Onset    Cancer Mother         glioblastoma    Lung Disease Father     Cancer Father         mesothelioma, testicular cancer        Current Medications:  Current Facility-Administered Medications:     0.9% sodium chloride infusion, 75 mL/hr, IntraVENous, CONTINUOUS, Sabiha Hudson MD, Last Rate: 75 mL/hr at 03/28/22 2133, 75 mL/hr at 03/28/22 2133    piperacillin-tazobactam (ZOSYN) 3.375 g in 0.9% sodium chloride (MBP/ADV) 100 mL MBP, 3.375 g, IntraVENous, NOW, Yifan Harp DO, Last Rate: 25 mL/hr at 03/28/22 2157, 3.375 g at 03/28/22 2157    [START ON 3/29/2022] aspirin chewable tablet 81 mg, 81 mg, Oral, DAILY, Sabiha Hudson MD    [START ON 3/29/2022] atorvastatin (LIPITOR) tablet 40 mg, 40 mg, Oral, DAILY, Sabiha Hudson MD    [START ON 3/29/2022] busPIRone (BUSPAR) tablet 30 mg, 30 mg, Oral, BID, Zeke Hudson MD    [START ON 3/29/2022] carBAMazepine ER (CARBATROL ER) capsule 300 mg, 300 mg, Oral, BID, Sabiha Hudson MD    [START ON 3/29/2022] carvediloL (COREG) tablet 3.125 mg, 3.125 mg, Oral, BID WITH MEALS, Katty Hudson MD  [employer]  [START ON 3/29/2022] famotidine (PEPCID) tablet 20 mg, 20 mg, Oral, BID, Katty Hudson MD  [employer]  . PHARMACY TO SUBSTITUTE PER PROTOCOL (Reordered from: gabapentin (NEURONTIN) 800 mg tablet), , , Per Protocol, Alma Delia Camara MD  [employer]  [START ON 3/29/2022] levothyroxine (SYNTHROID) tablet 125 mcg, 125 mcg, Oral, ACB, Katty Hudson MD  [employer]  [START ON 3/29/2022] linaCLOtide (LINZESS) capsule 145 mcg, 145 mcg, Oral, ACB, Katty Hudson MD  [employer]  [START ON 3/29/2022] venlafaxine-SR [facility]) capsule 150 mg, 150 mg, Oral, DAILY, Katty Hudson MD    piperacillin-tazobactam (ZOSYN) 3.375 g in 0.9% sodium chloride (MBP/ADV) 100 mL MBP, 3.375 g, IntraVENous, Q8H, Sabiha Hudson MD    Current Outpatient Medications:     linaCLOtide (Linzess) 145 mcg cap capsule, Take 145 mcg by mouth Daily (before breakfast). , Disp: , Rfl:     senna-docusate (PERICOLACE) 8.6-50 mg per tablet, Take 2 Tablets by mouth daily. , Disp: , Rfl:     atorvastatin (LIPITOR) 40 mg tablet, Take 40 mg by mouth daily. , Disp: , Rfl:     Xtampza ER 36 mg capsule, Take 1 Capsule by mouth two (2) times a day., Disp: , Rfl:     gabapentin (NEURONTIN) 800 mg tablet, Take 800 mg by mouth three (3) times daily. , Disp: , Rfl:     famotidine (Pepcid) 20 mg tablet, Take 20 mg by mouth two (2) times a day., Disp: , Rfl:     celecoxib (CELEBREX) 200 mg capsule, Take 100 mg by mouth two (2) times a day., Disp: , Rfl:     aspirin 81 mg chewable tablet, Take 81 mg by mouth daily. , Disp: , Rfl:     carBAMazepine ER (CARBATROL ER) 300 mg capsule, Take 300 mg by mouth two (2) times a day., Disp: , Rfl:     carvedilol (COREG) 6.25 mg tablet, Take  by mouth two (2) times daily (with meals). , Disp: , Rfl:     levothyroxine (SYNTHROID) 125 mcg tablet, Take  by mouth Daily (before breakfast). , Disp: , Rfl:     busPIRone (BUSPAR) 30 mg tablet, Take 30 mg by mouth two (2) times a day., Disp: , Rfl:     venlafaxine-SR (EFFEXOR XR) 150 mg capsule, Take 150 mg by mouth daily. , Disp: , Rfl:     naloxone 8 mg/actuation spry, 4 mg by Nasal route as needed. , Disp: , Rfl:     oxyCODONE-acetaminophen (Percocet) 7.5-325 mg per tablet, Take 1 Tablet by mouth every eight (8) hours as needed for Pain., Disp: , Rfl:     loratadine (Claritin) 10 mg tablet, Take 10 mg by mouth daily as needed for Allergies. , Disp: , Rfl:     diclofenac (FLECTOR) 1.3 % pt12, 1 Patch by TransDERmal route every twelve (12) hours every twelve (12) hours. , Disp: , Rfl:     diclofenac (Voltaren) 1 % gel, Apply 4 g to affected area four (4) times daily. , Disp: , Rfl:      Immunization History: There is no immunization history on file for this patient. Complete    Review of Systems:     Constitutional:  no fever,  no chills,  no sweats, No weakness, No fatigue, No decreased activity. Eye: No recent visual problem, No icterus, No discharge, No double vision. Ear/Nose/Mouth/Throat: No decreased hearing, No ear pain, No nasal congestion, No sore throat. Respiratory: No shortness of breath, No cough, No sputum production, No hemoptysis, No wheezing, No cyanosis. Cardiovascular:  chest pain, No palpitations, No bradycardia, No tachycardia, No peripheral edema, No syncope. Gastrointestinal: No nausea,  No vomiting, No diarrhea, No constipation, No heartburn,   abdominal pain. Genitourinary: No dysuria, No hematuria, No change in urine stream, No urethral discharge, No lesions. Hematology/Lymphatics: No bruising tendency, No bleeding tendency, No petechiae, No swollen lymph glands. Endocrine: No excessive thirst, No polyuria, No cold intolerance, No heat intolerance, No excessive hunger. Immunologic: Not immunocompromised, No recurrent fevers, No recurrent infections.   Musculoskeletal: No back pain, No neck pain, No joint pain, No muscle pain, No claudication, No decreased range of motion, No trauma. Integumentary: No rash, No pruritus, No abrasions. Neurologic: Alert and oriented X4, No abnormal balance, No headache, No confusion, No numbness, No tingling. Psychiatric: No anxiety, No depression, No bijan. Physical Exam:     Vitals & Measurements: Wt Readings from Last 3 Encounters:   03/28/22 61.7 kg (136 lb)   11/03/21 62.2 kg (137 lb 3.2 oz)   09/29/21 75 kg (165 lb 5.5 oz)     Temp Readings from Last 3 Encounters:   03/28/22 97.5 °F (36.4 °C)   11/03/21 98.4 °F (36.9 °C) (Temporal)   10/01/21 97.5 °F (36.4 °C)     BP Readings from Last 3 Encounters:   03/28/22 139/75   11/03/21 109/64   10/01/21 (!) 147/70     Pulse Readings from Last 3 Encounters:   03/28/22 74   11/03/21 84   10/01/21 71      Ht Readings from Last 3 Encounters:   03/28/22 5' 1\" (1.549 m)   11/03/21 5' 1\" (1.549 m)   10/01/21 5' 1\" (1.549 m)          General: well appearing, no acute distress  Head: Normal  Face: Nornal  HEENT: atraumatic, PERRLA, moist mucosa, normal pharynx, normal tonsils and adenoids, normal tongue, no fluid in sinuses  Neck: Trachea midline, no carotid bruit, no masses  Chest: Normal.  Respiratory: Normal chest wall expansion, CTA B, no r/r/w, no rubs  Cardiovascular: RRR, no m/r/g, Normal S1 and S2  Abdomen: Soft, non tender, non-distended, normal bowel sounds in all quadrants, no hepatosplenomegaly, no tympany. Genitourinary: No inguinal hernia, normal external gentalia,  no renal angle tenderness  Rectal: deferred  Musculoskeletal: normal ROM in upper and lower extremities, No joint swelling.   Integumentary: Warm, dry, and pink, with no rash, purpura, or petechia  Heme/Lymph: No lymphadenopathy, no bruises  Neurological:Cranial Nerves II-XII grossly intact, no gross motor or sensory deficit  Psychiatric: Cooperative with normal mood, affect, and cognition      Laboratory Values:   Recent Results (from the past 24 hour(s)) EKG, 12 LEAD, INITIAL    Collection Time: 03/28/22  6:19 PM   Result Value Ref Range    Ventricular Rate 77 BPM    Atrial Rate 76 BPM    QRS Duration 70 ms    Q-T Interval 376 ms    QTC Calculation (Bezet) 425 ms    Calculated R Axis -60 degrees    Calculated T Axis 73 degrees    Diagnosis       Normal sinus rhythm  When compared with ECG of 29-SEP-2021 22:33,  No significant change was found  Confirmed by Carmen Martinez M.D., Ouida Brake ([id number]) on 3/28/2022 6:44:29 PM     CBC WITH AUTOMATED DIFF    Collection Time: 03/28/22  6:21 PM   Result Value Ref Range    WBC 7.1 3.6 - 11.0 K/uL    RBC 4.32 3.80 - 5.20 M/uL    HGB 12.0 11.5 - 16.0 g/dL    HCT 36.3 35.0 - 47.0 %    MCV 84.0 80.0 - 99.0 FL    MCH 27.8 26.0 - 34.0 PG    MCHC 33.1 30.0 - 36.5 g/dL    RDW 13.7 11.5 - 14.5 %    PLATELET 875 768 - 054 K/uL    MPV 9.4 8.9 - 12.9 FL    NRBC 0.0 0.0  WBC    ABSOLUTE NRBC 0.00 0.00 - 0.01 K/uL    NEUTROPHILS 67 32 - 75 %    LYMPHOCYTES 19 12 - 49 %    MONOCYTES 10 5 - 13 %    EOSINOPHILS 3 0 - 7 %    BASOPHILS 1 0 - 1 %    IMMATURE GRANULOCYTES 0 0 - 0.5 %    ABS. NEUTROPHILS 4.8 1.8 - 8.0 K/UL    ABS. LYMPHOCYTES 1.3 0.8 - 3.5 K/UL    ABS. MONOCYTES 0.7 0.0 - 1.0 K/UL    ABS. EOSINOPHILS 0.2 0.0 - 0.4 K/UL    ABS. BASOPHILS 0.1 0.0 - 0.1 K/UL    ABS. IMM. GRANS. 0.0 0.00 - 0.04 K/UL    DF AUTOMATED     METABOLIC PANEL, COMPREHENSIVE    Collection Time: 03/28/22  6:21 PM   Result Value Ref Range    Sodium 137 136 - 145 mmol/L    Potassium 3.5 3.5 - 5.1 mmol/L    Chloride 104 97 - 108 mmol/L    CO2 29 21 - 32 mmol/L    Anion gap 4 (L) 5 - 15 mmol/L    Glucose 75 65 - 100 mg/dL    BUN 13 6 - 20 mg/dL    Creatinine 0.59 0.55 - 1.02 mg/dL    BUN/Creatinine ratio 22 (H) 12 - 20      GFR est AA >60 >60 ml/min/1.73m2    GFR est non-AA >60 >60 ml/min/1.73m2    Calcium 8.7 8.5 - 10.1 mg/dL    Bilirubin, total 0.3 0.2 - 1.0 mg/dL    AST (SGOT) 20 15 - 37 U/L    ALT (SGPT) 30 12 - 78 U/L    Alk.  phosphatase 127 (H) 45 - 117 U/L Protein, total 6.3 (L) 6.4 - 8.2 g/dL    Albumin 3.6 3.5 - 5.0 g/dL    Globulin 2.7 2.0 - 4.0 g/dL    A-G Ratio 1.3 1.1 - 2.2     TROPONIN-HIGH SENSITIVITY    Collection Time: 03/28/22  6:21 PM   Result Value Ref Range    Troponin-High Sensitivity 7 0 - 51 ng/L   LIPASE    Collection Time: 03/28/22  6:21 PM   Result Value Ref Range    Lipase 56 (L) 73 - 393 U/L         US GALLBLADDER   Final Result   Limited by bowel gas. 1. Cholelithiasis. No sonographic evidence of cholecystitis. However the common   bile duct is dilated which may be chronic. CTA CHEST W OR W WO CONT   Final Result   1. No pulmonary embolus. 2. Interval increase of peribronchial and perivascular soft tissue thickening   possibly lymphoid or neurofibromatosis in origin. Unchanged left lung   bronchovascular nodule, posterior mediastinal and retroperitoneal abnormal soft   tissue. 3. Bilateral hazy airspace edema or pneumonia. 4. Haziness of the gallbladder. Correlate clinically for cholecystitis. XR CHEST PORT   Final Result   No acute process or active disease. CT ABD PELV WO CONT    (Results Pending)       Assessment:  Problem List Items Addressed This Visit        Other    Chest pain - Primary       Gallstone    CBD stone    Plan:    1. Admission  2. Diet: NPO   3. IV fluids  4. SCD  5. IS  6. Pain medications  7. Antibiotics  8. Nausea medication  9. MRCP in am.  10. Labs  11. Consult: GI       Thank you for the consultation & allowing me to participate in the care of this patient.

## 2022-03-29 NOTE — H&P
Chest Pain         HPI: Scot Moore is a(n) 59 y.o. female with PMH significant for arthritis, depression, epilepsy, hypertension, neurofibromatosis, and thyroid disease who presents with chest pain.       Upon presentation, patient stated her symptoms began that morning. She describes her pain being in the center of her chest and radiates to the back and shoulder. Patient complained of her pain which awakened her at 5:30 this morning, she states the pain is 9/10, constant, without exacerbating symptoms. The patient is currently on HYDROmorphone (DILAUDID) injection 1 mg. She states the Dilaudid is greatly helping with her pain management. No fever, cough, or other associated symptoms.  Denies leg swelling unilaterally, history of PE/DVT, or pleuritic chest pain.     Originally, a ultrasound was conducted (3/28/2022 @22:05) revealing cholelithiasis and the common bile duct is dilated which may be chronic. However, the U/S gallbladder was limited by bowel gas.     The recent, CT ABD Pelvis w/o contrast (3/28/2022 @22:55) revealed Gallbladder wall and pericystic changes. Mild prominence of the common bile duct. Thus, cholecystitis and/or cholangitis should be considered clinically. Patient is scheduled for MRCP today. Surgery was consulted in the ER seen by the surgeon ordered MRI of the abdomen and the HIDA scan                 Past Medical History:   Diagnosis Date    Arthritis      Depression      Epilepsy ([address]      Hypertension      Ill-defined condition       neurofibromatosis    Thyroid disease                 Past Surgical History:   Procedure Laterality Date    HX GYN        HX ORTHOPAEDIC        NEUROLOGICAL PROCEDURE UNLISTED                     Prior to Admission medications    Medication Sig Start Date End Date Taking?  Authorizing Provider   venlafaxine [facility]) 75 mg tablet Take 75 mg by mouth daily.     Yes Provider, Historical   ergocalciferol (DrisdoL) 1,250 mcg (50,000 unit) capsule Take 50,000 Units by mouth every thirty (30) days. Take on the 15th day of the month     Yes Provider, Historical   linaCLOtide (Linzess) 145 mcg cap capsule Take 145 mcg by mouth Daily (before breakfast).     Yes Other, MD Henry   senna-docusate (PERICOLACE) 8.6-50 mg per tablet Take 2 Tablets by mouth daily.     Yes Other, MD Henry   atorvastatin (LIPITOR) 40 mg tablet Take 40 mg by mouth daily.     Yes Provider, Historical   Xtampza ER 36 mg capsule Take 1 Capsule by mouth two (2) times a day. 9/19/21   Yes Provider, Historical   gabapentin (NEURONTIN) 800 mg tablet Take 400 mg by mouth three (3) times daily. 9/9/21   Yes Provider, Historical   famotidine (Pepcid) 20 mg tablet Take 20 mg by mouth two (2) times a day.     Yes Other, MD Henry   celecoxib (CELEBREX) 200 mg capsule Take 100 mg by mouth two (2) times a day.     Yes Other, MD Henry   aspirin 81 mg chewable tablet Take 81 mg by mouth nightly.     Yes Other, MD Henry   carBAMazepine ER (CARBATROL ER) 300 mg capsule Take 300 mg by mouth two (2) times a day.     Yes Provider, Historical   carvedilol (COREG) 6.25 mg tablet Take  by mouth two (2) times daily (with meals).     Yes Provider, Historical   levothyroxine (SYNTHROID) 125 mcg tablet Take  by mouth Daily (before breakfast).     Yes Provider, Historical   busPIRone (BUSPAR) 30 mg tablet Take 30 mg by mouth two (2) times a day.     Yes Provider, Historical   venlafaxine-SR (EFFEXOR XR) 150 mg capsule Take 150 mg by mouth daily.     Yes Provider, Historical   naloxone 8 mg/actuation spry 4 mg by Nasal route as needed.       Other, MD Henry   oxyCODONE-acetaminophen (Percocet) 7.5-325 mg per tablet Take 1 Tablet by mouth every eight (8) hours as needed for Pain.       Other, MD Henry   loratadine (Claritin) 10 mg tablet Take 10 mg by mouth daily as needed for Allergies.        Other, MD Henry   diclofenac (FLECTOR) 1.3 % pt12 1 Patch by TransDERmal route every twelve (12) hours every twelve (12) hours.       Other, MD Henry   diclofenac (Voltaren) 1 % gel Apply 4 g to affected area four (4) times daily.       Rosalio, MD Henry               Allergies   Allergen Reactions    Codeine Nausea and Vomiting    Methadone Hives    Morphine Other (comments)       psychosis               Family History   Problem Relation Age of Onset    Cancer Mother           glioblastoma    Lung Disease Father      Cancer Father           mesothelioma, testicular cancer         Social History              Socioeconomic History    Marital status: UNKNOWN    Highest education level: Associate degree: occupational, technical, or vocational program   Tobacco Use    Smoking status: Never Smoker    Smokeless tobacco: Never Used   Vaping Use    Vaping Use: Former    Substances: CBD (only for 2 months)   Substance and Sexual Activity    Alcohol use: No    Drug use: Never            ROS  Constitutional:  no fever,  no chills,  no sweats, No weakness, No fatigue, No decreased activity. Eye: No recent visual problem, No icterus, No discharge, No double vision. Ear/Nose/Mouth/Throat: No decreased hearing, No ear pain, No nasal congestion, No sore throat. Respiratory: No shortness of breath, No cough, No sputum production, No hemoptysis, No wheezing, No cyanosis. Cardiovascular:  chest pain, No palpitations, No bradycardia, No tachycardia, No peripheral edema, No syncope. Gastrointestinal: No nausea,  No vomiting, No diarrhea, No constipation, No heartburn,   abdominal pain. Genitourinary: No dysuria, No hematuria, No change in urine stream, No urethral discharge, No lesions. Hematology/Lymphatics: No bruising tendency, No bleeding tendency, No petechiae, No swollen lymph glands. Endocrine: No excessive thirst, No polyuria, No cold intolerance, No heat intolerance, No excessive hunger. Immunologic: Not immunocompromised, No recurrent fevers, No recurrent infections.   Musculoskeletal: No back pain, No neck pain, No joint pain, No muscle pain, No claudication, No decreased range of motion, No trauma. Integumentary: No rash, No pruritus, No abrasions. Neurologic: Alert and oriented X4, No abnormal balance, No headache, No confusion, No numbness, No tingling. Psychiatric: No anxiety, No depression, No bijan.        Objective      Visit Vitals  /69 (BP 1 Location: Left upper arm, BP Patient Position: Supine)   Pulse 89   Temp 97.8 °F (36.6 °C)   Resp 16   Ht 5' 1\" (1.549 m)   Wt 61.7 kg (136 lb)   SpO2 97%   Breastfeeding No   BMI 25.70 kg/m²      O2 Device: None (Room air)     Physical Exam:   Physical Exam  General: well appearing, no acute distress  Head: Normal  Face: Nornal  HEENT: atraumatic, PERRLA, moist mucosa, normal pharynx, normal tonsils and adenoids, normal tongue, no fluid in sinuses  Neck: Trachea midline, no carotid bruit, no masses  Chest: Normal.  Respiratory: Normal chest wall expansion, CTA B, no r/r/w, no rubs  Cardiovascular: RRR, no m/r/g, Normal S1 and S2  Abdomen: Soft, non tender, non-distended, normal bowel sounds in all quadrants, no hepatosplenomegaly, no tympany.    Genitourinary: No inguinal hernia, normal external gentalia,  no renal angle tenderness  Rectal: deferred  Musculoskeletal: normal ROM in upper and lower extremities, No joint swelling. Integumentary: Warm, dry, and pink, with no rash, purpura, or petechia  Heme/Lymph: No lymphadenopathy, no bruises  Neurological:Cranial Nerves II-XII grossly intact, no gross motor or sensory deficit  Psychiatric: Cooperative with normal mood, affect, and cognition        Intake & Output:  Current Shift: No intake/output data recorded. Last three shifts: 03/27 1901 - 03/29 0700  In: 1000 [I.V.:1000]  Out: -      Lab/Data Review:   All lab results for the last 24 hours reviewed.         24 Hour Results:     Recent Results          Recent Results (from the past 24 hour(s))   EKG, 12 LEAD, INITIAL     Collection Time: 03/28/22  6:19 PM   Result Value Ref Range   Ventricular Rate 77 BPM     Atrial Rate 76 BPM     QRS Duration 70 ms     Q-T Interval 376 ms     QTC Calculation (Bezet) 425 ms     Calculated R Axis -60 degrees     Calculated T Axis 73 degrees     Diagnosis           Normal sinus rhythm  When compared with ECG of 29-SEP-2021 22:33,  No significant change was found  Confirmed by Marisela Carballo M.D., [facility] ([id number]) on 3/28/2022 6:44:29 PM      CBC WITH AUTOMATED DIFF     Collection Time: 03/28/22  6:21 PM   Result Value Ref Range     WBC 7.1 3.6 - 11.0 K/uL     RBC 4.32 3.80 - 5.20 M/uL     HGB 12.0 11.5 - 16.0 g/dL     HCT 36.3 35.0 - 47.0 %     MCV 84.0 80.0 - 99.0 FL     MCH 27.8 26.0 - 34.0 PG     MCHC 33.1 30.0 - 36.5 g/dL     RDW 13.7 11.5 - 14.5 %     PLATELET 148 810 - 330 K/uL     MPV 9.4 8.9 - 12.9 FL     NRBC 0.0 0.0  WBC     ABSOLUTE NRBC 0.00 0.00 - 0.01 K/uL     NEUTROPHILS 67 32 - 75 %     LYMPHOCYTES 19 12 - 49 %     MONOCYTES 10 5 - 13 %     EOSINOPHILS 3 0 - 7 %     BASOPHILS 1 0 - 1 %     IMMATURE GRANULOCYTES 0 0 - 0.5 %     ABS. NEUTROPHILS 4.8 1.8 - 8.0 K/UL     ABS. LYMPHOCYTES 1.3 0.8 - 3.5 K/UL     ABS. MONOCYTES 0.7 0.0 - 1.0 K/UL     ABS. EOSINOPHILS 0.2 0.0 - 0.4 K/UL     ABS. BASOPHILS 0.1 0.0 - 0.1 K/UL     ABS. IMM. GRANS. 0.0 0.00 - 0.04 K/UL     DF AUTOMATED     METABOLIC PANEL, COMPREHENSIVE     Collection Time: 03/28/22  6:21 PM   Result Value Ref Range     Sodium 137 136 - 145 mmol/L     Potassium 3.5 3.5 - 5.1 mmol/L     Chloride 104 97 - 108 mmol/L     CO2 29 21 - 32 mmol/L     Anion gap 4 (L) 5 - 15 mmol/L     Glucose 75 65 - 100 mg/dL     BUN 13 6 - 20 mg/dL     Creatinine 0.59 0.55 - 1.02 mg/dL     BUN/Creatinine ratio 22 (H) 12 - 20       GFR est AA >60 >60 ml/min/1.73m2     GFR est non-AA >60 >60 ml/min/1.73m2     Calcium 8.7 8.5 - 10.1 mg/dL     Bilirubin, total 0.3 0.2 - 1.0 mg/dL     AST (SGOT) 20 15 - 37 U/L     ALT (SGPT) 30 12 - 78 U/L     Alk.  phosphatase 127 (H) 45 - 117 U/L     Protein, total 6.3 (L) 6.4 - 8.2 g/dL     Albumin 3.6 3.5 - 5.0 g/dL     Globulin 2.7 2.0 - 4.0 g/dL     A-G Ratio 1.3 1.1 - 2.2     TROPONIN-HIGH SENSITIVITY     Collection Time: 03/28/22  6:21 PM   Result Value Ref Range     Troponin-High Sensitivity 7 0 - 51 ng/L   LIPASE     Collection Time: 03/28/22  6:21 PM   Result Value Ref Range     Lipase 56 (L) 73 - 393 U/L   SARS-COV-2     Collection Time: 03/28/22  9:45 PM   Result Value Ref Range     SARS-CoV-2 by PCR Nasopharyngeal     COVID-19 WITH INFLUENZA A/B     Collection Time: 03/28/22  9:45 PM   Result Value Ref Range     SARS-CoV-2 by PCR Not Detected Not Detected       Influenza A by PCR Not Detected Not Detected       Influenza B by PCR Not Detected Not Detected     TROPONIN-HIGH SENSITIVITY     Collection Time: 03/29/22  4:53 AM   Result Value Ref Range     Troponin-High Sensitivity 5 0 - 51 ng/L               Imaging:     CT ABD PELV WO CONT   Final Result   Gallbladder wall and pericystic changes. Mild prominence of the   common bile duct. This represents change from the prior study. Ultrasound   earlier this evening shows cholelithiasis. Cholecystitis and/or cholangitis   should be considered clinically. Consider radionuclide hepatobiliary scan   depending on clinical setting                   US GALLBLADDER   Final Result   Limited by bowel gas. 1. Cholelithiasis. No sonographic evidence of cholecystitis. However the common   bile duct is dilated which may be chronic.       CTA CHEST W OR W WO CONT   Final Result   1. No pulmonary embolus. 2. Interval increase of peribronchial and perivascular soft tissue thickening   possibly lymphoid or neurofibromatosis in origin. Unchanged left lung   bronchovascular nodule, posterior mediastinal and retroperitoneal abnormal soft   tissue. 3. Bilateral hazy airspace edema or pneumonia. 4. Haziness of the gallbladder.  Correlate clinically for cholecystitis.       XR CHEST PORT   Final Result   No acute process or active disease.      MRI ABD W MRCP W CONT    (Results Pending)   NM HEPATOBILIARY W INTERVENTION    (Results Pending)         Assessment      Chest pain rule out MI  Gallstone  Right upper quadrant pain  History of neurofibromatosis  Hypertension  Hypothyroidism  Seizure disorder  Hyperlipidemia  Anxiety disorder  Chronic pain syndrome       Plan       Lipitor 40 mg daily  BuSpar 30 mg twice a day  Carbamazepine  mg twice a day  Coreg 3.125 twice a day  Pepcid 20 twice daily  Neurontin 400 mg 3 times a day  Levothyroxine 125 mcg daily  Linzess 145 mg daily  Zosyn 3.375 IV every 8 hours  Effexor  mg daily    Continue IV fluids    Cardiology is surgical and GI consult    Discussed with the patient daughter/concern about pain medication at this time we will continue Dilaudid 1 mg every 4 hours as needed

## 2022-03-29 NOTE — PROGRESS NOTES
Patient dual skin assessment done with Benjamín hernandez RN, Jack score of 20. Patient skin is clean and intact. Patient is continent, alert, oriented and ambulatory.

## 2022-03-29 NOTE — PROGRESS NOTES
3/2922. Pt admitted under OBS. D/C Plan is home with daughter Teodoro Bryan @ [phone]) & [facility]. Therapy via [facility]. Pt uses walker/w/c. Daughter to transport pt home upon discharge.

## 2022-03-29 NOTE — CONSULTS
CONSULTATION    REASON FOR CONSULT:  Chest pain    REQUESTING PROVIDER:  Dr. Lora Martin:    Chief Complaint   Patient presents with    Chest Pain         HISTORY OF PRESENT ILLNESS:  Belkys Briggs is a 59y.o. year-old female with past medical history significant for hypertension, neurofibromatosis, arthritis, depression, epilepsy, and thyroid disease who was admitted to the hospital for chest pain radiating to the back and shoulder. On dilaudid for pain management. CT ABD pelves showed possible cholecystitis, MRCP pending. On examination, patient is lying in bed in no acute distress. Complaining of abdominal pain radiating into her back. She offers no other complaints. Significant labs: HS troponin negative x 2, cxr without congestion, EKG: NSR: HR 77, no ischemia. Records from hospital admission course thus far reviewed. PAST MEDICAL HISTORY:    Past Medical History:   Diagnosis Date    Arthritis     Depression     Epilepsy ([facility] [address]     Hypertension     Ill-defined condition     neurofibromatosis    Thyroid disease        PAST SURGICAL HISTORY:   Past Surgical History:   Procedure Laterality Date    HX GYN      HX ORTHOPAEDIC      NEUROLOGICAL PROCEDURE UNLISTED         ALLERGIES:    Allergies   Allergen Reactions    Codeine Nausea and Vomiting    Methadone Hives    Morphine Other (comments)     psychosis       FAMILY HISTORY:    Family History   Problem Relation Age of Onset    Cancer Mother         glioblastoma    Lung Disease Father     Cancer Father         mesothelioma, testicular cancer       SOCIAL HISTORY:    Tobacco: Never smoker  Drugs: no   ETOH: no    HOME MEDICATIONS:    Prior to Admission Medications   Prescriptions Last Dose Informant Patient Reported? Taking? Xtampza ER 36 mg capsule 3/28/2022 at Unknown time  Yes Yes   Sig: Take 1 Capsule by mouth two (2) times a day.    aspirin 81 mg chewable tablet 3/28/2022 at Unknown time  Yes Yes   Sig: Take 81 mg by mouth nightly. atorvastatin (LIPITOR) 40 mg tablet 3/27/2022 at Unknown time  Yes Yes   Sig: Take 40 mg by mouth daily. busPIRone (BUSPAR) 30 mg tablet 3/28/2022 at Unknown time  Yes Yes   Sig: Take 30 mg by mouth two (2) times a day. carBAMazepine ER (CARBATROL ER) 300 mg capsule 3/28/2022 at Unknown time  Yes Yes   Sig: Take 300 mg by mouth two (2) times a day. carvedilol (COREG) 6.25 mg tablet 3/28/2022 at Unknown time  Yes Yes   Sig: Take  by mouth two (2) times daily (with meals). celecoxib (CELEBREX) 200 mg capsule 3/28/2022 at Unknown time  Yes Yes   Sig: Take 100 mg by mouth two (2) times a day. diclofenac (FLECTOR) 1.3 % pt12 Unknown at Unknown time  Yes No   Si Patch by TransDERmal route every twelve (12) hours every twelve (12) hours. diclofenac (Voltaren) 1 % gel Unknown at Unknown time  Yes No   Sig: Apply 4 g to affected area four (4) times daily. ergocalciferol (DrisdoL) 1,250 mcg (50,000 unit) capsule   Yes Yes   Sig: Take 50,000 Units by mouth every thirty (30) days. Take on the 15th day of the month   famotidine (Pepcid) 20 mg tablet 3/28/2022 at Unknown time  Yes Yes   Sig: Take 20 mg by mouth two (2) times a day.   gabapentin (NEURONTIN) 800 mg tablet 3/28/2022 at Unknown time  Yes Yes   Sig: Take 400 mg by mouth three (3) times daily. levothyroxine (SYNTHROID) 125 mcg tablet 3/28/2022 at Unknown time  Yes Yes   Sig: Take  by mouth Daily (before breakfast). linaCLOtide (Linzess) 145 mcg cap capsule 3/28/2022 at Unknown time  Yes Yes   Sig: Take 145 mcg by mouth Daily (before breakfast). loratadine (Claritin) 10 mg tablet Unknown at Unknown time  Yes No   Sig: Take 10 mg by mouth daily as needed for Allergies. naloxone 8 mg/actuation spry Unknown at Unknown time  Yes No   Si mg by Nasal route as needed.    oxyCODONE-acetaminophen (Percocet) 7.5-325 mg per tablet Unknown at Unknown time  Yes No   Sig: Take 1 Tablet by mouth every eight (8) hours as needed for Pain. senna-docusate (PERICOLACE) 8.6-50 mg per tablet 3/28/2022 at Unknown time  Yes Yes   Sig: Take 2 Tablets by mouth daily. venlafaxine (EFFEXOR) 75 mg tablet   Yes Yes   Sig: Take 75 mg by mouth daily. venlafaxine-SR (EFFEXOR XR) 150 mg capsule 3/28/2022 at Unknown time  Yes Yes   Sig: Take 150 mg by mouth daily. Facility-Administered Medications: None       REVIEW OF SYSTEMS:  Complete review of systems performed, pertinents noted above, all other systems are negative. Patient Vitals for the past 24 hrs:   Temp Pulse Resp BP SpO2   03/29/22 1200 -- 72 -- -- --   03/29/22 1058 98.4 °F (36.9 °C) 72 18 112/64 95 %   03/29/22 0746 -- 89 -- -- --   03/29/22 0729 97.8 °F (36.6 °C) 73 16 122/69 97 %   03/29/22 0519 97.6 °F (36.4 °C) 74 14 109/66 98 %   03/29/22 0400 -- 74 -- -- --   03/29/22 0051 98 °F (36.7 °C) 69 14 129/76 98 %   03/28/22 2210 -- 74 12 139/75 98 %   03/28/22 2130 -- 73 18 (!) 161/102 97 %   03/28/22 2036 -- 70 13 122/74 98 %   03/28/22 2028 -- 71 -- -- 99 %   03/28/22 2000 -- 71 17 129/62 --   03/28/22 1823 -- -- -- 128/69 --   03/28/22 1821 97.5 °F (36.4 °C) 78 18 -- 98 %       PHYSICAL EXAMINATION:    General:  NAD, A&O  HEENT: Normocephalic, PERRL, no drainage  Neck: Supple, Trachea midline, No JVD  RESP: CTA bilaterally. + Symmetrical chest movement. No SOB or distress. On RA  Cardiovascular: RRR no MRG  PVS: No rubor, cyanosis, no edema, Radial, DP, PT pulses equal bilaterally  ABD:  soft, NT, Normoactive BS  Derm: Warm/Dry/Intact with no lesions  Neuro: A&O PPTS. No focal deficits  PSYCH: No anxiety or agitation    Recent labs results and imaging reviewed.       Recent Results (from the past 24 hour(s))   EKG, 12 LEAD, INITIAL    Collection Time: 03/28/22  6:19 PM   Result Value Ref Range    Ventricular Rate 77 BPM    Atrial Rate 76 BPM    QRS Duration 70 ms    Q-T Interval 376 ms    QTC Calculation (Bezet) 425 ms    Calculated R Axis -60 degrees    Calculated T Axis 73 degrees    Diagnosis       Normal sinus rhythm  When compared with ECG of 29-SEP-2021 22:33,  No significant change was found  Confirmed by Gulshan Mcginnis M.D., Destincarlos Dyerbury ([id number]) on 3/28/2022 6:44:29 PM     CBC WITH AUTOMATED DIFF    Collection Time: 03/28/22  6:21 PM   Result Value Ref Range    WBC 7.1 3.6 - 11.0 K/uL    RBC 4.32 3.80 - 5.20 M/uL    HGB 12.0 11.5 - 16.0 g/dL    HCT 36.3 35.0 - 47.0 %    MCV 84.0 80.0 - 99.0 FL    MCH 27.8 26.0 - 34.0 PG    MCHC 33.1 30.0 - 36.5 g/dL    RDW 13.7 11.5 - 14.5 %    PLATELET 617 069 - 347 K/uL    MPV 9.4 8.9 - 12.9 FL    NRBC 0.0 0.0  WBC    ABSOLUTE NRBC 0.00 0.00 - 0.01 K/uL    NEUTROPHILS 67 32 - 75 %    LYMPHOCYTES 19 12 - 49 %    MONOCYTES 10 5 - 13 %    EOSINOPHILS 3 0 - 7 %    BASOPHILS 1 0 - 1 %    IMMATURE GRANULOCYTES 0 0 - 0.5 %    ABS. NEUTROPHILS 4.8 1.8 - 8.0 K/UL    ABS. LYMPHOCYTES 1.3 0.8 - 3.5 K/UL    ABS. MONOCYTES 0.7 0.0 - 1.0 K/UL    ABS. EOSINOPHILS 0.2 0.0 - 0.4 K/UL    ABS. BASOPHILS 0.1 0.0 - 0.1 K/UL    ABS. IMM. GRANS. 0.0 0.00 - 0.04 K/UL    DF AUTOMATED     METABOLIC PANEL, COMPREHENSIVE    Collection Time: 03/28/22  6:21 PM   Result Value Ref Range    Sodium 137 136 - 145 mmol/L    Potassium 3.5 3.5 - 5.1 mmol/L    Chloride 104 97 - 108 mmol/L    CO2 29 21 - 32 mmol/L    Anion gap 4 (L) 5 - 15 mmol/L    Glucose 75 65 - 100 mg/dL    BUN 13 6 - 20 mg/dL    Creatinine 0.59 0.55 - 1.02 mg/dL    BUN/Creatinine ratio 22 (H) 12 - 20      GFR est AA >60 >60 ml/min/1.73m2    GFR est non-AA >60 >60 ml/min/1.73m2    Calcium 8.7 8.5 - 10.1 mg/dL    Bilirubin, total 0.3 0.2 - 1.0 mg/dL    AST (SGOT) 20 15 - 37 U/L    ALT (SGPT) 30 12 - 78 U/L    Alk.  phosphatase 127 (H) 45 - 117 U/L    Protein, total 6.3 (L) 6.4 - 8.2 g/dL    Albumin 3.6 3.5 - 5.0 g/dL    Globulin 2.7 2.0 - 4.0 g/dL    A-G Ratio 1.3 1.1 - 2.2     TROPONIN-HIGH SENSITIVITY    Collection Time: 03/28/22  6:21 PM   Result Value Ref Range    Troponin-High Sensitivity 7 0 - 51 ng/L LIPASE    Collection Time: 03/28/22  6:21 PM   Result Value Ref Range    Lipase 56 (L) 73 - 393 U/L   SARS-COV-2    Collection Time: 03/28/22  9:45 PM   Result Value Ref Range    SARS-CoV-2 by PCR Nasopharyngeal     COVID-19 WITH INFLUENZA A/B    Collection Time: 03/28/22  9:45 PM   Result Value Ref Range    SARS-CoV-2 by PCR Not Detected Not Detected      Influenza A by PCR Not Detected Not Detected      Influenza B by PCR Not Detected Not Detected     TROPONIN-HIGH SENSITIVITY    Collection Time: 03/29/22  4:53 AM   Result Value Ref Range    Troponin-High Sensitivity 5 0 - 51 ng/L       XR Results (maximum last 3): Results from Hospital Encounter encounter on 03/28/22    XR CHEST PORT    Impression  No acute process or active disease. Results from [place] encounter on 11/03/21    XR CHEST PA LAT    Impression  No acute pulmonary process. Results from [place] encounter on 09/22/21    XR SWALLOW FUNC VIDEO    Impression  Episode of penetration with thin consistency. No aspiration. CT Results (maximum last 3): Results from [place] encounter on 03/28/22    CT ABD PELV WO CONT    Impression  Gallbladder wall and pericystic changes. Mild prominence of the  common bile duct. This represents change from the prior study. Ultrasound  earlier this evening shows cholelithiasis. Cholecystitis and/or cholangitis  should be considered clinically. Consider radionuclide hepatobiliary scan  depending on clinical setting      CTA CHEST W OR W WO CONT    Impression  1. No pulmonary embolus. 2. Interval increase of peribronchial and perivascular soft tissue thickening  possibly lymphoid or neurofibromatosis in origin. Unchanged left lung  bronchovascular nodule, posterior mediastinal and retroperitoneal abnormal soft  tissue. 3. Bilateral hazy airspace edema or pneumonia. 4. Haziness of the gallbladder. Correlate clinically for cholecystitis.       Results from [facility] encounter on 09/22/21    CT HEAD WO CONT    Impression  No evidence of an acute intracranial abnormality. MRI Results (maximum last 3): Results from [facility] encounter on 09/20/17    MRI BRAIN MRA BRAIN WO CONT      MRI CERV SPINE W WO CONT      MRI LUMB SPINE W WO CONT      Nuclear Medicine Results (maximum last 3): No results found for this or any previous visit. US Results (maximum last 3): Results from [place] encounter on 03/28/22    US GALLBLADDER    Impression  Limited by bowel gas. 1. Cholelithiasis. No sonographic evidence of cholecystitis. However the common  bile duct is dilated which may be chronic. Results from [place] encounter on 09/22/21    US ABD LTD    Impression  Gallstone lodged in the neck of the gallbladder. Sludge within the  gallbladder. Borderline gallbladder wall thickening. No pericholecystic fluid. The technologist stated there was no sonographic Linder's sign. Poor visualization of the pancreas. Please see full report. VAS/US Results (maximum last 3): No results found for this or any previous visit.         Current Facility-Administered Medications:     gabapentin (NEURONTIN) capsule 400 mg, 400 mg, Oral, TID, Montse Hudson MD    HYDROmorphone (DILAUDID) injection 1 mg, 1 mg, IntraVENous, Q4H PRN, Sabiha Hudson MD    0.9% sodium chloride infusion, 75 mL/hr, IntraVENous, CONTINUOUS, Sabiha Hudson MD, Last Rate: 75 mL/hr at 03/29/22 0515, 75 mL/hr at 03/29/22 0515    atorvastatin (LIPITOR) tablet 40 mg, 40 mg, Oral, DAILY, Montse Hudson MD    busPIRone (BUSPAR) tablet 30 mg, 30 mg, Oral, BID, Montse Hudson MD    carBAMazepine ER (CARBATROL ER) capsule 300 mg, 300 mg, Oral, BID, Montse Hudson MD    carvediloL (COREG) tablet 3.125 mg, 3.125 mg, Oral, BID WITH MEALS, Montse Hudson MD    famotidine (PEPCID) tablet 20 mg, 20 mg, Oral, BID, Sabiha Hudson MD    levothyroxine (SYNTHROID) tablet 125 mcg, 125 mcg, Oral, ACB, Ginette Hudson MD    linaCLOtide [facility]) capsule 145 mcg, 145 mcg, Oral, ACBTristan Driscilla Maria, MD    venlafaxine-SR [facility] capsule 150 mg, 150 mg, Oral, DAILY, Sabiha Hudson MD    lactated Ringers infusion, 100 mL/hr, IntraVENous, CONTINUOUS, Ezequiel Schmitz MD    piperacillin-tazobactam (ZOSYN) 3.375 g in 0.9% sodium chloride (MBP/ADV) 100 mL MBP, 3.375 g, IntraVENous, Q8H, Sabiha Hudson MD, Last Rate: 25 mL/hr at 03/29/22 1244, 3.375 g at 03/29/22 1244    Case discussed with collaborating physician Dr. Sylvia Reeder and our impression and recommendations are as follows:     1. Chest pain:   - complaining of chest pain, radiating into the back  - continue Dilaudid for pain  - HS troponin negative x 2  - CT abdomen showed gallbladder wall and pericystic changes. MRCP pending further evaluation  - HIDA scan pending  - echo pending    2. Hypertension:   - blood pressure acceptable  - continue current medications    3. Hyperlipidemia:   - continue statin therapy    Thank you for involving us in the care of this patient. Please do not hesitate to call if additional questions arise. If after hours please call [phone]. Dr. Calista Enrique Cardiology  [address] [place] [address] Rd, [facility]  [phone]

## 2022-03-29 NOTE — PROGRESS NOTES
Medicare Outpatient Observation Notice (MOON)/ [place] Outpatient Observation Notice (Janet Cantor) provided to patient/representative with verbal explanation of the notice. Time allotted for questions regarding the notice. Patient /representative provided a completed copy of the MOON/VOON notice. Copy placed on bedside chart. Daughter Jonnie Herron @ [phone]) in room with pt - informed & signed.

## 2022-03-29 NOTE — CONSULTS
Gastroenterology Consult     Referring Physician: Henry Santamaria MD     Consult Date: 3/29/2022     Subjective:     Chief Complaint: Chest pain    History of Present Illness: Perico Patiño is a 59 y.o. female who is seen in consultation for abdominal pain. She reports the symptoms started yesterday morning. She states the pain was in the center of her chest and radiates to her back and shoulder. She states the pain woke her up around 5:30 am yesterday morning. . She denies fever or any other associated symptoms. She does have a past medical history significant for arthritis, depression, epilepsy, hypertension, thyroid disease, neurofibromatosis, and history of PE/DVT. She is getting dilaudid for pain and states it is improved with the pain medication. CT abdomen shows gallbladder wall and pericystic changes. Mild prominence of the common bile duct. Abdominal ultrasound shows cholelithiasis and CBD dilation. Troponin on admission is 5. Total bilirubin 0.3, ALT 30, AST 20, Alk Phosphatase 127   Lipase 56. EKG shows Sinus rhythm. She is scheduled for MRCP results  pending at this time. HIDA scan findings concerning for cystic duct compromise, Normal distribution of activity through the liver. Common bile duct activity noted with free spill of activity into small bowel loops, No gallbladder activity is evident. Patient is followed by surgery. Will wait for MRCP results. Continue PPI. Possible EGD as an out patient. HIDA 3/29/22: There is a normal distribution of activity through the liver. Common bile duct activity noted with free spill of activity into small bowel loops. With images carried out to 2 hours, no gallbladder activity is evident. IMPRESSION  Findings concerning for cystic duct compromise. US Gallbladder 3/28/22: IMPRESSION Limited by bowel gas. 1. Cholelithiasis. No sonographic evidence of cholecystitis. However the common bile duct is dilated which may be chronic.     CT abdomen 3/28/22: IMPRESSION  Gallbladder wall and pericystic changes. Mild prominence of the  common bile duct. This represents change from the prior study. Ultrasound earlier this evening shows cholelithiasis. Cholecystitis and/or cholangitis should be considered clinically.  Consider radionuclide hepatobiliary scan depending on clinical setting    Past Medical History:   Diagnosis Date    Arthritis     Depression     Epilepsy ([address]     Hypertension     Ill-defined condition     neurofibromatosis    Thyroid disease      Past Surgical History:   Procedure Laterality Date    HX GYN      HX ORTHOPAEDIC      NEUROLOGICAL PROCEDURE UNLISTED        Family History   Problem Relation Age of Onset    Cancer Mother         glioblastoma    Lung Disease Father     Cancer Father         mesothelioma, testicular cancer     Social History     Tobacco Use    Smoking status: Never Smoker    Smokeless tobacco: Never Used   Substance Use Topics    Alcohol use: No      Allergies   Allergen Reactions    Codeine Nausea and Vomiting    Methadone Hives    Morphine Other (comments)     psychosis     Current Facility-Administered Medications   Medication Dose Route Frequency    gabapentin (NEURONTIN) capsule 400 mg  400 mg Oral TID    HYDROmorphone (DILAUDID) injection 1 mg  1 mg IntraVENous Q4H PRN    0.9% sodium chloride infusion  75 mL/hr IntraVENous CONTINUOUS    atorvastatin (LIPITOR) tablet 40 mg  40 mg Oral DAILY    busPIRone (BUSPAR) tablet 30 mg  30 mg Oral BID    carBAMazepine ER (CARBATROL ER) capsule 300 mg  300 mg Oral BID    carvediloL (COREG) tablet 3.125 mg  3.125 mg Oral BID WITH MEALS    famotidine (PEPCID) tablet 20 mg  20 mg Oral BID    levothyroxine (SYNTHROID) tablet 125 mcg  125 mcg Oral ACB    linaCLOtide (LINZESS) capsule 145 mcg  145 mcg Oral ACB    venlafaxine-SR (EFFEXOR-XR) capsule 150 mg  150 mg Oral DAILY    lactated Ringers infusion  100 mL/hr IntraVENous CONTINUOUS    piperacillin-tazobactam (ZOSYN) 3.375 g in 0.9% sodium chloride (MBP/ADV) 100 mL MBP  3.375 g IntraVENous Q8H        Review of Systems:  A detailed 10 organ review of systems is obtained with pertinent positives as listed in the History of Present Illness and Past Medical History. All others are negative. Objective:     Physical Exam:  Visit Vitals  /64 (BP 1 Location: Left upper arm, BP Patient Position: Supine)   Pulse 72   Temp 98.4 °F (36.9 °C)   Resp 18   Ht 5' 1\" (1.549 m)   Wt 61.7 kg (136 lb)   SpO2 95%   Breastfeeding No   BMI 25.70 kg/m²        Skin:  Extremities and face reveal no rashes. No coleman erythema. No telangiectasias on the chest wall. HEENT: Sclerae anicteric. Extra-occular muscles are intact. No oral ulcers. No abnormal pigmentation of the lips. The neck is supple. Cardiovascular: Regular rate and rhythm. Respiratory:  Comfortable breathing with no accessory muscle use. GI:  Abdomen nondistended, soft, and nontender. Normal active bowel sounds. No enlargement of the liver or spleen. No masses palpable. Rectal:  Deferred  Musculoskeletal: Extremities have good range of motion. Neurological:  Gross memory appears intact. Patient is alert and oriented. Psychiatric:  Mood appears appropriate with judgement intact. Lymphatic:  No cervical or supraclavicular adenopathy.     Lab/Data Review:  CMP:   Lab Results   Component Value Date/Time     03/28/2022 06:21 PM    K 3.5 03/28/2022 06:21 PM     03/28/2022 06:21 PM    CO2 29 03/28/2022 06:21 PM    AGAP 4 (L) 03/28/2022 06:21 PM    GLU 75 03/28/2022 06:21 PM    BUN 13 03/28/2022 06:21 PM    CREA 0.59 03/28/2022 06:21 PM    GFRAA >60 03/28/2022 06:21 PM    GFRNA >60 03/28/2022 06:21 PM    CA 8.7 03/28/2022 06:21 PM    ALB 3.6 03/28/2022 06:21 PM    TP 6.3 (L) 03/28/2022 06:21 PM    GLOB 2.7 03/28/2022 06:21 PM    AGRAT 1.3 03/28/2022 06:21 PM    ALT 30 03/28/2022 06:21 PM     CBC:   Lab Results   Component Value Date/Time    WBC 7.1 03/28/2022 06:21 PM    HGB 12.0 03/28/2022 06:21 PM    HCT 36.3 03/28/2022 06:21 PM     03/28/2022 06:21 PM         Assessment/Plan:     1. Abdominal Pain/Gallstones     Continue Pain management     Antiemetics as needed     NPO     MRCP pending     HIDA 3/29/22: There is a normal distribution of activity through the liver. Common bile duct activity noted with free spill of activity into small bowel loops. With images carried out to 2 hours, no gallbladder activity is evident. IMPRESSION  Findings concerning for cystic duct compromise. Continue IV Hydration     Surgery input appreciated     Continue PPI      Possible EGD as an out patient     LFT's in the am    Thank you for allowing me to participate in this patients care. Plan discussed with Dr. Becki Marmolejo and he approves.

## 2022-03-29 NOTE — MED STUDENT NOTES
HISTORY & PHYSICAL      Patient: Krupa Faria MRN: [id number]  SSN: [id number]    YOB: 1960  Age: 59 y.o. Sex: female      Primary Care Provider: Rosalio, MD Henry  Source of Information:       Subjective     CC:   Chief Complaint   Patient presents with    Chest Pain       HPI: Krupa Faria is a(n) 59 y.o. female with PMH significant for arthritis, depression, epilepsy, hypertension, neurofibromatosis, and thyroid disease who presents with chest pain. Upon presentation, patient stated her symptoms began that morning. She describes her pain being in the center of her chest and radiates to the back and shoulder. Patient complained of her pain which awakened her at 5:30 this morning, she states the pain is 9/10, constant, without exacerbating symptoms. The patient is currently on HYDROmorphone (DILAUDID) injection 1 mg. She states the Dilaudid is greatly helping with her pain management. No fever, cough, or other associated symptoms. Denies leg swelling unilaterally, history of PE/DVT, or pleuritic chest pain. Originally, a ultrasound was conducted (3/28/2022 @22:05) revealing cholelithiasis and the common bile duct is dilated which may be chronic. However, the U/S gallbladder was limited by bowel gas. The recent, CT ABD Pelvis w/o contrast (3/28/2022 @22:55) revealed Gallbladder wall and pericystic changes. Mild prominence of the common bile duct. Thus, cholecystitis and/or cholangitis should be considered clinically. Patient is scheduled for MRCP today. Past Medical History:   Diagnosis Date    Arthritis     Depression     Epilepsy ([facility] [address]     Hypertension     Ill-defined condition     neurofibromatosis    Thyroid disease         Past Surgical History:   Procedure Laterality Date    HX GYN      HX ORTHOPAEDIC      NEUROLOGICAL PROCEDURE UNLISTED         Prior to Admission medications    Medication Sig Start Date End Date Taking?  Authorizing Provider   venlafaxine [facility]) 75 mg tablet Take 75 mg by mouth daily. Yes Provider, Historical   ergocalciferol (DrisdoL) 1,250 mcg (50,000 unit) capsule Take 50,000 Units by mouth every thirty (30) days. Take on the 15th day of the month   Yes Provider, Historical   linaCLOtide (Linzess) 145 mcg cap capsule Take 145 mcg by mouth Daily (before breakfast). Yes Other, MD Henry   senna-docusate (PERICOLACE) 8.6-50 mg per tablet Take 2 Tablets by mouth daily. Yes Other, MD Henry   atorvastatin (LIPITOR) 40 mg tablet Take 40 mg by mouth daily. Yes Provider, Historical   Xtampza ER 36 mg capsule Take 1 Capsule by mouth two (2) times a day. 9/19/21  Yes Provider, Historical   gabapentin (NEURONTIN) 800 mg tablet Take 400 mg by mouth three (3) times daily. 9/9/21  Yes Provider, Historical   famotidine (Pepcid) 20 mg tablet Take 20 mg by mouth two (2) times a day. Yes Other, MD Henry   celecoxib (CELEBREX) 200 mg capsule Take 100 mg by mouth two (2) times a day. Yes Other, MD Henry   aspirin 81 mg chewable tablet Take 81 mg by mouth nightly. Yes Other, MD Henry   carBAMazepine ER (CARBATROL ER) 300 mg capsule Take 300 mg by mouth two (2) times a day. Yes Provider, Historical   carvedilol (COREG) 6.25 mg tablet Take  by mouth two (2) times daily (with meals). Yes Provider, Historical   levothyroxine (SYNTHROID) 125 mcg tablet Take  by mouth Daily (before breakfast). Yes Provider, Historical   busPIRone (BUSPAR) 30 mg tablet Take 30 mg by mouth two (2) times a day. Yes Provider, Historical   venlafaxine-SR (EFFEXOR XR) 150 mg capsule Take 150 mg by mouth daily. Yes Provider, Historical   naloxone 8 mg/actuation spry 4 mg by Nasal route as needed. Other, MD Henry   oxyCODONE-acetaminophen (Percocet) 7.5-325 mg per tablet Take 1 Tablet by mouth every eight (8) hours as needed for Pain. Other, MD Henry   loratadine (Claritin) 10 mg tablet Take 10 mg by mouth daily as needed for Allergies.     Rosalio, MD Henry   diclofenac (FLECTOR) 1.3 % pt12 1 Patch by TransDERmal route every twelve (12) hours every twelve (12) hours. Henry Santamaria MD   diclofenac (Voltaren) 1 % gel Apply 4 g to affected area four (4) times daily. Henry Santamaria MD       Allergies   Allergen Reactions    Codeine Nausea and Vomiting    Methadone Hives    Morphine Other (comments)     psychosis        Family History   Problem Relation Age of Onset    Cancer Mother         glioblastoma    Lung Disease Father     Cancer Father         mesothelioma, testicular cancer        Social History     Socioeconomic History    Marital status: UNKNOWN    Highest education level: Associate degree: occupational, technical, or vocational program   Tobacco Use    Smoking status: Never Smoker    Smokeless tobacco: Never Used   Vaping Use    Vaping Use: Former    Substances: CBD (only for 2 months)   Substance and Sexual Activity    Alcohol use: No    Drug use: Never        ROS  Constitutional:  no fever,  no chills,  no sweats, No weakness, No fatigue, No decreased activity. Eye: No recent visual problem, No icterus, No discharge, No double vision. Ear/Nose/Mouth/Throat: No decreased hearing, No ear pain, No nasal congestion, No sore throat. Respiratory: No shortness of breath, No cough, No sputum production, No hemoptysis, No wheezing, No cyanosis. Cardiovascular:  chest pain, No palpitations, No bradycardia, No tachycardia, No peripheral edema, No syncope. Gastrointestinal: No nausea,  No vomiting, No diarrhea, No constipation, No heartburn,   abdominal pain. Genitourinary: No dysuria, No hematuria, No change in urine stream, No urethral discharge, No lesions. Hematology/Lymphatics: No bruising tendency, No bleeding tendency, No petechiae, No swollen lymph glands. Endocrine: No excessive thirst, No polyuria, No cold intolerance, No heat intolerance, No excessive hunger. Immunologic: Not immunocompromised, No recurrent fevers, No recurrent infections.   Musculoskeletal: No back pain, No neck pain, No joint pain, No muscle pain, No claudication, No decreased range of motion, No trauma. Integumentary: No rash, No pruritus, No abrasions. Neurologic: Alert and oriented X4, No abnormal balance, No headache, No confusion, No numbness, No tingling. Psychiatric: No anxiety, No depression, No bijan. Objective     Visit Vitals  /69 (BP 1 Location: Left upper arm, BP Patient Position: Supine)   Pulse 89   Temp 97.8 °F (36.6 °C)   Resp 16   Ht 5' 1\" (1.549 m)   Wt 61.7 kg (136 lb)   SpO2 97%   Breastfeeding No   BMI 25.70 kg/m²      O2 Device: None (Room air)    Physical Exam:   Physical Exam  General: well appearing, no acute distress  Head: Normal  Face: Nornal  HEENT: atraumatic, PERRLA, moist mucosa, normal pharynx, normal tonsils and adenoids, normal tongue, no fluid in sinuses  Neck: Trachea midline, no carotid bruit, no masses  Chest: Normal.  Respiratory: Normal chest wall expansion, CTA B, no r/r/w, no rubs  Cardiovascular: RRR, no m/r/g, Normal S1 and S2  Abdomen: Soft, non tender, non-distended, normal bowel sounds in all quadrants, no hepatosplenomegaly, no tympany. Genitourinary: No inguinal hernia, normal external gentalia,  no renal angle tenderness  Rectal: deferred  Musculoskeletal: normal ROM in upper and lower extremities, No joint swelling. Integumentary: Warm, dry, and pink, with no rash, purpura, or petechia  Heme/Lymph: No lymphadenopathy, no bruises  Neurological:Cranial Nerves II-XII grossly intact, no gross motor or sensory deficit  Psychiatric: Cooperative with normal mood, affect, and cognition      Intake & Output:  Current Shift: No intake/output data recorded. Last three shifts: 03/27 1901 - 03/29 0700  In: 1000 [I.V.:1000]  Out: -     Lab/Data Review: All lab results for the last 24 hours reviewed.        24 Hour Results:    Recent Results (from the past 24 hour(s))   EKG, 12 LEAD, INITIAL    Collection Time: 03/28/22  6:19 PM   Result Value Ref Range    Ventricular Rate 77 BPM    Atrial Rate 76 BPM    QRS Duration 70 ms    Q-T Interval 376 ms    QTC Calculation (Bezet) 425 ms    Calculated R Axis -60 degrees    Calculated T Axis 73 degrees    Diagnosis       Normal sinus rhythm  When compared with ECG of 29-SEP-2021 22:33,  No significant change was found  Confirmed by Tay Warner M.D., Aria Babcock ([id number]) on 3/28/2022 6:44:29 PM     CBC WITH AUTOMATED DIFF    Collection Time: 03/28/22  6:21 PM   Result Value Ref Range    WBC 7.1 3.6 - 11.0 K/uL    RBC 4.32 3.80 - 5.20 M/uL    HGB 12.0 11.5 - 16.0 g/dL    HCT 36.3 35.0 - 47.0 %    MCV 84.0 80.0 - 99.0 FL    MCH 27.8 26.0 - 34.0 PG    MCHC 33.1 30.0 - 36.5 g/dL    RDW 13.7 11.5 - 14.5 %    PLATELET 323 785 - 163 K/uL    MPV 9.4 8.9 - 12.9 FL    NRBC 0.0 0.0  WBC    ABSOLUTE NRBC 0.00 0.00 - 0.01 K/uL    NEUTROPHILS 67 32 - 75 %    LYMPHOCYTES 19 12 - 49 %    MONOCYTES 10 5 - 13 %    EOSINOPHILS 3 0 - 7 %    BASOPHILS 1 0 - 1 %    IMMATURE GRANULOCYTES 0 0 - 0.5 %    ABS. NEUTROPHILS 4.8 1.8 - 8.0 K/UL    ABS. LYMPHOCYTES 1.3 0.8 - 3.5 K/UL    ABS. MONOCYTES 0.7 0.0 - 1.0 K/UL    ABS. EOSINOPHILS 0.2 0.0 - 0.4 K/UL    ABS. BASOPHILS 0.1 0.0 - 0.1 K/UL    ABS. IMM. GRANS. 0.0 0.00 - 0.04 K/UL    DF AUTOMATED     METABOLIC PANEL, COMPREHENSIVE    Collection Time: 03/28/22  6:21 PM   Result Value Ref Range    Sodium 137 136 - 145 mmol/L    Potassium 3.5 3.5 - 5.1 mmol/L    Chloride 104 97 - 108 mmol/L    CO2 29 21 - 32 mmol/L    Anion gap 4 (L) 5 - 15 mmol/L    Glucose 75 65 - 100 mg/dL    BUN 13 6 - 20 mg/dL    Creatinine 0.59 0.55 - 1.02 mg/dL    BUN/Creatinine ratio 22 (H) 12 - 20      GFR est AA >60 >60 ml/min/1.73m2    GFR est non-AA >60 >60 ml/min/1.73m2    Calcium 8.7 8.5 - 10.1 mg/dL    Bilirubin, total 0.3 0.2 - 1.0 mg/dL    AST (SGOT) 20 15 - 37 U/L    ALT (SGPT) 30 12 - 78 U/L    Alk.  phosphatase 127 (H) 45 - 117 U/L    Protein, total 6.3 (L) 6.4 - 8.2 g/dL    Albumin 3.6 3.5 - 5.0 g/dL Globulin 2.7 2.0 - 4.0 g/dL    A-G Ratio 1.3 1.1 - 2.2     TROPONIN-HIGH SENSITIVITY    Collection Time: 03/28/22  6:21 PM   Result Value Ref Range    Troponin-High Sensitivity 7 0 - 51 ng/L   LIPASE    Collection Time: 03/28/22  6:21 PM   Result Value Ref Range    Lipase 56 (L) 73 - 393 U/L   SARS-COV-2    Collection Time: 03/28/22  9:45 PM   Result Value Ref Range    SARS-CoV-2 by PCR Nasopharyngeal     COVID-19 WITH INFLUENZA A/B    Collection Time: 03/28/22  9:45 PM   Result Value Ref Range    SARS-CoV-2 by PCR Not Detected Not Detected      Influenza A by PCR Not Detected Not Detected      Influenza B by PCR Not Detected Not Detected     TROPONIN-HIGH SENSITIVITY    Collection Time: 03/29/22  4:53 AM   Result Value Ref Range    Troponin-High Sensitivity 5 0 - 51 ng/L         Imaging:    CT ABD PELV WO CONT   Final Result   Gallbladder wall and pericystic changes. Mild prominence of the   common bile duct. This represents change from the prior study. Ultrasound   earlier this evening shows cholelithiasis. Cholecystitis and/or cholangitis   should be considered clinically. Consider radionuclide hepatobiliary scan   depending on clinical setting                US GALLBLADDER   Final Result   Limited by bowel gas. 1. Cholelithiasis. No sonographic evidence of cholecystitis. However the common   bile duct is dilated which may be chronic. CTA CHEST W OR W WO CONT   Final Result   1. No pulmonary embolus. 2. Interval increase of peribronchial and perivascular soft tissue thickening   possibly lymphoid or neurofibromatosis in origin. Unchanged left lung   bronchovascular nodule, posterior mediastinal and retroperitoneal abnormal soft   tissue. 3. Bilateral hazy airspace edema or pneumonia. 4. Haziness of the gallbladder. Correlate clinically for cholecystitis. XR CHEST PORT   Final Result   No acute process or active disease.        MRI ABD W MRCP W CONT    (Results Pending)   NM HEPATOBILIARY W INTERVENTION    (Results Pending)        Assessment     Active Problems:    Chest pain (3/28/2022)    Cholecystitis and/or cholangitis     Plan     1. Diet: NPO   2. IV fluids  3. SCD  4. IS  5. Pain medications  6. Antibiotics  7. Nausea medication  8. MRCP in am.  9. Labs  10. Consult: GI      Signed By: Lebron Guerrero     March 29, 2022          *ATTENTION:  This note has been created by a medical student for educational purposes only. Please do not refer to the content of this note for clinical decision-making, billing, or other purposes. Please see attending physicians note to obtain clinical information on this patient. *

## 2022-03-29 NOTE — ED NOTES
Pt c/o increasing pain. Noted to be more toward RUQ than chest.  Pt given oral pain med, reported minimal response to oral.  Provider made aware of ongoing pain, and daughter expressed concern due to pt's hx of known gall bladder/sepsis issues. Provider to bedside, evaluated and determined to admit pt and do US for gallbladder/abdomen. Pt pain decreased from 8:10 to 6:10 after she used restroom, and is at 2:10 following dilaudid. Pt resting, daughter bedside, awaiting admission.

## 2022-03-29 NOTE — PROGRESS NOTES
Chief Complaint:None      Subjective:  Ms. Dacia Glover is a 59y.o. year old * female admitted for chest pain, back pain. No abdominal pain now even though she had earlier today and received Dilaudid & HIDA did not visualize GB but has free spillage in to duodenum. Review of Systems:   Constitutional:  no fever, no chills,  no sweats, No weakness, No fatigue, No decreased activity. Respiratory: No shortness of breath, No cough, No sputum production, No hemoptysis, No wheezing, No cyanosis. Cardiovascular: No chest pain, No palpitations, No bradycardia, No tachycardia, No peripheral edema, No syncope. Gastrointestinal: No nausea, No vomiting, No diarrhea, No constipation, No heartburn, No abdominal pain. Genitourinary: No dysuria, No hematuria, No change in urine stream, No urethral discharge, No lesions. Hematology/Lymphatics: No bruising tendency, No bleeding tendency, No petechiae, No swollen lymph glands. Endocrine: No excessive thirst, No polyuria, No cold intolerance, No heat intolerance, No excessive hunger. Musculoskeletal: No back pain, No neck pain, No joint pain, No muscle pain, No claudication, No decreased range of motion, No trauma. Integumentary: No rash, No pruritus, No abrasions. Neurologic: Alert and oriented X4, No abnormal balance, No headache, No confusion, No numbness, No tingling. Psychiatric: No anxiety, No depression, No bijan. Physical Exam:     Vitals & Measurements:     Wt Readings from Last 3 Encounters:   03/28/22 61.7 kg (136 lb)   11/03/21 62.2 kg (137 lb 3.2 oz)   09/29/21 75 kg (165 lb 5.5 oz)     Temp Readings from Last 3 Encounters:   03/29/22 98.4 °F (36.9 °C)   11/03/21 98.4 °F (36.9 °C) (Temporal)   10/01/21 97.5 °F (36.4 °C)     BP Readings from Last 3 Encounters:   03/29/22 112/64   11/03/21 109/64   10/01/21 (!) 147/70     Pulse Readings from Last 3 Encounters:   03/29/22 78   11/03/21 84   10/01/21 71      Ht Readings from Last 3 Encounters:   03/28/22 5' 1\" (1.549 m)   11/03/21 5' 1\" (1.549 m)   10/01/21 5' 1\" (1.549 m)      Date 03/28/22 0700 - 03/29/22 0659 03/29/22 0700 - 03/30/22 0659   Shift 9997-7844 2295-8928 24 Hour Total 4534-1868 9187-3790 24 Hour Total   INTAKE   I.V.(mL/kg/hr)  1000(1.4) 1000(0.7)        Volume (sodium chloride 0.9 % bolus infusion 1,000 mL)  1000 1000      Shift Total(mL/kg)  1000(16.2) 1000(16.2)      OUTPUT   Shift Total(mL/kg)         NET  1000 1000      Weight (kg) 61.7 61.7 61.7 61.7 61.7 61.7       General: well appearing, no acute distress  Head: Normal  Face: Nornal  HEENT: atraumatic, PERRLA, moist mucosa, normal pharynx, normal tonsils and adenoids, normal tongue, no fluid in sinuses  Neck: Trachea midline, no carotid bruit, no masses  Chest: Normal.  Respiratory: normal chest wall expansion, CTA B, no r/r/w, no rubs  Cardiovascular: RRR, no m/r/g, Normal S1 and S2  Abdomen: Soft, non tender, non-distended, normal bowel sounds in all quadrants, no hepatosplenomegaly, no tympany  Genitourinary: No inguinal hernia, normal external gentalia, no renal angle tenderness  Rectal: deferred  Musculoskeletal: Normal ROM in upper and lower extremities, No joint swelling. Integumentary: Warm, dry, and pink, with no rash, purpura, or petechia  Heme/Lymph: No lymphadenopathy, no bruises  Neurological: Cranial Nerves II-XII grossly intact, No gross sensory or motor deficit.   Psychiatric: Cooperative with normal mood, affect, and cognition    Laboratory Values:   Recent Results (from the past 24 hour(s))   EKG, 12 LEAD, INITIAL    Collection Time: 03/28/22  6:19 PM   Result Value Ref Range    Ventricular Rate 77 BPM    Atrial Rate 76 BPM    QRS Duration 70 ms    Q-T Interval 376 ms    QTC Calculation (Bezet) 425 ms    Calculated R Axis -60 degrees    Calculated T Axis 73 degrees    Diagnosis       Normal sinus rhythm  When compared with ECG of 29-SEP-2021 22:33,  No significant change was found  Confirmed by Mary Grace Edmonds M.D., Xiomara Werner ([id number]) on 3/28/2022 6:44:29 PM     CBC WITH AUTOMATED DIFF    Collection Time: 03/28/22  6:21 PM   Result Value Ref Range    WBC 7.1 3.6 - 11.0 K/uL    RBC 4.32 3.80 - 5.20 M/uL    HGB 12.0 11.5 - 16.0 g/dL    HCT 36.3 35.0 - 47.0 %    MCV 84.0 80.0 - 99.0 FL    MCH 27.8 26.0 - 34.0 PG    MCHC 33.1 30.0 - 36.5 g/dL    RDW 13.7 11.5 - 14.5 %    PLATELET 829 628 - 213 K/uL    MPV 9.4 8.9 - 12.9 FL    NRBC 0.0 0.0  WBC    ABSOLUTE NRBC 0.00 0.00 - 0.01 K/uL    NEUTROPHILS 67 32 - 75 %    LYMPHOCYTES 19 12 - 49 %    MONOCYTES 10 5 - 13 %    EOSINOPHILS 3 0 - 7 %    BASOPHILS 1 0 - 1 %    IMMATURE GRANULOCYTES 0 0 - 0.5 %    ABS. NEUTROPHILS 4.8 1.8 - 8.0 K/UL    ABS. LYMPHOCYTES 1.3 0.8 - 3.5 K/UL    ABS. MONOCYTES 0.7 0.0 - 1.0 K/UL    ABS. EOSINOPHILS 0.2 0.0 - 0.4 K/UL    ABS. BASOPHILS 0.1 0.0 - 0.1 K/UL    ABS. IMM. GRANS. 0.0 0.00 - 0.04 K/UL    DF AUTOMATED     METABOLIC PANEL, COMPREHENSIVE    Collection Time: 03/28/22  6:21 PM   Result Value Ref Range    Sodium 137 136 - 145 mmol/L    Potassium 3.5 3.5 - 5.1 mmol/L    Chloride 104 97 - 108 mmol/L    CO2 29 21 - 32 mmol/L    Anion gap 4 (L) 5 - 15 mmol/L    Glucose 75 65 - 100 mg/dL    BUN 13 6 - 20 mg/dL    Creatinine 0.59 0.55 - 1.02 mg/dL    BUN/Creatinine ratio 22 (H) 12 - 20      GFR est AA >60 >60 ml/min/1.73m2    GFR est non-AA >60 >60 ml/min/1.73m2    Calcium 8.7 8.5 - 10.1 mg/dL    Bilirubin, total 0.3 0.2 - 1.0 mg/dL    AST (SGOT) 20 15 - 37 U/L    ALT (SGPT) 30 12 - 78 U/L    Alk.  phosphatase 127 (H) 45 - 117 U/L    Protein, total 6.3 (L) 6.4 - 8.2 g/dL    Albumin 3.6 3.5 - 5.0 g/dL    Globulin 2.7 2.0 - 4.0 g/dL    A-G Ratio 1.3 1.1 - 2.2     TROPONIN-HIGH SENSITIVITY    Collection Time: 03/28/22  6:21 PM   Result Value Ref Range    Troponin-High Sensitivity 7 0 - 51 ng/L   LIPASE    Collection Time: 03/28/22  6:21 PM   Result Value Ref Range    Lipase 56 (L) 73 - 393 U/L   SARS-COV-2    Collection Time: 03/28/22  9:45 PM   Result Value Ref Range    SARS-CoV-2 by PCR Nasopharyngeal     COVID-19 WITH INFLUENZA A/B    Collection Time: 03/28/22  9:45 PM   Result Value Ref Range    SARS-CoV-2 by PCR Not Detected Not Detected      Influenza A by PCR Not Detected Not Detected      Influenza B by PCR Not Detected Not Detected     TROPONIN-HIGH SENSITIVITY    Collection Time: 03/29/22  4:53 AM   Result Value Ref Range    Troponin-High Sensitivity 5 0 - 51 ng/L           NM HEPATOBILIARY DUCT SCAN   Final Result   Findings concerning for cystic duct compromise. CT ABD PELV WO CONT   Final Result   Gallbladder wall and pericystic changes. Mild prominence of the   common bile duct. This represents change from the prior study. Ultrasound   earlier this evening shows cholelithiasis. Cholecystitis and/or cholangitis   should be considered clinically. Consider radionuclide hepatobiliary scan   depending on clinical setting                US GALLBLADDER   Final Result   Limited by bowel gas. 1. Cholelithiasis. No sonographic evidence of cholecystitis. However the common   bile duct is dilated which may be chronic. CTA CHEST W OR W WO CONT   Final Result   1. No pulmonary embolus. 2. Interval increase of peribronchial and perivascular soft tissue thickening   possibly lymphoid or neurofibromatosis in origin. Unchanged left lung   bronchovascular nodule, posterior mediastinal and retroperitoneal abnormal soft   tissue. 3. Bilateral hazy airspace edema or pneumonia. 4. Haziness of the gallbladder. Correlate clinically for cholecystitis. XR CHEST PORT   Final Result   No acute process or active disease. Assessment:  Problem List Items Addressed This Visit        Other    Chest pain - Primary       Gallstone    Plan:    1. Admission  2. Diet   3. IV fluids  4. SCD  5. IS  6. Pain medications  7. Antibiotics  8. Nausea medication  9. Labs today. 10. Cardiac clearance  11. Hold Aspirin if OK with Cardiology  12.  Plan discussed with patient and family and answered all their questions. Thank you for allowing me to participate in the care of this patient.

## 2022-03-30 ENCOUNTER — APPOINTMENT (OUTPATIENT)
Dept: NON INVASIVE DIAGNOSTICS | Age: 62
DRG: 419 | End: 2022-03-30
Attending: FAMILY MEDICINE
Payer: MEDICARE

## 2022-03-30 LAB
ALBUMIN SERPL-MCNC: 3.6 G/DL (ref 3.5–5)
ALBUMIN SERPL-MCNC: 3.6 G/DL (ref 3.5–5)
ALBUMIN SERPL-MCNC: 3.7 G/DL (ref 3.5–5)
ALBUMIN/GLOB SERPL: 1.2 {RATIO} (ref 1.1–2.2)
ALBUMIN/GLOB SERPL: 1.2 {RATIO} (ref 1.1–2.2)
ALBUMIN/GLOB SERPL: 1.3 {RATIO} (ref 1.1–2.2)
ALP SERPL-CCNC: 147 U/L (ref 45–117)
ALP SERPL-CCNC: 150 U/L (ref 45–117)
ALP SERPL-CCNC: 152 U/L (ref 45–117)
ALT SERPL-CCNC: 33 U/L (ref 12–78)
ALT SERPL-CCNC: 33 U/L (ref 12–78)
ALT SERPL-CCNC: 34 U/L (ref 12–78)
ANION GAP SERPL CALC-SCNC: 13 MMOL/L (ref 5–15)
AST SERPL W P-5'-P-CCNC: 22 U/L (ref 15–37)
AST SERPL W P-5'-P-CCNC: 25 U/L (ref 15–37)
AST SERPL W P-5'-P-CCNC: 28 U/L (ref 15–37)
BASOPHILS # BLD: 0.1 K/UL (ref 0–0.1)
BASOPHILS NFR BLD: 1 % (ref 0–1)
BILIRUB DIRECT SERPL-MCNC: 0.3 MG/DL (ref 0–0.2)
BILIRUB DIRECT SERPL-MCNC: 0.3 MG/DL (ref 0–0.2)
BILIRUB SERPL-MCNC: 0.9 MG/DL (ref 0.2–1)
BILIRUB SERPL-MCNC: 1 MG/DL (ref 0.2–1)
BILIRUB SERPL-MCNC: 1 MG/DL (ref 0.2–1)
BUN SERPL-MCNC: 11 MG/DL (ref 6–20)
BUN/CREAT SERPL: 24 (ref 12–20)
CA-I BLD-MCNC: 9 MG/DL (ref 8.5–10.1)
CHLORIDE SERPL-SCNC: 106 MMOL/L (ref 97–108)
CO2 SERPL-SCNC: 20 MMOL/L (ref 21–32)
CREAT SERPL-MCNC: 0.46 MG/DL (ref 0.55–1.02)
DIFFERENTIAL METHOD BLD: ABNORMAL
ECHO AO ROOT DIAM: 3 CM
ECHO AO ROOT INDEX: 1.88 CM/M2
ECHO AV PEAK GRADIENT: 5 MMHG
ECHO AV PEAK VELOCITY: 1.2 M/S
ECHO AV VELOCITY RATIO: 0.75
ECHO EST RA PRESSURE: 3 MMHG
ECHO LA DIAMETER INDEX: 2 CM/M2
ECHO LA DIAMETER: 3.2 CM
ECHO LA TO AORTIC ROOT RATIO: 1.07
ECHO LV E' LATERAL VELOCITY: 10 CM/S
ECHO LV E' SEPTAL VELOCITY: 11 CM/S
ECHO LV EJECTION FRACTION BIPLANE: 74 % (ref 55–100)
ECHO LV FRACTIONAL SHORTENING: 42 % (ref 28–44)
ECHO LV INTERNAL DIMENSION DIASTOLE INDEX: 2.38 CM/M2
ECHO LV INTERNAL DIMENSION DIASTOLIC: 3.8 CM (ref 3.9–5.3)
ECHO LV INTERNAL DIMENSION SYSTOLIC INDEX: 1.38 CM/M2
ECHO LV INTERNAL DIMENSION SYSTOLIC: 2.2 CM
ECHO LV IVSD: 1.3 CM (ref 0.6–0.9)
ECHO LV MASS 2D: 163 G (ref 67–162)
ECHO LV MASS INDEX 2D: 101.9 G/M2 (ref 43–95)
ECHO LV POSTERIOR WALL DIASTOLIC: 1.2 CM (ref 0.6–0.9)
ECHO LV RELATIVE WALL THICKNESS RATIO: 0.63
ECHO LVOT PEAK GRADIENT: 3 MMHG
ECHO LVOT PEAK VELOCITY: 0.9 M/S
ECHO MV A VELOCITY: 1.27 M/S
ECHO MV E DECELERATION TIME (DT): 338 MS
ECHO MV E VELOCITY: 0.88 M/S
ECHO MV E/A RATIO: 0.69
ECHO MV E/E' LATERAL: 8.8
ECHO MV E/E' RATIO (AVERAGED): 8.4
ECHO MV E/E' SEPTAL: 8
ECHO PV MAX VELOCITY: 1.1 M/S
ECHO PV PEAK GRADIENT: 5 MMHG
ECHO RIGHT VENTRICULAR SYSTOLIC PRESSURE (RVSP): 22 MMHG
ECHO TV REGURGITANT MAX VELOCITY: 2.17 M/S
ECHO TV REGURGITANT PEAK GRADIENT: 19 MMHG
EOSINOPHIL # BLD: 0 K/UL (ref 0–0.4)
EOSINOPHIL NFR BLD: 0 % (ref 0–7)
ERYTHROCYTE [DISTWIDTH] IN BLOOD BY AUTOMATED COUNT: 13.6 % (ref 11.5–14.5)
GLOBULIN SER CALC-MCNC: 2.9 G/DL (ref 2–4)
GLOBULIN SER CALC-MCNC: 3.1 G/DL (ref 2–4)
GLOBULIN SER CALC-MCNC: 3.1 G/DL (ref 2–4)
GLUCOSE SERPL-MCNC: 62 MG/DL (ref 65–100)
HCT VFR BLD AUTO: 36.7 % (ref 35–47)
HGB BLD-MCNC: 12.3 G/DL (ref 11.5–16)
IMM GRANULOCYTES # BLD AUTO: 0 K/UL (ref 0–0.04)
IMM GRANULOCYTES NFR BLD AUTO: 0 % (ref 0–0.5)
LYMPHOCYTES # BLD: 0.9 K/UL (ref 0.8–3.5)
LYMPHOCYTES NFR BLD: 9 % (ref 12–49)
MCH RBC QN AUTO: 27.6 PG (ref 26–34)
MCHC RBC AUTO-ENTMCNC: 33.5 G/DL (ref 30–36.5)
MCV RBC AUTO: 82.5 FL (ref 80–99)
MONOCYTES # BLD: 0.6 K/UL (ref 0–1)
MONOCYTES NFR BLD: 6 % (ref 5–13)
NEUTS SEG # BLD: 8 K/UL (ref 1.8–8)
NEUTS SEG NFR BLD: 84 % (ref 32–75)
NRBC # BLD: 0 K/UL (ref 0–0.01)
NRBC BLD-RTO: 0 PER 100 WBC
PLATELET # BLD AUTO: 361 K/UL (ref 150–400)
PMV BLD AUTO: 9.9 FL (ref 8.9–12.9)
POTASSIUM SERPL-SCNC: 3.6 MMOL/L (ref 3.5–5.1)
PROT SERPL-MCNC: 6.6 G/DL (ref 6.4–8.2)
PROT SERPL-MCNC: 6.7 G/DL (ref 6.4–8.2)
PROT SERPL-MCNC: 6.7 G/DL (ref 6.4–8.2)
RBC # BLD AUTO: 4.45 M/UL (ref 3.8–5.2)
SODIUM SERPL-SCNC: 139 MMOL/L (ref 136–145)
WBC # BLD AUTO: 9.6 K/UL (ref 3.6–11)

## 2022-03-30 PROCEDURE — 80076 HEPATIC FUNCTION PANEL: CPT

## 2022-03-30 PROCEDURE — 74011250636 HC RX REV CODE- 250/636: Performed by: FAMILY MEDICINE

## 2022-03-30 PROCEDURE — 74011250636 HC RX REV CODE- 250/636: Performed by: SURGERY

## 2022-03-30 PROCEDURE — 74011250637 HC RX REV CODE- 250/637: Performed by: FAMILY MEDICINE

## 2022-03-30 PROCEDURE — 96376 TX/PRO/DX INJ SAME DRUG ADON: CPT

## 2022-03-30 PROCEDURE — G0378 HOSPITAL OBSERVATION PER HR: HCPCS

## 2022-03-30 PROCEDURE — 80053 COMPREHEN METABOLIC PANEL: CPT

## 2022-03-30 PROCEDURE — 74011000258 HC RX REV CODE- 258: Performed by: FAMILY MEDICINE

## 2022-03-30 PROCEDURE — 85025 COMPLETE CBC W/AUTO DIFF WBC: CPT

## 2022-03-30 PROCEDURE — 93306 TTE W/DOPPLER COMPLETE: CPT

## 2022-03-30 PROCEDURE — 65270000029 HC RM PRIVATE

## 2022-03-30 PROCEDURE — 36415 COLL VENOUS BLD VENIPUNCTURE: CPT

## 2022-03-30 RX ORDER — HYDROMORPHONE HYDROCHLORIDE 1 MG/ML
1 INJECTION, SOLUTION INTRAMUSCULAR; INTRAVENOUS; SUBCUTANEOUS
Status: DISPENSED | OUTPATIENT
Start: 2022-03-30 | End: 2022-04-02

## 2022-03-30 RX ADMIN — GABAPENTIN 400 MG: 400 CAPSULE ORAL at 22:16

## 2022-03-30 RX ADMIN — FAMOTIDINE 20 MG: 20 TABLET, FILM COATED ORAL at 20:37

## 2022-03-30 RX ADMIN — HYDROMORPHONE HYDROCHLORIDE 0.5 MG: 1 INJECTION, SOLUTION INTRAMUSCULAR; INTRAVENOUS; SUBCUTANEOUS at 08:00

## 2022-03-30 RX ADMIN — SODIUM CHLORIDE 75 ML/HR: 9 INJECTION, SOLUTION INTRAVENOUS at 04:21

## 2022-03-30 RX ADMIN — HYDROMORPHONE HYDROCHLORIDE 1 MG: 1 INJECTION, SOLUTION INTRAMUSCULAR; INTRAVENOUS; SUBCUTANEOUS at 15:50

## 2022-03-30 RX ADMIN — PIPERACILLIN AND TAZOBACTAM 3.38 G: 3; .375 INJECTION, POWDER, LYOPHILIZED, FOR SOLUTION INTRAVENOUS at 20:43

## 2022-03-30 RX ADMIN — VENLAFAXINE HYDROCHLORIDE 150 MG: 75 CAPSULE, EXTENDED RELEASE ORAL at 10:03

## 2022-03-30 RX ADMIN — ATORVASTATIN CALCIUM 40 MG: 40 TABLET, FILM COATED ORAL at 10:03

## 2022-03-30 RX ADMIN — LEVOTHYROXINE SODIUM 125 MCG: 0.03 TABLET ORAL at 10:14

## 2022-03-30 RX ADMIN — CARBAMAZEPINE 300 MG: 300 CAPSULE, EXTENDED RELEASE ORAL at 21:00

## 2022-03-30 RX ADMIN — PIPERACILLIN AND TAZOBACTAM 3.38 G: 3; .375 INJECTION, POWDER, LYOPHILIZED, FOR SOLUTION INTRAVENOUS at 14:43

## 2022-03-30 RX ADMIN — FAMOTIDINE 20 MG: 20 TABLET, FILM COATED ORAL at 10:03

## 2022-03-30 RX ADMIN — BUSPIRONE HYDROCHLORIDE 30 MG: 10 TABLET ORAL at 10:14

## 2022-03-30 RX ADMIN — HYDROMORPHONE HYDROCHLORIDE 1 MG: 1 INJECTION, SOLUTION INTRAMUSCULAR; INTRAVENOUS; SUBCUTANEOUS at 20:38

## 2022-03-30 RX ADMIN — CARVEDILOL 3.12 MG: 3.12 TABLET, FILM COATED ORAL at 10:03

## 2022-03-30 RX ADMIN — HYDROMORPHONE HYDROCHLORIDE 0.5 MG: 1 INJECTION, SOLUTION INTRAMUSCULAR; INTRAVENOUS; SUBCUTANEOUS at 04:22

## 2022-03-30 RX ADMIN — CARVEDILOL 3.12 MG: 3.12 TABLET, FILM COATED ORAL at 15:49

## 2022-03-30 RX ADMIN — GABAPENTIN 400 MG: 400 CAPSULE ORAL at 10:02

## 2022-03-30 RX ADMIN — PIPERACILLIN AND TAZOBACTAM 3.38 G: 3; .375 INJECTION, POWDER, LYOPHILIZED, FOR SOLUTION INTRAVENOUS at 04:22

## 2022-03-30 RX ADMIN — GABAPENTIN 400 MG: 400 CAPSULE ORAL at 15:49

## 2022-03-30 RX ADMIN — BUSPIRONE HYDROCHLORIDE 30 MG: 10 TABLET ORAL at 20:37

## 2022-03-30 RX ADMIN — LEVOTHYROXINE SODIUM 125 MCG: 0.03 TABLET ORAL at 10:05

## 2022-03-30 RX ADMIN — CARVEDILOL 3.12 MG: 3.12 TABLET, FILM COATED ORAL at 17:00

## 2022-03-30 RX ADMIN — CARBAMAZEPINE 300 MG: 300 CAPSULE, EXTENDED RELEASE ORAL at 09:00

## 2022-03-30 RX ADMIN — HYDROMORPHONE HYDROCHLORIDE 1 MG: 1 INJECTION, SOLUTION INTRAMUSCULAR; INTRAVENOUS; SUBCUTANEOUS at 10:58

## 2022-03-30 NOTE — ROUTINE PROCESS
Client complained that she is hungry, Dr. Estela Wolfe called and informed, ordered clear liquid diet until 12MN. NPO at 12. Also changed dilaudid order to 0.5mg q 4hrs instead of 1mg.

## 2022-03-30 NOTE — MED STUDENT NOTES
HISTORY & PHYSICAL      Patient: Eric Calvert MRN: [id number]  SSN: [id number]    YOB: 1960  Age: 59 y.o. Sex: female      Primary Care Provider: Rosalio, MD Henry  Source of Information:       Subjective     CC:   Chief Complaint   Patient presents with    Chest Pain       HPI: Morton Fothergill a(n) 59 y. o. female with PMH significant for arthritis, depression, epilepsy, hypertension, neurofibromatosis, and thyroid disease who presents with chest pain.       Upon presentation, patient stated her symptoms began that morning. She describes her pain being in the center of her chest and radiates to the back and shoulder. Patient complained of her pain which awakened her at 5:30 this morning, she states the pain is 9/10, constant, without exacerbating symptoms. The patient is currently on HYDROmorphone (DILAUDID) injection 1 mg. She states the Dilaudid is greatly helping with her pain management.  No fever, cough, or other associated symptoms.  Denies leg swelling unilaterally, history of PE/DVT, or pleuritic chest pain.     Originally, a ultrasound was conducted (3/28/2022 @22:05) revealing cholelithiasis and the common bile duct is dilated which may be chronic. However, the U/S gallbladder was limited by bowel gas.     The recent, CT ABD Pelvis w/o contrast (3/28/2022 @22:55) revealed Gallbladder wall and pericystic changes. Mild prominence of the common bile duct. Thus, cholecystitis and/or cholangitis should be considered clinically. Patient is scheduled for MRCP today.      Surgery was consulted in the ER seen by the surgeon ordered MRI of the abdomen and the HIDA scan      3/30/2022  Pt continues to report pain being in the center of her chest and radiates to the back and shoulder. She states the pain is 9/10, constant, without exacerbating symptoms. The patient is currently on HYDROmorphone (DILAUDID) injection 1 mg.  She states the Dilaudid is greatly helping with her pain management.  No fever, cough, or other associated symptoms.  Denies leg swelling unilaterally, history of PE/DVT, or pleuritic chest pain. Cardiology - CXR (3/28/2022) without congestion. The heart, mediastinum and pulmonary vasculature are normal. The lungs are well expanded and clear. The bony thorax is intact. When compared to the prior exam, there is no significant change. HS troponin negative x 2. NSR: HR 77, no ischemia. General Surgery: HIDA did not visualize GB but has free spillage in to duodenum. Yesterday, patient's dilaudid was changed from 0.5mg q 4hrs instead of 1mg. However, the patient reports increased pain. Patient's dilaudid was returned to 1mg q 4hrs this morning. HIDA 3/29/22: There is a normal distribution of activity through the liver. Common bile duct activity noted with free spill of activity into small bowel loops. With images carried out to 2 hours, no gallbladder activity is evident. IMPRESSION Findings concerning for cystic duct compromise    Significant labs:   WBC 9.6  Neutrophils 84  aPTT 34.4  Creatinine 0.46    Past Medical History:   Diagnosis Date    Arthritis     Depression     Epilepsy ([facility] [address]     Hypertension     Ill-defined condition     neurofibromatosis    Thyroid disease         Past Surgical History:   Procedure Laterality Date    HX GYN      HX ORTHOPAEDIC      NEUROLOGICAL PROCEDURE UNLISTED         Prior to Admission medications    Medication Sig Start Date End Date Taking? Authorizing Provider   venlafaxine [facility]) 75 mg tablet Take 75 mg by mouth daily. Yes Provider, Historical   ergocalciferol (DrisdoL) 1,250 mcg (50,000 unit) capsule Take 50,000 Units by mouth every thirty (30) days. Take on the 15th day of the month   Yes Provider, Historical   linaCLOtide (Linzess) 145 mcg cap capsule Take 145 mcg by mouth Daily (before breakfast). Yes Other, MD Henry   senna-docusate (PERICOLACE) 8.6-50 mg per tablet Take 2 Tablets by mouth daily.    Yes Other, MD Henry atorvastatin (LIPITOR) 40 mg tablet Take 40 mg by mouth daily. Yes Provider, Historical   Xtampza ER 36 mg capsule Take 1 Capsule by mouth two (2) times a day. 9/19/21  Yes Provider, Historical   gabapentin (NEURONTIN) 800 mg tablet Take 400 mg by mouth three (3) times daily. 9/9/21  Yes Provider, Historical   famotidine (Pepcid) 20 mg tablet Take 20 mg by mouth two (2) times a day. Yes Rosalio, MD Henry   celecoxib (CELEBREX) 200 mg capsule Take 100 mg by mouth two (2) times a day. Yes Other, MD Henry   aspirin 81 mg chewable tablet Take 81 mg by mouth nightly. Yes Rosalio, MD Henry   carBAMazepine ER (CARBATROL ER) 300 mg capsule Take 300 mg by mouth two (2) times a day. Yes Provider, Historical   carvedilol (COREG) 6.25 mg tablet Take  by mouth two (2) times daily (with meals). Yes Provider, Historical   levothyroxine (SYNTHROID) 125 mcg tablet Take  by mouth Daily (before breakfast). Yes Provider, Historical   busPIRone (BUSPAR) 30 mg tablet Take 30 mg by mouth two (2) times a day. Yes Provider, Historical   venlafaxine-SR (EFFEXOR XR) 150 mg capsule Take 150 mg by mouth daily. Yes Provider, Historical   naloxone 8 mg/actuation spry 4 mg by Nasal route as needed. Rosalio, MD Henry   oxyCODONE-acetaminophen (Percocet) 7.5-325 mg per tablet Take 1 Tablet by mouth every eight (8) hours as needed for Pain. Rosalio, MD Henry   loratadine (Claritin) 10 mg tablet Take 10 mg by mouth daily as needed for Allergies. Rosalio, MD Henry   diclofenac (FLECTOR) 1.3 % pt12 1 Patch by TransDERmal route every twelve (12) hours every twelve (12) hours. Henry Santamaria MD   diclofenac (Voltaren) 1 % gel Apply 4 g to affected area four (4) times daily.     Henry Santamaria MD       Allergies   Allergen Reactions    Codeine Nausea and Vomiting    Methadone Hives    Morphine Other (comments)     psychosis        Family History   Problem Relation Age of Onset    Cancer Mother         glioblastoma    Lung Disease Father     Cancer Father         mesothelioma, testicular cancer        Social History     Socioeconomic History    Marital status: UNKNOWN    Highest education level: Associate degree: occupational, technical, or vocational program   Tobacco Use    Smoking status: Never Smoker    Smokeless tobacco: Never Used   Vaping Use    Vaping Use: Former    Substances: CBD (only for 2 months)   Substance and Sexual Activity    Alcohol use: No    Drug use: Never        ROS  Constitutional:  no fever,  no chills,  no sweats, No weakness, No fatigue, No decreased activity. Eye: No recent visual problem, No icterus, No discharge, No double vision. Ear/Nose/Mouth/Throat: No decreased hearing, No ear pain, No nasal congestion, No sore throat. Respiratory: No shortness of breath, No cough, No sputum production, No hemoptysis, No wheezing, No cyanosis. Cardiovascular:  chest pain, No palpitations, No bradycardia, No tachycardia, No peripheral edema, No syncope. Gastrointestinal: No nausea,  No vomiting, No diarrhea, No constipation, No heartburn,   abdominal pain. Genitourinary: No dysuria, No hematuria, No change in urine stream, No urethral discharge, No lesions. Hematology/Lymphatics: No bruising tendency, No bleeding tendency, No petechiae, No swollen lymph glands. Endocrine: No excessive thirst, No polyuria, No cold intolerance, No heat intolerance, No excessive hunger. Immunologic: Not immunocompromised, No recurrent fevers, No recurrent infections. Musculoskeletal: No back pain, No neck pain, No joint pain, No muscle pain, No claudication, No decreased range of motion, No trauma. Integumentary: No rash, No pruritus, No abrasions. Neurologic: Alert and oriented X4, No abnormal balance, No headache, No confusion, No numbness, No tingling. Psychiatric: No anxiety, No depression, No bijan.       Objective     Visit Vitals  /70 (BP 1 Location: Right upper arm, BP Patient Position: Supine)   Pulse 83 Temp 98 °F (36.7 °C)   Resp 18   Ht 5' 1\" (1.549 m)   Wt 61.7 kg (136 lb)   SpO2 97%   Breastfeeding No   BMI 25.70 kg/m²      O2 Device: None (Room air)    Physical Exam:   Physical Exam  General: well appearing, no acute distress  Head: Normal  Face: Nornal  HEENT: atraumatic, PERRLA, moist mucosa, normal pharynx, normal tonsils and adenoids, normal tongue, no fluid in sinuses  Neck: Trachea midline, no carotid bruit, no masses  Chest: Normal.  Respiratory: Normal chest wall expansion, CTA B, no r/r/w, no rubs  Cardiovascular: RRR, no m/r/g, Normal S1 and S2  Abdomen: Soft, non tender, non-distended, normal bowel sounds in all quadrants, no hepatosplenomegaly, no tympany. Genitourinary: No inguinal hernia, normal external gentalia,  no renal angle tenderness  Rectal: deferred  Musculoskeletal: normal ROM in upper and lower extremities, No joint swelling. Integumentary: Warm, dry, and pink, with no rash, purpura, or petechia  Heme/Lymph: No lymphadenopathy, no bruises  Neurological:Cranial Nerves II-XII grossly intact, no gross motor or sensory deficit  Psychiatric: Cooperative with normal mood, affect, and cognition      Intake & Output:  Current Shift: No intake/output data recorded. Last three shifts: 03/28 1901 - 03/30 0700  In: 1000 [I.V.:1000]  Out: -     Lab/Data Review: All lab results for the last 24 hours reviewed.        24 Hour Results:    Recent Results (from the past 24 hour(s))   CBC WITH AUTOMATED DIFF    Collection Time: 03/30/22  6:23 AM   Result Value Ref Range    WBC 9.6 3.6 - 11.0 K/uL    RBC 4.45 3.80 - 5.20 M/uL    HGB 12.3 11.5 - 16.0 g/dL    HCT 36.7 35.0 - 47.0 %    MCV 82.5 80.0 - 99.0 FL    MCH 27.6 26.0 - 34.0 PG    MCHC 33.5 30.0 - 36.5 g/dL    RDW 13.6 11.5 - 14.5 %    PLATELET 068 471 - 698 K/uL    MPV 9.9 8.9 - 12.9 FL    NRBC 0.0 0.0  WBC    ABSOLUTE NRBC 0.00 0.00 - 0.01 K/uL    NEUTROPHILS 84 (H) 32 - 75 %    LYMPHOCYTES 9 (L) 12 - 49 %    MONOCYTES 6 5 - 13 % EOSINOPHILS 0 0 - 7 %    BASOPHILS 1 0 - 1 %    IMMATURE GRANULOCYTES 0 0 - 0.5 %    ABS. NEUTROPHILS 8.0 1.8 - 8.0 K/UL    ABS. LYMPHOCYTES 0.9 0.8 - 3.5 K/UL    ABS. MONOCYTES 0.6 0.0 - 1.0 K/UL    ABS. EOSINOPHILS 0.0 0.0 - 0.4 K/UL    ABS. BASOPHILS 0.1 0.0 - 0.1 K/UL    ABS. IMM. GRANS. 0.0 0.00 - 0.04 K/UL    DF AUTOMATED     HEPATIC FUNCTION PANEL    Collection Time: 03/30/22  6:23 AM   Result Value Ref Range    Protein, total 6.6 6.4 - 8.2 g/dL    Albumin 3.7 3.5 - 5.0 g/dL    Globulin 2.9 2.0 - 4.0 g/dL    A-G Ratio 1.3 1.1 - 2.2      Bilirubin, total 1.0 0.2 - 1.0 mg/dL    Bilirubin, direct 0.3 (H) 0.0 - 0.2 mg/dL    Alk. phosphatase 150 (H) 45 - 117 U/L    AST (SGOT) 28 15 - 37 U/L    ALT (SGPT) 33 12 - 78 U/L   METABOLIC PANEL, COMPREHENSIVE    Collection Time: 03/30/22  6:24 AM   Result Value Ref Range    Sodium 139 136 - 145 mmol/L    Potassium 3.6 3.5 - 5.1 mmol/L    Chloride 106 97 - 108 mmol/L    CO2 20 (L) 21 - 32 mmol/L    Anion gap 13 5 - 15 mmol/L    Glucose 62 (L) 65 - 100 mg/dL    BUN 11 6 - 20 mg/dL    Creatinine 0.46 (L) 0.55 - 1.02 mg/dL    BUN/Creatinine ratio 24 (H) 12 - 20      GFR est AA >60 >60 ml/min/1.73m2    GFR est non-AA >60 >60 ml/min/1.73m2    Calcium 9.0 8.5 - 10.1 mg/dL    Bilirubin, total 0.9 0.2 - 1.0 mg/dL    AST (SGOT) 22 15 - 37 U/L    ALT (SGPT) 33 12 - 78 U/L    Alk. phosphatase 147 (H) 45 - 117 U/L    Protein, total 6.7 6.4 - 8.2 g/dL    Albumin 3.6 3.5 - 5.0 g/dL    Globulin 3.1 2.0 - 4.0 g/dL    A-G Ratio 1.2 1.1 - 2.2           Imaging:    NM HEPATOBILIARY DUCT SCAN   Final Result   Findings concerning for cystic duct compromise. CT ABD PELV WO CONT   Final Result   Gallbladder wall and pericystic changes. Mild prominence of the   common bile duct. This represents change from the prior study. Ultrasound   earlier this evening shows cholelithiasis. Cholecystitis and/or cholangitis   should be considered clinically.  Consider radionuclide hepatobiliary scan depending on clinical setting                US GALLBLADDER   Final Result   Limited by bowel gas. 1. Cholelithiasis. No sonographic evidence of cholecystitis. However the common   bile duct is dilated which may be chronic. CTA CHEST W OR W WO CONT   Final Result   1. No pulmonary embolus. 2. Interval increase of peribronchial and perivascular soft tissue thickening   possibly lymphoid or neurofibromatosis in origin. Unchanged left lung   bronchovascular nodule, posterior mediastinal and retroperitoneal abnormal soft   tissue. 3. Bilateral hazy airspace edema or pneumonia. 4. Haziness of the gallbladder. Correlate clinically for cholecystitis. XR CHEST PORT   Final Result   No acute process or active disease. Assessment     Chest pain rule out MI  Gallstone  Right upper quadrant pain  History of neurofibromatosis  Hypertension  Hypothyroidism  Seizure disorder  Hyperlipidemia  Anxiety disorder  Chronic pain syndrome    CT abdomen - showed gallbladder wall and pericystic changes. HIDA - Common bile duct activity noted with free spill of activity into small bowel loops. Findings concerning for cystic duct compromise      Plan     Medications  Lipitor 40 mg daily  BuSpar 30 mg twice a day  Carbamazepine  mg twice a day  Coreg 3.125 twice a day  Pepcid 20 twice daily  Neurontin 400 mg 3 times a day  Levothyroxine 125 mcg daily  Linzess 145 mg daily  Zosyn 3.375 IV every 8 hours  Effexor  mg daily      1. Continue IV fluids  2. Cardiology is surgical and GI consult  3. Discussed with the patient daughter/concern about pain medication at this time   4.  We will continue Dilaudid 1 mg every 4 hours as needed      Signed By: Trevor Pillai     March 30, 2022 Quantity Per Injection Site (Units): 3

## 2022-03-30 NOTE — PROGRESS NOTES
Problem: Falls - Risk of  Goal: *Absence of Falls  Description: Document Vicky Monterroso Fall Risk and appropriate interventions in the flowsheet.   Outcome: Progressing Towards Goal  Note: Fall Risk Interventions:  Mobility Interventions: Utilize walker, cane, or other assistive device         Medication Interventions: Assess postural VS orthostatic hypotension    Elimination Interventions: Call light in reach              Problem: Patient Education: Go to Patient Education Activity  Goal: Patient/Family Education  Outcome: Progressing Towards Goal     Problem: General Medical Care Plan  Goal: *Vital signs within specified parameters  Outcome: Progressing Towards Goal

## 2022-03-30 NOTE — PROGRESS NOTES
Progress Note    Patient: Alejandro Chung MRN: [id number]  SSN: [id number]    YOB: 1960  Age: 59 y.o. Sex: female      Admit Date: 3/28/2022    LOS: 0 days     Subjective:   GI in consultation for abdominal pain. 3/30: Patient seen resting at this time. She is sleepy today. She is on dilaudid for complaints of chest pain that radiates to her back and shoulder. Surgery is following. MRCP pending. ALT 33, AST 22, Alk Phos 147, Total bilirubin 0.9. History of Present Illness: Alejandro Chung is a 59 y.o. female who is seen in consultation for abdominal pain. She reports the symptoms started yesterday morning. She states the pain was in the center of her chest and radiates to her back and shoulder. She states the pain woke her up around 5:30 am yesterday morning. . She denies fever or any other associated symptoms. She does have a past medical history significant for arthritis, depression, epilepsy, hypertension, thyroid disease, neurofibromatosis, and history of PE/DVT. She is getting dilaudid for pain and states it is improved with the pain medication. CT abdomen shows gallbladder wall and pericystic changes. Mild prominence of the common bile duct. Abdominal ultrasound shows cholelithiasis and CBD dilation. Troponin on admission is 5. Total bilirubin 0.3, ALT 30, AST 20, Alk Phosphatase 127   Lipase 56. EKG shows Sinus rhythm. She is scheduled for MRCP results  pending at this time. HIDA scan findings concerning for cystic duct compromise, Normal distribution of activity through the liver. Common bile duct activity noted with free spill of activity into small bowel loops, No gallbladder activity is evident. Patient is followed by surgery. Will wait for MRCP results. Continue PPI. Possible EGD as an out patient.     HIDA 3/29/22: There is a normal distribution of activity through the liver. Common bile duct activity noted with free spill of activity into small bowel loops.   With images carried out to 2 hours, no gallbladder activity is evident. IMPRESSION  Findings concerning for cystic duct compromise.     US Gallbladder 3/28/22: IMPRESSION Limited by bowel gas. 1. Cholelithiasis. No sonographic evidence of cholecystitis. However the common bile duct is dilated which may be chronic.     CT abdomen 3/28/22: IMPRESSION  Gallbladder wall and pericystic changes. Mild prominence of the  common bile duct. This represents change from the prior study. Ultrasound earlier this evening shows cholelithiasis. Cholecystitis and/or cholangitis should be considered clinically. Consider radionuclide hepatobiliary scan depending on clinical setting      Objective:     Vitals:    03/30/22 0329 03/30/22 0751 03/30/22 1111 03/30/22 1413   BP: 138/75 126/70 123/67 133/73   Pulse: 78 83 81 75   Resp: 20 18 18 18   Temp: 97.7 °F (36.5 °C) 98 °F (36.7 °C) 98 °F (36.7 °C) 97.5 °F (36.4 °C)   SpO2: 99% 97% 99% 100%   Weight:       Height:            Intake and Output:  Current Shift: No intake/output data recorded. Last three shifts: 03/28 1901 - 03/30 0700  In: 1000 [I.V.:1000]  Out: -     Physical Exam:   Skin:  Extremities and face reveal no rashes. No coleman erythema. HEENT: Sclerae anicteric. Extra-occular muscles are intact. No abnormal pigmentation of the lips. The neck is supple. Cardiovascular: Regular rate and rhythm. Respiratory:  Comfortable breathing with no accessory muscle use. GI:  Abdomen nondistended, soft, and nontender. No enlargement of the liver or spleen. No masses palpable. Rectal:  Deferred  Musculoskeletal: Extremities have good range of motion. Neurological:  Gross memory appears intact. Patient is alert and oriented. Psychiatric:  Mood appears appropriate with judgement intact.   Lymphatic:  No visible adenopathy      Lab/Data Review:  Recent Results (from the past 24 hour(s))   HEPATIC FUNCTION PANEL    Collection Time: 03/30/22  6:23 AM   Result Value Ref Range    Protein, total 6.7 6.4 - 8.2 g/dL    Albumin 3.6 3.5 - 5.0 g/dL    Globulin 3.1 2.0 - 4.0 g/dL    A-G Ratio 1.2 1.1 - 2.2      Bilirubin, total 1.0 0.2 - 1.0 mg/dL    Bilirubin, direct 0.3 (H) 0.0 - 0.2 mg/dL    Alk. phosphatase 152 (H) 45 - 117 U/L    AST (SGOT) 25 15 - 37 U/L    ALT (SGPT) 34 12 - 78 U/L   CBC WITH AUTOMATED DIFF    Collection Time: 03/30/22  6:23 AM   Result Value Ref Range    WBC 9.6 3.6 - 11.0 K/uL    RBC 4.45 3.80 - 5.20 M/uL    HGB 12.3 11.5 - 16.0 g/dL    HCT 36.7 35.0 - 47.0 %    MCV 82.5 80.0 - 99.0 FL    MCH 27.6 26.0 - 34.0 PG    MCHC 33.5 30.0 - 36.5 g/dL    RDW 13.6 11.5 - 14.5 %    PLATELET 392 422 - 579 K/uL    MPV 9.9 8.9 - 12.9 FL    NRBC 0.0 0.0  WBC    ABSOLUTE NRBC 0.00 0.00 - 0.01 K/uL    NEUTROPHILS 84 (H) 32 - 75 %    LYMPHOCYTES 9 (L) 12 - 49 %    MONOCYTES 6 5 - 13 %    EOSINOPHILS 0 0 - 7 %    BASOPHILS 1 0 - 1 %    IMMATURE GRANULOCYTES 0 0 - 0.5 %    ABS. NEUTROPHILS 8.0 1.8 - 8.0 K/UL    ABS. LYMPHOCYTES 0.9 0.8 - 3.5 K/UL    ABS. MONOCYTES 0.6 0.0 - 1.0 K/UL    ABS. EOSINOPHILS 0.0 0.0 - 0.4 K/UL    ABS. BASOPHILS 0.1 0.0 - 0.1 K/UL    ABS. IMM. GRANS. 0.0 0.00 - 0.04 K/UL    DF AUTOMATED     HEPATIC FUNCTION PANEL    Collection Time: 03/30/22  6:23 AM   Result Value Ref Range    Protein, total 6.6 6.4 - 8.2 g/dL    Albumin 3.7 3.5 - 5.0 g/dL    Globulin 2.9 2.0 - 4.0 g/dL    A-G Ratio 1.3 1.1 - 2.2      Bilirubin, total 1.0 0.2 - 1.0 mg/dL    Bilirubin, direct 0.3 (H) 0.0 - 0.2 mg/dL    Alk.  phosphatase 150 (H) 45 - 117 U/L    AST (SGOT) 28 15 - 37 U/L    ALT (SGPT) 33 12 - 78 U/L   METABOLIC PANEL, COMPREHENSIVE    Collection Time: 03/30/22  6:24 AM   Result Value Ref Range    Sodium 139 136 - 145 mmol/L    Potassium 3.6 3.5 - 5.1 mmol/L    Chloride 106 97 - 108 mmol/L    CO2 20 (L) 21 - 32 mmol/L    Anion gap 13 5 - 15 mmol/L    Glucose 62 (L) 65 - 100 mg/dL    BUN 11 6 - 20 mg/dL    Creatinine 0.46 (L) 0.55 - 1.02 mg/dL    BUN/Creatinine ratio 24 (H) 12 - 20      GFR est AA >60 >60 ml/min/1.73m2    GFR est non-AA >60 >60 ml/min/1.73m2    Calcium 9.0 8.5 - 10.1 mg/dL    Bilirubin, total 0.9 0.2 - 1.0 mg/dL    AST (SGOT) 22 15 - 37 U/L    ALT (SGPT) 33 12 - 78 U/L    Alk. phosphatase 147 (H) 45 - 117 U/L    Protein, total 6.7 6.4 - 8.2 g/dL    Albumin 3.6 3.5 - 5.0 g/dL    Globulin 3.1 2.0 - 4.0 g/dL    A-G Ratio 1.2 1.1 - 2.2                NM HEPATOBILIARY DUCT SCAN   Final Result   Findings concerning for cystic duct compromise. CT ABD PELV WO CONT   Final Result   Gallbladder wall and pericystic changes. Mild prominence of the   common bile duct. This represents change from the prior study. Ultrasound   earlier this evening shows cholelithiasis. Cholecystitis and/or cholangitis   should be considered clinically. Consider radionuclide hepatobiliary scan   depending on clinical setting                US GALLBLADDER   Final Result   Limited by bowel gas. 1. Cholelithiasis. No sonographic evidence of cholecystitis. However the common   bile duct is dilated which may be chronic. CTA CHEST W OR W WO CONT   Final Result   1. No pulmonary embolus. 2. Interval increase of peribronchial and perivascular soft tissue thickening   possibly lymphoid or neurofibromatosis in origin. Unchanged left lung   bronchovascular nodule, posterior mediastinal and retroperitoneal abnormal soft   tissue. 3. Bilateral hazy airspace edema or pneumonia. 4. Haziness of the gallbladder. Correlate clinically for cholecystitis. XR CHEST PORT   Final Result   No acute process or active disease. Assessment:     Active Problems:    Chest pain (3/28/2022)        Plan:   1. Abdominal Pain/Gallstones     Continue Pain management     Antiemetics as needed     NPO     MRCP pending     HIDA 3/29/22: There is a normal distribution of activity through the liver. Common bile duct activity noted with free spill of activity into small bowel loops.   With images carried out to 2 hours, no gallbladder activity is evident. IMPRESSION  Findings concerning for cystic duct compromise. Continue IV Hydration     Surgery input appreciated     Continue PPI      Possible EGD as an out patient     LFT's in the am    Thank you for allowing me to participate in this patients care. Plan discussed with Dr. Paulo Lang and he approves.     Signed By: Alisson Elliott NP     March 30, 2022

## 2022-03-30 NOTE — PROGRESS NOTES
Progress Note    Patient: Alexis Regan MRN: [id number]  SSN: [id number]    YOB: 1960  Age: 59 y.o. Sex: female      Admit Date: 3/28/2022    LOS: 0 days     Subjective:     Patient off the floor for testin. Patient is followed for chest pain. Patient has a past medical history of hypertension, neurofibromatosis, arthritis, depression, epilepsy, and thyroid disease. Telemetry Review: No acute events noted in the past 24 hours. Remains in normal sinus rhythm; heart rate 80s, no ectopy. Review of Symptoms:   Review of Systems   Constitutional: Negative. Cardiovascular: Negative for chest pain, dyspnea on exertion, irregular heartbeat and palpitations. Respiratory: Negative for cough, shortness of breath and wheezing. Gastrointestinal: Negative for abdominal pain, constipation, nausea and vomiting. Neurological: Negative for light-headedness. All other systems reviewed and are negative. Objective:     Vitals:    03/29/22 2319 03/30/22 0329 03/30/22 0751 03/30/22 1111   BP: 128/81 138/75 126/70 123/67   Pulse: 70 78 83 81   Resp: 18 20 18 18   Temp: 97.7 °F (36.5 °C) 97.7 °F (36.5 °C) 98 °F (36.7 °C) 98 °F (36.7 °C)   SpO2: 98% 99% 97% 99%   Weight:       Height:            Intake and Output:  Current Shift: No intake/output data recorded. Last three shifts: 03/28 1901 - 03/30 0700  In: 1000 [I.V.:1000]  Out: -     Physical Exam:   . Earnest Murillo Physical Exam  Nursing note reviewed. Constitutional:       General: She is in acute distress. Cardiovascular:      Rate and Rhythm: Normal rate and regular rhythm. Pulmonary:      Effort: Pulmonary effort is normal.      Breath sounds: Normal breath sounds. Abdominal:      Tenderness: There is abdominal tenderness. Neurological:      General: No focal deficit present. Mental Status: She is alert and oriented to person, place, and time. Mental status is at baseline.    Psychiatric:         Mood and Affect: Mood normal. Behavior: Behavior normal.           Lab/Data Review: All lab results for the last 24 hours reviewed. Current Facility-Administered Medications:     HYDROmorphone (DILAUDID) injection 1 mg, 1 mg, IntraVENous, Q4H PRN, Sabiha Hudson MD, 1 mg at 03/30/22 1058    gabapentin (NEURONTIN) capsule 400 mg, 400 mg, Oral, TID, Rigo Hudson MD, 400 mg at 03/30/22 1002    0.9% sodium chloride infusion, 75 mL/hr, IntraVENous, CONTINUOUS, Sabiha Hudson MD, Last Rate: 75 mL/hr at 03/30/22 0421, 75 mL/hr at 03/30/22 0421    atorvastatin (LIPITOR) tablet 40 mg, 40 mg, Oral, DAILY, Sharon Maurer MD, 40 mg at 03/30/22 1003    busPIRone (BUSPAR) tablet 30 mg, 30 mg, Oral, BID, Sabiha Hudson MD, 30 mg at 03/30/22 1014    carBAMazepine ER (CARBATROL ER) capsule 300 mg, 300 mg, Oral, BID, Rigo Hudson MD, 300 mg at 03/30/22 0900    carvediloL (COREG) tablet 3.125 mg, 3.125 mg, Oral, BID WITH MEALS, Rigo Hudson MD, 3.125 mg at 03/30/22 1003    famotidine (PEPCID) tablet 20 mg, 20 mg, Oral, BID, Sabiha Hudson MD, 20 mg at 03/30/22 1003    levothyroxine (SYNTHROID) tablet 125 mcg, 125 mcg, Oral, Tristan BAUER Abdul, MD, 125 mcg at 03/30/22 1014    linaCLOtide (LINZESS) capsule 145 mcg, 145 mcg, Oral, Tristan BAUER Abdul, MD    venlafaxine-SR [facility] capsule 150 mg, 150 mg, Oral, DAILY, Sharon Maurer MD, 150 mg at 03/30/22 1003    lactated Ringers infusion, 100 mL/hr, IntraVENous, CONTINUOUS, Zion Schmitz MD    piperacillin-tazobactam (ZOSYN) 3.375 g in 0.9% sodium chloride (MBP/ADV) 100 mL MBP, 3.375 g, IntraVENous, Q8H, Sabiha Hudson MD, Last Rate: 25 mL/hr at 03/30/22 0422, 3.375 g at 03/30/22 0422      Assessment:     Active Problems:    Chest pain (3/28/2022)        Plan:     Case discussed with Collaborating physician Dr. Daryl Skinner and our recommendations are as follows:       1.  Chest pain:  - complaining of chest pain, radiating into the back  - continue Dilaudid for pain  - HS troponin negative x 2  - CT abdomen showed gallbladder wall and pericystic changes. MRCP pending further evaluation  - HIDA scan concerning for cystic duct compromise. - echo pending for further evaluation.  - Patient is considered low to moderate risk for surgery from a cardiology standpoint, pending echo results. No further cardiac testing is recommended at this time.      2. Hypertension:   - blood pressure acceptable  - continue current medications     3. Hyperlipidemia:   - continue statin therapy    Thank you for involving us in the care of this patient. Please do not hesitate to call if additional questions arise. If after hours please call [phone]. Signed By: David Valera NP     March 30, 2022        Dr. Alice Egan Cardiologist    [facility]  [address] [facility].    [address] [facility], [facility]  [phone]

## 2022-03-30 NOTE — MED STUDENT NOTES
HISTORY & PHYSICAL      Patient: Isaiah Grewal MRN: [id number]  SSN: [id number]    YOB: 1960  Age: 59 y.o. Sex: female      Primary Care Provider: Rosalio, MD Henry  Source of Information:       Subjective     CC:   Chief Complaint   Patient presents with    Chest Pain       HPI: Kendal hdz(n) 59 y. o. female with PMH significant for arthritis, depression, epilepsy, hypertension, neurofibromatosis, and thyroid disease who presents with chest pain.       Upon presentation, patient stated her symptoms began that morning. She describes her pain being in the center of her chest and radiates to the back and shoulder. Patient complained of her pain which awakened her at 5:30 this morning, she states the pain is 9/10, constant, without exacerbating symptoms. The patient is currently on HYDROmorphone (DILAUDID) injection 1 mg. She states the Dilaudid is greatly helping with her pain management.  No fever, cough, or other associated symptoms.  Denies leg swelling unilaterally, history of PE/DVT, or pleuritic chest pain.     Originally, a ultrasound was conducted (3/28/2022 @22:05) revealing cholelithiasis and the common bile duct is dilated which may be chronic. However, the U/S gallbladder was limited by bowel gas.     The recent, CT ABD Pelvis w/o contrast (3/28/2022 @22:55) revealed Gallbladder wall and pericystic changes. Mild prominence of the common bile duct. Thus, cholecystitis and/or cholangitis should be considered clinically. Patient is scheduled for MRCP today.      Surgery was consulted in the ER seen by the surgeon ordered MRI of the abdomen and the HIDA scan      3/30/2022  Pt continues to report pain being in the center of her chest and radiates to the back and shoulder. She states the pain is 9/10, constant, without exacerbating symptoms. The patient is currently on HYDROmorphone (DILAUDID) injection 1 mg.  She states the Dilaudid is greatly helping with her pain management.  No fever, cough, or other associated symptoms.  Denies leg swelling unilaterally, history of PE/DVT, or pleuritic chest pain. Patient daughter and sister indicate Dilaudid 1 mg every 4 hours as needed for her chronic pain/discussed the side effects she understand    Cardiology - CXR (3/28/2022) without congestion. The heart, mediastinum and pulmonary vasculature are normal. The lungs are well expanded and clear. The bony thorax is intact. When compared to the prior exam, there is no significant change. HS troponin negative x 2. NSR: HR 77, no ischemia. General Surgery: HIDA did not visualize GB but has free spillage in to duodenum. Yesterday, patient's dilaudid was changed from 0.5mg q 4hrs instead of 1mg. However, the patient reports increased pain. Patient's dilaudid was returned to 1mg q 4hrs this morning. HIDA 3/29/22: There is a normal distribution of activity through the liver. Common bile duct activity noted with free spill of activity into small bowel loops. With images carried out to 2 hours, no gallbladder activity is evident. IMPRESSION Findings concerning for cystic duct compromise    Significant labs:   WBC 9.6  Neutrophils 84  aPTT 34.4  Creatinine 0.46    Past Medical History:   Diagnosis Date    Arthritis     Depression     Epilepsy ([facility] [address]     Hypertension     Ill-defined condition     neurofibromatosis    Thyroid disease         Past Surgical History:   Procedure Laterality Date    HX GYN      HX ORTHOPAEDIC      NEUROLOGICAL PROCEDURE UNLISTED         Prior to Admission medications    Medication Sig Start Date End Date Taking? Authorizing Provider   venlafaxine [facility]) 75 mg tablet Take 75 mg by mouth daily. Yes Provider, Historical   ergocalciferol (DrisdoL) 1,250 mcg (50,000 unit) capsule Take 50,000 Units by mouth every thirty (30) days.  Take on the 15th day of the month   Yes Provider, Historical   linaCLOtide (Linzess) 145 mcg cap capsule Take 145 mcg by mouth Daily (before breakfast). Yes Other, MD Henry   senna-docusate (PERICOLACE) 8.6-50 mg per tablet Take 2 Tablets by mouth daily. Yes Rosalio, MD Henry   atorvastatin (LIPITOR) 40 mg tablet Take 40 mg by mouth daily. Yes Provider, Historical   Xtampza ER 36 mg capsule Take 1 Capsule by mouth two (2) times a day. 9/19/21  Yes Provider, Historical   gabapentin (NEURONTIN) 800 mg tablet Take 400 mg by mouth three (3) times daily. 9/9/21  Yes Provider, Historical   famotidine (Pepcid) 20 mg tablet Take 20 mg by mouth two (2) times a day. Yes Rosalio, MD Henry   celecoxib (CELEBREX) 200 mg capsule Take 100 mg by mouth two (2) times a day. Yes Rosalio, MD Henry   aspirin 81 mg chewable tablet Take 81 mg by mouth nightly. Yes Other, MD Henry   carBAMazepine ER (CARBATROL ER) 300 mg capsule Take 300 mg by mouth two (2) times a day. Yes Provider, Historical   carvedilol (COREG) 6.25 mg tablet Take  by mouth two (2) times daily (with meals). Yes Provider, Historical   levothyroxine (SYNTHROID) 125 mcg tablet Take  by mouth Daily (before breakfast). Yes Provider, Historical   busPIRone (BUSPAR) 30 mg tablet Take 30 mg by mouth two (2) times a day. Yes Provider, Historical   venlafaxine-SR (EFFEXOR XR) 150 mg capsule Take 150 mg by mouth daily. Yes Provider, Historical   naloxone 8 mg/actuation spry 4 mg by Nasal route as needed. Other, MD Henry   oxyCODONE-acetaminophen (Percocet) 7.5-325 mg per tablet Take 1 Tablet by mouth every eight (8) hours as needed for Pain. Henry Santamaria MD   loratadine (Claritin) 10 mg tablet Take 10 mg by mouth daily as needed for Allergies. Henry Santamaria MD   diclofenac (FLECTOR) 1.3 % pt12 1 Patch by TransDERmal route every twelve (12) hours every twelve (12) hours. Henry Santamaria MD   diclofenac (Voltaren) 1 % gel Apply 4 g to affected area four (4) times daily.     Henry Santamaria MD       Allergies   Allergen Reactions    Codeine Nausea and Vomiting    Methadone Hives    Morphine Other (comments)     psychosis        Family History   Problem Relation Age of Onset    Cancer Mother         glioblastoma    Lung Disease Father     Cancer Father         mesothelioma, testicular cancer        Social History     Socioeconomic History    Marital status: UNKNOWN    Highest education level: Associate degree: occupational, technical, or vocational program   Tobacco Use    Smoking status: Never Smoker    Smokeless tobacco: Never Used   Vaping Use    Vaping Use: Former    Substances: CBD (only for 2 months)   Substance and Sexual Activity    Alcohol use: No    Drug use: Never        ROS  Constitutional:  no fever,  no chills,  no sweats, No weakness, No fatigue, No decreased activity. Eye: No recent visual problem, No icterus, No discharge, No double vision. Ear/Nose/Mouth/Throat: No decreased hearing, No ear pain, No nasal congestion, No sore throat. Respiratory: No shortness of breath, No cough, No sputum production, No hemoptysis, No wheezing, No cyanosis. Cardiovascular:  chest pain, No palpitations, No bradycardia, No tachycardia, No peripheral edema, No syncope. Gastrointestinal: No nausea,  No vomiting, No diarrhea, No constipation, No heartburn,   abdominal pain. Genitourinary: No dysuria, No hematuria, No change in urine stream, No urethral discharge, No lesions. Hematology/Lymphatics: No bruising tendency, No bleeding tendency, No petechiae, No swollen lymph glands. Endocrine: No excessive thirst, No polyuria, No cold intolerance, No heat intolerance, No excessive hunger. Immunologic: Not immunocompromised, No recurrent fevers, No recurrent infections. Musculoskeletal: No back pain, No neck pain, No joint pain, No muscle pain, No claudication, No decreased range of motion, No trauma. Integumentary: No rash, No pruritus, No abrasions. Neurologic: Alert and oriented X4, No abnormal balance, No headache, No confusion, No numbness, No tingling.   Psychiatric: No anxiety, No depression, No bijan. Objective     Visit Vitals  /67 (BP 1 Location: Left upper arm, BP Patient Position: At rest)   Pulse 81   Temp 98 °F (36.7 °C)   Resp 18   Ht 5' 1\" (1.549 m)   Wt 61.7 kg (136 lb)   SpO2 99%   Breastfeeding No   BMI 25.70 kg/m²      O2 Device: None (Room air)    Physical Exam:   Physical Exam  General: well appearing, no acute distress  Head: Normal  Face: Nornal  HEENT: atraumatic, PERRLA, moist mucosa, normal pharynx, normal tonsils and adenoids, normal tongue, no fluid in sinuses  Neck: Trachea midline, no carotid bruit, no masses  Chest: Normal.  Respiratory: Normal chest wall expansion, CTA B, no r/r/w, no rubs  Cardiovascular: RRR, no m/r/g, Normal S1 and S2  Abdomen: Soft, non tender, non-distended, normal bowel sounds in all quadrants, no hepatosplenomegaly, no tympany. Genitourinary: No inguinal hernia, normal external gentalia,  no renal angle tenderness  Rectal: deferred  Musculoskeletal: normal ROM in upper and lower extremities, No joint swelling. Integumentary: Warm, dry, and pink, with no rash, purpura, or petechia  Heme/Lymph: No lymphadenopathy, no bruises  Neurological:Cranial Nerves II-XII grossly intact, no gross motor or sensory deficit  Psychiatric: Cooperative with normal mood, affect, and cognition      Intake & Output:  Current Shift: No intake/output data recorded. Last three shifts: 03/28 1901 - 03/30 0700  In: 1000 [I.V.:1000]  Out: -     Lab/Data Review: All lab results for the last 24 hours reviewed. 24 Hour Results:    Recent Results (from the past 24 hour(s))   HEPATIC FUNCTION PANEL    Collection Time: 03/30/22  6:23 AM   Result Value Ref Range    Protein, total 6.7 6.4 - 8.2 g/dL    Albumin 3.6 3.5 - 5.0 g/dL    Globulin 3.1 2.0 - 4.0 g/dL    A-G Ratio 1.2 1.1 - 2.2      Bilirubin, total 1.0 0.2 - 1.0 mg/dL    Bilirubin, direct 0.3 (H) 0.0 - 0.2 mg/dL    Alk.  phosphatase 152 (H) 45 - 117 U/L    AST (SGOT) 25 15 - 37 U/L    ALT (SGPT) 34 12 - 78 U/L   CBC WITH AUTOMATED DIFF    Collection Time: 03/30/22  6:23 AM   Result Value Ref Range    WBC 9.6 3.6 - 11.0 K/uL    RBC 4.45 3.80 - 5.20 M/uL    HGB 12.3 11.5 - 16.0 g/dL    HCT 36.7 35.0 - 47.0 %    MCV 82.5 80.0 - 99.0 FL    MCH 27.6 26.0 - 34.0 PG    MCHC 33.5 30.0 - 36.5 g/dL    RDW 13.6 11.5 - 14.5 %    PLATELET 773 769 - 366 K/uL    MPV 9.9 8.9 - 12.9 FL    NRBC 0.0 0.0  WBC    ABSOLUTE NRBC 0.00 0.00 - 0.01 K/uL    NEUTROPHILS 84 (H) 32 - 75 %    LYMPHOCYTES 9 (L) 12 - 49 %    MONOCYTES 6 5 - 13 %    EOSINOPHILS 0 0 - 7 %    BASOPHILS 1 0 - 1 %    IMMATURE GRANULOCYTES 0 0 - 0.5 %    ABS. NEUTROPHILS 8.0 1.8 - 8.0 K/UL    ABS. LYMPHOCYTES 0.9 0.8 - 3.5 K/UL    ABS. MONOCYTES 0.6 0.0 - 1.0 K/UL    ABS. EOSINOPHILS 0.0 0.0 - 0.4 K/UL    ABS. BASOPHILS 0.1 0.0 - 0.1 K/UL    ABS. IMM. GRANS. 0.0 0.00 - 0.04 K/UL    DF AUTOMATED     HEPATIC FUNCTION PANEL    Collection Time: 03/30/22  6:23 AM   Result Value Ref Range    Protein, total 6.6 6.4 - 8.2 g/dL    Albumin 3.7 3.5 - 5.0 g/dL    Globulin 2.9 2.0 - 4.0 g/dL    A-G Ratio 1.3 1.1 - 2.2      Bilirubin, total 1.0 0.2 - 1.0 mg/dL    Bilirubin, direct 0.3 (H) 0.0 - 0.2 mg/dL    Alk. phosphatase 150 (H) 45 - 117 U/L    AST (SGOT) 28 15 - 37 U/L    ALT (SGPT) 33 12 - 78 U/L   METABOLIC PANEL, COMPREHENSIVE    Collection Time: 03/30/22  6:24 AM   Result Value Ref Range    Sodium 139 136 - 145 mmol/L    Potassium 3.6 3.5 - 5.1 mmol/L    Chloride 106 97 - 108 mmol/L    CO2 20 (L) 21 - 32 mmol/L    Anion gap 13 5 - 15 mmol/L    Glucose 62 (L) 65 - 100 mg/dL    BUN 11 6 - 20 mg/dL    Creatinine 0.46 (L) 0.55 - 1.02 mg/dL    BUN/Creatinine ratio 24 (H) 12 - 20      GFR est AA >60 >60 ml/min/1.73m2    GFR est non-AA >60 >60 ml/min/1.73m2    Calcium 9.0 8.5 - 10.1 mg/dL    Bilirubin, total 0.9 0.2 - 1.0 mg/dL    AST (SGOT) 22 15 - 37 U/L    ALT (SGPT) 33 12 - 78 U/L    Alk.  phosphatase 147 (H) 45 - 117 U/L    Protein, total 6.7 6.4 - 8.2 g/dL Albumin 3.6 3.5 - 5.0 g/dL    Globulin 3.1 2.0 - 4.0 g/dL    A-G Ratio 1.2 1.1 - 2.2           Imaging:    NM HEPATOBILIARY DUCT SCAN   Final Result   Findings concerning for cystic duct compromise. CT ABD PELV WO CONT   Final Result   Gallbladder wall and pericystic changes. Mild prominence of the   common bile duct. This represents change from the prior study. Ultrasound   earlier this evening shows cholelithiasis. Cholecystitis and/or cholangitis   should be considered clinically. Consider radionuclide hepatobiliary scan   depending on clinical setting                US GALLBLADDER   Final Result   Limited by bowel gas. 1. Cholelithiasis. No sonographic evidence of cholecystitis. However the common   bile duct is dilated which may be chronic. CTA CHEST W OR W WO CONT   Final Result   1. No pulmonary embolus. 2. Interval increase of peribronchial and perivascular soft tissue thickening   possibly lymphoid or neurofibromatosis in origin. Unchanged left lung   bronchovascular nodule, posterior mediastinal and retroperitoneal abnormal soft   tissue. 3. Bilateral hazy airspace edema or pneumonia. 4. Haziness of the gallbladder. Correlate clinically for cholecystitis. XR CHEST PORT   Final Result   No acute process or active disease. Assessment     Chest pain rule out MI  Gallstone  Right upper quadrant pain  History of neurofibromatosis  Hypertension  Hypothyroidism  Seizure disorder  Hyperlipidemia  Anxiety disorder  Chronic pain syndrome    CT abdomen - showed gallbladder wall and pericystic changes. HIDA - Common bile duct activity noted with free spill of activity into small bowel loops.  Findings concerning for cystic duct compromise      Plan     Medications  Lipitor 40 mg daily  BuSpar 30 mg twice a day  Carbamazepine  mg twice a day  Coreg 3.125 twice a day  Pepcid 20 twice daily  Neurontin 400 mg 3 times a day  Levothyroxine 125 mcg daily  Linzess 145 mg daily  Zosyn 3.375 IV every 8 hours  Effexor  mg daily      1. Continue IV fluids  2. Cardiology is surgical and GI consult  3. Discussed with the patient daughter/concern about pain medication at this time   4.  We will continue Dilaudid 1 mg every 4 hours as needed    Discussed with the patient daughter this morning she want second opinion with second surgeon/she want patient to be seen by Dr. Kadie Fernández   Nurse call the surgeon and he plan for possible cholecystectomy on Friday  Want to start on clear liquid diet      Signed By: Tam Burnham MD     March 30, 2022

## 2022-03-30 NOTE — PROGRESS NOTES
CM received call from patient's  with [address], Latonya Montez [phone], she stated that patient was previously going to outpatient therapy prior to admission, DCP at this time is for patient to return home self care continuing outpatient therapy.     [place] main office number - [phone]

## 2022-03-30 NOTE — ROUTINE PROCESS
Dual skin assessment done with ALEIDA Miller Adjutant. Client has no signs of skin breakdown. Skin clean, dry and intact.

## 2022-03-31 ENCOUNTER — ANESTHESIA (OUTPATIENT)
Dept: SURGERY | Age: 62
DRG: 419 | End: 2022-03-31
Payer: MEDICARE

## 2022-03-31 ENCOUNTER — ANESTHESIA EVENT (OUTPATIENT)
Dept: SURGERY | Age: 62
DRG: 419 | End: 2022-03-31
Payer: MEDICARE

## 2022-03-31 PROCEDURE — 77030013567 HC DRN WND RESERV BARD -A: Performed by: COLON & RECTAL SURGERY

## 2022-03-31 PROCEDURE — 74011250636 HC RX REV CODE- 250/636: Performed by: NURSE ANESTHETIST, CERTIFIED REGISTERED

## 2022-03-31 PROCEDURE — 74011000258 HC RX REV CODE- 258: Performed by: FAMILY MEDICINE

## 2022-03-31 PROCEDURE — 77030016151 HC PROTCTR LNS DFOG COVD -B: Performed by: COLON & RECTAL SURGERY

## 2022-03-31 PROCEDURE — 77030010507 HC ADH SKN DERMBND J&J -B: Performed by: COLON & RECTAL SURGERY

## 2022-03-31 PROCEDURE — 2709999900 HC NON-CHARGEABLE SUPPLY: Performed by: COLON & RECTAL SURGERY

## 2022-03-31 PROCEDURE — 77030013079 HC BLNKT BAIR HGGR 3M -A: Performed by: NURSE ANESTHETIST, CERTIFIED REGISTERED

## 2022-03-31 PROCEDURE — 88304 TISSUE EXAM BY PATHOLOGIST: CPT

## 2022-03-31 PROCEDURE — 77030018684: Performed by: COLON & RECTAL SURGERY

## 2022-03-31 PROCEDURE — 77030010513 HC APPL CLP LIG J&J -C: Performed by: COLON & RECTAL SURGERY

## 2022-03-31 PROCEDURE — 77030034628 HC LIGASURE LAP SEAL DIV MD COVD -F: Performed by: COLON & RECTAL SURGERY

## 2022-03-31 PROCEDURE — 77030008756 HC TU IRR SUC STRY -B: Performed by: COLON & RECTAL SURGERY

## 2022-03-31 PROCEDURE — 77030008606 HC TRCR ENDOSC KII AMR -B: Performed by: COLON & RECTAL SURGERY

## 2022-03-31 PROCEDURE — 76010000153 HC OR TIME 1.5 TO 2 HR: Performed by: COLON & RECTAL SURGERY

## 2022-03-31 PROCEDURE — 77030002933 HC SUT MCRYL J&J -A: Performed by: COLON & RECTAL SURGERY

## 2022-03-31 PROCEDURE — 77030031139 HC SUT VCRL2 J&J -A: Performed by: COLON & RECTAL SURGERY

## 2022-03-31 PROCEDURE — 76210000006 HC OR PH I REC 0.5 TO 1 HR: Performed by: COLON & RECTAL SURGERY

## 2022-03-31 PROCEDURE — 74011000250 HC RX REV CODE- 250: Performed by: COLON & RECTAL SURGERY

## 2022-03-31 PROCEDURE — 74011000250 HC RX REV CODE- 250: Performed by: NURSE ANESTHETIST, CERTIFIED REGISTERED

## 2022-03-31 PROCEDURE — 77030012799 HC TRCR GELPRT BLN AMR -B: Performed by: COLON & RECTAL SURGERY

## 2022-03-31 PROCEDURE — 77030018706 HC CORD MPLR COVD -A: Performed by: COLON & RECTAL SURGERY

## 2022-03-31 PROCEDURE — 74011250637 HC RX REV CODE- 250/637: Performed by: FAMILY MEDICINE

## 2022-03-31 PROCEDURE — 77030026438 HC STYL ET INTUB CARD -A: Performed by: NURSE ANESTHETIST, CERTIFIED REGISTERED

## 2022-03-31 PROCEDURE — 0FT44ZZ RESECTION OF GALLBLADDER, PERCUTANEOUS ENDOSCOPIC APPROACH: ICD-10-PCS | Performed by: COLON & RECTAL SURGERY

## 2022-03-31 PROCEDURE — 77030008684 HC TU ET CUF COVD -B: Performed by: NURSE ANESTHETIST, CERTIFIED REGISTERED

## 2022-03-31 PROCEDURE — 74011250636 HC RX REV CODE- 250/636: Performed by: FAMILY MEDICINE

## 2022-03-31 PROCEDURE — 77030018813 HC SCIS LAPSCP EPIX DISP AMR -B: Performed by: COLON & RECTAL SURGERY

## 2022-03-31 PROCEDURE — 74011250636 HC RX REV CODE- 250/636: Performed by: ANESTHESIOLOGY

## 2022-03-31 PROCEDURE — 77030009851 HC PCH RTVR ENDOSC AMR -B: Performed by: COLON & RECTAL SURGERY

## 2022-03-31 PROCEDURE — 77030012406 HC DRN WND PENRS BARD -A: Performed by: COLON & RECTAL SURGERY

## 2022-03-31 PROCEDURE — 77030040361 HC SLV COMPR DVT MDII -B: Performed by: COLON & RECTAL SURGERY

## 2022-03-31 PROCEDURE — 77030039147 HC PWDR HEMSTS SURGICEL JNJ -D: Performed by: COLON & RECTAL SURGERY

## 2022-03-31 PROCEDURE — 47562 LAPAROSCOPIC CHOLECYSTECTOMY: CPT | Performed by: COLON & RECTAL SURGERY

## 2022-03-31 PROCEDURE — 65270000029 HC RM PRIVATE

## 2022-03-31 PROCEDURE — 77030041236 HC APPL SURG ENDO JNJ -B: Performed by: COLON & RECTAL SURGERY

## 2022-03-31 PROCEDURE — 77030002916 HC SUT ETHLN J&J -A: Performed by: COLON & RECTAL SURGERY

## 2022-03-31 PROCEDURE — 77030018842 HC SOL IRR SOD CL 9% BAXT -A: Performed by: COLON & RECTAL SURGERY

## 2022-03-31 PROCEDURE — 99221 1ST HOSP IP/OBS SF/LOW 40: CPT | Performed by: COLON & RECTAL SURGERY

## 2022-03-31 PROCEDURE — 76060000034 HC ANESTHESIA 1.5 TO 2 HR: Performed by: COLON & RECTAL SURGERY

## 2022-03-31 PROCEDURE — 77030018390 HC SPNG HEMSTAT2 J&J -B: Performed by: COLON & RECTAL SURGERY

## 2022-03-31 RX ORDER — METOPROLOL TARTRATE 5 MG/5ML
2.5 INJECTION INTRAVENOUS
Status: CANCELLED | OUTPATIENT
Start: 2022-03-31

## 2022-03-31 RX ORDER — HYDROMORPHONE HYDROCHLORIDE 1 MG/ML
1 INJECTION, SOLUTION INTRAMUSCULAR; INTRAVENOUS; SUBCUTANEOUS ONCE
Status: COMPLETED | OUTPATIENT
Start: 2022-03-31 | End: 2022-03-31

## 2022-03-31 RX ORDER — HYDROCODONE BITARTRATE AND ACETAMINOPHEN 5; 325 MG/1; MG/1
1 TABLET ORAL AS NEEDED
Status: DISCONTINUED | OUTPATIENT
Start: 2022-03-31 | End: 2022-03-31 | Stop reason: HOSPADM

## 2022-03-31 RX ORDER — LIDOCAINE HYDROCHLORIDE 20 MG/ML
INJECTION, SOLUTION EPIDURAL; INFILTRATION; INTRACAUDAL; PERINEURAL AS NEEDED
Status: DISCONTINUED | OUTPATIENT
Start: 2022-03-31 | End: 2022-03-31 | Stop reason: HOSPADM

## 2022-03-31 RX ORDER — DIPHENHYDRAMINE HYDROCHLORIDE 50 MG/ML
12.5 INJECTION, SOLUTION INTRAMUSCULAR; INTRAVENOUS AS NEEDED
Status: DISCONTINUED | OUTPATIENT
Start: 2022-03-31 | End: 2022-03-31 | Stop reason: HOSPADM

## 2022-03-31 RX ORDER — CYCLOBENZAPRINE HCL 10 MG
5 TABLET ORAL ONCE
Status: COMPLETED | OUTPATIENT
Start: 2022-03-31 | End: 2022-03-31

## 2022-03-31 RX ORDER — SODIUM CHLORIDE, SODIUM LACTATE, POTASSIUM CHLORIDE, CALCIUM CHLORIDE 600; 310; 30; 20 MG/100ML; MG/100ML; MG/100ML; MG/100ML
INJECTION, SOLUTION INTRAVENOUS
Status: DISCONTINUED | OUTPATIENT
Start: 2022-03-31 | End: 2022-03-31 | Stop reason: HOSPADM

## 2022-03-31 RX ORDER — EPHEDRINE SULFATE/0.9% NACL/PF 50 MG/5 ML
5 SYRINGE (ML) INTRAVENOUS AS NEEDED
Status: DISCONTINUED | OUTPATIENT
Start: 2022-03-31 | End: 2022-03-31 | Stop reason: HOSPADM

## 2022-03-31 RX ORDER — LIDOCAINE HYDROCHLORIDE 10 MG/ML
0.1 INJECTION, SOLUTION EPIDURAL; INFILTRATION; INTRACAUDAL; PERINEURAL AS NEEDED
Status: DISCONTINUED | OUTPATIENT
Start: 2022-03-31 | End: 2022-03-31 | Stop reason: HOSPADM

## 2022-03-31 RX ORDER — ONDANSETRON 2 MG/ML
4 INJECTION INTRAMUSCULAR; INTRAVENOUS AS NEEDED
Status: DISCONTINUED | OUTPATIENT
Start: 2022-03-31 | End: 2022-03-31 | Stop reason: HOSPADM

## 2022-03-31 RX ORDER — MIDAZOLAM HYDROCHLORIDE 1 MG/ML
0.5 INJECTION, SOLUTION INTRAMUSCULAR; INTRAVENOUS
Status: DISCONTINUED | OUTPATIENT
Start: 2022-03-31 | End: 2022-03-31 | Stop reason: HOSPADM

## 2022-03-31 RX ORDER — BUPIVACAINE HYDROCHLORIDE 2.5 MG/ML
INJECTION, SOLUTION EPIDURAL; INFILTRATION; INTRACAUDAL AS NEEDED
Status: DISCONTINUED | OUTPATIENT
Start: 2022-03-31 | End: 2022-03-31 | Stop reason: HOSPADM

## 2022-03-31 RX ORDER — SODIUM CHLORIDE 0.9 % (FLUSH) 0.9 %
5-40 SYRINGE (ML) INJECTION AS NEEDED
Status: DISCONTINUED | OUTPATIENT
Start: 2022-03-31 | End: 2022-03-31 | Stop reason: HOSPADM

## 2022-03-31 RX ORDER — SODIUM CHLORIDE 0.9 % (FLUSH) 0.9 %
5-40 SYRINGE (ML) INJECTION EVERY 8 HOURS
Status: DISCONTINUED | OUTPATIENT
Start: 2022-03-31 | End: 2022-03-31 | Stop reason: HOSPADM

## 2022-03-31 RX ORDER — HYDROMORPHONE HYDROCHLORIDE 1 MG/ML
0.4 INJECTION, SOLUTION INTRAMUSCULAR; INTRAVENOUS; SUBCUTANEOUS
Status: DISCONTINUED | OUTPATIENT
Start: 2022-03-31 | End: 2022-03-31 | Stop reason: HOSPADM

## 2022-03-31 RX ORDER — ROCURONIUM BROMIDE 10 MG/ML
INJECTION, SOLUTION INTRAVENOUS AS NEEDED
Status: DISCONTINUED | OUTPATIENT
Start: 2022-03-31 | End: 2022-03-31 | Stop reason: HOSPADM

## 2022-03-31 RX ORDER — LABETALOL HCL 20 MG/4 ML
5 SYRINGE (ML) INTRAVENOUS
Status: CANCELLED | OUTPATIENT
Start: 2022-03-31

## 2022-03-31 RX ORDER — DEXAMETHASONE SODIUM PHOSPHATE 4 MG/ML
INJECTION, SOLUTION INTRA-ARTICULAR; INTRALESIONAL; INTRAMUSCULAR; INTRAVENOUS; SOFT TISSUE AS NEEDED
Status: DISCONTINUED | OUTPATIENT
Start: 2022-03-31 | End: 2022-03-31 | Stop reason: HOSPADM

## 2022-03-31 RX ORDER — HYDRALAZINE HYDROCHLORIDE 20 MG/ML
10 INJECTION INTRAMUSCULAR; INTRAVENOUS
Status: CANCELLED | OUTPATIENT
Start: 2022-03-31

## 2022-03-31 RX ORDER — PROPOFOL 10 MG/ML
INJECTION, EMULSION INTRAVENOUS AS NEEDED
Status: DISCONTINUED | OUTPATIENT
Start: 2022-03-31 | End: 2022-03-31 | Stop reason: HOSPADM

## 2022-03-31 RX ORDER — ACETAMINOPHEN 325 MG/1
650 TABLET ORAL
Status: DISCONTINUED | OUTPATIENT
Start: 2022-03-31 | End: 2022-04-03 | Stop reason: HOSPADM

## 2022-03-31 RX ORDER — ONDANSETRON 2 MG/ML
INJECTION INTRAMUSCULAR; INTRAVENOUS AS NEEDED
Status: DISCONTINUED | OUTPATIENT
Start: 2022-03-31 | End: 2022-03-31 | Stop reason: HOSPADM

## 2022-03-31 RX ORDER — MIDAZOLAM HYDROCHLORIDE 1 MG/ML
1 INJECTION, SOLUTION INTRAMUSCULAR; INTRAVENOUS AS NEEDED
Status: DISCONTINUED | OUTPATIENT
Start: 2022-03-31 | End: 2022-03-31 | Stop reason: HOSPADM

## 2022-03-31 RX ORDER — HYDROMORPHONE HYDROCHLORIDE 1 MG/ML
INJECTION, SOLUTION INTRAMUSCULAR; INTRAVENOUS; SUBCUTANEOUS AS NEEDED
Status: DISCONTINUED | OUTPATIENT
Start: 2022-03-31 | End: 2022-03-31 | Stop reason: HOSPADM

## 2022-03-31 RX ORDER — FENTANYL CITRATE 50 UG/ML
50 INJECTION, SOLUTION INTRAMUSCULAR; INTRAVENOUS AS NEEDED
Status: DISCONTINUED | OUTPATIENT
Start: 2022-03-31 | End: 2022-03-31 | Stop reason: HOSPADM

## 2022-03-31 RX ORDER — FENTANYL CITRATE 50 UG/ML
50 INJECTION, SOLUTION INTRAMUSCULAR; INTRAVENOUS
Status: DISCONTINUED | OUTPATIENT
Start: 2022-03-31 | End: 2022-03-31 | Stop reason: HOSPADM

## 2022-03-31 RX ORDER — FENTANYL CITRATE 50 UG/ML
INJECTION, SOLUTION INTRAMUSCULAR; INTRAVENOUS AS NEEDED
Status: DISCONTINUED | OUTPATIENT
Start: 2022-03-31 | End: 2022-03-31 | Stop reason: HOSPADM

## 2022-03-31 RX ORDER — MIDAZOLAM HYDROCHLORIDE 1 MG/ML
INJECTION, SOLUTION INTRAMUSCULAR; INTRAVENOUS AS NEEDED
Status: DISCONTINUED | OUTPATIENT
Start: 2022-03-31 | End: 2022-03-31 | Stop reason: HOSPADM

## 2022-03-31 RX ADMIN — FAMOTIDINE 20 MG: 20 TABLET, FILM COATED ORAL at 08:34

## 2022-03-31 RX ADMIN — ONDANSETRON 4 MG: 2 INJECTION INTRAMUSCULAR; INTRAVENOUS at 16:59

## 2022-03-31 RX ADMIN — SUGAMMADEX 100 MG: 100 INJECTION, SOLUTION INTRAVENOUS at 18:16

## 2022-03-31 RX ADMIN — LIDOCAINE HYDROCHLORIDE 40 MG: 20 INJECTION, SOLUTION EPIDURAL; INFILTRATION; INTRACAUDAL; PERINEURAL at 16:57

## 2022-03-31 RX ADMIN — SODIUM CHLORIDE, POTASSIUM CHLORIDE, SODIUM LACTATE AND CALCIUM CHLORIDE: 600; 310; 30; 20 INJECTION, SOLUTION INTRAVENOUS at 17:36

## 2022-03-31 RX ADMIN — ROCURONIUM BROMIDE 30 MG: 50 INJECTION, SOLUTION INTRAVENOUS at 17:45

## 2022-03-31 RX ADMIN — HYDROMORPHONE HYDROCHLORIDE 1 MG: 1 INJECTION, SOLUTION INTRAMUSCULAR; INTRAVENOUS; SUBCUTANEOUS at 04:27

## 2022-03-31 RX ADMIN — PROPOFOL 150 MG: 10 INJECTION, EMULSION INTRAVENOUS at 16:57

## 2022-03-31 RX ADMIN — MIDAZOLAM HYDROCHLORIDE 2 MG: 2 INJECTION, SOLUTION INTRAMUSCULAR; INTRAVENOUS at 16:48

## 2022-03-31 RX ADMIN — HYDROMORPHONE HYDROCHLORIDE 1 MG: 1 INJECTION, SOLUTION INTRAMUSCULAR; INTRAVENOUS; SUBCUTANEOUS at 00:21

## 2022-03-31 RX ADMIN — VENLAFAXINE HYDROCHLORIDE 150 MG: 75 CAPSULE, EXTENDED RELEASE ORAL at 08:34

## 2022-03-31 RX ADMIN — FAMOTIDINE 20 MG: 20 TABLET, FILM COATED ORAL at 20:10

## 2022-03-31 RX ADMIN — HYDROMORPHONE HYDROCHLORIDE 1 MG: 1 INJECTION, SOLUTION INTRAMUSCULAR; INTRAVENOUS; SUBCUTANEOUS at 06:52

## 2022-03-31 RX ADMIN — HYDROMORPHONE HYDROCHLORIDE 0.4 MG: 1 INJECTION, SOLUTION INTRAMUSCULAR; INTRAVENOUS; SUBCUTANEOUS at 18:40

## 2022-03-31 RX ADMIN — CARBAMAZEPINE 300 MG: 300 CAPSULE, EXTENDED RELEASE ORAL at 08:35

## 2022-03-31 RX ADMIN — GABAPENTIN 400 MG: 400 CAPSULE ORAL at 22:13

## 2022-03-31 RX ADMIN — FENTANYL CITRATE 100 MCG: 50 INJECTION, SOLUTION INTRAMUSCULAR; INTRAVENOUS at 16:54

## 2022-03-31 RX ADMIN — GABAPENTIN 400 MG: 400 CAPSULE ORAL at 08:35

## 2022-03-31 RX ADMIN — BUSPIRONE HYDROCHLORIDE 30 MG: 10 TABLET ORAL at 21:00

## 2022-03-31 RX ADMIN — PHENYLEPHRINE HYDROCHLORIDE 200 MCG: 10 INJECTION INTRAVENOUS at 17:06

## 2022-03-31 RX ADMIN — HYDROMORPHONE HYDROCHLORIDE 1 MG: 1 INJECTION, SOLUTION INTRAMUSCULAR; INTRAVENOUS; SUBCUTANEOUS at 08:46

## 2022-03-31 RX ADMIN — LEVOTHYROXINE SODIUM 125 MCG: 0.03 TABLET ORAL at 08:34

## 2022-03-31 RX ADMIN — ROCURONIUM BROMIDE 10 MG: 50 INJECTION, SOLUTION INTRAVENOUS at 17:28

## 2022-03-31 RX ADMIN — HYDROMORPHONE HYDROCHLORIDE 1 MG: 1 INJECTION, SOLUTION INTRAMUSCULAR; INTRAVENOUS; SUBCUTANEOUS at 22:13

## 2022-03-31 RX ADMIN — ATORVASTATIN CALCIUM 40 MG: 40 TABLET, FILM COATED ORAL at 08:35

## 2022-03-31 RX ADMIN — PIPERACILLIN AND TAZOBACTAM 3.38 G: 3; .375 INJECTION, POWDER, LYOPHILIZED, FOR SOLUTION INTRAVENOUS at 04:27

## 2022-03-31 RX ADMIN — CARBAMAZEPINE 300 MG: 300 CAPSULE, EXTENDED RELEASE ORAL at 21:15

## 2022-03-31 RX ADMIN — CARVEDILOL 3.12 MG: 3.12 TABLET, FILM COATED ORAL at 08:35

## 2022-03-31 RX ADMIN — HYDROMORPHONE HYDROCHLORIDE 1 MG: 1 INJECTION, SOLUTION INTRAMUSCULAR; INTRAVENOUS; SUBCUTANEOUS at 18:24

## 2022-03-31 RX ADMIN — HYDROMORPHONE HYDROCHLORIDE 1 MG: 1 INJECTION, SOLUTION INTRAMUSCULAR; INTRAVENOUS; SUBCUTANEOUS at 17:47

## 2022-03-31 RX ADMIN — CYCLOBENZAPRINE 5 MG: 10 TABLET, FILM COATED ORAL at 20:10

## 2022-03-31 RX ADMIN — DEXAMETHASONE SODIUM PHOSPHATE 4 MG: 4 INJECTION, SOLUTION INTRA-ARTICULAR; INTRALESIONAL; INTRAMUSCULAR; INTRAVENOUS; SOFT TISSUE at 17:05

## 2022-03-31 RX ADMIN — PIPERACILLIN AND TAZOBACTAM 3.38 G: 3; .375 INJECTION, POWDER, LYOPHILIZED, FOR SOLUTION INTRAVENOUS at 20:10

## 2022-03-31 RX ADMIN — PIPERACILLIN AND TAZOBACTAM 3.38 G: 3; .375 INJECTION, POWDER, LYOPHILIZED, FOR SOLUTION INTRAVENOUS at 12:40

## 2022-03-31 RX ADMIN — SODIUM CHLORIDE, POTASSIUM CHLORIDE, SODIUM LACTATE AND CALCIUM CHLORIDE: 600; 310; 30; 20 INJECTION, SOLUTION INTRAVENOUS at 16:31

## 2022-03-31 RX ADMIN — ACETAMINOPHEN 650 MG: 325 TABLET ORAL at 11:58

## 2022-03-31 RX ADMIN — BUSPIRONE HYDROCHLORIDE 30 MG: 10 TABLET ORAL at 08:44

## 2022-03-31 RX ADMIN — ROCURONIUM BROMIDE 40 MG: 50 INJECTION, SOLUTION INTRAVENOUS at 16:57

## 2022-03-31 NOTE — PROGRESS NOTES
Problem: Falls - Risk of  Goal: *Absence of Falls  Description: Document Marciana Distance Fall Risk and appropriate interventions in the flowsheet.   Outcome: Progressing Towards Goal  Note: Fall Risk Interventions:  Mobility Interventions: Utilize walker, cane, or other assistive device         Medication Interventions: Teach patient to arise slowly,Patient to call before getting OOB    Elimination Interventions: Call light in reach    Problem: General Medical Care Plan  Goal: *Vital signs within specified parameters  Outcome: Progressing Towards Goal     Problem: General Medical Care Plan  Goal: *Absence of infection signs and symptoms  Outcome: Progressing Towards Goal     Problem: General Medical Care Plan  Goal: *Anxiety reduced or absent  Outcome: Progressing Towards Goal

## 2022-03-31 NOTE — MED STUDENT NOTES
HISTORY & PHYSICAL      Patient: Krupa Faria MRN: [id number]  SSN: [id number]    YOB: 1960  Age: 59 y.o. Sex: female      Primary Care Provider: Rosalio, MD Henry  Source of Information:       Subjective     CC:   Chief Complaint   Patient presents with    Chest Pain       HPI: Lux hdz(n) 59 y. o. female with PMH significant for arthritis, depression, epilepsy, hypertension, neurofibromatosis, and thyroid disease who presents with chest pain.       Upon presentation, patient stated her symptoms began that morning. She describes her pain being in the center of her chest and radiates to the back and shoulder. Patient complained of her pain which awakened her at 5:30 this morning, she states the pain is 9/10, constant, without exacerbating symptoms. The patient is currently on HYDROmorphone (DILAUDID) injection 1 mg. She states the Dilaudid is greatly helping with her pain management.  No fever, cough, or other associated symptoms.  Denies leg swelling unilaterally, history of PE/DVT, or pleuritic chest pain.     Originally, a ultrasound was conducted (3/28/2022 @22:05) revealing cholelithiasis and the common bile duct is dilated which may be chronic. However, the U/S gallbladder was limited by bowel gas.     The recent, CT ABD Pelvis w/o contrast (3/28/2022 @22:55) revealed Gallbladder wall and pericystic changes. Mild prominence of the common bile duct. Thus, cholecystitis and/or cholangitis should be considered clinically. Patient is scheduled for MRCP today.      Surgery was consulted in the ER seen by the surgeon ordered MRI of the abdomen and the HIDA scan      3/30/2022  Pt continues to report pain being in the center of her chest and radiates to the back and shoulder. She states the pain is 9/10, constant, without exacerbating symptoms. The patient is currently on HYDROmorphone (DILAUDID) injection 1 mg.  She states the Dilaudid is greatly helping with her pain management.  No fever, cough, or other associated symptoms.  Denies leg swelling unilaterally, history of PE/DVT, or pleuritic chest pain. Patient daughter and sister indicate Dilaudid 1 mg every 4 hours as needed for her chronic pain/discussed the side effects she understand    Cardiology - CXR (3/28/2022) without congestion. The heart, mediastinum and pulmonary vasculature are normal. The lungs are well expanded and clear. The bony thorax is intact. When compared to the prior exam, there is no significant change. HS troponin negative x 2. NSR: HR 77, no ischemia. General Surgery: HIDA did not visualize GB but has free spillage in to duodenum. Yesterday, patient's dilaudid was changed from 0.5mg q 4hrs instead of 1mg. However, the patient reports increased pain. Patient's dilaudid was returned to 1mg q 4hrs this morning. HIDA 3/29/22: There is a normal distribution of activity through the liver. Common bile duct activity noted with free spill of activity into small bowel loops. With images carried out to 2 hours, no gallbladder activity is evident. IMPRESSION Findings concerning for cystic duct compromise    Significant labs:   WBC 9.6  Neutrophils 84  aPTT 34.4  Creatinine 0.46      3/31/2022  Pt continues to report pain being in the center of her chest and radiates to the back and shoulder. She states the pain is 9/10, constant, without exacerbating symptoms. The patient is currently on HYDROmorphone (DILAUDID) injection 1 mg. She states the Dilaudid is greatly helping with her pain management.      Today, the patient complains of a sinus headache. However, she denies fever, cough, or other associated symptoms.  Denies leg swelling unilaterally, history of PE/DVT, or pleuritic chest pain.     Patient daughter and sister indicate Dilaudid 1 mg every 4 hours as needed for her chronic pain/discussed the side effects she understand    Colon and Rectal Surgery: Patient previously requested Dr. Kelsie Godfrey to be involved with her case; Dr. Harmeet Álvarez has seen the patient and has recommended a laparoscopic cholecystectomy which is scheduled for this afternoon. General Surgery: HIDA did not visualize GB but has free spillage in to duodenum. HIDA 3/29/22: There is a normal distribution of activity through the liver. Common bile duct activity noted with free spill of activity into small bowel loops. With images carried out to 2 hours, no gallbladder activity is evident. IMPRESSION Findings concerning for cystic duct compromise    Echo (3/30/2022) -  Left ventricle size is normal. Increased wall thickness. Normal wall motion. Normal left ventricular systolic function with a visually estimated EF of greater than 65%. Normal diastolic function    Cardiology - CXR (3/28/2022) without congestion. The heart, mediastinum and pulmonary vasculature are normal. The lungs are well expanded and clear. The bony thorax is intact. When compared to the prior exam, there is no significant change. HS troponin negative x 2. NSR: HR 77, no ischemia. Significant labs: (Labs from 3/30/2022  WBC 9.6  Neutrophils 84  aPTT 34.4  Creatinine 0.46    Patient seen by the surgeon and plan for laparoscopic cholecystectomy today    Patient taking Dilaudid every 4 hours have a detailed discussion with the patient patient daughter recommend to continue discussed about the side effects and the complications        Past Medical History:   Diagnosis Date    Arthritis     Depression     Epilepsy ([address]     Hypertension     Ill-defined condition     neurofibromatosis    Thyroid disease         Past Surgical History:   Procedure Laterality Date    HX GYN      HX ORTHOPAEDIC      NEUROLOGICAL PROCEDURE UNLISTED         Prior to Admission medications    Medication Sig Start Date End Date Taking? Authorizing Provider   venlafaxine [facility]) 75 mg tablet Take 75 mg by mouth daily.    Yes Provider, Historical   ergocalciferol (DrisdoL) 1,250 mcg (50,000 unit) capsule Take 50,000 Units by mouth every thirty (30) days. Take on the 15th day of the month   Yes Provider, Historical   linaCLOtide (Linzess) 145 mcg cap capsule Take 145 mcg by mouth Daily (before breakfast). Yes Other, MD Henry   senna-docusate (PERICOLACE) 8.6-50 mg per tablet Take 2 Tablets by mouth daily. Yes Other, MD Henry   atorvastatin (LIPITOR) 40 mg tablet Take 40 mg by mouth daily. Yes Provider, Historical   Xtampza ER 36 mg capsule Take 1 Capsule by mouth two (2) times a day. 9/19/21  Yes Provider, Historical   gabapentin (NEURONTIN) 800 mg tablet Take 400 mg by mouth three (3) times daily. 9/9/21  Yes Provider, Historical   famotidine (Pepcid) 20 mg tablet Take 20 mg by mouth two (2) times a day. Yes Other, MD Henry   celecoxib (CELEBREX) 200 mg capsule Take 100 mg by mouth two (2) times a day. Yes Other, MD Henry   aspirin 81 mg chewable tablet Take 81 mg by mouth nightly. Yes Other, MD Henry   carBAMazepine ER (CARBATROL ER) 300 mg capsule Take 300 mg by mouth two (2) times a day. Yes Provider, Historical   carvedilol (COREG) 6.25 mg tablet Take  by mouth two (2) times daily (with meals). Yes Provider, Historical   levothyroxine (SYNTHROID) 125 mcg tablet Take  by mouth Daily (before breakfast). Yes Provider, Historical   busPIRone (BUSPAR) 30 mg tablet Take 30 mg by mouth two (2) times a day. Yes Provider, Historical   venlafaxine-SR (EFFEXOR XR) 150 mg capsule Take 150 mg by mouth daily. Yes Provider, Historical   naloxone 8 mg/actuation spry 4 mg by Nasal route as needed. Other, MD Henry   oxyCODONE-acetaminophen (Percocet) 7.5-325 mg per tablet Take 1 Tablet by mouth every eight (8) hours as needed for Pain. Other, MD Henry   loratadine (Claritin) 10 mg tablet Take 10 mg by mouth daily as needed for Allergies. Other, MD Henry   diclofenac (FLECTOR) 1.3 % pt12 1 Patch by TransDERmal route every twelve (12) hours every twelve (12) hours.     Rosalio, MD Henry   diclofenac (Voltaren) 1 % gel Apply 4 g to affected area four (4) times daily. Other, MD Henry       Allergies   Allergen Reactions    Codeine Nausea and Vomiting    Methadone Hives    Morphine Other (comments)     psychosis        Family History   Problem Relation Age of Onset    Cancer Mother         glioblastoma    Lung Disease Father     Cancer Father         mesothelioma, testicular cancer        Social History     Socioeconomic History    Marital status: UNKNOWN    Highest education level: Associate degree: occupational, technical, or vocational program   Tobacco Use    Smoking status: Never Smoker    Smokeless tobacco: Never Used   Vaping Use    Vaping Use: Former    Substances: CBD (only for 2 months)   Substance and Sexual Activity    Alcohol use: No    Drug use: Never        ROS  Constitutional:  no fever,  no chills,  no sweats, No weakness, No fatigue, No decreased activity. Eye: No recent visual problem, No icterus, No discharge, No double vision. Ear/Nose/Mouth/Throat: No decreased hearing, No ear pain, No nasal congestion, No sore throat. Respiratory: No shortness of breath, No cough, No sputum production, No hemoptysis, No wheezing, No cyanosis. Cardiovascular:  chest pain, No palpitations, No bradycardia, No tachycardia, No peripheral edema, No syncope. Gastrointestinal: No nausea,  No vomiting, No diarrhea, No constipation, No heartburn,   abdominal pain. Genitourinary: No dysuria, No hematuria, No change in urine stream, No urethral discharge, No lesions. Hematology/Lymphatics: No bruising tendency, No bleeding tendency, No petechiae, No swollen lymph glands. Endocrine: No excessive thirst, No polyuria, No cold intolerance, No heat intolerance, No excessive hunger. Immunologic: Not immunocompromised, No recurrent fevers, No recurrent infections. Musculoskeletal: No back pain, No neck pain, No joint pain, No muscle pain, No claudication, No decreased range of motion, No trauma.   Integumentary: No rash, No pruritus, No abrasions. Neurologic: Alert and oriented X4, No abnormal balance, No headache, No confusion, No numbness, No tingling. Psychiatric: No anxiety, No depression, No bijan. Objective     Visit Vitals  /69 (BP 1 Location: Left upper arm, BP Patient Position: At rest)   Pulse 81   Temp 97.9 °F (36.6 °C)   Resp 18   Ht 5' 1\" (1.549 m)   Wt 61.7 kg (136 lb)   SpO2 99%   Breastfeeding No   BMI 25.70 kg/m²      O2 Device: None (Room air)    Physical Exam:   Physical Exam  General: well appearing, no acute distress  Head: Normal  Face: Nornal  HEENT: atraumatic, PERRLA, moist mucosa, normal pharynx, normal tonsils and adenoids, normal tongue, no fluid in sinuses  Neck: Trachea midline, no carotid bruit, no masses  Chest: Normal.  Respiratory: Normal chest wall expansion, CTA B, no r/r/w, no rubs  Cardiovascular: RRR, no m/r/g, Normal S1 and S2  Abdomen: Soft, non tender, non-distended, normal bowel sounds in all quadrants, no hepatosplenomegaly, no tympany. Genitourinary: No inguinal hernia, normal external gentalia,  no renal angle tenderness  Rectal: deferred  Musculoskeletal: normal ROM in upper and lower extremities, No joint swelling. Integumentary: Warm, dry, and pink, with no rash, purpura, or petechia  Heme/Lymph: No lymphadenopathy, no bruises  Neurological:Cranial Nerves II-XII grossly intact, no gross motor or sensory deficit  Psychiatric: Cooperative with normal mood, affect, and cognition      Intake & Output:  Current Shift: No intake/output data recorded. Last three shifts: 03/29 1901 - 03/31 0700  In: 400 [P.O.:400]  Out: 802 [Urine:800]    Lab/Data Review: All lab results for the last 24 hours reviewed.        24 Hour Results:    Recent Results (from the past 24 hour(s))   ECHO ADULT COMPLETE    Collection Time: 03/30/22  3:30 PM   Result Value Ref Range    AV Peak Velocity 1.2 m/s    AV Peak Gradient 5 mmHg    Aortic Root 3.0 cm    IVSd 1.3 (A) 0.6 - 0.9 cm    LVIDd 3.8 (A) 3.9 - 5.3 cm    LVIDs 2.2 cm    LVOT Peak Velocity 0.9 m/s    LVOT Peak Gradient 3 mmHg    LVPWd 1.2 (A) 0.6 - 0.9 cm    LV E' Lateral Velocity 10 cm/s    LV E' Septal Velocity 11 cm/s    LA Diameter 3.2 cm    MV E Wave Deceleration Time 338.0 ms    MV A Velocity 1.27 m/s    MV E Velocity 0.88 m/s    PV Max Velocity 1.1 m/s    PV Peak Gradient 5 mmHg    Est. RA Pressure 3 mmHg    TR Max Velocity 2.17 m/s    TR Peak Gradient 19 mmHg    Fractional Shortening 2D 42 28 - 44 %    LVIDd Index 2.38 cm/m2    LVIDs Index 1.38 cm/m2    LV RWT Ratio 0.63     LV Mass 2D 163.0 (A) 67 - 162 g    LV Mass 2D Index 101.9 (A) 43 - 95 g/m2    MV E/A 0.69     E/E' Ratio (Averaged) 8.40     E/E' Lateral 8.80     E/E' Septal 8.00     LA Size Index 2.00 cm/m2    LA/AO Root Ratio 1.07     Ao Root Index 1.88 cm/m2    AV Velocity Ratio 0.75     RVSP 22 mmHg    EF BP 74 55 - 100 %         Imaging:    NM HEPATOBILIARY DUCT SCAN   Final Result   Findings concerning for cystic duct compromise. CT ABD PELV WO CONT   Final Result   Gallbladder wall and pericystic changes. Mild prominence of the   common bile duct. This represents change from the prior study. Ultrasound   earlier this evening shows cholelithiasis. Cholecystitis and/or cholangitis   should be considered clinically. Consider radionuclide hepatobiliary scan   depending on clinical setting                US GALLBLADDER   Final Result   Limited by bowel gas. 1. Cholelithiasis. No sonographic evidence of cholecystitis. However the common   bile duct is dilated which may be chronic. CTA CHEST W OR W WO CONT   Final Result   1. No pulmonary embolus. 2. Interval increase of peribronchial and perivascular soft tissue thickening   possibly lymphoid or neurofibromatosis in origin. Unchanged left lung   bronchovascular nodule, posterior mediastinal and retroperitoneal abnormal soft   tissue. 3. Bilateral hazy airspace edema or pneumonia. 4. Haziness of the gallbladder. Correlate clinically for cholecystitis. XR CHEST PORT   Final Result   No acute process or active disease. Assessment     Chest pain rule out MI  Gallstone  Right upper quadrant pain  History of neurofibromatosis  Hypertension  Hypothyroidism  Seizure disorder  Hyperlipidemia  Anxiety disorder  Chronic pain syndrome    CT abdomen - showed gallbladder wall and pericystic changes. HIDA - Common bile duct activity noted with free spill of activity into small bowel loops. Findings concerning for cystic duct compromise  Echo (3/30/2022) -  Left ventricle size is normal. Increased wall thickness. Normal wall motion. Normal left ventricular systolic function with a visually estimated EF of greater than 65%. Normal diastolic function    Plan     Medications  Lipitor 40 mg daily  BuSpar 30 mg twice a day  Carbamazepine  mg twice a day  Coreg 3.125 twice a day  Pepcid 20 twice daily  Neurontin 400 mg 3 times a day  Levothyroxine 125 mcg daily  Linzess 145 mg daily  Zosyn 3.375 IV every 8 hours  Effexor  mg daily      1. Continue IV fluids  2. Cardiology is surgical and GI consult  3. Discussed with the patient daughter/concern about pain medication at this time   4.  We will continue Dilaudid 1 mg every 4 hours as needed  5.  N.p.o.   6. Dr. Sheron Yañez has recommended laparoscopic cholecystectomy which is scheduled for this afternoon        Signed By: Adina Bonner MD     March 31, 2022

## 2022-03-31 NOTE — PROGRESS NOTES
Pt returned from PACU, states her neck is hurting and it feels like a knot. Contacted MD and received order for one time dose of Flexeril 5mg.  Pts daughter is also requesting that she not receive Linzess in the morning until they can establish a regular bowel program.

## 2022-03-31 NOTE — PROGRESS NOTES
Progress Note    Patient: Alek Lagos MRN: [id number]  SSN: [id number]    YOB: 1960  Age: 59 y.o. Sex: female      Admit Date: 3/28/2022    LOS: 1 day     Subjective:     Patient was seen and examined. Patient is followed for chest pain. Patient has a past medical history of hypertension, neurofibromatosis, arthritis, depression, epilepsy, and thyroid disease. Lying in the bed, complaining of headache but offers no other complaints. Accompanied by family at the bedside. Patient is scheduled for a laparoscopic cholecystectomy later today. Telemetry Review: No acute events noted in the past 24 hours. Remains in normal sinus rhythm; heart rate 80s, no ectopy. Review of Symptoms:   Review of Systems   Constitutional: Negative. Cardiovascular: Negative for chest pain, dyspnea on exertion, irregular heartbeat and palpitations. Respiratory: Negative for cough, shortness of breath and wheezing. Gastrointestinal: Negative for abdominal pain, constipation, nausea and vomiting. Neurological: Negative for light-headedness. All other systems reviewed and are negative. Objective:     Vitals:    03/31/22 0431 03/31/22 0653 03/31/22 0834 03/31/22 1148   BP: 133/71 122/61 128/69 (!) 141/73   Pulse: 85 82 81 72   Resp: 18 18 18 18   Temp: 98.2 °F (36.8 °C) 98 °F (36.7 °C) 97.9 °F (36.6 °C) 98.2 °F (36.8 °C)   SpO2: 98% 99% 99% 99%   Weight:       Height:            Intake and Output:  Current Shift: No intake/output data recorded. Last three shifts: 03/29 1901 - 03/31 0700  In: 400 [P.O.:400]  Out: 802 [Urine:800]    Physical Exam:   . Lelia Bolanos Physical Exam  Nursing note reviewed. Constitutional:       General: She is in acute distress. Cardiovascular:      Rate and Rhythm: Normal rate and regular rhythm. Pulmonary:      Effort: Pulmonary effort is normal.      Breath sounds: Normal breath sounds. Abdominal:      Tenderness: There is abdominal tenderness.    Neurological:      General: No focal deficit present. Mental Status: She is alert and oriented to person, place, and time. Mental status is at baseline. Psychiatric:         Mood and Affect: Mood normal.         Behavior: Behavior normal.           Lab/Data Review: All lab results for the last 24 hours reviewed.        Current Facility-Administered Medications:     acetaminophen (TYLENOL) tablet 650 mg, 650 mg, Oral, Q6H PRN, Sabiha Hudson MD, 650 mg at 03/31/22 1158    HYDROmorphone (DILAUDID) injection 1 mg, 1 mg, IntraVENous, Q4H PRN, Sabiha Hudson MD, 1 mg at 03/31/22 0846    gabapentin (NEURONTIN) capsule 400 mg, 400 mg, Oral, TID, Sabiha Hudson MD, 400 mg at 03/31/22 8382    0.9% sodium chloride infusion, 75 mL/hr, IntraVENous, CONTINUOUS, Sabiha Hudson MD, Last Rate: 75 mL/hr at 03/30/22 0421, 75 mL/hr at 03/30/22 0421    atorvastatin (LIPITOR) tablet 40 mg, 40 mg, Oral, DAILY, Eladio Goldberg, MD, 40 mg at 03/31/22 4221    busPIRone (BUSPAR) tablet 30 mg, 30 mg, Oral, BID, Sabiha Husdon MD, 30 mg at 03/31/22 0844    carBAMazepine ER (CARBATROL ER) capsule 300 mg, 300 mg, Oral, BID, Sabiha Hudson MD, 300 mg at 03/31/22 5357    carvediloL (COREG) tablet 3.125 mg, 3.125 mg, Oral, BID WITH MEALS, Eladio Goldberg, MD, 3.125 mg at 03/31/22 2019    famotidine (PEPCID) tablet 20 mg, 20 mg, Oral, BID, Sabiha Hudson MD, 20 mg at 03/31/22 6486    levothyroxine (SYNTHROID) tablet 125 mcg, 125 mcg, Oral, ACB, Sabiha Hudson MD, 125 mcg at 03/31/22 8242    linaCLOtide (LINZESS) capsule 145 mcg, 145 mcg, Oral, ACB, Sabiha Hudson MD    venlafaxine-SR [facility] capsule 150 mg, 150 mg, Oral, DAILY, Eladio Goldberg, MD, 150 mg at 03/31/22 0294    lactated Ringers infusion, 100 mL/hr, IntraVENous, CONTINUOUS, Vidal Schmitz MD    piperacillin-tazobactam (ZOSYN) 3.375 g in 0.9% sodium chloride (MBP/ADV) 100 mL MBP, 3.375 g, IntraVENous, Q8H, Sabiha Hudson MD, Last Rate: 25 mL/hr at 03/31/22 1240, 3.375 g at 03/31/22 1240      Assessment:     Active Problems:    Chest pain (3/28/2022)        Plan:     Case discussed with Collaborating physician Dr. Corine Pastrana and our recommendations are as follows:       1. Chest pain:  - no active chest pain this am on exam.  - continue Dilaudid for pain  - HS troponin negative x 2  - CT abdomen showed gallbladder wall and pericystic changes. MRCP pending further evaluation  - HIDA scan concerning for cystic duct compromise. - echo: EF greater than 65%, no wall motion or functional abnormalities. - Patient is considered low to moderate risk for surgery from a cardiology standpoint. No further cardiac testing is recommended at this time.      2. Hypertension:   - blood pressure acceptable  - continue current medications     3. Hyperlipidemia:   - continue statin therapy    Thank you for involving us in the care of this patient. Please do not hesitate to call if additional questions arise. If after hours please call [phone]. Signed By: John Palafox NP     March 31, 2022            [address] [place] [address] Cardiology  [address]    [address], [address] [facility]  [phone]

## 2022-03-31 NOTE — PROGRESS NOTES
Dr. Jaspal Man notes reflect the fact that family requested for second opinion. Will sign off.   Thank you

## 2022-03-31 NOTE — CONSULTS
Consult Date: 3/31/2022    Consults    Subjective      The patient is a 64-year-old female well-known to me from her hospitalization fall 2021 at [facility]. At that time she presented with severe abdominal pain outside lab with distended colon with stool and a distended gallbladder on CT. She became septic and was diagnosed with aspiration pneumonia and required ICU admission with pressor support for septic shock. She was ultimately discharged on October 1, 2021. I saw her in the office 1 month later at that time she was having no abdominal pain was tolerating a diet. After extensive discussion with the patient and her daughter at that time decision was made to defer on surgery as she is not having any symptoms from her gallstones. She presented to the hospital on March 28, 2020 with complaints of chest pain. At the time of presentation she stated the pain had woke her up at 5:30 in the morning and she came to the emergency department where during the course of her work-up she did have an ultrasound which revealed cholelithiasis and dilated common bile duct and a CT scan abdomen pelvis which revealed gallbladder wall thickening and pericholecystic fluid. She had a HIDA scan that was positive for nonfilling of the gallbladder consistent with possible cholecystitis. She was seen by cardiology because her initial presenting symptom was chest pain and she had troponin negative x2 EKG normal sinus rhythm. She did have an echo which showed normal LV function with a EF of greater than 65%. Her LFTs have all been more or less normal except for slight elevation of her alkaline phosphatase. Her past medical history significant history of neurofibromatosis type I, hypertension, hyperlipidemia, hypothyroidism, chronic pain. This a.m. she states she is not currently having much pain in her abdomen however she did just receive Dilaudid.   She is complaining mostly of a sinus headache.   Past Medical History:   Diagnosis Date    Arthritis     Depression     Epilepsy ([address]     Hypertension     Ill-defined condition     neurofibromatosis    Thyroid disease       Past Surgical History:   Procedure Laterality Date    HX GYN      HX ORTHOPAEDIC      NEUROLOGICAL PROCEDURE UNLISTED       Family History   Problem Relation Age of Onset    Cancer Mother         glioblastoma    Lung Disease Father     Cancer Father         mesothelioma, testicular cancer      Social History     Tobacco Use    Smoking status: Never Smoker    Smokeless tobacco: Never Used   Substance Use Topics    Alcohol use: No       Current Facility-Administered Medications   Medication Dose Route Frequency Provider Last Rate Last Admin    HYDROmorphone (DILAUDID) injection 1 mg  1 mg IntraVENous Q4H PRN Sabiha Hudson MD   1 mg at 03/31/22 3152    gabapentin (NEURONTIN) capsule 400 mg  400 mg Oral TID Lili Hudson MD   400 mg at 03/30/22 2216    0.9% sodium chloride infusion  75 mL/hr IntraVENous CONTINUOUS Sabiha Hudson MD 75 mL/hr at 03/30/22 0421 75 mL/hr at 03/30/22 0421    atorvastatin (LIPITOR) tablet 40 mg  40 mg Oral DAILY Sabiha Hudson MD   40 mg at 03/30/22 1003    busPIRone (BUSPAR) tablet 30 mg  30 mg Oral BID Sabiha Hudson MD   30 mg at 03/30/22 2037    carBAMazepine ER (CARBATROL ER) capsule 300 mg  300 mg Oral BID Sabiha Hudson MD   300 mg at 03/30/22 2100    carvediloL (COREG) tablet 3.125 mg  3.125 mg Oral BID WITH MEALS Sabiha Hudson MD   3.125 mg at 03/30/22 1700    famotidine (PEPCID) tablet 20 mg  20 mg Oral BID Sabiha Hudson MD   20 mg at 03/30/22 2037    levothyroxine (SYNTHROID) tablet 125 mcg  125 mcg Oral ACB Sabiha Hudson MD   125 mcg at 03/30/22 1014    linaCLOtide (LINZESS) capsule 145 mcg  145 mcg Oral ACB Sabiha Hudson MD        venlafaxine-SR [facility] capsule 150 mg  150 mg Oral DAILY Sabiha Hudson MD   150 mg at 03/30/22 1003    lactated Ringers infusion  100 mL/hr IntraVENous CONTINUOUS Zion Schmitz MD        piperacillin-tazobactam (ZOSYN) 3.375 g in 0.9% sodium chloride (MBP/ADV) 100 mL MBP  3.375 g IntraVENous Q8H Sabiha Hudson MD 25 mL/hr at 03/31/22 0427 3.375 g at 03/31/22 0333        Review of Systems   All other systems reviewed and are negative. Objective     Vital signs for last 24 hours:  Visit Vitals  /61 (BP 1 Location: Left upper arm, BP Patient Position: At rest;Semi fowlers)   Pulse 82   Temp 98 °F (36.7 °C)   Resp 18   Ht 5' 1\" (1.549 m)   Wt 136 lb (61.7 kg)   SpO2 99%   Breastfeeding No   BMI 25.70 kg/m²       Intake/Output this shift:  Current Shift: No intake/output data recorded.   Last 3 Shifts: 03/29 1901 - 03/31 0700  In: 400 [P.O.:400]  Out: 802 [Urine:800]    Data Review:   Recent Results (from the past 24 hour(s))   ECHO ADULT COMPLETE    Collection Time: 03/30/22  3:30 PM   Result Value Ref Range    AV Peak Velocity 1.2 m/s    AV Peak Gradient 5 mmHg    Aortic Root 3.0 cm    IVSd 1.3 (A) 0.6 - 0.9 cm    LVIDd 3.8 (A) 3.9 - 5.3 cm    LVIDs 2.2 cm    LVOT Peak Velocity 0.9 m/s    LVOT Peak Gradient 3 mmHg    LVPWd 1.2 (A) 0.6 - 0.9 cm    LV E' Lateral Velocity 10 cm/s    LV E' Septal Velocity 11 cm/s    LA Diameter 3.2 cm    MV E Wave Deceleration Time 338.0 ms    MV A Velocity 1.27 m/s    MV E Velocity 0.88 m/s    PV Max Velocity 1.1 m/s    PV Peak Gradient 5 mmHg    Est. RA Pressure 3 mmHg    TR Max Velocity 2.17 m/s    TR Peak Gradient 19 mmHg    Fractional Shortening 2D 42 28 - 44 %    LVIDd Index 2.38 cm/m2    LVIDs Index 1.38 cm/m2    LV RWT Ratio 0.63     LV Mass 2D 163.0 (A) 67 - 162 g    LV Mass 2D Index 101.9 (A) 43 - 95 g/m2    MV E/A 0.69     E/E' Ratio (Averaged) 8.40     E/E' Lateral 8.80     E/E' Septal 8.00     LA Size Index 2.00 cm/m2    LA/AO Root Ratio 1.07     Ao Root Index 1.88 cm/m2    AV Velocity Ratio 0.75     RVSP 22 mmHg    EF BP 74 55 - 100 % Physical Exam  Constitutional:       General: She is not in acute distress. Appearance: Normal appearance. She is not ill-appearing. HENT:      Head: Normocephalic and atraumatic. Cardiovascular:      Rate and Rhythm: Normal rate and regular rhythm. Heart sounds: Normal heart sounds. Pulmonary:      Breath sounds: Normal breath sounds. Abdominal:      General: There is no distension. Palpations: Abdomen is soft. There is no mass. Tenderness: There is abdominal tenderness (Very mild tenderness to palpation right upper quadrant). Hernia: No hernia is present. Musculoskeletal:         General: Normal range of motion. Cervical back: Normal range of motion and neck supple. Skin:     General: Skin is warm and dry. Neurological:      Mental Status: She is alert and oriented to person, place, and time. Comments: Left facial weakness secondary to surgery in the past (patient had left acoustic neuroma resected)   Psychiatric:         Mood and Affect: Mood normal.         Thought Content: Thought content normal.       Assessment and plan:  57-year-old female with past medical history seen for neurofibromatosis type I, hypertension, hyperlipidemia, chronic pain who presented with abdominal pain found on work-up to have early acute cholecystitis. Patient has had a full work-up with ultrasound, CT, HIDA scan. There is an MRCP ordered, patient does not  have any elevation in her bilirubin. I had extensive discussion with the patient who is well-known to me and recommended a laparoscopic cholecystectomy. I reviewed the procedure with her including the risk and benefits. I reviewed the risk of infection, bleeding, wound complications, possible conversion to an open procedure. She wishes to proceed and surgery scheduled for later this afternoon.

## 2022-03-31 NOTE — MED STUDENT NOTES
HISTORY & PHYSICAL      Patient: Sotero Geller MRN: [id number]  SSN: [id number]    YOB: 1960  Age: 59 y.o. Sex: female      Primary Care Provider: Rosalio, MD Henry  Source of Information:       Subjective     CC:   Chief Complaint   Patient presents with    Chest Pain       HPI: Mike hdz(n) 59 y. o. female with PMH significant for arthritis, depression, epilepsy, hypertension, neurofibromatosis, and thyroid disease who presents with chest pain.       Upon presentation, patient stated her symptoms began that morning. She describes her pain being in the center of her chest and radiates to the back and shoulder. Patient complained of her pain which awakened her at 5:30 this morning, she states the pain is 9/10, constant, without exacerbating symptoms. The patient is currently on HYDROmorphone (DILAUDID) injection 1 mg. She states the Dilaudid is greatly helping with her pain management.  No fever, cough, or other associated symptoms.  Denies leg swelling unilaterally, history of PE/DVT, or pleuritic chest pain.     Originally, a ultrasound was conducted (3/28/2022 @22:05) revealing cholelithiasis and the common bile duct is dilated which may be chronic. However, the U/S gallbladder was limited by bowel gas.     The recent, CT ABD Pelvis w/o contrast (3/28/2022 @22:55) revealed Gallbladder wall and pericystic changes. Mild prominence of the common bile duct. Thus, cholecystitis and/or cholangitis should be considered clinically. Patient is scheduled for MRCP today.      Surgery was consulted in the ER seen by the surgeon ordered MRI of the abdomen and the HIDA scan      3/30/2022  Pt continues to report pain being in the center of her chest and radiates to the back and shoulder. She states the pain is 9/10, constant, without exacerbating symptoms. The patient is currently on HYDROmorphone (DILAUDID) injection 1 mg.  She states the Dilaudid is greatly helping with her pain management.  No fever, cough, or other associated symptoms.  Denies leg swelling unilaterally, history of PE/DVT, or pleuritic chest pain. Patient daughter and sister indicate Dilaudid 1 mg every 4 hours as needed for her chronic pain/discussed the side effects she understand    Cardiology - CXR (3/28/2022) without congestion. The heart, mediastinum and pulmonary vasculature are normal. The lungs are well expanded and clear. The bony thorax is intact. When compared to the prior exam, there is no significant change. HS troponin negative x 2. NSR: HR 77, no ischemia. General Surgery: HIDA did not visualize GB but has free spillage in to duodenum. Yesterday, patient's dilaudid was changed from 0.5mg q 4hrs instead of 1mg. However, the patient reports increased pain. Patient's dilaudid was returned to 1mg q 4hrs this morning. HIDA 3/29/22: There is a normal distribution of activity through the liver. Common bile duct activity noted with free spill of activity into small bowel loops. With images carried out to 2 hours, no gallbladder activity is evident. IMPRESSION Findings concerning for cystic duct compromise    Significant labs:   WBC 9.6  Neutrophils 84  aPTT 34.4  Creatinine 0.46      3/31/2022  Pt continues to report pain being in the center of her chest and radiates to the back and shoulder. She states the pain is 9/10, constant, without exacerbating symptoms. The patient is currently on HYDROmorphone (DILAUDID) injection 1 mg. She states the Dilaudid is greatly helping with her pain management.      Today, the patient complains of a sinus headache. However, she denies fever, cough, or other associated symptoms.  Denies leg swelling unilaterally, history of PE/DVT, or pleuritic chest pain.     Patient daughter and sister indicate Dilaudid 1 mg every 4 hours as needed for her chronic pain/discussed the side effects she understand    Colon and Rectal Surgery: Patient previously requested Dr. January Hickey to be involved with her case; Dr. Harmeet Álvarez has seen the patient and has recommended a laparoscopic cholecystectomy which is scheduled for this afternoon. General Surgery: HIDA did not visualize GB but has free spillage in to duodenum. HIDA 3/29/22: There is a normal distribution of activity through the liver. Common bile duct activity noted with free spill of activity into small bowel loops. With images carried out to 2 hours, no gallbladder activity is evident. IMPRESSION Findings concerning for cystic duct compromise    Echo (3/30/2022) -  Left ventricle size is normal. Increased wall thickness. Normal wall motion. Normal left ventricular systolic function with a visually estimated EF of greater than 65%. Normal diastolic function    Cardiology - CXR (3/28/2022) without congestion. The heart, mediastinum and pulmonary vasculature are normal. The lungs are well expanded and clear. The bony thorax is intact. When compared to the prior exam, there is no significant change. HS troponin negative x 2. NSR: HR 77, no ischemia. Significant labs: (Labs from 3/30/2022  WBC 9.6  Neutrophils 84  aPTT 34.4  Creatinine 0.46        Past Medical History:   Diagnosis Date    Arthritis     Depression     Epile[address]     Hypertension     Ill-defined condition     neurofibromatosis    Thyroid disease         Past Surgical History:   Procedure Laterality Date    HX GYN      HX ORTHOPAEDIC      NEUROLOGICAL PROCEDURE UNLISTED         Prior to Admission medications    Medication Sig Start Date End Date Taking? Authorizing Provider   venlafaxine [facility]) 75 mg tablet Take 75 mg by mouth daily. Yes Provider, Historical   ergocalciferol (DrisdoL) 1,250 mcg (50,000 unit) capsule Take 50,000 Units by mouth every thirty (30) days. Take on the 15th day of the month   Yes Provider, Historical   linaCLOtide (Linzess) 145 mcg cap capsule Take 145 mcg by mouth Daily (before breakfast).    Yes Other, MD Henry   senna-docusate (PERICOLACE) 8.6-50 mg per tablet Take 2 Tablets by mouth daily. Yes Rosalio, MD Henry   atorvastatin (LIPITOR) 40 mg tablet Take 40 mg by mouth daily. Yes Provider, Historical   Xtampza ER 36 mg capsule Take 1 Capsule by mouth two (2) times a day. 9/19/21  Yes Provider, Historical   gabapentin (NEURONTIN) 800 mg tablet Take 400 mg by mouth three (3) times daily. 9/9/21  Yes Provider, Historical   famotidine (Pepcid) 20 mg tablet Take 20 mg by mouth two (2) times a day. Yes Henry Santamaria MD   celecoxib (CELEBREX) 200 mg capsule Take 100 mg by mouth two (2) times a day. Yes Rosalio, MD Henry   aspirin 81 mg chewable tablet Take 81 mg by mouth nightly. Yes Rosalio, MD Henry   carBAMazepine ER (CARBATROL ER) 300 mg capsule Take 300 mg by mouth two (2) times a day. Yes Provider, Historical   carvedilol (COREG) 6.25 mg tablet Take  by mouth two (2) times daily (with meals). Yes Provider, Historical   levothyroxine (SYNTHROID) 125 mcg tablet Take  by mouth Daily (before breakfast). Yes Provider, Historical   busPIRone (BUSPAR) 30 mg tablet Take 30 mg by mouth two (2) times a day. Yes Provider, Historical   venlafaxine-SR (EFFEXOR XR) 150 mg capsule Take 150 mg by mouth daily. Yes Provider, Historical   naloxone 8 mg/actuation spry 4 mg by Nasal route as needed. Henry Santamaria MD   oxyCODONE-acetaminophen (Percocet) 7.5-325 mg per tablet Take 1 Tablet by mouth every eight (8) hours as needed for Pain. Henry Santamaria MD   loratadine (Claritin) 10 mg tablet Take 10 mg by mouth daily as needed for Allergies. Henry Santamaria MD   diclofenac (FLECTOR) 1.3 % pt12 1 Patch by TransDERmal route every twelve (12) hours every twelve (12) hours. Henry Santamaria MD   diclofenac (Voltaren) 1 % gel Apply 4 g to affected area four (4) times daily.     Henry Santamaria MD       Allergies   Allergen Reactions    Codeine Nausea and Vomiting    Methadone Hives    Morphine Other (comments)     psychosis        Family History   Problem Relation Age of Onset  Cancer Mother         glioblastoma    Lung Disease Father     Cancer Father         mesothelioma, testicular cancer        Social History     Socioeconomic History    Marital status: UNKNOWN    Highest education level: Associate degree: occupational, technical, or vocational program   Tobacco Use    Smoking status: Never Smoker    Smokeless tobacco: Never Used   Vaping Use    Vaping Use: Former    Substances: CBD (only for 2 months)   Substance and Sexual Activity    Alcohol use: No    Drug use: Never        ROS  Constitutional:  no fever,  no chills,  no sweats, No weakness, No fatigue, No decreased activity. Eye: No recent visual problem, No icterus, No discharge, No double vision. Ear/Nose/Mouth/Throat: No decreased hearing, No ear pain, No nasal congestion, No sore throat. Respiratory: No shortness of breath, No cough, No sputum production, No hemoptysis, No wheezing, No cyanosis. Cardiovascular:  chest pain, No palpitations, No bradycardia, No tachycardia, No peripheral edema, No syncope. Gastrointestinal: No nausea,  No vomiting, No diarrhea, No constipation, No heartburn,   abdominal pain. Genitourinary: No dysuria, No hematuria, No change in urine stream, No urethral discharge, No lesions. Hematology/Lymphatics: No bruising tendency, No bleeding tendency, No petechiae, No swollen lymph glands. Endocrine: No excessive thirst, No polyuria, No cold intolerance, No heat intolerance, No excessive hunger. Immunologic: Not immunocompromised, No recurrent fevers, No recurrent infections. Musculoskeletal: No back pain, No neck pain, No joint pain, No muscle pain, No claudication, No decreased range of motion, No trauma. Integumentary: No rash, No pruritus, No abrasions. Neurologic: Alert and oriented X4, No abnormal balance, No headache, No confusion, No numbness, No tingling. Psychiatric: No anxiety, No depression, No bijan.       Objective     Visit Vitals  /69 (BP 1 Location: Left upper arm, BP Patient Position: At rest)   Pulse 81   Temp 97.9 °F (36.6 °C)   Resp 18   Ht 5' 1\" (1.549 m)   Wt 61.7 kg (136 lb)   SpO2 99%   Breastfeeding No   BMI 25.70 kg/m²      O2 Device: None (Room air)    Physical Exam:   Physical Exam  General: well appearing, no acute distress  Head: Normal  Face: Nornal  HEENT: atraumatic, PERRLA, moist mucosa, normal pharynx, normal tonsils and adenoids, normal tongue, no fluid in sinuses  Neck: Trachea midline, no carotid bruit, no masses  Chest: Normal.  Respiratory: Normal chest wall expansion, CTA B, no r/r/w, no rubs  Cardiovascular: RRR, no m/r/g, Normal S1 and S2  Abdomen: Soft, non tender, non-distended, normal bowel sounds in all quadrants, no hepatosplenomegaly, no tympany. Genitourinary: No inguinal hernia, normal external gentalia,  no renal angle tenderness  Rectal: deferred  Musculoskeletal: normal ROM in upper and lower extremities, No joint swelling. Integumentary: Warm, dry, and pink, with no rash, purpura, or petechia  Heme/Lymph: No lymphadenopathy, no bruises  Neurological:Cranial Nerves II-XII grossly intact, no gross motor or sensory deficit  Psychiatric: Cooperative with normal mood, affect, and cognition      Intake & Output:  Current Shift: No intake/output data recorded. Last three shifts: 03/29 1901 - 03/31 0700  In: 400 [P.O.:400]  Out: 802 [Urine:800]    Lab/Data Review: All lab results for the last 24 hours reviewed.        24 Hour Results:    Recent Results (from the past 24 hour(s))   ECHO ADULT COMPLETE    Collection Time: 03/30/22  3:30 PM   Result Value Ref Range    AV Peak Velocity 1.2 m/s    AV Peak Gradient 5 mmHg    Aortic Root 3.0 cm    IVSd 1.3 (A) 0.6 - 0.9 cm    LVIDd 3.8 (A) 3.9 - 5.3 cm    LVIDs 2.2 cm    LVOT Peak Velocity 0.9 m/s    LVOT Peak Gradient 3 mmHg    LVPWd 1.2 (A) 0.6 - 0.9 cm    LV E' Lateral Velocity 10 cm/s    LV E' Septal Velocity 11 cm/s    LA Diameter 3.2 cm    MV E Wave Deceleration Time 338.0 ms    MV A Velocity 1.27 m/s    MV E Velocity 0.88 m/s    PV Max Velocity 1.1 m/s    PV Peak Gradient 5 mmHg    Est. RA Pressure 3 mmHg    TR Max Velocity 2.17 m/s    TR Peak Gradient 19 mmHg    Fractional Shortening 2D 42 28 - 44 %    LVIDd Index 2.38 cm/m2    LVIDs Index 1.38 cm/m2    LV RWT Ratio 0.63     LV Mass 2D 163.0 (A) 67 - 162 g    LV Mass 2D Index 101.9 (A) 43 - 95 g/m2    MV E/A 0.69     E/E' Ratio (Averaged) 8.40     E/E' Lateral 8.80     E/E' Septal 8.00     LA Size Index 2.00 cm/m2    LA/AO Root Ratio 1.07     Ao Root Index 1.88 cm/m2    AV Velocity Ratio 0.75     RVSP 22 mmHg    EF BP 74 55 - 100 %         Imaging:    NM HEPATOBILIARY DUCT SCAN   Final Result   Findings concerning for cystic duct compromise. CT ABD PELV WO CONT   Final Result   Gallbladder wall and pericystic changes. Mild prominence of the   common bile duct. This represents change from the prior study. Ultrasound   earlier this evening shows cholelithiasis. Cholecystitis and/or cholangitis   should be considered clinically. Consider radionuclide hepatobiliary scan   depending on clinical setting                US GALLBLADDER   Final Result   Limited by bowel gas. 1. Cholelithiasis. No sonographic evidence of cholecystitis. However the common   bile duct is dilated which may be chronic. CTA CHEST W OR W WO CONT   Final Result   1. No pulmonary embolus. 2. Interval increase of peribronchial and perivascular soft tissue thickening   possibly lymphoid or neurofibromatosis in origin. Unchanged left lung   bronchovascular nodule, posterior mediastinal and retroperitoneal abnormal soft   tissue. 3. Bilateral hazy airspace edema or pneumonia. 4. Haziness of the gallbladder. Correlate clinically for cholecystitis. XR CHEST PORT   Final Result   No acute process or active disease.             Assessment     Chest pain rule out MI  Gallstone  Right upper quadrant pain  History of neurofibromatosis  Hypertension  Hypothyroidism  Seizure disorder  Hyperlipidemia  Anxiety disorder  Chronic pain syndrome    CT abdomen - showed gallbladder wall and pericystic changes. HIDA - Common bile duct activity noted with free spill of activity into small bowel loops. Findings concerning for cystic duct compromise  Echo (3/30/2022) -  Left ventricle size is normal. Increased wall thickness. Normal wall motion. Normal left ventricular systolic function with a visually estimated EF of greater than 65%. Normal diastolic function    Plan     Medications  Lipitor 40 mg daily  BuSpar 30 mg twice a day  Carbamazepine  mg twice a day  Coreg 3.125 twice a day  Pepcid 20 twice daily  Neurontin 400 mg 3 times a day  Levothyroxine 125 mcg daily  Linzess 145 mg daily  Zosyn 3.375 IV every 8 hours  Effexor  mg daily      1. Continue IV fluids  2. Cardiology is surgical and GI consult  3. Discussed with the patient daughter/concern about pain medication at this time   4. We will continue Dilaudid 1 mg every 4 hours as needed  5. Clear liquid diet  6.  Dr. Harinder Alcantar has recommended laparoscopic cholecystectomy which is scheduled for this afternoon        Signed By: Ion Fuentes     March 31, 2022

## 2022-03-31 NOTE — PROGRESS NOTES
Physician Progress Note      Isabel CHANEL #:                  [id number]  :                       1960  ADMIT DATE:       3/28/2022 6:17 PM  [address] [place] DATE:  RESPONDING  PROVIDER #:        Corine PRABHAKAR MD          QUERY TEXT:    Patient admitted with CHEST PAIN, noted to have Bilateral hazy airspace edema or pneumonia on CT scan. If possible, please document in progress notes and discharge summary if you are evaluating and/or treating any of the following: The medical record reflects the following:  Risk Factors: Gallbladder calculus, Seizures, Neurofibromatosis, Anxiety, HLD  Clinical Indicators: CT scan: \" Bilateral hazy airspace edema or pneumonia. \"  Treatment: Cardiology & GI consulted, CT scan, lab monitoring. Thank you,  MARGARITA Deal, RN, CD[employer] Specialist  [phone] or [email or web address]  Options provided:  -- Pneumonia  -- Pneumonia ruled out  -- Other - I will add my own diagnosis  -- Disagree - Not applicable / Not valid  -- Disagree - Clinically unable to determine / Unknown  -- Refer to Clinical Documentation Reviewer    PROVIDER RESPONSE TEXT:    Pneumonia ruled out on this patient.     Query created by: Julius Smith on 3/30/2022 1:05 PM      Electronically signed by:  Corine Morejon MD 3/31/2022 8:34 AM

## 2022-03-31 NOTE — PROGRESS NOTES
Progress Note    Patient: Isaiah Grewal MRN: [id number]  SSN: [id number]    YOB: 1960  Age: 59 y.o. Sex: female      Admit Date: 3/28/2022    LOS: 1 day     Subjective:   GI in consultation for abdominal pain.     3/31: Patient seen in room resting comfortably. Complaint of headache this morning. She gets Dilaudid with good relief of abdominal pain. Surgery is following for cholelithiasis and planning for surgery this afternoon. LFTs and total bilirubin are normal. Elevated alkphos - 147. HIDA 3/29/22: Findings concerning for cystic duct compromise. US Gallbladder 3/28/22: Cholelithiasis. No sonographic evidence of cholecystitis. However the common bile duct is dilated which may be chronic. CT abdomen 3/28/22:  Gallbladder wall and pericystic changes. Mild prominence of the  common bile duct. History of Present Illness: Amy Albert is a 59 y. o. female who is seen in consultation for abdominal pain. She reports the symptoms started yesterday morning. She states the pain was in the center of her chest and radiates to her back and shoulder. She states the pain woke her up around 5:30 am yesterday morning. . She denies fever or any other associated symptoms. She does have a past medical history significant for arthritis, depression, epilepsy, hypertension, thyroid disease, neurofibromatosis, and history of PE/DVT. She is getting dilaudid for pain and states it is improved with the pain medication. CT abdomen shows gallbladder wall and pericystic changes. Mild prominence of the common bile duct. Abdominal ultrasound shows cholelithiasis and CBD dilation.  Troponin on admission is 5. Total bilirubin 0.3, ALT 30, AST 20, Alk Phosphatase 127   Lipase 56. EKG shows Sinus rhythm. She is scheduled for MRCP results  pending at this time. HIDA scan findings concerning for cystic duct compromise, Normal distribution of activity through the liver.  Common bile duct activity noted with free spill of activity into small bowel loops, No gallbladder activity is evident. Patient is followed by surgery. Will wait for MRCP results. Continue PPI. Possible EGD as an out patient.     Objective:     Vitals:    03/31/22 0431 03/31/22 0653 03/31/22 0834 03/31/22 1148   BP: 133/71 122/61 128/69 (!) 141/73   Pulse: 85 82 81 72   Resp: 18 18 18 18   Temp: 98.2 °F (36.8 °C) 98 °F (36.7 °C) 97.9 °F (36.6 °C) 98.2 °F (36.8 °C)   SpO2: 98% 99% 99% 99%   Weight:       Height:            Intake and Output:  Current Shift: No intake/output data recorded. Last three shifts: 03/29 1901 - 03/31 0700  In: 400 [P.O.:400]  Out: 802 [Urine:800]    Physical Exam:   Skin:  Extremities and face reveal no rashes. No coleman erythema. HEENT: Sclerae anicteric. Extra-occular muscles are intact. No abnormal pigmentation of the lips. The neck is supple. Cardiovascular: Regular rate and rhythm. Respiratory:  Comfortable breathing with no accessory muscle use. GI:  Abdomen nondistended, soft, and nontender. No enlargement of the liver or spleen. No masses palpable. Rectal:  Deferred  Musculoskeletal: Extremities have good range of motion. Neurological:  Gross memory appears intact. Patient is alert and oriented. Psychiatric:  Mood appears appropriate with judgement intact.   Lymphatic:  No visible adenopathy      Lab/Data Review:  Recent Results (from the past 24 hour(s))   ECHO ADULT COMPLETE    Collection Time: 03/30/22  3:30 PM   Result Value Ref Range    AV Peak Velocity 1.2 m/s    AV Peak Gradient 5 mmHg    Aortic Root 3.0 cm    IVSd 1.3 (A) 0.6 - 0.9 cm    LVIDd 3.8 (A) 3.9 - 5.3 cm    LVIDs 2.2 cm    LVOT Peak Velocity 0.9 m/s    LVOT Peak Gradient 3 mmHg    LVPWd 1.2 (A) 0.6 - 0.9 cm    LV E' Lateral Velocity 10 cm/s    LV E' Septal Velocity 11 cm/s    LA Diameter 3.2 cm    MV E Wave Deceleration Time 338.0 ms    MV A Velocity 1.27 m/s    MV E Velocity 0.88 m/s    PV Max Velocity 1.1 m/s    PV Peak Gradient 5 mmHg    Est. RA Pressure 3 mmHg    TR Max Velocity 2.17 m/s    TR Peak Gradient 19 mmHg    Fractional Shortening 2D 42 28 - 44 %    LVIDd Index 2.38 cm/m2    LVIDs Index 1.38 cm/m2    LV RWT Ratio 0.63     LV Mass 2D 163.0 (A) 67 - 162 g    LV Mass 2D Index 101.9 (A) 43 - 95 g/m2    MV E/A 0.69     E/E' Ratio (Averaged) 8.40     E/E' Lateral 8.80     E/E' Septal 8.00     LA Size Index 2.00 cm/m2    LA/AO Root Ratio 1.07     Ao Root Index 1.88 cm/m2    AV Velocity Ratio 0.75     RVSP 22 mmHg    EF BP 74 55 - 100 %              NM HEPATOBILIARY DUCT SCAN   Final Result   Findings concerning for cystic duct compromise. CT ABD PELV WO CONT   Final Result   Gallbladder wall and pericystic changes. Mild prominence of the   common bile duct. This represents change from the prior study. Ultrasound   earlier this evening shows cholelithiasis. Cholecystitis and/or cholangitis   should be considered clinically. Consider radionuclide hepatobiliary scan   depending on clinical setting                US GALLBLADDER   Final Result   Limited by bowel gas. 1. Cholelithiasis. No sonographic evidence of cholecystitis. However the common   bile duct is dilated which may be chronic. CTA CHEST W OR W WO CONT   Final Result   1. No pulmonary embolus. 2. Interval increase of peribronchial and perivascular soft tissue thickening   possibly lymphoid or neurofibromatosis in origin. Unchanged left lung   bronchovascular nodule, posterior mediastinal and retroperitoneal abnormal soft   tissue. 3. Bilateral hazy airspace edema or pneumonia. 4. Haziness of the gallbladder. Correlate clinically for cholecystitis. XR CHEST PORT   Final Result   No acute process or active disease. Assessment:     Active Problems:    Chest pain (3/28/2022)        Plan:   1.  Abdominal Pain/Gallstones     Continue Pain management     Antiemetics as needed     NPO     HIDA 3/29/22: Findings concerning for cystic duct compromise.     Continue IV Hydration     Surgery input appreciated. Planning for a laparoscopic cholecystectomy this afternoon.      Continue PPI      Possible EGD as an out patient      Patient discussed with Dr Tani Trevizo and agrees to above impression and plan. Thank you for allowing me to participate in this patients care    Signed By: Cody Varma.  SHANNEN Ley     March 31, 2022

## 2022-03-31 NOTE — ANESTHESIA POSTPROCEDURE EVALUATION
Procedure(s):  CHOLECYSTECTOMY LAPAROSCOPIC.     general    Anesthesia Post Evaluation      Multimodal analgesia: multimodal analgesia used between 6 hours prior to anesthesia start to PACU discharge  Patient location during evaluation: PACU  Patient participation: complete - patient participated  Level of consciousness: awake  Pain score: 0  Pain management: adequate  Airway patency: patent  Anesthetic complications: no  Cardiovascular status: acceptable  Respiratory status: acceptable  Hydration status: acceptable  Post anesthesia nausea and vomiting:  controlled  Final Post Anesthesia Temperature Assessment:  Normothermia (36.0-37.5 degrees C)      INITIAL Post-op Vital signs:   Vitals Value Taken Time   /85 03/31/22 1900   Temp 36.5 °C (97.7 °F) 03/31/22 1838   Pulse 75 03/31/22 1900   Resp 12 03/31/22 1900   SpO2 100 % 03/31/22 1900

## 2022-03-31 NOTE — ANESTHESIA PREPROCEDURE EVALUATION
Relevant Problems   RESPIRATORY SYSTEM   (+) Aspiration pneumonia (HCC)       Anesthetic History   No history of anesthetic complications            Review of Systems / Medical History  Patient summary reviewed, nursing notes reviewed and pertinent labs reviewed    Pulmonary  Within defined limits                 Neuro/Psych     seizures    Psychiatric history     Cardiovascular    Hypertension          Hyperlipidemia      Comments: Echo:  Left Ventricle: Left ventricle size is normal. Increased wall thickness. Normal wall motion. Normal left ventricular systolic function with a visually estimated EF of greater than 65%. Normal diastolic function.    GI/Hepatic/Renal  Within defined limits              Endo/Other      Hypothyroidism  Arthritis     Other Findings          Past Medical History:   Diagnosis Date    Arthritis     Depression     [facility]ile[facility] ([facility] [address]     Hypertension     Ill-defined condition     neurofibromatosis    Thyroid disease        Past Surgical History:   Procedure Laterality Date    HX GYN      HX ORTHOPAEDIC      NEUROLOGICAL PROCEDURE UNLISTED         Current Outpatient Medications   Medication Instructions    aspirin 81 mg, Oral, EVERY BEDTIME    atorvastatin (LIPITOR) 40 mg, Oral, DAILY    busPIRone (BUSPAR) 30 mg, Oral, 2 TIMES DAILY    carBAMazepine ER (CARBATROL ER) 300 mg, Oral, 2 TIMES DAILY    carvedilol (COREG) 6.25 mg tablet Oral, 2 TIMES DAILY WITH MEALS    celecoxib (CELEBREX) 100 mg, Oral, 2 TIMES DAILY    diclofenac (FLECTOR) 1.3 % pt12 1 Patch, TransDERmal, EVERY 12 HOURS    diclofenac (VOLTAREN) 4 g, Topical, 4 TIMES DAILY    ergocalciferol (DRISDOL) 50,000 Units, Oral, EVERY 30 DAYS, Take on the 15th day of the month     famotidine (PEPCID) 20 mg, Oral, 2 TIMES DAILY    gabapentin (NEURONTIN) 400 mg, Oral, 3 TIMES DAILY    levothyroxine (SYNTHROID) 125 mcg tablet Oral, DAILY BEFORE BREAKFAST    linaCLOtide (LINZESS) 145 mcg, Oral, DAILY BEFORE BREAKFAST    loratadine (CLARITIN) 10 mg, Oral, DAILY AS NEEDED    naloxone 4 mg, Nasal, AS NEEDED    oxyCODONE-acetaminophen (Percocet) 7.5-325 mg per tablet 1 Tablet, Oral, EVERY 8 HOURS AS NEEDED    senna-docusate (PERICOLACE) 8.6-50 mg per tablet 2 Tablets, Oral, DAILY    venlafaxine (EFFEXOR) 75 mg, Oral, DAILY    venlafaxine-SR (EFFEXOR XR) 150 mg, Oral, DAILY    Xtampza ER 36 mg capsule 1 Capsule, Oral, 2 TIMES DAILY       Current Facility-Administered Medications   Medication Dose Route Frequency    acetaminophen (TYLENOL) tablet 650 mg  650 mg Oral Q6H PRN    HYDROmorphone (DILAUDID) injection 1 mg  1 mg IntraVENous Q4H PRN    gabapentin (NEURONTIN) capsule 400 mg  400 mg Oral TID    0.9% sodium chloride infusion  75 mL/hr IntraVENous CONTINUOUS    atorvastatin (LIPITOR) tablet 40 mg  40 mg Oral DAILY    busPIRone (BUSPAR) tablet 30 mg  30 mg Oral BID    carBAMazepine ER (CARBATROL ER) capsule 300 mg  300 mg Oral BID    carvediloL (COREG) tablet 3.125 mg  3.125 mg Oral BID WITH MEALS    famotidine (PEPCID) tablet 20 mg  20 mg Oral BID    levothyroxine (SYNTHROID) tablet 125 mcg  125 mcg Oral ACB    linaCLOtide (LINZESS) capsule 145 mcg  145 mcg Oral ACB    venlafaxine-SR (EFFEXOR-XR) capsule 150 mg  150 mg Oral DAILY    lactated Ringers infusion  100 mL/hr IntraVENous CONTINUOUS    piperacillin-tazobactam (ZOSYN) 3.375 g in 0.9% sodium chloride (MBP/ADV) 100 mL MBP  3.375 g IntraVENous Q8H       Patient Vitals for the past 24 hrs:   Temp Pulse Resp BP SpO2   03/31/22 1445 36.6 °C (97.8 °F) 73 18 132/71 96 %   03/31/22 1148 36.8 °C (98.2 °F) 72 18 (!) 141/73 99 %   03/31/22 0834 36.6 °C (97.9 °F) 81 18 128/69 99 %   03/31/22 0653 36.7 °C (98 °F) 82 18 122/61 99 %   03/31/22 0431 36.8 °C (98.2 °F) 85 18 133/71 98 %   03/31/22 0020 36.6 °C (97.8 °F) 79 18 129/70 97 %   03/30/22 2007 36.6 °C (97.9 °F) 84 18 127/61 100 %       Lab Results   Component Value Date/Time    WBC 9.6 03/30/2022 06:23 AM    HGB 12.3 03/30/2022 06:23 AM    HCT 36.7 03/30/2022 06:23 AM    PLATELET 456 87/35/6562 06:23 AM    MCV 82.5 03/30/2022 06:23 AM     Lab Results   Component Value Date/Time    Sodium 139 03/30/2022 06:24 AM    Potassium 3.6 03/30/2022 06:24 AM    Chloride 106 03/30/2022 06:24 AM    CO2 20 (L) 03/30/2022 06:24 AM    Anion gap 13 03/30/2022 06:24 AM    Glucose 62 (L) 03/30/2022 06:24 AM    BUN 11 03/30/2022 06:24 AM    Creatinine 0.46 (L) 03/30/2022 06:24 AM    BUN/Creatinine ratio 24 (H) 03/30/2022 06:24 AM    GFR est AA >60 03/30/2022 06:24 AM    GFR est non-AA >60 03/30/2022 06:24 AM    Calcium 9.0 03/30/2022 06:24 AM     No results found for: APTT, PTP, INR, INREXT  Lab Results   Component Value Date/Time    Glucose 62 (L) 03/30/2022 06:24 AM    Glucose (POC) 96 10/01/2021 04:05 PM     Physical Exam    Airway  Mallampati: I  TM Distance: 4 - 6 cm  Neck ROM: normal range of motion   Mouth opening: Normal     Cardiovascular    Rhythm: regular  Rate: normal         Dental    Dentition: Edentulous     Pulmonary  Breath sounds clear to auscultation               Abdominal  GI exam deferred       Other Findings            Anesthetic Plan    ASA: 3  Anesthesia type: general          Induction: Intravenous  Anesthetic plan and risks discussed with: Patient and Family

## 2022-03-31 NOTE — BRIEF OP NOTE
Brief Postoperative Note    Patient: Dacia Glover  YOB: 1960  MRN: [id number]    Date of Procedure: 3/31/2022     Pre-Op Diagnosis: Cholelithiasis    Post-Op Diagnosis: Acute on chronic cholecystitis    Procedure(s):  CHOLECYSTECTOMY LAPAROSCOPIC    Surgeon(s):  Annette Grant MD    Surgical Assistant: None    Anesthesia: General     Estimated Blood Loss (mL): 316NN    Complications: None    Specimens:   ID Type Source Tests Collected by Time Destination   1 : Gallbladder Preservative Gallbladder  Annette Grant MD 3/31/2022 1701 Pathology        Implants: * No implants in log *    Drains:   Britton-Dyson Drain 03/31/22 Right; Lower Abdomen (Active)       Orogastric Tube 03/31/22 (Active)       Findings: Significant omental adhesions and significant inflammatory changes at the infundibulum    Electronically Signed by Parker Jacinto MD on 3/31/2022 at 6:28 PM

## 2022-03-31 NOTE — OP NOTES
Operative Note    Patient: Perico Patiño  YOB: 1960  MRN: [id number]    Date of Procedure: 3/31/2022     Pre-Op Diagnosis: Cholelithiasis    Post-Op Diagnosis: Acute on chronic cholecystitis    Procedure(s):  CHOLECYSTECTOMY LAPAROSCOPIC    Surgeon(s):  Sanjuana Goncalves MD    Surgical Assistant: Joseph    Anesthesia: General     Estimated Blood Loss (mL):  182UP    Complications: None    Specimens:   ID Type Source Tests Collected by Time Destination   1 : Gallbladder Preservative Gallbladder  Sanjuana Goncalves MD 3/31/2022 1701 Pathology        Implants: * No implants in log *    Drains:   Britton-Dyson Drain 03/31/22 Right; Lower Abdomen (Active)       Orogastric Tube 03/31/22 (Active)       Findings: Significant omental adhesions and significant inflammatory changes at the infundibulum    Detailed Description of Procedure: The patient was brought to the operating room and positioned on the OR table in the supine position. Following the induction of general anesthesia the abdomen was prepped and draped in standard sterile fashion. A surgical timeout performed at which time the patient's identity and surgical procedure were once again confirmed. A small supraumbilical incision was made and taken down through the subcutaneous tissues with electrocautery. The fascia was grasped and elevated between 2 Kocher clamps sharply incised with a scalpel and a Leydi clamp used to bluntly passed into the abdominal cavity. 0 Vicryl sutures were placed on either side of the fascial opening and the Lara trocar introduced and secured in place. The abdomen was then insufflated to a pressure of 15 mmHg. Laparoscope was introduced and under direct visualization after infiltrating 0.5% Marcaine with epinephrine, two right upper quadrant 5 mm ports and one 5 mm subxiphoid port were placed. The patient and placed in the reverse Trendelenburg position with the left side down.     It was noted that there was significant omental adhesions up to the gallbladder. These were carefully taken down with a combination of blunt and sharp dissection. We were unable to place a grasper on the fundus of the gallbladder and retracted upward into the right. There was significant inflammatory changes noted around the infundibulum. A second grasper was placed at the infundibulum and the triangle of Calot  carefully dissected out until the cystic duct and artery were fully skeletonized the liver bed. This was doubly clipped distal to the gallbladder single gallbladder side and divided. No small posterior cystic artery was likewise clipped and divided. The L-hook cautery was used to take the gallbladder off of the liver bed. Any areas of bleeding on the repair were controlled with electrocautery. Surgicel powder was also placed. Given the complexity of the operation with extensive inflammatory and fibrotic changes decision was made to place a Britton-Dyson drain in the abdomen. The laparoscope was withdrawn and a 5 mm laparoscope introduced the subxiphoid port site. A Endo Catch bag was placed through the umbilical port site the gallbladder placed within it and withdrawn. The Roku port was then replaced and the abdomen reinsufflated. A 10 flat Britton-Dyson drain was passed through the more medial right upper quadrant port and with the grasper and the more inferior lateral port was pulled through and positioned under the liver at the site of the surgery. This was pulled through the lower more lateral right upper quadrant port site and sutures were placed which were nylon sutures. At this point all ports were withdrawn the abdomen was desufflated. The fascia umbilical port site was closed with 0 Vicryl sutures. Additional 0.0% Marcaine with epinephrine was infiltrated. All skin incisions closed with 4-0 Monocryl subcuticular suture. Dermabond dressings were applied the procedure terminated.     The patient awakened, extubated, taken to recovery in stable condition. All counts were correct throughout the case.         Electronically Signed by Gunner Flor MD on 3/31/2022 at 6:25 PM

## 2022-03-31 NOTE — PROGRESS NOTES
Problem: Falls - Risk of  Goal: *Absence of Falls  Description: Document Jaki Aguilar Fall Risk and appropriate interventions in the flowsheet.   Outcome: Progressing Towards Goal  Note: Fall Risk Interventions:  Mobility Interventions: Patient to call before getting OOB         Medication Interventions: Patient to call before getting OOB,Teach patient to arise slowly    Elimination Interventions: Call light in reach              Problem: Patient Education: Go to Patient Education Activity  Goal: Patient/Family Education  Outcome: Progressing Towards Goal     Problem: General Medical Care Plan  Goal: *Vital signs within specified parameters  Outcome: Progressing Towards Goal  Goal: *Labs within defined limits  Outcome: Progressing Towards Goal  Goal: *Absence of infection signs and symptoms  Outcome: Progressing Towards Goal  Goal: *Optimal pain control at patient's stated goal  Outcome: Progressing Towards Goal  Goal: *Skin integrity maintained  Outcome: Progressing Towards Goal  Goal: *Fluid volume balance  Outcome: Progressing Towards Goal  Goal: *Optimize nutritional status  Outcome: Progressing Towards Goal  Goal: *Anxiety reduced or absent  Outcome: Progressing Towards Goal  Goal: *Progressive mobility and function (eg: ADL's)  Outcome: Progressing Towards Goal     Problem: Patient Education: Go to Patient Education Activity  Goal: Patient/Family Education  Outcome: Progressing Towards Goal

## 2022-04-01 LAB
ALBUMIN SERPL-MCNC: 3.2 G/DL (ref 3.5–5)
ALBUMIN/GLOB SERPL: 1.2 {RATIO} (ref 1.1–2.2)
ALP SERPL-CCNC: 113 U/L (ref 45–117)
ALT SERPL-CCNC: 37 U/L (ref 12–78)
ANION GAP SERPL CALC-SCNC: 15 MMOL/L (ref 5–15)
AST SERPL W P-5'-P-CCNC: 50 U/L (ref 15–37)
BASOPHILS # BLD: 0.1 K/UL (ref 0–0.1)
BASOPHILS NFR BLD: 1 % (ref 0–1)
BILIRUB SERPL-MCNC: 0.6 MG/DL (ref 0.2–1)
BUN SERPL-MCNC: 5 MG/DL (ref 6–20)
BUN/CREAT SERPL: 10 (ref 12–20)
CA-I BLD-MCNC: 8.6 MG/DL (ref 8.5–10.1)
CHLORIDE SERPL-SCNC: 104 MMOL/L (ref 97–108)
CO2 SERPL-SCNC: 18 MMOL/L (ref 21–32)
CREAT SERPL-MCNC: 0.52 MG/DL (ref 0.55–1.02)
DIFFERENTIAL METHOD BLD: ABNORMAL
EOSINOPHIL # BLD: 0 K/UL (ref 0–0.4)
EOSINOPHIL NFR BLD: 0 % (ref 0–7)
ERYTHROCYTE [DISTWIDTH] IN BLOOD BY AUTOMATED COUNT: 13.8 % (ref 11.5–14.5)
GLOBULIN SER CALC-MCNC: 2.6 G/DL (ref 2–4)
GLUCOSE SERPL-MCNC: 79 MG/DL (ref 65–100)
HCT VFR BLD AUTO: 35.3 % (ref 35–47)
HGB BLD-MCNC: 12 G/DL (ref 11.5–16)
IMM GRANULOCYTES # BLD AUTO: 0.1 K/UL (ref 0–0.04)
IMM GRANULOCYTES NFR BLD AUTO: 1 % (ref 0–0.5)
LYMPHOCYTES # BLD: 1.3 K/UL (ref 0.8–3.5)
LYMPHOCYTES NFR BLD: 13 % (ref 12–49)
MCH RBC QN AUTO: 28.1 PG (ref 26–34)
MCHC RBC AUTO-ENTMCNC: 34 G/DL (ref 30–36.5)
MCV RBC AUTO: 82.7 FL (ref 80–99)
MONOCYTES # BLD: 1.6 K/UL (ref 0–1)
MONOCYTES NFR BLD: 17 % (ref 5–13)
NEUTS SEG # BLD: 6.5 K/UL (ref 1.8–8)
NEUTS SEG NFR BLD: 68 % (ref 32–75)
NRBC # BLD: 0 K/UL (ref 0–0.01)
NRBC BLD-RTO: 0 PER 100 WBC
PLATELET # BLD AUTO: 393 K/UL (ref 150–400)
PMV BLD AUTO: 10.2 FL (ref 8.9–12.9)
POTASSIUM SERPL-SCNC: 3.3 MMOL/L (ref 3.5–5.1)
PROT SERPL-MCNC: 5.8 G/DL (ref 6.4–8.2)
RBC # BLD AUTO: 4.27 M/UL (ref 3.8–5.2)
SODIUM SERPL-SCNC: 137 MMOL/L (ref 136–145)
WBC # BLD AUTO: 9.5 K/UL (ref 3.6–11)

## 2022-04-01 PROCEDURE — 97161 PT EVAL LOW COMPLEX 20 MIN: CPT

## 2022-04-01 PROCEDURE — 74011250637 HC RX REV CODE- 250/637: Performed by: FAMILY MEDICINE

## 2022-04-01 PROCEDURE — 99024 POSTOP FOLLOW-UP VISIT: CPT | Performed by: COLON & RECTAL SURGERY

## 2022-04-01 PROCEDURE — 80053 COMPREHEN METABOLIC PANEL: CPT

## 2022-04-01 PROCEDURE — 36415 COLL VENOUS BLD VENIPUNCTURE: CPT

## 2022-04-01 PROCEDURE — 74011250636 HC RX REV CODE- 250/636: Performed by: COLON & RECTAL SURGERY

## 2022-04-01 PROCEDURE — 85025 COMPLETE CBC W/AUTO DIFF WBC: CPT

## 2022-04-01 PROCEDURE — 65270000029 HC RM PRIVATE

## 2022-04-01 PROCEDURE — 74011250636 HC RX REV CODE- 250/636: Performed by: FAMILY MEDICINE

## 2022-04-01 PROCEDURE — 74011000258 HC RX REV CODE- 258: Performed by: FAMILY MEDICINE

## 2022-04-01 PROCEDURE — 97530 THERAPEUTIC ACTIVITIES: CPT

## 2022-04-01 RX ORDER — POTASSIUM CHLORIDE 750 MG/1
40 TABLET, FILM COATED, EXTENDED RELEASE ORAL
Status: COMPLETED | OUTPATIENT
Start: 2022-04-01 | End: 2022-04-01

## 2022-04-01 RX ADMIN — SODIUM CHLORIDE 250 ML: 9 INJECTION, SOLUTION INTRAVENOUS at 12:03

## 2022-04-01 RX ADMIN — FAMOTIDINE 20 MG: 20 TABLET, FILM COATED ORAL at 21:51

## 2022-04-01 RX ADMIN — HYDROMORPHONE HYDROCHLORIDE 1 MG: 1 INJECTION, SOLUTION INTRAMUSCULAR; INTRAVENOUS; SUBCUTANEOUS at 02:01

## 2022-04-01 RX ADMIN — CARBAMAZEPINE 300 MG: 300 CAPSULE, EXTENDED RELEASE ORAL at 08:33

## 2022-04-01 RX ADMIN — HYDROMORPHONE HYDROCHLORIDE 1 MG: 1 INJECTION, SOLUTION INTRAMUSCULAR; INTRAVENOUS; SUBCUTANEOUS at 10:24

## 2022-04-01 RX ADMIN — HYDROMORPHONE HYDROCHLORIDE 1 MG: 1 INJECTION, SOLUTION INTRAMUSCULAR; INTRAVENOUS; SUBCUTANEOUS at 21:44

## 2022-04-01 RX ADMIN — BUSPIRONE HYDROCHLORIDE 30 MG: 10 TABLET ORAL at 21:56

## 2022-04-01 RX ADMIN — ACETAMINOPHEN 650 MG: 325 TABLET ORAL at 08:34

## 2022-04-01 RX ADMIN — VENLAFAXINE HYDROCHLORIDE 150 MG: 75 CAPSULE, EXTENDED RELEASE ORAL at 08:34

## 2022-04-01 RX ADMIN — GABAPENTIN 400 MG: 400 CAPSULE ORAL at 21:51

## 2022-04-01 RX ADMIN — ACETAMINOPHEN 650 MG: 325 TABLET ORAL at 00:28

## 2022-04-01 RX ADMIN — CARVEDILOL 3.12 MG: 3.12 TABLET, FILM COATED ORAL at 08:34

## 2022-04-01 RX ADMIN — ACETAMINOPHEN 650 MG: 325 TABLET ORAL at 16:09

## 2022-04-01 RX ADMIN — FAMOTIDINE 20 MG: 20 TABLET, FILM COATED ORAL at 08:34

## 2022-04-01 RX ADMIN — GABAPENTIN 400 MG: 400 CAPSULE ORAL at 16:00

## 2022-04-01 RX ADMIN — PIPERACILLIN AND TAZOBACTAM 3.38 G: 3; .375 INJECTION, POWDER, LYOPHILIZED, FOR SOLUTION INTRAVENOUS at 21:44

## 2022-04-01 RX ADMIN — POTASSIUM CHLORIDE 40 MEQ: 750 TABLET, FILM COATED, EXTENDED RELEASE ORAL at 13:47

## 2022-04-01 RX ADMIN — PIPERACILLIN AND TAZOBACTAM 3.38 G: 3; .375 INJECTION, POWDER, LYOPHILIZED, FOR SOLUTION INTRAVENOUS at 05:03

## 2022-04-01 RX ADMIN — CARBAMAZEPINE 300 MG: 300 CAPSULE, EXTENDED RELEASE ORAL at 23:12

## 2022-04-01 RX ADMIN — BUSPIRONE HYDROCHLORIDE 30 MG: 10 TABLET ORAL at 08:34

## 2022-04-01 RX ADMIN — SODIUM CHLORIDE 50 ML/HR: 9 INJECTION, SOLUTION INTRAVENOUS at 18:43

## 2022-04-01 RX ADMIN — LEVOTHYROXINE SODIUM 125 MCG: 0.03 TABLET ORAL at 08:33

## 2022-04-01 RX ADMIN — HYDROMORPHONE HYDROCHLORIDE 1 MG: 1 INJECTION, SOLUTION INTRAMUSCULAR; INTRAVENOUS; SUBCUTANEOUS at 05:55

## 2022-04-01 RX ADMIN — GABAPENTIN 400 MG: 400 CAPSULE ORAL at 08:34

## 2022-04-01 RX ADMIN — HYDROMORPHONE HYDROCHLORIDE 1 MG: 1 INJECTION, SOLUTION INTRAMUSCULAR; INTRAVENOUS; SUBCUTANEOUS at 14:45

## 2022-04-01 RX ADMIN — PIPERACILLIN AND TAZOBACTAM 3.38 G: 3; .375 INJECTION, POWDER, LYOPHILIZED, FOR SOLUTION INTRAVENOUS at 12:03

## 2022-04-01 RX ADMIN — ATORVASTATIN CALCIUM 40 MG: 40 TABLET, FILM COATED ORAL at 08:34

## 2022-04-01 NOTE — PROGRESS NOTES
PHYSICAL THERAPY EVALUATION  Patient: Jeanette Caban (51 y.o. female)  Date: 4/1/2022  Primary Diagnosis: Chest pain [R07.9]  Procedure(s) (LRB):  CHOLECYSTECTOMY LAPAROSCOPIC (N/A) 1 Day Post-Op   Precautions:fall     ASSESSMENT  This is a  59 yrs old femal adm due to chest pain. S/P lap martine yesterday. Patient found in bed semi supine sleeping. CM requested therapy to see the patient this afternoon for recs. Patient accompanied by daughter and agreeable to work with therapy. Patient lives with daughter in 2 level home and stays on 1st level but has to go 12 steps up to the bathroom to take a shower. 1st floor does have half bath. Patient is at baseline level for bed mob,transfers and gait to bathroom and back. Has gen weakness from being sick. Was encouraged to be up in chair or sit at eob for all meals. Also the improtance of mobility after sx was discussed with patient and daughter. Patient can go home with [place] but as per daughter her insurance [employer] not cover [place]. She is with [facility]. Other factors to consider for discharge: home with family   Patient will benefit from skilled therapy intervention to address the above noted impairments. PLAN :  Recommendations and Planned Interventions: gait training, therapeutic exercises, patient and family training/education, and therapeutic activities      Frequency/Duration: Patient will be followed by physical therapy:  1-2x/week to address goals. Recommendation for discharge: (in order for the patient to meet his/her long term goals)  Home with Family Care    This discharge recommendation:  Has been made in collaboration with the attending provider and/or case management    IF patient discharges home will need the following DME: rolling walker         SUBJECTIVE:   Patient stated I am ok.     OBJECTIVE DATA SUMMARY:   HISTORY:    Past Medical History:   Diagnosis Date    Arthritis     Depression     Epilepsy ([facility] [address]     Hypertension     Ill-defined condition neurofibromatosis    Thyroid disease      Past Surgical History:   Procedure Laterality Date    HX GYN      HX ORTHOPAEDIC      NEUROLOGICAL PROCEDURE UNLISTED         Personal factors and/or comorbidities impacting plan of care:   Home Situation  Home Environment: Private residence  # Steps to Enter: 3  One/Two Story Residence: Two story  # of Interior Steps: 12  Living Alone: No  Support Systems: Child(aishwarya)  Patient Expects to be Discharged to[de-identified] Home  Current DME Used/Available at Home: Walker, rolling    EXAMINATION/PRESENTATION/DECISION MAKING:   Critical Behavior:  Neurologic State: Alert  Orientation Level: Oriented X4  Cognition: Follows commands     Hearing: Auditory  Auditory Impairment: Deaf,Hard of hearing, right side,Hearing aid(s) (deaf in left ear)  Hearing Aids/Status: At bedside  Range Of Motion:  AROM: Within functional limits                       Strength:    Strength: Generally decreased, functional                    Tone & Sensation:   Tone: Normal                              Functional Mobility:  Bed Mobility:  Rolling: Contact guard assistance  Supine to Sit: Contact guard assistance  Sit to Supine: Contact guard assistance  Scooting: Contact guard assistance  Transfers:  Sit to Stand: Stand-by assistance  Stand to Sit: Stand-by assistance                       Balance:   Sitting: Intact  Standing: Intact; With support  Ambulation/Gait Training:  Distance (ft): 25 Feet (ft)  Assistive Device: Gait belt  Ambulation - Level of Assistance: Stand-by assistance     Gait Description (WDL): Exceptions to [facility]                                  Functional Measure:  [address] Short Form  How much difficulty does the patient currently have. .. Unable A Lot A Little None   1. Turning over in bed (including adjusting bedclothes, sheets and blankets)? [] 1   [] 2   [] 3   [x] 4   2.   Sitting down on and standing up from a chair with arms ( e.g., wheelchair, bedside commode, etc.)   [] 1   [] 2   [x] 3   [] 4   3. Moving from lying on back to sitting on the side of the bed? [] 1   [] 2   [] 3   [x] 4          How much help from another person does the patient currently need. .. Total A Lot A Little None   4. Moving to and from a bed to a chair (including a wheelchair)? [] 1   [] 2   [x] 3   [] 4   5. Need to walk in hospital room? [] 1   [] 2   [x] 3   [] 4   6. Climbing 3-5 steps with a railing? [] 1   [] 2   [x] 3   [] 4   © , Trustees of [facility] [employer], under license to [employer]. All rights reserved     Score:  Initial:  Most Recent: X (Date:2022 )   Interpretation of Tool:  Represents activities that are increasingly more difficult (i.e. Bed mobility, Transfers, Gait). Score 24 23 22-20 19-15 14-10 9-7 6   Modifier CH CI CJ CK CL CM CN           Physical Therapy Evaluation Charge Determination   History Examination Presentation Decision-Making   LOW Complexity : Zero comorbidities / personal factors that will impact the outcome / POC LOW Complexity : 1-2 Standardized tests and measures addressing body structure, function, activity limitation and / or participation in recreation  LOW Complexity : Stable, uncomplicated  LOW Complexity : FOTO score of       Based on the above components, the patient evaluation is determined to be of the following complexity level: LOW     Pain Ratin/10    Activity Tolerance:   Good  Please refer to the flowsheet for vital signs taken during this treatment. After treatment patient left in no apparent distress:   Supine in bed and Call bell within reach    Problem: Mobility Impaired (Adult and Pediatric)  Goal: *Acute Goals and Plan of Care (Insert Text)  Description: Patient will move from supine to sit and sit to supine , scoot up and down, and roll side to side in bed with modified independence within 7 day(s).     Patient will transfer from bed to chair and chair to bed with modified independence using the least restrictive device within 7 day(s). Patient will improve static sitting balance to modified independence within 1 week(s). Patient will ambulate 50 feet with modified independence with least restrictive device within 1 weeks. Outcome: Progressing Towards Goal     COMMUNICATION/EDUCATION:   The patients plan of care was discussed with: Registered nurse and Case management. Fall prevention education was provided and the patient/caregiver indicated understanding.     Thank you for this referral.  Magali Arciniega PT   Time Calculation: 20 mins

## 2022-04-01 NOTE — PROGRESS NOTES
Problem: Falls - Risk of  Goal: *Absence of Falls  Description: Document Tim Sexton Fall Risk and appropriate interventions in the flowsheet.   Outcome: Progressing Towards Goal  Note: Fall Risk Interventions:  Mobility Interventions: Patient to call before getting OOB    Medication Interventions: Patient to call before getting OOB,Bed/chair exit alarm,Teach patient to arise slowly    Elimination Interventions: Bed/chair exit alarm,Call light in reach    Problem: General Medical Care Plan  Goal: *Labs within defined limits  Outcome: Progressing Towards Goal  Goal: *Absence of infection signs and symptoms  Outcome: Progressing Towards Goal  Goal: *Skin integrity maintained  Outcome: Progressing Towards Goal  Goal: *Fluid volume balance  Outcome: Progressing Towards Goal

## 2022-04-01 NOTE — MED STUDENT NOTES
HISTORY & PHYSICAL      Patient: Isaiah Grewal MRN: [id number]  SSN: [id number]    YOB: 1960  Age: 59 y.o. Sex: female      Primary Care Provider: Rosalio, MD Henry  Source of Information:       Subjective     CC:   Chief Complaint   Patient presents with    Chest Pain       HPI: Kendal hdz(n) 59 y. o. female with PMH significant for arthritis, depression, epilepsy, hypertension, neurofibromatosis, and thyroid disease who presents with chest pain.       Upon presentation, patient stated her symptoms began that morning. She describes her pain being in the center of her chest and radiates to the back and shoulder. Patient complained of her pain which awakened her at 5:30 this morning, she states the pain is 9/10, constant, without exacerbating symptoms. The patient is currently on HYDROmorphone (DILAUDID) injection 1 mg. She states the Dilaudid is greatly helping with her pain management.  No fever, cough, or other associated symptoms.  Denies leg swelling unilaterally, history of PE/DVT, or pleuritic chest pain.     Originally, a ultrasound was conducted (3/28/2022 @22:05) revealing cholelithiasis and the common bile duct is dilated which may be chronic. However, the U/S gallbladder was limited by bowel gas.     The recent, CT ABD Pelvis w/o contrast (3/28/2022 @22:55) revealed Gallbladder wall and pericystic changes. Mild prominence of the common bile duct. Thus, cholecystitis and/or cholangitis should be considered clinically. Patient is scheduled for MRCP today.      Surgery was consulted in the ER seen by the surgeon ordered MRI of the abdomen and the HIDA scan      3/30/2022  Pt continues to report pain being in the center of her chest and radiates to the back and shoulder. She states the pain is 9/10, constant, without exacerbating symptoms. The patient is currently on HYDROmorphone (DILAUDID) injection 1 mg.  She states the Dilaudid is greatly helping with her pain management.  No fever, cough, or other associated symptoms.  Denies leg swelling unilaterally, history of PE/DVT, or pleuritic chest pain. Patient daughter and sister indicate Dilaudid 1 mg every 4 hours as needed for her chronic pain/discussed the side effects she understand    Cardiology - CXR (3/28/2022) without congestion. The heart, mediastinum and pulmonary vasculature are normal. The lungs are well expanded and clear. The bony thorax is intact. When compared to the prior exam, there is no significant change. HS troponin negative x 2. NSR: HR 77, no ischemia. General Surgery: HIDA did not visualize GB but has free spillage in to duodenum. Yesterday, patient's dilaudid was changed from 0.5mg q 4hrs instead of 1mg. However, the patient reports increased pain. Patient's dilaudid was returned to 1mg q 4hrs this morning. HIDA 3/29/22: There is a normal distribution of activity through the liver. Common bile duct activity noted with free spill of activity into small bowel loops. With images carried out to 2 hours, no gallbladder activity is evident. IMPRESSION Findings concerning for cystic duct compromise    Significant labs:   WBC 9.6  Neutrophils 84  aPTT 34.4  Creatinine 0.46      3/31/2022  Pt continues to report pain being in the center of her chest and radiates to the back and shoulder. She states the pain is 9/10, constant, without exacerbating symptoms. The patient is currently on HYDROmorphone (DILAUDID) injection 1 mg. She states the Dilaudid is greatly helping with her pain management.      Today, the patient complains of a sinus headache. However, she denies fever, cough, or other associated symptoms.  Denies leg swelling unilaterally, history of PE/DVT, or pleuritic chest pain.     Patient daughter and sister indicate Dilaudid 1 mg every 4 hours as needed for her chronic pain/discussed the side effects she understand    Colon and Rectal Surgery: Patient previously requested Dr. Deepali Winn to be involved with her case; Dr. Sultana Matthew has seen the patient and has recommended a laparoscopic cholecystectomy which is scheduled for this afternoon. General Surgery: HIDA did not visualize GB but has free spillage in to duodenum. HIDA 3/29/22: There is a normal distribution of activity through the liver. Common bile duct activity noted with free spill of activity into small bowel loops. With images carried out to 2 hours, no gallbladder activity is evident. IMPRESSION Findings concerning for cystic duct compromise    Echo (3/30/2022) -  Left ventricle size is normal. Increased wall thickness. Normal wall motion. Normal left ventricular systolic function with a visually estimated EF of greater than 65%. Normal diastolic function    Cardiology - CXR (3/28/2022) without congestion. The heart, mediastinum and pulmonary vasculature are normal. The lungs are well expanded and clear. The bony thorax is intact. When compared to the prior exam, there is no significant change. HS troponin negative x 2. NSR: HR 77, no ischemia. Significant labs: (Labs from 3/30/2022  WBC 9.6  Neutrophils 84  aPTT 34.4  Creatinine 0.46    Patient seen by the surgeon and plan for laparoscopic cholecystectomy today    Patient taking Dilaudid every 4 hours have a detailed discussion with the patient patient daughter recommend to continue discussed about the side effects and the complications      0/9/2152  Colon and Rectal Surgery: Dr. Sultana Matthew conducted laparoscopic cholecystectomy yesterday with no complications. Other Findings: Significant omental adhesions and significant inflammatory changes at the infundibulum. Post-surgery patient stated her neck was hurting and it felt like a knot. Patient received one time dose of Flexeril 5mg. Today, the patient stats that her neck pain and \"knot sensation\" has resolved.      Pts daughter is also requesting that the pt does not receive Linzess until they can establish a regular bowel program.     Today, the patient complains of a nausea, fatigue, and soreness at the surgery laparoscopic incision location. However, she denies fever, cough, or other associated symptoms.  Denies leg swelling unilaterally, history of PE/DVT, or pleuritic chest pain. Patient taking Dilaudid every 4 hours have a detailed discussion with the patient patient daughter recommend to continue discussed about the side effects and the complications    Significant labs:  WBC 9.5  Neutrophils 68  BUN 5  Creatinine 0.52    Past Medical History:   Diagnosis Date    Arthritis     Depression     Epilepsy ([address]     Hypertension     Ill-defined condition     neurofibromatosis    Thyroid disease         Past Surgical History:   Procedure Laterality Date    HX GYN      HX ORTHOPAEDIC      NEUROLOGICAL PROCEDURE UNLISTED         Prior to Admission medications    Medication Sig Start Date End Date Taking? Authorizing Provider   venlafajenaro [facility]) 75 mg tablet Take 75 mg by mouth daily. Yes Provider, Historical   ergocalciferol (DrisdoL) 1,250 mcg (50,000 unit) capsule Take 50,000 Units by mouth every thirty (30) days. Take on the 15th day of the month   Yes Provider, Historical   linaCLOtide (Linzess) 145 mcg cap capsule Take 145 mcg by mouth Daily (before breakfast). Yes Other, MD Henry   senna-docusate (PERICOLACE) 8.6-50 mg per tablet Take 2 Tablets by mouth daily. Yes Other, MD Henry   atorvastatin (LIPITOR) 40 mg tablet Take 40 mg by mouth daily. Yes Provider, Historical   Xtampza ER 36 mg capsule Take 1 Capsule by mouth two (2) times a day. 9/19/21  Yes Provider, Historical   gabapentin (NEURONTIN) 800 mg tablet Take 400 mg by mouth three (3) times daily. 9/9/21  Yes Provider, Historical   famotidine (Pepcid) 20 mg tablet Take 20 mg by mouth two (2) times a day. Yes Other, MD Henry   celecoxib (CELEBREX) 200 mg capsule Take 100 mg by mouth two (2) times a day.    Yes Other, MD Henry   aspirin 81 mg chewable tablet Take 81 mg by mouth nightly. Yes Other, MD Henry   carBAMazepine ER (CARBATROL ER) 300 mg capsule Take 300 mg by mouth two (2) times a day. Yes Provider, Historical   carvedilol (COREG) 6.25 mg tablet Take  by mouth two (2) times daily (with meals). Yes Provider, Historical   levothyroxine (SYNTHROID) 125 mcg tablet Take  by mouth Daily (before breakfast). Yes Provider, Historical   busPIRone (BUSPAR) 30 mg tablet Take 30 mg by mouth two (2) times a day. Yes Provider, Historical   venlafaxine-SR (EFFEXOR XR) 150 mg capsule Take 150 mg by mouth daily. Yes Provider, Historical   naloxone 8 mg/actuation spry 4 mg by Nasal route as needed. Henry Santamaria MD   oxyCODONE-acetaminophen (Percocet) 7.5-325 mg per tablet Take 1 Tablet by mouth every eight (8) hours as needed for Pain. Henry Santamaria MD   loratadine (Claritin) 10 mg tablet Take 10 mg by mouth daily as needed for Allergies. Henry Santamaria MD   diclofenac (FLECTOR) 1.3 % pt12 1 Patch by TransDERmal route every twelve (12) hours every twelve (12) hours. Henry Santamaria MD   diclofenac (Voltaren) 1 % gel Apply 4 g to affected area four (4) times daily.     Henry Santamaria MD       Allergies   Allergen Reactions    Codeine Nausea and Vomiting    Methadone Hives    Morphine Other (comments)     psychosis        Family History   Problem Relation Age of Onset    Cancer Mother         glioblastoma    Lung Disease Father     Cancer Father         mesothelioma, testicular cancer        Social History     Socioeconomic History    Marital status: UNKNOWN    Highest education level: Associate degree: occupational, technical, or vocational program   Tobacco Use    Smoking status: Never Smoker    Smokeless tobacco: Never Used   Vaping Use    Vaping Use: Former    Substances: CBD (only for 2 months)   Substance and Sexual Activity    Alcohol use: No    Drug use: Never        ROS  Constitutional:  no fever,  no chills,  no sweats, No weakness, No fatigue, No decreased activity. Eye: No recent visual problem, No icterus, No discharge, No double vision. Ear/Nose/Mouth/Throat: No decreased hearing, No ear pain, No nasal congestion, No sore throat. Respiratory: No shortness of breath, No cough, No sputum production, No hemoptysis, No wheezing, No cyanosis. Cardiovascular:  chest pain, No palpitations, No bradycardia, No tachycardia, No peripheral edema, No syncope. Gastrointestinal: No nausea,  No vomiting, No diarrhea, No constipation, No heartburn,   abdominal pain. Genitourinary: No dysuria, No hematuria, No change in urine stream, No urethral discharge, No lesions. Hematology/Lymphatics: No bruising tendency, No bleeding tendency, No petechiae, No swollen lymph glands. Endocrine: No excessive thirst, No polyuria, No cold intolerance, No heat intolerance, No excessive hunger. Immunologic: Not immunocompromised, No recurrent fevers, No recurrent infections. Musculoskeletal: No back pain, No neck pain, No joint pain, No muscle pain, No claudication, No decreased range of motion, No trauma. Integumentary: No rash, No pruritus, No abrasions. Neurologic: Alert and oriented X4, No abnormal balance, No headache, No confusion, No numbness, No tingling. Psychiatric: No anxiety, No depression, No bijan.       Objective     Visit Vitals  BP 92/61   Pulse 96   Temp 98.5 °F (36.9 °C)   Resp 18   Ht 5' 1\" (1.549 m)   Wt 61.7 kg (136 lb)   SpO2 100%   Breastfeeding No   BMI 25.70 kg/m²    O2 Flow Rate (L/min): 2 l/min O2 Device: None (Room air)    Physical Exam:   Physical Exam  General: well appearing, no acute distress  Head: Normal  Face: Nornal  HEENT: atraumatic, PERRLA, moist mucosa, normal pharynx, normal tonsils and adenoids, normal tongue, no fluid in sinuses  Neck: Trachea midline, no carotid bruit, no masses  Chest: Normal.  Respiratory: Normal chest wall expansion, CTA B, no r/r/w, no rubs  Cardiovascular: RRR, no m/r/g, Normal S1 and S2  Abdomen: Soft, non tender, non-distended, normal bowel sounds in all quadrants, no hepatosplenomegaly, no tympany. Genitourinary: No inguinal hernia, normal external gentalia,  no renal angle tenderness  Rectal: deferred  Musculoskeletal: normal ROM in upper and lower extremities, No joint swelling. Integumentary: Warm, dry, and pink, with no rash, purpura, or petechia  Heme/Lymph: No lymphadenopathy, no bruises  Neurological:Cranial Nerves II-XII grossly intact, no gross motor or sensory deficit  Psychiatric: Cooperative with normal mood, affect, and cognition      Intake & Output:  Current Shift: No intake/output data recorded. Last three shifts: 03/30 1901 - 04/01 0700  In: 1800 [I.V.:1800]  Out: 215 [Drains:65]    Lab/Data Review: All lab results for the last 24 hours reviewed. 24 Hour Results:    Recent Results (from the past 24 hour(s))   CBC WITH AUTOMATED DIFF    Collection Time: 04/01/22  7:01 AM   Result Value Ref Range    WBC 9.5 3.6 - 11.0 K/uL    RBC 4.27 3.80 - 5.20 M/uL    HGB 12.0 11.5 - 16.0 g/dL    HCT 35.3 35.0 - 47.0 %    MCV 82.7 80.0 - 99.0 FL    MCH 28.1 26.0 - 34.0 PG    MCHC 34.0 30.0 - 36.5 g/dL    RDW 13.8 11.5 - 14.5 %    PLATELET 413 960 - 194 K/uL    MPV 10.2 8.9 - 12.9 FL    NRBC 0.0 0.0  WBC    ABSOLUTE NRBC 0.00 0.00 - 0.01 K/uL    NEUTROPHILS 68 32 - 75 %    LYMPHOCYTES 13 12 - 49 %    MONOCYTES 17 (H) 5 - 13 %    EOSINOPHILS 0 0 - 7 %    BASOPHILS 1 0 - 1 %    IMMATURE GRANULOCYTES 1 (H) 0 - 0.5 %    ABS. NEUTROPHILS 6.5 1.8 - 8.0 K/UL    ABS. LYMPHOCYTES 1.3 0.8 - 3.5 K/UL    ABS. MONOCYTES 1.6 (H) 0.0 - 1.0 K/UL    ABS. EOSINOPHILS 0.0 0.0 - 0.4 K/UL    ABS. BASOPHILS 0.1 0.0 - 0.1 K/UL    ABS. IMM.  GRANS. 0.1 (H) 0.00 - 0.04 K/UL    DF AUTOMATED     METABOLIC PANEL, COMPREHENSIVE    Collection Time: 04/01/22  7:01 AM   Result Value Ref Range    Sodium 137 136 - 145 mmol/L    Potassium 3.3 (L) 3.5 - 5.1 mmol/L    Chloride 104 97 - 108 mmol/L    CO2 18 (L) 21 - 32 mmol/L    Anion gap 15 5 - 15 mmol/L    Glucose 79 65 - 100 mg/dL    BUN 5 (L) 6 - 20 mg/dL    Creatinine 0.52 (L) 0.55 - 1.02 mg/dL    BUN/Creatinine ratio 10 (L) 12 - 20      GFR est AA >60 >60 ml/min/1.73m2    GFR est non-AA >60 >60 ml/min/1.73m2    Calcium 8.6 8.5 - 10.1 mg/dL    Bilirubin, total 0.6 0.2 - 1.0 mg/dL    AST (SGOT) 50 (H) 15 - 37 U/L    ALT (SGPT) 37 12 - 78 U/L    Alk. phosphatase 113 45 - 117 U/L    Protein, total 5.8 (L) 6.4 - 8.2 g/dL    Albumin 3.2 (L) 3.5 - 5.0 g/dL    Globulin 2.6 2.0 - 4.0 g/dL    A-G Ratio 1.2 1.1 - 2.2           Imaging:    NM HEPATOBILIARY DUCT SCAN   Final Result   Findings concerning for cystic duct compromise. CT ABD PELV WO CONT   Final Result   Gallbladder wall and pericystic changes. Mild prominence of the   common bile duct. This represents change from the prior study. Ultrasound   earlier this evening shows cholelithiasis. Cholecystitis and/or cholangitis   should be considered clinically. Consider radionuclide hepatobiliary scan   depending on clinical setting                US GALLBLADDER   Final Result   Limited by bowel gas. 1. Cholelithiasis. No sonographic evidence of cholecystitis. However the common   bile duct is dilated which may be chronic. CTA CHEST W OR W WO CONT   Final Result   1. No pulmonary embolus. 2. Interval increase of peribronchial and perivascular soft tissue thickening   possibly lymphoid or neurofibromatosis in origin. Unchanged left lung   bronchovascular nodule, posterior mediastinal and retroperitoneal abnormal soft   tissue. 3. Bilateral hazy airspace edema or pneumonia. 4. Haziness of the gallbladder. Correlate clinically for cholecystitis. XR CHEST PORT   Final Result   No acute process or active disease.             Assessment     Chest pain rule out MI  Gallstone  Right upper quadrant pain  History of neurofibromatosis  Hypertension  Hypothyroidism  Seizure disorder  Hyperlipidemia  Anxiety disorder  Chronic pain syndrome      CT abdomen - showed gallbladder wall and pericystic changes. HIDA - Common bile duct activity noted with free spill of activity into small bowel loops. Findings concerning for cystic duct compromise  Echo (3/30/2022) -  Left ventricle size is normal. Increased wall thickness. Normal wall motion. Normal left ventricular systolic function with a visually estimated EF of greater than 65%. Normal diastolic function      Dr. January Hickey conducted laparoscopic cholecystectomy yesterday with no complications. Other Findings: Significant omental adhesions and significant inflammatory changes at the infundibulum. Plan     Medications  Lipitor 40 mg daily  BuSpar 30 mg twice a day  Carbamazepine  mg twice a day  Coreg 3.125 twice a day  Pepcid 20 twice daily  Neurontin 400 mg 3 times a day  Levothyroxine 125 mcg daily  Linzess 145 mg daily  Zosyn 3.375 IV every 8 hours  Effexor  mg daily      1. Continue IV fluids  2. Cardiology is surgical and GI consult  3. Discussed with the patient daughter/concern about pain medication at this time   4.  We will continue Dilaudid 1 mg every 4 hours as needed  5.  N.p.o.         Signed By: Alessia Steward     April 1, 2022

## 2022-04-01 NOTE — PROGRESS NOTES
Progress Note    Patient: Dee Vick MRN: [id number]  SSN: [id number]    YOB: 1960  Age: 59 y.o. Sex: female      Admit Date: 3/28/2022    LOS: 2 days     Subjective:     Patient was seen and examined. Patient is followed for chest pain. Patient has a past medical history of hypertension, neurofibromatosis, arthritis, depression, epilepsy, and thyroid disease. S/p lap cholecystectomy day 1. Resting comfortably, pain well controlled with medication. Accompanied by family at the bedside. Telemetry Review: No acute events noted in the past 24 hours. Remains in normal sinus rhythm; heart rate 80s, no ectopy. Review of Symptoms:   Review of Systems   Constitutional: Negative. Cardiovascular: Negative for chest pain, dyspnea on exertion, irregular heartbeat and palpitations. Respiratory: Negative for cough, shortness of breath and wheezing. Gastrointestinal: Negative for abdominal pain, constipation, nausea and vomiting. Neurological: Negative for light-headedness. All other systems reviewed and are negative. Objective:     Vitals:    03/31/22 2343 04/01/22 0408 04/01/22 0732 04/01/22 1110   BP: 131/69 131/70 92/61 (!) 80/51   Pulse: 79 85 96 88   Resp: 19 18 18 18   Temp: 98.1 °F (36.7 °C) 99 °F (37.2 °C) 98.5 °F (36.9 °C) 98.2 °F (36.8 °C)   SpO2: 100% 100% 100% 95%   Weight:       Height:            Intake and Output:  Current Shift: No intake/output data recorded. Last three shifts: 03/30 1901 - 04/01 0700  In: 1800 [I.V.:1800]  Out: 215 [Drains:65]    Physical Exam:   . Melinda Alegre Physical Exam  Nursing note reviewed. Constitutional:       General: She is in acute distress. Cardiovascular:      Rate and Rhythm: Normal rate and regular rhythm. Pulmonary:      Effort: Pulmonary effort is normal.      Breath sounds: Normal breath sounds. Abdominal:      Tenderness: There is abdominal tenderness. Neurological:      General: No focal deficit present.       Mental Status: She is alert and oriented to person, place, and time. Mental status is at baseline. Psychiatric:         Mood and Affect: Mood normal.         Behavior: Behavior normal.           Lab/Data Review: All lab results for the last 24 hours reviewed.        Current Facility-Administered Medications:     sodium chloride 0.9 % bolus infusion 250 mL, 250 mL, IntraVENous, ONCE, Sabiha Hudson MD    acetaminophen (TYLENOL) tablet 650 mg, 650 mg, Oral, Q6H PRN, Sabiha Hudson MD, 650 mg at 04/01/22 0834    HYDROmorphone (DILAUDID) injection 1 mg, 1 mg, IntraVENous, Q4H PRN, Sabiha Hudson MD, 1 mg at 04/01/22 1024    gabapentin (NEURONTIN) capsule 400 mg, 400 mg, Oral, TID, Sabiha Hudson MD, 400 mg at 04/01/22 0834    0.9% sodium chloride infusion, 50 mL/hr, IntraVENous, CONTINUOUS, Dori Rosen MD, Last Rate: 50 mL/hr at 04/01/22 0930, 50 mL/hr at 04/01/22 0930    atorvastatin (LIPITOR) tablet 40 mg, 40 mg, Oral, DAILY, Lawanda Martinez MD, 40 mg at 04/01/22 0834    busPIRone (BUSPAR) tablet 30 mg, 30 mg, Oral, BID, Sabhia Hudson MD, 30 mg at 04/01/22 5975    carBAMazepine ER (CARBATROL ER) capsule 300 mg, 300 mg, Oral, BID, Sabiha Hudson MD, 300 mg at 04/01/22 8426    carvediloL (COREG) tablet 3.125 mg, 3.125 mg, Oral, BID WITH MEALS, Sabiha Hudson MD, 3.125 mg at 04/01/22 0834    famotidine (PEPCID) tablet 20 mg, 20 mg, Oral, BID, Sabiha Hudson MD, 20 mg at 04/01/22 9537    levothyroxine (SYNTHROID) tablet 125 mcg, 125 mcg, Oral, ACB, Sabiha Hudson MD, 125 mcg at 04/01/22 8043    linaCLOtide (LINZESS) capsule 145 mcg, 145 mcg, Oral, ACB, Sabiha Hudson MD    venlafaxine-SR [facility]) capsule 150 mg, 150 mg, Oral, DAILY, Sabiha Hudson MD, 150 mg at 04/01/22 0834    piperacillin-tazobactam (ZOSYN) 3.375 g in 0.9% sodium chloride (MBP/ADV) 100 mL MBP, 3.375 g, IntraVENous, Q8H, Sabiha Hudson MD, Last Rate: 25 mL/hr at 04/01/22 0503, 3.375 g at 04/01/22 7809      Assessment:     Active Problems:    Chest pain (3/28/2022)        Plan:     Case discussed with Collaborating physician Dr. Julia Jimenes and our recommendations are as follows:       1. Chest pain:  - resolved  - continue Dilaudid for pain  - HS troponin negative x 2  - CT abdomen showed gallbladder wall and pericystic changes. MRCP pending further evaluation  - echo: EF greater than 65%, no wall motion or functional abnormalities.        2. Hypertension:   - blood pressure acceptable  - continue current medications     3. Hyperlipidemia:   - continue statin therapy    Will sign off of patient at this time. Thank you for involving us in the care of this patient. Please do not hesitate to call if additional questions arise. If after hours please call [phone]. Signed By: Shell Murphy NP     April 1, 2022          Dr. Forest Jimenez Cardiologist    [facility]  [address], [address] [facility]  [phone]

## 2022-04-01 NOTE — PROGRESS NOTES
Problem: Falls - Risk of  Goal: *Absence of Falls  Description: Document Nita Huggins Fall Risk and appropriate interventions in the flowsheet.   Outcome: Progressing Towards Goal  Note: Fall Risk Interventions:  Mobility Interventions: Patient to call before getting OOB         Medication Interventions: Patient to call before getting OOB,Teach patient to arise slowly    Elimination Interventions: Call light in reach,Patient to call for help with toileting needs              Problem: Patient Education: Go to Patient Education Activity  Goal: Patient/Family Education  Outcome: Progressing Towards Goal     Problem: General Medical Care Plan  Goal: *Vital signs within specified parameters  Outcome: Progressing Towards Goal  Goal: *Labs within defined limits  Outcome: Progressing Towards Goal  Goal: *Absence of infection signs and symptoms  Outcome: Progressing Towards Goal  Goal: *Optimal pain control at patient's stated goal  Outcome: Progressing Towards Goal  Goal: *Skin integrity maintained  Outcome: Progressing Towards Goal  Goal: *Fluid volume balance  Outcome: Progressing Towards Goal  Goal: *Optimize nutritional status  Outcome: Progressing Towards Goal  Goal: *Anxiety reduced or absent  Outcome: Progressing Towards Goal  Goal: *Progressive mobility and function (eg: ADL's)  Outcome: Progressing Towards Goal     Problem: Patient Education: Go to Patient Education Activity  Goal: Patient/Family Education  Outcome: Progressing Towards Goal

## 2022-04-01 NOTE — PROGRESS NOTES
Progress Note    Patient: Jeanette Caban MRN: [id number]  [id number]: [id number]    YOB: 1960  Age: 59 y.o. Sex: female      Admit Date: 3/28/2022    LOS: 2 days     Subjective:   Postop day 1 status post laparoscopic cholecystectomy  Patient seen in bed no complaints  Tolerating clear liquids    Objective:     Vitals:    04/01/22 0408 04/01/22 0732 04/01/22 1110 04/01/22 1425   BP: 131/70 92/61 (!) 80/51 108/62   Pulse: 85 96 88 83   Resp: 18 18 18 18   Temp: 99 °F (37.2 °C) 98.5 °F (36.9 °C) 98.2 °F (36.8 °C) 97.7 °F (36.5 °C)   SpO2: 100% 100% 95% 98%   Weight:       Height:            Intake and Output:  Current Shift: 04/01 0701 - 04/01 1900  In: 500 [P.O.:500]  Out: 40 [Drains:40]  Last three shifts: 03/30 1901 - 04/01 0700  In: 1800 [I.V.:1800]  Out: 215 [Drains:65]    Review of Systems:  ROS     Physical Exam:   Abdomen is soft, appropriate tender incisions, nondistended, WANG drain with dark serosanguineous drainage    Lab/Data Review:  Recent Results (from the past 24 hour(s))   CBC WITH AUTOMATED DIFF    Collection Time: 04/01/22  7:01 AM   Result Value Ref Range    WBC 9.5 3.6 - 11.0 K/uL    RBC 4.27 3.80 - 5.20 M/uL    HGB 12.0 11.5 - 16.0 g/dL    HCT 35.3 35.0 - 47.0 %    MCV 82.7 80.0 - 99.0 FL    MCH 28.1 26.0 - 34.0 PG    MCHC 34.0 30.0 - 36.5 g/dL    RDW 13.8 11.5 - 14.5 %    PLATELET 227 217 - 136 K/uL    MPV 10.2 8.9 - 12.9 FL    NRBC 0.0 0.0  WBC    ABSOLUTE NRBC 0.00 0.00 - 0.01 K/uL    NEUTROPHILS 68 32 - 75 %    LYMPHOCYTES 13 12 - 49 %    MONOCYTES 17 (H) 5 - 13 %    EOSINOPHILS 0 0 - 7 %    BASOPHILS 1 0 - 1 %    IMMATURE GRANULOCYTES 1 (H) 0 - 0.5 %    ABS. NEUTROPHILS 6.5 1.8 - 8.0 K/UL    ABS. LYMPHOCYTES 1.3 0.8 - 3.5 K/UL    ABS. MONOCYTES 1.6 (H) 0.0 - 1.0 K/UL    ABS. EOSINOPHILS 0.0 0.0 - 0.4 K/UL    ABS. BASOPHILS 0.1 0.0 - 0.1 K/UL    ABS. IMM.  GRANS. 0.1 (H) 0.00 - 0.04 K/UL    DF AUTOMATED     METABOLIC PANEL, COMPREHENSIVE    Collection Time: 04/01/22  7:01 AM Result Value Ref Range    Sodium 137 136 - 145 mmol/L    Potassium 3.3 (L) 3.5 - 5.1 mmol/L    Chloride 104 97 - 108 mmol/L    CO2 18 (L) 21 - 32 mmol/L    Anion gap 15 5 - 15 mmol/L    Glucose 79 65 - 100 mg/dL    BUN 5 (L) 6 - 20 mg/dL    Creatinine 0.52 (L) 0.55 - 1.02 mg/dL    BUN/Creatinine ratio 10 (L) 12 - 20      GFR est AA >60 >60 ml/min/1.73m2    GFR est non-AA >60 >60 ml/min/1.73m2    Calcium 8.6 8.5 - 10.1 mg/dL    Bilirubin, total 0.6 0.2 - 1.0 mg/dL    AST (SGOT) 50 (H) 15 - 37 U/L    ALT (SGPT) 37 12 - 78 U/L    Alk.  phosphatase 113 45 - 117 U/L    Protein, total 5.8 (L) 6.4 - 8.2 g/dL    Albumin 3.2 (L) 3.5 - 5.0 g/dL    Globulin 2.6 2.0 - 4.0 g/dL    A-G Ratio 1.2 1.1 - 2.2              Assessment:     Active Problems:    Chest pain (3/28/2022)        Plan:   Advance diet  Continue Zosyn  Likely discharge home tomorrow      Signed By: Talia Arnett MD     April 1, 2022

## 2022-04-01 NOTE — PROGRESS NOTES
Progress Note    Patient: Lisa Steel MRN: [id number]  SSN: [id number]    YOB: 1960  Age: 59 y.o. Sex: female      Admit Date: 3/28/2022    LOS: 2 days     Subjective:   GI in consultation for abdominal pain.     4/1: Patient seen in room resting comfortably. She had a laparoscopic cholecystectomy yesterday. Pain in the umbilical area. She has 4 abdominal lap sites, all intact no bleeding noted. WANG drain with dark colored drainage. Hgb today 12.0    HIDA 3/29/22: Findings concerning for cystic duct compromise. US Gallbladder 3/28/22: Cholelithiasis. No sonographic evidence of cholecystitis. However the common bile duct is dilated which may be chronic. CT abdomen 3/28/22:  Gallbladder wall and pericystic changes. Mild prominence of the  common bile duct.     History of Present Illness: Amy Albert is a 59 y. o. female who is seen in consultation for abdominal pain. She reports the symptoms started yesterday morning. She states the pain was in the center of her chest and radiates to her back and shoulder. She states the pain woke her up around 5:30 am yesterday morning. . She denies fever or any other associated symptoms. She does have a past medical history significant for arthritis, depression, epilepsy, hypertension, thyroid disease, neurofibromatosis, and history of PE/DVT. She is getting dilaudid for pain and states it is improved with the pain medication. CT abdomen shows gallbladder wall and pericystic changes. Mild prominence of the common bile duct. Abdominal ultrasound shows cholelithiasis and CBD dilation.  Troponin on admission is 5. Total bilirubin 0.3, ALT 30, AST 20, Alk Phosphatase 127   Lipase 56. EKG shows Sinus rhythm. She is scheduled for MRCP results  pending at this time. HIDA scan findings concerning for cystic duct compromise, Normal distribution of activity through the liver.  Common bile duct activity noted with free spill of activity into small bowel loops, No gallbladder activity is evident. Patient is followed by surgery. Will wait for MRCP results. Continue PPI. Possible EGD as an out patient.         Objective:     Vitals:    04/01/22 0408 04/01/22 0732 04/01/22 1110 04/01/22 1425   BP: 131/70 92/61 (!) 80/51 108/62   Pulse: 85 96 88 83   Resp: 18 18 18 18   Temp: 99 °F (37.2 °C) 98.5 °F (36.9 °C) 98.2 °F (36.8 °C) 97.7 °F (36.5 °C)   SpO2: 100% 100% 95% 98%   Weight:       Height:            Intake and Output:  Current Shift: 04/01 0701 - 04/01 1900  In: 500 [P.O.:500]  Out: 40 [Drains:40]  Last three shifts: 03/30 1901 - 04/01 0700  In: 1800 [I.V.:1800]  Out: 215 [Drains:65]    Physical Exam:   Skin:  Extremities and face reveal no rashes. No coleman erythema. 4 lap sites abdominal area, intact. HEENT: Sclerae anicteric. Extra-occular muscles are intact. No abnormal pigmentation of the lips. The neck is supple. Cardiovascular: Regular rate and rhythm. Respiratory:  Comfortable breathing with no accessory muscle use. GI:  Abdomen nondistended, soft, and tender surgical sites. No enlargement of the liver or spleen. No masses palpable. Rectal:  Deferred  Musculoskeletal: Extremities have good range of motion. Neurological:  Gross memory appears intact. Patient is alert and oriented. Psychiatric:  Mood appears appropriate with judgement intact.   Lymphatic:  No visible adenopathy      Lab/Data Review:  Recent Results (from the past 24 hour(s))   CBC WITH AUTOMATED DIFF    Collection Time: 04/01/22  7:01 AM   Result Value Ref Range    WBC 9.5 3.6 - 11.0 K/uL    RBC 4.27 3.80 - 5.20 M/uL    HGB 12.0 11.5 - 16.0 g/dL    HCT 35.3 35.0 - 47.0 %    MCV 82.7 80.0 - 99.0 FL    MCH 28.1 26.0 - 34.0 PG    MCHC 34.0 30.0 - 36.5 g/dL    RDW 13.8 11.5 - 14.5 %    PLATELET 326 255 - 608 K/uL    MPV 10.2 8.9 - 12.9 FL    NRBC 0.0 0.0  WBC    ABSOLUTE NRBC 0.00 0.00 - 0.01 K/uL    NEUTROPHILS 68 32 - 75 %    LYMPHOCYTES 13 12 - 49 %    MONOCYTES 17 (H) 5 - 13 % EOSINOPHILS 0 0 - 7 %    BASOPHILS 1 0 - 1 %    IMMATURE GRANULOCYTES 1 (H) 0 - 0.5 %    ABS. NEUTROPHILS 6.5 1.8 - 8.0 K/UL    ABS. LYMPHOCYTES 1.3 0.8 - 3.5 K/UL    ABS. MONOCYTES 1.6 (H) 0.0 - 1.0 K/UL    ABS. EOSINOPHILS 0.0 0.0 - 0.4 K/UL    ABS. BASOPHILS 0.1 0.0 - 0.1 K/UL    ABS. IMM. GRANS. 0.1 (H) 0.00 - 0.04 K/UL    DF AUTOMATED     METABOLIC PANEL, COMPREHENSIVE    Collection Time: 04/01/22  7:01 AM   Result Value Ref Range    Sodium 137 136 - 145 mmol/L    Potassium 3.3 (L) 3.5 - 5.1 mmol/L    Chloride 104 97 - 108 mmol/L    CO2 18 (L) 21 - 32 mmol/L    Anion gap 15 5 - 15 mmol/L    Glucose 79 65 - 100 mg/dL    BUN 5 (L) 6 - 20 mg/dL    Creatinine 0.52 (L) 0.55 - 1.02 mg/dL    BUN/Creatinine ratio 10 (L) 12 - 20      GFR est AA >60 >60 ml/min/1.73m2    GFR est non-AA >60 >60 ml/min/1.73m2    Calcium 8.6 8.5 - 10.1 mg/dL    Bilirubin, total 0.6 0.2 - 1.0 mg/dL    AST (SGOT) 50 (H) 15 - 37 U/L    ALT (SGPT) 37 12 - 78 U/L    Alk. phosphatase 113 45 - 117 U/L    Protein, total 5.8 (L) 6.4 - 8.2 g/dL    Albumin 3.2 (L) 3.5 - 5.0 g/dL    Globulin 2.6 2.0 - 4.0 g/dL    A-G Ratio 1.2 1.1 - 2.2                NM HEPATOBILIARY DUCT SCAN   Final Result   Findings concerning for cystic duct compromise. CT ABD PELV WO CONT   Final Result   Gallbladder wall and pericystic changes. Mild prominence of the   common bile duct. This represents change from the prior study. Ultrasound   earlier this evening shows cholelithiasis. Cholecystitis and/or cholangitis   should be considered clinically. Consider radionuclide hepatobiliary scan   depending on clinical setting                US GALLBLADDER   Final Result   Limited by bowel gas. 1. Cholelithiasis. No sonographic evidence of cholecystitis. However the common   bile duct is dilated which may be chronic. CTA CHEST W OR W WO CONT   Final Result   1. No pulmonary embolus.    2. Interval increase of peribronchial and perivascular soft tissue thickening possibly lymphoid or neurofibromatosis in origin. Unchanged left lung   bronchovascular nodule, posterior mediastinal and retroperitoneal abnormal soft   tissue. 3. Bilateral hazy airspace edema or pneumonia. 4. Haziness of the gallbladder. Correlate clinically for cholecystitis. XR CHEST PORT   Final Result   No acute process or active disease. Assessment:     Active Problems:    Chest pain (3/28/2022)        Plan:   1. Abdominal Pain/Gallstones     3/31 s/p Laparoscopic cholecystectomy     Continue Pain management     Antiemetics as needed     HIDA 3/29/22: Findings concerning for cystic duct compromise.     Continue IV Hydration     Surgery input appreciated.      Continue PPI      Possible EGD as an out patient    GI is signing off. Please reconsult as needed. Follow up as outpatient. Patient discussed with Dr Paulo Lang and agrees to above impression and plan. Thank you for allowing me to participate in this patients care    Signed By: Katarzyna Martinez.  SHANNEN Ley     April 1, 2022

## 2022-04-01 NOTE — PROGRESS NOTES
CM spoke with patient's PACE , Luis Angel Baptiste [phone], who expressed concern about patient returning home and asked that PT/OT evaluate patient before d/c, order entered. [facility] Morse stated that they only contract with a few SNF, they are [facility], [facility], [place], and [facility]. Ms. Jose Luis Gayle said they would not be able to get patient Located within [facility] but that she can participate in their day center therapy program where transport takes her to the center to have therapy. CM met with patient and her daughter to discuss DCP. CM explained that she may not be able to return home as she does not have 24/7 care at home as her daughter works. Patient is very hesitant and states that she was at [facility] in the past and called it a \"dive\" and did not want to return. CM explained that there are other SNF and that we would need to see PT/OT reccs. Patient asked that she work with PT/OT prior to making a decision. CM to follow up at a later time to obtain SNF choice. 3:00 PM - CM spoke to PT/OT, they recc that patient can return home at this time. CM attempted to meet with patient to discuss, patient asleep at this time, CM attempted to speak with daughter, [facility] [phone], via phone however no answer at this time, CM left VM explained PT/OT recc.

## 2022-04-01 NOTE — MED STUDENT NOTES
HISTORY & PHYSICAL      Patient: Jocelyn Navas MRN: [id number]  SSN: [id number]    YOB: 1960  Age: 59 y.o. Sex: female      Primary Care Provider: Rosalio, MD Henry  Source of Information:       Subjective     CC:   Chief Complaint   Patient presents with    Chest Pain       HPI: Corey hdz(n) 59 y. o. female with PMH significant for arthritis, depression, epilepsy, hypertension, neurofibromatosis, and thyroid disease who presents with chest pain.       Upon presentation, patient stated her symptoms began that morning. She describes her pain being in the center of her chest and radiates to the back and shoulder. Patient complained of her pain which awakened her at 5:30 this morning, she states the pain is 9/10, constant, without exacerbating symptoms. The patient is currently on HYDROmorphone (DILAUDID) injection 1 mg. She states the Dilaudid is greatly helping with her pain management.  No fever, cough, or other associated symptoms.  Denies leg swelling unilaterally, history of PE/DVT, or pleuritic chest pain.     Originally, a ultrasound was conducted (3/28/2022 @22:05) revealing cholelithiasis and the common bile duct is dilated which may be chronic. However, the U/S gallbladder was limited by bowel gas.     The recent, CT ABD Pelvis w/o contrast (3/28/2022 @22:55) revealed Gallbladder wall and pericystic changes. Mild prominence of the common bile duct. Thus, cholecystitis and/or cholangitis should be considered clinically. Patient is scheduled for MRCP today.      Surgery was consulted in the ER seen by the surgeon ordered MRI of the abdomen and the HIDA scan      3/30/2022  Pt continues to report pain being in the center of her chest and radiates to the back and shoulder. She states the pain is 9/10, constant, without exacerbating symptoms. The patient is currently on HYDROmorphone (DILAUDID) injection 1 mg.  She states the Dilaudid is greatly helping with her pain management.  No fever, cough, or other associated symptoms.  Denies leg swelling unilaterally, history of PE/DVT, or pleuritic chest pain. Patient daughter and sister indicate Dilaudid 1 mg every 4 hours as needed for her chronic pain/discussed the side effects she understand    Cardiology - CXR (3/28/2022) without congestion. The heart, mediastinum and pulmonary vasculature are normal. The lungs are well expanded and clear. The bony thorax is intact. When compared to the prior exam, there is no significant change. HS troponin negative x 2. NSR: HR 77, no ischemia. General Surgery: HIDA did not visualize GB but has free spillage in to duodenum. Yesterday, patient's dilaudid was changed from 0.5mg q 4hrs instead of 1mg. However, the patient reports increased pain. Patient's dilaudid was returned to 1mg q 4hrs this morning. HIDA 3/29/22: There is a normal distribution of activity through the liver. Common bile duct activity noted with free spill of activity into small bowel loops. With images carried out to 2 hours, no gallbladder activity is evident. IMPRESSION Findings concerning for cystic duct compromise    Significant labs:   WBC 9.6  Neutrophils 84  aPTT 34.4  Creatinine 0.46      3/31/2022  Pt continues to report pain being in the center of her chest and radiates to the back and shoulder. She states the pain is 9/10, constant, without exacerbating symptoms. The patient is currently on HYDROmorphone (DILAUDID) injection 1 mg. She states the Dilaudid is greatly helping with her pain management.      Today, the patient complains of a sinus headache. However, she denies fever, cough, or other associated symptoms.  Denies leg swelling unilaterally, history of PE/DVT, or pleuritic chest pain.     Patient daughter and sister indicate Dilaudid 1 mg every 4 hours as needed for her chronic pain/discussed the side effects she understand    Colon and Rectal Surgery: Patient previously requested Dr. Yojana Reyes to be involved with her case; Dr. Jacob Aguilar has seen the patient and has recommended a laparoscopic cholecystectomy which is scheduled for this afternoon. General Surgery: HIDA did not visualize GB but has free spillage in to duodenum. HIDA 3/29/22: There is a normal distribution of activity through the liver. Common bile duct activity noted with free spill of activity into small bowel loops. With images carried out to 2 hours, no gallbladder activity is evident. IMPRESSION Findings concerning for cystic duct compromise    Echo (3/30/2022) -  Left ventricle size is normal. Increased wall thickness. Normal wall motion. Normal left ventricular systolic function with a visually estimated EF of greater than 65%. Normal diastolic function    Cardiology - CXR (3/28/2022) without congestion. The heart, mediastinum and pulmonary vasculature are normal. The lungs are well expanded and clear. The bony thorax is intact. When compared to the prior exam, there is no significant change. HS troponin negative x 2. NSR: HR 77, no ischemia. Significant labs: (Labs from 3/30/2022  WBC 9.6  Neutrophils 84  aPTT 34.4  Creatinine 0.46    Patient seen by the surgeon and plan for laparoscopic cholecystectomy today    Patient taking Dilaudid every 4 hours have a detailed discussion with the patient patient daughter recommend to continue discussed about the side effects and the complications      8/0/0874  Colon and Rectal Surgery: Dr. Jacob Aguilar conducted laparoscopic cholecystectomy yesterday with no complications. Other Findings: Significant omental adhesions and significant inflammatory changes at the infundibulum. Post-surgery patient stated her neck was hurting and it felt like a knot. Patient received one time dose of Flexeril 5mg. Today, the patient stats that her neck pain and \"knot sensation\" has resolved.      Pts daughter is also requesting that the pt does not receive Linzess until they can establish a regular bowel program.     Today, the patient complains of a nausea, fatigue, and soreness at the surgery laparoscopic incision location. However, she denies fever, cough, or other associated symptoms.  Denies leg swelling unilaterally, history of PE/DVT, or pleuritic chest pain. Patient taking Dilaudid every 4 hours have a detailed discussion with the patient patient daughter recommend to continue discussed about the side effects and the complications    Significant labs:  WBC 9.5  Neutrophils 68  BUN 5  Creatinine 0.52    Past Medical History:   Diagnosis Date    Arthritis     Depression     Epilepsy ([address]     Hypertension     Ill-defined condition     neurofibromatosis    Thyroid disease         Past Surgical History:   Procedure Laterality Date    HX GYN      HX ORTHOPAEDIC      NEUROLOGICAL PROCEDURE UNLISTED         Prior to Admission medications    Medication Sig Start Date End Date Taking? Authorizing Provider   venlafajenaro [facility]) 75 mg tablet Take 75 mg by mouth daily. Yes Provider, Historical   ergocalciferol (DrisdoL) 1,250 mcg (50,000 unit) capsule Take 50,000 Units by mouth every thirty (30) days. Take on the 15th day of the month   Yes Provider, Historical   linaCLOtide (Linzess) 145 mcg cap capsule Take 145 mcg by mouth Daily (before breakfast). Yes Other, MD Henry   senna-docusate (PERICOLACE) 8.6-50 mg per tablet Take 2 Tablets by mouth daily. Yes Other, MD Henry   atorvastatin (LIPITOR) 40 mg tablet Take 40 mg by mouth daily. Yes Provider, Historical   Xtampza ER 36 mg capsule Take 1 Capsule by mouth two (2) times a day. 9/19/21  Yes Provider, Historical   gabapentin (NEURONTIN) 800 mg tablet Take 400 mg by mouth three (3) times daily. 9/9/21  Yes Provider, Historical   famotidine (Pepcid) 20 mg tablet Take 20 mg by mouth two (2) times a day. Yes Other, MD Henry   celecoxib (CELEBREX) 200 mg capsule Take 100 mg by mouth two (2) times a day.    Yes Other, MD Henry   aspirin 81 mg chewable tablet Take 81 mg by mouth nightly. Yes Other, MD Henry   carBAMazepine ER (CARBATROL ER) 300 mg capsule Take 300 mg by mouth two (2) times a day. Yes Provider, Historical   carvedilol (COREG) 6.25 mg tablet Take  by mouth two (2) times daily (with meals). Yes Provider, Historical   levothyroxine (SYNTHROID) 125 mcg tablet Take  by mouth Daily (before breakfast). Yes Provider, Historical   busPIRone (BUSPAR) 30 mg tablet Take 30 mg by mouth two (2) times a day. Yes Provider, Historical   venlafaxine-SR (EFFEXOR XR) 150 mg capsule Take 150 mg by mouth daily. Yes Provider, Historical   naloxone 8 mg/actuation spry 4 mg by Nasal route as needed. Henry Santamaria MD   oxyCODONE-acetaminophen (Percocet) 7.5-325 mg per tablet Take 1 Tablet by mouth every eight (8) hours as needed for Pain. Henry Santamaria MD   loratadine (Claritin) 10 mg tablet Take 10 mg by mouth daily as needed for Allergies. Henry Santamaria MD   diclofenac (FLECTOR) 1.3 % pt12 1 Patch by TransDERmal route every twelve (12) hours every twelve (12) hours. Henry Santamaria MD   diclofenac (Voltaren) 1 % gel Apply 4 g to affected area four (4) times daily.     Henry Santamaria MD       Allergies   Allergen Reactions    Codeine Nausea and Vomiting    Methadone Hives    Morphine Other (comments)     psychosis        Family History   Problem Relation Age of Onset    Cancer Mother         glioblastoma    Lung Disease Father     Cancer Father         mesothelioma, testicular cancer        Social History     Socioeconomic History    Marital status: UNKNOWN    Highest education level: Associate degree: occupational, technical, or vocational program   Tobacco Use    Smoking status: Never Smoker    Smokeless tobacco: Never Used   Vaping Use    Vaping Use: Former    Substances: CBD (only for 2 months)   Substance and Sexual Activity    Alcohol use: No    Drug use: Never        ROS  Constitutional:  no fever,  no chills,  no sweats, No weakness, No fatigue, No decreased activity. Eye: No recent visual problem, No icterus, No discharge, No double vision. Ear/Nose/Mouth/Throat: No decreased hearing, No ear pain, No nasal congestion, No sore throat. Respiratory: No shortness of breath, No cough, No sputum production, No hemoptysis, No wheezing, No cyanosis. Cardiovascular:  chest pain, No palpitations, No bradycardia, No tachycardia, No peripheral edema, No syncope. Gastrointestinal: No nausea,  No vomiting, No diarrhea, No constipation, No heartburn,   abdominal pain. Genitourinary: No dysuria, No hematuria, No change in urine stream, No urethral discharge, No lesions. Hematology/Lymphatics: No bruising tendency, No bleeding tendency, No petechiae, No swollen lymph glands. Endocrine: No excessive thirst, No polyuria, No cold intolerance, No heat intolerance, No excessive hunger. Immunologic: Not immunocompromised, No recurrent fevers, No recurrent infections. Musculoskeletal: No back pain, No neck pain, No joint pain, No muscle pain, No claudication, No decreased range of motion, No trauma. Integumentary: No rash, No pruritus, No abrasions. Neurologic: Alert and oriented X4, No abnormal balance, No headache, No confusion, No numbness, No tingling. Psychiatric: No anxiety, No depression, No bijan.       Objective     Visit Vitals  BP (!) 80/51 (BP 1 Location: Left upper arm, BP Patient Position: Semi fowlers) Comment: notifed nurse and took twice   Pulse 88   Temp 98.2 °F (36.8 °C)   Resp 18   Ht 5' 1\" (1.549 m)   Wt 61.7 kg (136 lb)   SpO2 95%   Breastfeeding No   BMI 25.70 kg/m²    O2 Flow Rate (L/min): 2 l/min O2 Device: None (Room air)    Physical Exam:   Physical Exam  General: well appearing, no acute distress  Head: Normal  Face: Nornal  HEENT: atraumatic, PERRLA, moist mucosa, normal pharynx, normal tonsils and adenoids, normal tongue, no fluid in sinuses  Neck: Trachea midline, no carotid bruit, no masses  Chest: Normal.  Respiratory: Normal chest wall expansion, CTA B, no r/r/w, no rubs  Cardiovascular: RRR, no m/r/g, Normal S1 and S2  Abdomen: Soft, non tender, non-distended, normal bowel sounds in all quadrants, no hepatosplenomegaly, no tympany. Genitourinary: No inguinal hernia, normal external gentalia,  no renal angle tenderness  Rectal: deferred  Musculoskeletal: normal ROM in upper and lower extremities, No joint swelling. Integumentary: Warm, dry, and pink, with no rash, purpura, or petechia  Heme/Lymph: No lymphadenopathy, no bruises  Neurological:Cranial Nerves II-XII grossly intact, no gross motor or sensory deficit  Psychiatric: Cooperative with normal mood, affect, and cognition      Intake & Output:  Current Shift: 04/01 0701 - 04/01 1900  In: 500 [P.O.:500]  Out: 40 [Drains:40]  Last three shifts: 03/30 1901 - 04/01 0700  In: 1800 [I.V.:1800]  Out: 215 [Drains:65]    Lab/Data Review: All lab results for the last 24 hours reviewed. 24 Hour Results:    Recent Results (from the past 24 hour(s))   CBC WITH AUTOMATED DIFF    Collection Time: 04/01/22  7:01 AM   Result Value Ref Range    WBC 9.5 3.6 - 11.0 K/uL    RBC 4.27 3.80 - 5.20 M/uL    HGB 12.0 11.5 - 16.0 g/dL    HCT 35.3 35.0 - 47.0 %    MCV 82.7 80.0 - 99.0 FL    MCH 28.1 26.0 - 34.0 PG    MCHC 34.0 30.0 - 36.5 g/dL    RDW 13.8 11.5 - 14.5 %    PLATELET 651 927 - 089 K/uL    MPV 10.2 8.9 - 12.9 FL    NRBC 0.0 0.0  WBC    ABSOLUTE NRBC 0.00 0.00 - 0.01 K/uL    NEUTROPHILS 68 32 - 75 %    LYMPHOCYTES 13 12 - 49 %    MONOCYTES 17 (H) 5 - 13 %    EOSINOPHILS 0 0 - 7 %    BASOPHILS 1 0 - 1 %    IMMATURE GRANULOCYTES 1 (H) 0 - 0.5 %    ABS. NEUTROPHILS 6.5 1.8 - 8.0 K/UL    ABS. LYMPHOCYTES 1.3 0.8 - 3.5 K/UL    ABS. MONOCYTES 1.6 (H) 0.0 - 1.0 K/UL    ABS. EOSINOPHILS 0.0 0.0 - 0.4 K/UL    ABS. BASOPHILS 0.1 0.0 - 0.1 K/UL    ABS. IMM.  GRANS. 0.1 (H) 0.00 - 0.04 K/UL    DF AUTOMATED     METABOLIC PANEL, COMPREHENSIVE    Collection Time: 04/01/22  7:01 AM   Result Value Ref Range    Sodium 137 136 - 145 mmol/L    Potassium 3.3 (L) 3.5 - 5.1 mmol/L    Chloride 104 97 - 108 mmol/L    CO2 18 (L) 21 - 32 mmol/L    Anion gap 15 5 - 15 mmol/L    Glucose 79 65 - 100 mg/dL    BUN 5 (L) 6 - 20 mg/dL    Creatinine 0.52 (L) 0.55 - 1.02 mg/dL    BUN/Creatinine ratio 10 (L) 12 - 20      GFR est AA >60 >60 ml/min/1.73m2    GFR est non-AA >60 >60 ml/min/1.73m2    Calcium 8.6 8.5 - 10.1 mg/dL    Bilirubin, total 0.6 0.2 - 1.0 mg/dL    AST (SGOT) 50 (H) 15 - 37 U/L    ALT (SGPT) 37 12 - 78 U/L    Alk. phosphatase 113 45 - 117 U/L    Protein, total 5.8 (L) 6.4 - 8.2 g/dL    Albumin 3.2 (L) 3.5 - 5.0 g/dL    Globulin 2.6 2.0 - 4.0 g/dL    A-G Ratio 1.2 1.1 - 2.2           Imaging:    NM HEPATOBILIARY DUCT SCAN   Final Result   Findings concerning for cystic duct compromise. CT ABD PELV WO CONT   Final Result   Gallbladder wall and pericystic changes. Mild prominence of the   common bile duct. This represents change from the prior study. Ultrasound   earlier this evening shows cholelithiasis. Cholecystitis and/or cholangitis   should be considered clinically. Consider radionuclide hepatobiliary scan   depending on clinical setting                US GALLBLADDER   Final Result   Limited by bowel gas. 1. Cholelithiasis. No sonographic evidence of cholecystitis. However the common   bile duct is dilated which may be chronic. CTA CHEST W OR W WO CONT   Final Result   1. No pulmonary embolus. 2. Interval increase of peribronchial and perivascular soft tissue thickening   possibly lymphoid or neurofibromatosis in origin. Unchanged left lung   bronchovascular nodule, posterior mediastinal and retroperitoneal abnormal soft   tissue. 3. Bilateral hazy airspace edema or pneumonia. 4. Haziness of the gallbladder. Correlate clinically for cholecystitis. XR CHEST PORT   Final Result   No acute process or active disease.             Assessment     Chest pain rule out MI  Gallstone  Right upper quadrant pain s/p laparoscopic cholecystectomy  History of neurofibromatosis  Hypertension  Hypothyroidism  Seizure disorder  Hyperlipidemia  Anxiety disorder  Chronic pain syndrome      CT abdomen - showed gallbladder wall and pericystic changes. HIDA - Common bile duct activity noted with free spill of activity into small bowel loops. Findings concerning for cystic duct compromise  Echo (3/30/2022) -  Left ventricle size is normal. Increased wall thickness. Normal wall motion. Normal left ventricular systolic function with a visually estimated EF of greater than 65%. Normal diastolic function      Dr. Sheron Yañez conducted laparoscopic cholecystectomy yesterday with no complications. Other Findings: Significant omental adhesions and significant inflammatory changes at the infundibulum. Plan     Medications  Lipitor 40 mg daily  BuSpar 30 mg twice a day  Carbamazepine  mg twice a day  Coreg 3.125 twice a day  Pepcid 20 twice daily  Neurontin 400 mg 3 times a day  Levothyroxine 125 mcg daily  Linzess 145 mg daily  Zosyn 3.375 IV every 8 hours  Effexor  mg daily      1. Continue IV fluids  2. Cardiology is surgical and GI consult  3. Discussed with the patient daughter/concern about pain medication at this time   4. We will continue Dilaudid 1 mg every 4 hours as needed  5.   Advance diet as tolerated    Possible discharge home tomorrow discussed with surgery        Signed By: Adina Bonner MD     April 1, 2022

## 2022-04-01 NOTE — PROGRESS NOTES
1145 - Pt BP 80/51. Asymptomatic. MD notified. Order to give a NS 250ml bolus obtained. All other vitals are stable. Pt is A/O/4. Stable

## 2022-04-01 NOTE — PROGRESS NOTES
Problem: Falls - Risk of  Goal: *Absence of Falls  Description: Document Ag Mandes Fall Risk and appropriate interventions in the flowsheet.   Outcome: Progressing Towards Goal  Note: Fall Risk Interventions:  Mobility Interventions: Bed/chair exit alarm,Patient to call before getting OOB         Medication Interventions: Bed/chair exit alarm,Patient to call before getting OOB    Elimination Interventions: Bed/chair exit alarm,Call light in reach,Patient to call for help with toileting needs              Problem: Patient Education: Go to Patient Education Activity  Goal: Patient/Family Education  Outcome: Progressing Towards Goal     Problem: General Medical Care Plan  Goal: *Vital signs within specified parameters  Outcome: Progressing Towards Goal  Goal: *Skin integrity maintained  Outcome: Progressing Towards Goal

## 2022-04-02 LAB
ALBUMIN SERPL-MCNC: 2.7 G/DL (ref 3.5–5)
ALBUMIN/GLOB SERPL: 1 {RATIO} (ref 1.1–2.2)
ALP SERPL-CCNC: 93 U/L (ref 45–117)
ALT SERPL-CCNC: 36 U/L (ref 12–78)
ANION GAP SERPL CALC-SCNC: 7 MMOL/L (ref 5–15)
AST SERPL W P-5'-P-CCNC: 43 U/L (ref 15–37)
BILIRUB SERPL-MCNC: 0.5 MG/DL (ref 0.2–1)
BUN SERPL-MCNC: 4 MG/DL (ref 6–20)
BUN/CREAT SERPL: 8 (ref 12–20)
CA-I BLD-MCNC: 8 MG/DL (ref 8.5–10.1)
CHLORIDE SERPL-SCNC: 106 MMOL/L (ref 97–108)
CO2 SERPL-SCNC: 28 MMOL/L (ref 21–32)
CREAT SERPL-MCNC: 0.49 MG/DL (ref 0.55–1.02)
ERYTHROCYTE [DISTWIDTH] IN BLOOD BY AUTOMATED COUNT: 14.1 % (ref 11.5–14.5)
GLOBULIN SER CALC-MCNC: 2.6 G/DL (ref 2–4)
GLUCOSE SERPL-MCNC: 100 MG/DL (ref 65–100)
HCT VFR BLD AUTO: 29.5 % (ref 35–47)
HGB BLD-MCNC: 9.8 G/DL (ref 11.5–16)
MCH RBC QN AUTO: 27.1 PG (ref 26–34)
MCHC RBC AUTO-ENTMCNC: 33.2 G/DL (ref 30–36.5)
MCV RBC AUTO: 81.5 FL (ref 80–99)
NRBC # BLD: 0 K/UL (ref 0–0.01)
NRBC BLD-RTO: 0 PER 100 WBC
PLATELET # BLD AUTO: 313 K/UL (ref 150–400)
PMV BLD AUTO: 10.1 FL (ref 8.9–12.9)
POTASSIUM SERPL-SCNC: 2.9 MMOL/L (ref 3.5–5.1)
PROT SERPL-MCNC: 5.3 G/DL (ref 6.4–8.2)
RBC # BLD AUTO: 3.62 M/UL (ref 3.8–5.2)
SODIUM SERPL-SCNC: 141 MMOL/L (ref 136–145)
WBC # BLD AUTO: 7 K/UL (ref 3.6–11)

## 2022-04-02 PROCEDURE — 94762 N-INVAS EAR/PLS OXIMTRY CONT: CPT

## 2022-04-02 PROCEDURE — 74011250636 HC RX REV CODE- 250/636: Performed by: COLON & RECTAL SURGERY

## 2022-04-02 PROCEDURE — 36415 COLL VENOUS BLD VENIPUNCTURE: CPT

## 2022-04-02 PROCEDURE — 65270000029 HC RM PRIVATE

## 2022-04-02 PROCEDURE — 74011250637 HC RX REV CODE- 250/637: Performed by: FAMILY MEDICINE

## 2022-04-02 PROCEDURE — 80053 COMPREHEN METABOLIC PANEL: CPT

## 2022-04-02 PROCEDURE — 74011000258 HC RX REV CODE- 258: Performed by: FAMILY MEDICINE

## 2022-04-02 PROCEDURE — 74011250636 HC RX REV CODE- 250/636: Performed by: FAMILY MEDICINE

## 2022-04-02 PROCEDURE — 99024 POSTOP FOLLOW-UP VISIT: CPT | Performed by: COLON & RECTAL SURGERY

## 2022-04-02 PROCEDURE — 85027 COMPLETE CBC AUTOMATED: CPT

## 2022-04-02 RX ORDER — POTASSIUM CHLORIDE 7.45 MG/ML
10 INJECTION INTRAVENOUS
Status: COMPLETED | OUTPATIENT
Start: 2022-04-02 | End: 2022-04-02

## 2022-04-02 RX ORDER — POTASSIUM CHLORIDE 750 MG/1
40 TABLET, FILM COATED, EXTENDED RELEASE ORAL
Status: COMPLETED | OUTPATIENT
Start: 2022-04-02 | End: 2022-04-02

## 2022-04-02 RX ORDER — HYDROMORPHONE HYDROCHLORIDE 1 MG/ML
1 INJECTION, SOLUTION INTRAMUSCULAR; INTRAVENOUS; SUBCUTANEOUS
Status: DISCONTINUED | OUTPATIENT
Start: 2022-04-02 | End: 2022-04-03 | Stop reason: HOSPADM

## 2022-04-02 RX ADMIN — GABAPENTIN 400 MG: 400 CAPSULE ORAL at 20:56

## 2022-04-02 RX ADMIN — HYDROMORPHONE HYDROCHLORIDE 1 MG: 1 INJECTION, SOLUTION INTRAMUSCULAR; INTRAVENOUS; SUBCUTANEOUS at 02:30

## 2022-04-02 RX ADMIN — POTASSIUM CHLORIDE 10 MEQ: 7.46 INJECTION, SOLUTION INTRAVENOUS at 13:11

## 2022-04-02 RX ADMIN — HYDROMORPHONE HYDROCHLORIDE 1 MG: 1 INJECTION, SOLUTION INTRAMUSCULAR; INTRAVENOUS; SUBCUTANEOUS at 08:01

## 2022-04-02 RX ADMIN — BUSPIRONE HYDROCHLORIDE 30 MG: 10 TABLET ORAL at 08:01

## 2022-04-02 RX ADMIN — FAMOTIDINE 20 MG: 20 TABLET, FILM COATED ORAL at 20:56

## 2022-04-02 RX ADMIN — CARVEDILOL 3.12 MG: 3.12 TABLET, FILM COATED ORAL at 08:02

## 2022-04-02 RX ADMIN — BUSPIRONE HYDROCHLORIDE 30 MG: 10 TABLET ORAL at 20:55

## 2022-04-02 RX ADMIN — PIPERACILLIN AND TAZOBACTAM 3.38 G: 3; .375 INJECTION, POWDER, LYOPHILIZED, FOR SOLUTION INTRAVENOUS at 04:08

## 2022-04-02 RX ADMIN — HYDROMORPHONE HYDROCHLORIDE 1 MG: 1 INJECTION, SOLUTION INTRAMUSCULAR; INTRAVENOUS; SUBCUTANEOUS at 13:47

## 2022-04-02 RX ADMIN — CARVEDILOL 3.12 MG: 3.12 TABLET, FILM COATED ORAL at 16:29

## 2022-04-02 RX ADMIN — PIPERACILLIN AND TAZOBACTAM 3.38 G: 3; .375 INJECTION, POWDER, LYOPHILIZED, FOR SOLUTION INTRAVENOUS at 20:57

## 2022-04-02 RX ADMIN — GABAPENTIN 400 MG: 400 CAPSULE ORAL at 16:27

## 2022-04-02 RX ADMIN — POTASSIUM CHLORIDE 40 MEQ: 750 TABLET, FILM COATED, EXTENDED RELEASE ORAL at 10:36

## 2022-04-02 RX ADMIN — CARBAMAZEPINE 300 MG: 300 CAPSULE, EXTENDED RELEASE ORAL at 21:25

## 2022-04-02 RX ADMIN — PIPERACILLIN AND TAZOBACTAM 3.38 G: 3; .375 INJECTION, POWDER, LYOPHILIZED, FOR SOLUTION INTRAVENOUS at 12:26

## 2022-04-02 RX ADMIN — VENLAFAXINE HYDROCHLORIDE 150 MG: 75 CAPSULE, EXTENDED RELEASE ORAL at 08:01

## 2022-04-02 RX ADMIN — GABAPENTIN 400 MG: 400 CAPSULE ORAL at 08:02

## 2022-04-02 RX ADMIN — LEVOTHYROXINE SODIUM 125 MCG: 0.03 TABLET ORAL at 08:02

## 2022-04-02 RX ADMIN — ATORVASTATIN CALCIUM 40 MG: 40 TABLET, FILM COATED ORAL at 08:01

## 2022-04-02 RX ADMIN — CARBAMAZEPINE 300 MG: 300 CAPSULE, EXTENDED RELEASE ORAL at 08:02

## 2022-04-02 RX ADMIN — HYDROMORPHONE HYDROCHLORIDE 1 MG: 1 INJECTION, SOLUTION INTRAMUSCULAR; INTRAVENOUS; SUBCUTANEOUS at 22:58

## 2022-04-02 RX ADMIN — HYDROMORPHONE HYDROCHLORIDE 1 MG: 1 INJECTION, SOLUTION INTRAMUSCULAR; INTRAVENOUS; SUBCUTANEOUS at 18:42

## 2022-04-02 RX ADMIN — POTASSIUM CHLORIDE 10 MEQ: 7.46 INJECTION, SOLUTION INTRAVENOUS at 11:50

## 2022-04-02 RX ADMIN — SODIUM CHLORIDE 50 ML/HR: 9 INJECTION, SOLUTION INTRAVENOUS at 13:11

## 2022-04-02 RX ADMIN — POTASSIUM CHLORIDE 10 MEQ: 7.46 INJECTION, SOLUTION INTRAVENOUS at 10:14

## 2022-04-02 RX ADMIN — FAMOTIDINE 20 MG: 20 TABLET, FILM COATED ORAL at 08:01

## 2022-04-02 RX ADMIN — ACETAMINOPHEN 650 MG: 325 TABLET ORAL at 16:29

## 2022-04-02 NOTE — PROGRESS NOTES
Progress Note    Patient: Manish Ram MRN: [id number]  SSN: [id number]    YOB: 1960  Age: 59 y.o.   Sex: female      Admit Date: 3/28/2022    LOS: 3 days     Subjective:   Postoperative day 2 status post laparoscopic cholecystectomy  Patient is seen in bed no complaints  Tolerating diet    Objective:     Vitals:    04/02/22 0037 04/02/22 0335 04/02/22 0720 04/02/22 1058   BP: 107/66 (!) 92/57 119/63    Pulse: 90 93 95    Resp: 23 25 16    Temp: 97.9 °F (36.6 °C) 99 °F (37.2 °C) 98.3 °F (36.8 °C)    SpO2: 97% 95% 98% 96%   Weight:       Height:            Intake and Output:  Current Shift: 04/02 0701 - 04/02 1900  In: -   Out: 730 [Urine:700; Drains:30]  Last three shifts: 03/31 1901 - 04/02 0700  In: 500 [P.O.:500]  Out: 1915 [Urine:1700; Drains:215]    Review of Systems:  ROS     Physical Exam:   Abdomen soft, appropriately tender at incisions, nondistended, incisions clean, WANG drain still some dark maroon-tinged serosanguineous drainage    Lab/Data Review:  Recent Results (from the past 24 hour(s))   CBC W/O DIFF    Collection Time: 04/02/22  7:42 AM   Result Value Ref Range    WBC 7.0 3.6 - 11.0 K/uL    RBC 3.62 (L) 3.80 - 5.20 M/uL    HGB 9.8 (L) 11.5 - 16.0 g/dL    HCT 29.5 (L) 35.0 - 47.0 %    MCV 81.5 80.0 - 99.0 FL    MCH 27.1 26.0 - 34.0 PG    MCHC 33.2 30.0 - 36.5 g/dL    RDW 14.1 11.5 - 14.5 %    PLATELET 021 595 - 875 K/uL    MPV 10.1 8.9 - 12.9 FL    NRBC 0.0 0.0  WBC    ABSOLUTE NRBC 0.00 0.00 - 0.38 K/uL   METABOLIC PANEL, COMPREHENSIVE    Collection Time: 04/02/22  7:42 AM   Result Value Ref Range    Sodium 141 136 - 145 mmol/L    Potassium 2.9 (L) 3.5 - 5.1 mmol/L    Chloride 106 97 - 108 mmol/L    CO2 28 21 - 32 mmol/L    Anion gap 7 5 - 15 mmol/L    Glucose 100 65 - 100 mg/dL    BUN 4 (L) 6 - 20 mg/dL    Creatinine 0.49 (L) 0.55 - 1.02 mg/dL    BUN/Creatinine ratio 8 (L) 12 - 20      GFR est AA >60 >60 ml/min/1.73m2    GFR est non-AA >60 >60 ml/min/1.73m2    Calcium 8.0 (L) 8.5 - 10.1 mg/dL    Bilirubin, total 0.5 0.2 - 1.0 mg/dL    AST (SGOT) 43 (H) 15 - 37 U/L    ALT (SGPT) 36 12 - 78 U/L    Alk.  phosphatase 93 45 - 117 U/L    Protein, total 5.3 (L) 6.4 - 8.2 g/dL    Albumin 2.7 (L) 3.5 - 5.0 g/dL    Globulin 2.6 2.0 - 4.0 g/dL    A-G Ratio 1.0 (L) 1.1 - 2.2            Assessment:     Active Problems:    Chest pain (3/28/2022)        Plan:   Overall doing well  WANG drainage still too high for discharge hopefully will drop down by tomorrow I will be able to discharge home  Repeat CBC in a.m. to follow hemoglobin  Replete potassium    Signed By: Meli Enriquez MD     April 2, 2022

## 2022-04-02 NOTE — PROGRESS NOTES
Problem: Falls - Risk of  Goal: *Absence of Falls  Description: Document Elli Gaviria Fall Risk and appropriate interventions in the flowsheet.   Outcome: Progressing Towards Goal  Note: Fall Risk Interventions:  Mobility Interventions: Bed/chair exit alarm,Patient to call before getting OOB         Medication Interventions: Bed/chair exit alarm,Patient to call before getting OOB    Elimination Interventions: Bed/chair exit alarm,Call light in reach,Patient to call for help with toileting needs              Problem: Patient Education: Go to Patient Education Activity  Goal: Patient/Family Education  Outcome: Progressing Towards Goal     Problem: General Medical Care Plan  Goal: *Vital signs within specified parameters  Outcome: Progressing Towards Goal

## 2022-04-02 NOTE — PROGRESS NOTES
HPI: Aroldo hdz(n) 59 y. o. female with PMH significant for arthritis, depression, epilepsy, hypertension, neurofibromatosis, and thyroid disease who presents with chest pain.       Upon presentation, patient stated her symptoms began that morning. She describes her pain being in the center of her chest and radiates to the back and shoulder. Patient complained of her pain which awakened her at 5:30 this morning, she states the pain is 9/10, constant, without exacerbating symptoms. The patient is currently on HYDROmorphone (DILAUDID) injection 1 mg. She states the Dilaudid is greatly helping with her pain management.  No fever, cough, or other associated symptoms.  Denies leg swelling unilaterally, history of PE/DVT, or pleuritic chest pain.     Originally, a ultrasound was conducted (3/28/2022 @22:05) revealing cholelithiasis and the common bile duct is dilated which may be chronic. However, the U/S gallbladder was limited by bowel gas.     The recent, CT ABD Pelvis w/o contrast (3/28/2022 @22:55) revealed Gallbladder wall and pericystic changes. Mild prominence of the common bile duct. Thus, cholecystitis and/or cholangitis should be considered clinically. Patient is scheduled for MRCP today.      Surgery was consulted in the ER seen by the surgeon ordered MRI of the abdomen and the HIDA scan        3/30/2022  Pt continues to report pain being in the center of her chest and radiates to the back and shoulder. She states the pain is 9/10, constant, without exacerbating symptoms. The patient is currently on HYDROmorphone (DILAUDID) injection 1 mg.  She states the Dilaudid is greatly helping with her pain management.  No fever, cough, or other associated symptoms.  Denies leg swelling unilaterally, history of PE/DVT, or pleuritic chest pain.     Patient daughter and sister indicate Dilaudid 1 mg every 4 hours as needed for her chronic pain/discussed the side effects she understand     Cardiology - CXR (3/28/2022) without congestion. The heart, mediastinum and pulmonary vasculature are normal. The lungs are well expanded and clear. The bony thorax is intact. When compared to the prior exam, there is no significant change. HS troponin negative x 2. NSR: HR 77, no ischemia.      General Surgery: HIDA did not visualize GB but has free spillage in to duodenum. Yesterday, patient's dilaudid was changed from 0.5mg q 4hrs instead of 1mg. However, the patient reports increased pain. Patient's dilaudid was returned to 1mg q 4hrs this morning.      HIDA 3/29/22: There is a normal distribution of activity through the liver. Common bile duct activity noted with free spill of activity into small bowel loops.  With images carried out to 2 hours, no gallbladder activity is evident. IMPRESSION Findings concerning for cystic duct compromise     Significant labs:   WBC 9.6  Neutrophils 84  aPTT 34.4  Creatinine 0.46        3/31/2022  Pt continues to report pain being in the center of her chest and radiates to the back and shoulder. She states the pain is 9/10, constant, without exacerbating symptoms. The patient is currently on HYDROmorphone (DILAUDID) injection 1 mg. She states the Dilaudid is greatly helping with her pain management.       Today, the patient complains of a sinus headache.  However, she denies fever, cough, or other associated symptoms.  Denies leg swelling unilaterally, history of PE/DVT, or pleuritic chest pain.     Patient daughter and sister indicate Dilaudid 1 mg every 4 hours as needed for her chronic pain/discussed the side effects she understand     Colon and Rectal Surgery: Patient previously requested Dr. Naomi Ambrocio to be involved with her case; Dr. Naomi Ambrocio has seen the patient and has recommended a laparoscopic cholecystectomy which is scheduled for this afternoon.      General Surgery: HIDA did not visualize GB but has free spillage in to duodenum.      HIDA 3/29/22: There is a normal distribution of activity through the liver. Common bile duct activity noted with free spill of activity into small bowel loops.  With images carried out to 2 hours, no gallbladder activity is evident. IMPRESSION Findings concerning for cystic duct compromise     Echo (3/30/2022) -  Left ventricle size is normal. Increased wall thickness. Normal wall motion. Normal left ventricular systolic function with a visually estimated EF of greater than 65%. Normal diastolic function     Cardiology - CXR (3/28/2022) without congestion. The heart, mediastinum and pulmonary vasculature are normal. The lungs are well expanded and clear. The bony thorax is intact. When compared to the prior exam, there is no significant change. HS troponin negative x 2. NSR: HR 77, no ischemia.      Significant labs: (Labs from 3/30/2022  WBC 9.6  Neutrophils 84  aPTT 34.4  Creatinine 0.46     Patient seen by the surgeon and plan for laparoscopic cholecystectomy today     Patient taking Dilaudid every 4 hours have a detailed discussion with the patient patient daughter recommend to continue discussed about the side effects and the complications                Past Medical History:   Diagnosis Date    Arthritis      Depression      Epilepsy ([facility] [address]      Hypertension      Ill-defined condition       neurofibromatosis    Thyroid disease                 Past Surgical History:   Procedure Laterality Date    HX GYN        HX ORTHOPAEDIC        NEUROLOGICAL PROCEDURE UNLISTED                     Prior to Admission medications    Medication Sig Start Date End Date Taking? Authorizing Provider   venlafaxine [facility]) 75 mg tablet Take 75 mg by mouth daily.     Yes Provider, Historical   ergocalciferol (DrisdoL) 1,250 mcg (50,000 unit) capsule Take 50,000 Units by mouth every thirty (30) days.  Take on the 15th day of the month     Yes Provider, Historical   linaCLOtide (Linzess) 145 mcg cap capsule Take 145 mcg by mouth Daily (before breakfast).     Yes Other, MD Henry senna-docusate (PERICOLACE) 8.6-50 mg per tablet Take 2 Tablets by mouth daily.     Yes Rosalio, MD Henry   atorvastatin (LIPITOR) 40 mg tablet Take 40 mg by mouth daily.     Yes Provider, Historical   Xtampza ER 36 mg capsule Take 1 Capsule by mouth two (2) times a day. 9/19/21   Yes Provider, Historical   gabapentin (NEURONTIN) 800 mg tablet Take 400 mg by mouth three (3) times daily. 9/9/21   Yes Provider, Historical   famotidine (Pepcid) 20 mg tablet Take 20 mg by mouth two (2) times a day.     Yes Other, MD Henry   celecoxib (CELEBREX) 200 mg capsule Take 100 mg by mouth two (2) times a day.     Yes Rosalio, MD Henry   aspirin 81 mg chewable tablet Take 81 mg by mouth nightly.     Yes Other, MD Henry   carBAMazepine ER (CARBATROL ER) 300 mg capsule Take 300 mg by mouth two (2) times a day.     Yes Provider, Historical   carvedilol (COREG) 6.25 mg tablet Take  by mouth two (2) times daily (with meals).     Yes Provider, Historical   levothyroxine (SYNTHROID) 125 mcg tablet Take  by mouth Daily (before breakfast).     Yes Provider, Historical   busPIRone (BUSPAR) 30 mg tablet Take 30 mg by mouth two (2) times a day.     Yes Provider, Historical   venlafaxine-SR (EFFEXOR XR) 150 mg capsule Take 150 mg by mouth daily.     Yes Provider, Historical   naloxone 8 mg/actuation spry 4 mg by Nasal route as needed.       Rosalio, MD Henry   oxyCODONE-acetaminophen (Percocet) 7.5-325 mg per tablet Take 1 Tablet by mouth every eight (8) hours as needed for Pain.       Rosalio, MD Henry   loratadine (Claritin) 10 mg tablet Take 10 mg by mouth daily as needed for Allergies.        Henry Santamaria MD   diclofenac (FLECTOR) 1.3 % pt12 1 Patch by TransDERmal route every twelve (12) hours every twelve (12) hours.       Henry Santamaria MD   diclofenac (Voltaren) 1 % gel Apply 4 g to affected area four (4) times daily.       Henry Santamaria MD               Allergies   Allergen Reactions    Codeine Nausea and Vomiting    Methadone Hives    Morphine Other (comments)       psychosis               Family History   Problem Relation Age of Onset    Cancer Mother           glioblastoma    Lung Disease Father      Cancer Father           mesothelioma, testicular cancer         Social History              Socioeconomic History    Marital status: UNKNOWN    Highest education level: Associate degree: occupational, technical, or vocational program   Tobacco Use    Smoking status: Never Smoker    Smokeless tobacco: Never Used   Vaping Use    Vaping Use: Former    Substances: CBD (only for 2 months)   Substance and Sexual Activity    Alcohol use: No    Drug use: Never            ROS  Constitutional:  no fever,  no chills,  no sweats, No weakness, No fatigue, No decreased activity. Eye: No recent visual problem, No icterus, No discharge, No double vision. Ear/Nose/Mouth/Throat: No decreased hearing, No ear pain, No nasal congestion, No sore throat. Respiratory: No shortness of breath, No cough, No sputum production, No hemoptysis, No wheezing, No cyanosis. Cardiovascular:  chest pain, No palpitations, No bradycardia, No tachycardia, No peripheral edema, No syncope. Gastrointestinal: No nausea,  No vomiting, No diarrhea, No constipation, No heartburn,   abdominal pain. Genitourinary: No dysuria, No hematuria, No change in urine stream, No urethral discharge, No lesions. Hematology/Lymphatics: No bruising tendency, No bleeding tendency, No petechiae, No swollen lymph glands. Endocrine: No excessive thirst, No polyuria, No cold intolerance, No heat intolerance, No excessive hunger. Immunologic: Not immunocompromised, No recurrent fevers, No recurrent infections. Musculoskeletal: No back pain, No neck pain, No joint pain, No muscle pain, No claudication, No decreased range of motion, No trauma. Integumentary: No rash, No pruritus, No abrasions.   Neurologic: Alert and oriented X4, No abnormal balance, No headache, No confusion, No numbness, No tingling. Psychiatric: No anxiety, No depression, No bijan.        Objective      Visit Vitals  /69 (BP 1 Location: Left upper arm, BP Patient Position: At rest)   Pulse 81   Temp 97.9 °F (36.6 °C)   Resp 18   Ht 5' 1\" (1.549 m)   Wt 61.7 kg (136 lb)   SpO2 99%   Breastfeeding No   BMI 25.70 kg/m²      O2 Device: None (Room air)     Physical Exam:   Physical Exam  General: well appearing, no acute distress  Head: Normal  Face: Nornal  HEENT: atraumatic, PERRLA, moist mucosa, normal pharynx, normal tonsils and adenoids, normal tongue, no fluid in sinuses  Neck: Trachea midline, no carotid bruit, no masses  Chest: Normal.  Respiratory: Normal chest wall expansion, CTA B, no r/r/w, no rubs  Cardiovascular: RRR, no m/r/g, Normal S1 and S2  Abdomen: Soft, non tender, non-distended, normal bowel sounds in all quadrants, no hepatosplenomegaly, no tympany.    Genitourinary: No inguinal hernia, normal external gentalia,  no renal angle tenderness  Rectal: deferred  Musculoskeletal: normal ROM in upper and lower extremities, No joint swelling. Integumentary: Warm, dry, and pink, with no rash, purpura, or petechia  Heme/Lymph: No lymphadenopathy, no bruises  Neurological:Cranial Nerves II-XII grossly intact, no gross motor or sensory deficit  Psychiatric: Cooperative with normal mood, affect, and cognition        Intake & Output:  Current Shift: No intake/output data recorded. Last three shifts: 03/29 1901 - 03/31 0700  In: 400 [P.O.:400]  Out: 802 [Urine:800]     Lab/Data Review:   All lab results for the last 24 hours reviewed.         24 Hour Results:     Recent Results         Recent Results (from the past 24 hour(s))   ECHO ADULT COMPLETE     Collection Time: 03/30/22  3:30 PM   Result Value Ref Range     AV Peak Velocity 1.2 m/s     AV Peak Gradient 5 mmHg     Aortic Root 3.0 cm     IVSd 1.3 (A) 0.6 - 0.9 cm     LVIDd 3.8 (A) 3.9 - 5.3 cm     LVIDs 2.2 cm     LVOT Peak Velocity 0.9 m/s     LVOT Peak Gradient 3 mmHg     LVPWd 1.2 (A) 0.6 - 0.9 cm     LV E' Lateral Velocity 10 cm/s     LV E' Septal Velocity 11 cm/s     LA Diameter 3.2 cm     MV E Wave Deceleration Time 338.0 ms     MV A Velocity 1.27 m/s     MV E Velocity 0.88 m/s     PV Max Velocity 1.1 m/s     PV Peak Gradient 5 mmHg     Est. RA Pressure 3 mmHg     TR Max Velocity 2.17 m/s     TR Peak Gradient 19 mmHg     Fractional Shortening 2D 42 28 - 44 %     LVIDd Index 2.38 cm/m2     LVIDs Index 1.38 cm/m2     LV RWT Ratio 0.63       LV Mass 2D 163.0 (A) 67 - 162 g     LV Mass 2D Index 101.9 (A) 43 - 95 g/m2     MV E/A 0.69       E/E' Ratio (Averaged) 8.40       E/E' Lateral 8.80       E/E' Septal 8.00       LA Size Index 2.00 cm/m2     LA/AO Root Ratio 1.07       Ao Root Index 1.88 cm/m2     AV Velocity Ratio 0.75       RVSP 22 mmHg     EF BP 74 55 - 100 %               Imaging:     NM HEPATOBILIARY DUCT SCAN   Final Result   Findings concerning for cystic duct compromise.       CT ABD PELV WO CONT   Final Result   Gallbladder wall and pericystic changes. Mild prominence of the   common bile duct. This represents change from the prior study. Ultrasound   earlier this evening shows cholelithiasis. Cholecystitis and/or cholangitis   should be considered clinically. Consider radionuclide hepatobiliary scan   depending on clinical setting                   US GALLBLADDER   Final Result   Limited by bowel gas. 1. Cholelithiasis. No sonographic evidence of cholecystitis. However the common   bile duct is dilated which may be chronic.       CTA CHEST W OR W WO CONT   Final Result   1. No pulmonary embolus. 2. Interval increase of peribronchial and perivascular soft tissue thickening   possibly lymphoid or neurofibromatosis in origin. Unchanged left lung   bronchovascular nodule, posterior mediastinal and retroperitoneal abnormal soft   tissue. 3. Bilateral hazy airspace edema or pneumonia. 4. Haziness of the gallbladder.  Correlate clinically for cholecystitis.     XR CHEST PORT   Final Result   No acute process or active disease.              Assessment      Chest pain rule out MI  Gallstone  Right upper quadrant pain  History of neurofibromatosis  Hypertension  Hypothyroidism  Seizure disorder  Hyperlipidemia  Anxiety disorder  Chronic pain syndrome     CT abdomen - showed gallbladder wall and pericystic changes. HIDA - Common bile duct activity noted with free spill of activity into small bowel loops. Findings concerning for cystic duct compromise  Echo (3/30/2022) -  Left ventricle size is normal. Increased wall thickness. Normal wall motion. Normal left ventricular systolic function with a visually estimated EF of greater than 65%. Normal diastolic function     Plan      Medications  Lipitor 40 mg daily  BuSpar 30 mg twice a day  Carbamazepine  mg twice a day  Coreg 3.125 twice a day  Pepcid 20 twice daily  Neurontin 400 mg 3 times a day  Levothyroxine 125 mcg daily  Linzess 145 mg daily  Zosyn 3.375 IV every 8 hours  Effexor  mg daily        1. Continue IV fluids  2. Cardiology is surgical and GI consult  3. Discussed with the patient daughter/concern about pain medication at this time   4.  We will continue Dilaudid 1 mg every 4 hours as needed  5.  N.p.o.   6. Dr. Kane Garcia has recommended laparoscopic cholecystectomy which is scheduled for this afternoon           Signed By: Macho Duenas MD      March 31, 2022

## 2022-04-02 NOTE — PROGRESS NOTES
Problem: Falls - Risk of  Goal: *Absence of Falls  Description: Document Joseph Ray Fall Risk and appropriate interventions in the flowsheet.   Outcome: Progressing Towards Goal  Note: Fall Risk Interventions:  Mobility Interventions: PT Consult for mobility concerns,Patient to call before getting OOB         Medication Interventions: Patient to call before getting OOB    Elimination Interventions: Call light in reach,Patient to call for help with toileting needs              Problem: Patient Education: Go to Patient Education Activity  Goal: Patient/Family Education  Outcome: Progressing Towards Goal     Problem: General Medical Care Plan  Goal: *Vital signs within specified parameters  Outcome: Progressing Towards Goal  Goal: *Labs within defined limits  Outcome: Progressing Towards Goal  Goal: *Absence of infection signs and symptoms  Outcome: Progressing Towards Goal  Goal: *Optimal pain control at patient's stated goal  Outcome: Progressing Towards Goal  Goal: *Skin integrity maintained  Outcome: Progressing Towards Goal  Goal: *Fluid volume balance  Outcome: Progressing Towards Goal  Goal: *Optimize nutritional status  Outcome: Progressing Towards Goal  Goal: *Anxiety reduced or absent  Outcome: Progressing Towards Goal  Goal: *Progressive mobility and function (eg: ADL's)  Outcome: Progressing Towards Goal     Problem: Patient Education: Go to Patient Education Activity  Goal: Patient/Family Education  Outcome: Progressing Towards Goal     Problem: Patient Education: Go to Patient Education Activity  Goal: Patient/Family Education  Outcome: Progressing Towards Goal

## 2022-04-02 NOTE — PROGRESS NOTES
HPI: Suman hdz(n) 59 y. o. female with PMH significant for arthritis, depression, epilepsy, hypertension, neurofibromatosis, and thyroid disease who presents with chest pain.       Upon presentation, patient stated her symptoms began that morning. She describes her pain being in the center of her chest and radiates to the back and shoulder. Patient complained of her pain which awakened her at 5:30 this morning, she states the pain is 9/10, constant, without exacerbating symptoms. The patient is currently on HYDROmorphone (DILAUDID) injection 1 mg. She states the Dilaudid is greatly helping with her pain management.  No fever, cough, or other associated symptoms.  Denies leg swelling unilaterally, history of PE/DVT, or pleuritic chest pain.     Originally, a ultrasound was conducted (3/28/2022 @22:05) revealing cholelithiasis and the common bile duct is dilated which may be chronic. However, the U/S gallbladder was limited by bowel gas.     The recent, CT ABD Pelvis w/o contrast (3/28/2022 @22:55) revealed Gallbladder wall and pericystic changes. Mild prominence of the common bile duct. Thus, cholecystitis and/or cholangitis should be considered clinically. Patient is scheduled for MRCP today.      Surgery was consulted in the ER seen by the surgeon ordered MRI of the abdomen and the HIDA scan        3/30/2022  Pt continues to report pain being in the center of her chest and radiates to the back and shoulder. She states the pain is 9/10, constant, without exacerbating symptoms. The patient is currently on HYDROmorphone (DILAUDID) injection 1 mg.  She states the Dilaudid is greatly helping with her pain management.  No fever, cough, or other associated symptoms.  Denies leg swelling unilaterally, history of PE/DVT, or pleuritic chest pain.     Patient daughter and sister indicate Dilaudid 1 mg every 4 hours as needed for her chronic pain/discussed the side effects she understand     Cardiology - CXR (3/28/2022) without congestion. The heart, mediastinum and pulmonary vasculature are normal. The lungs are well expanded and clear. The bony thorax is intact. When compared to the prior exam, there is no significant change. HS troponin negative x 2. NSR: HR 77, no ischemia.      General Surgery: HIDA did not visualize GB but has free spillage in to duodenum. Yesterday, patient's dilaudid was changed from 0.5mg q 4hrs instead of 1mg. However, the patient reports increased pain. Patient's dilaudid was returned to 1mg q 4hrs this morning.      HIDA 3/29/22: There is a normal distribution of activity through the liver. Common bile duct activity noted with free spill of activity into small bowel loops.  With images carried out to 2 hours, no gallbladder activity is evident. IMPRESSION Findings concerning for cystic duct compromise     Significant labs:   WBC 9.6  Neutrophils 84  aPTT 34.4  Creatinine 0.46        3/31/2022  Pt continues to report pain being in the center of her chest and radiates to the back and shoulder. She states the pain is 9/10, constant, without exacerbating symptoms. The patient is currently on HYDROmorphone (DILAUDID) injection 1 mg. She states the Dilaudid is greatly helping with her pain management.       Today, the patient complains of a sinus headache.  However, she denies fever, cough, or other associated symptoms.  Denies leg swelling unilaterally, history of PE/DVT, or pleuritic chest pain.     Patient daughter and sister indicate Dilaudid 1 mg every 4 hours as needed for her chronic pain/discussed the side effects she understand     Colon and Rectal Surgery: Patient previously requested Dr. Janaury Hickey to be involved with her case; Dr. January Hickey has seen the patient and has recommended a laparoscopic cholecystectomy which is scheduled for this afternoon.      General Surgery: HIDA did not visualize GB but has free spillage in to duodenum.      HIDA 3/29/22: There is a normal distribution of activity through the liver. Common bile duct activity noted with free spill of activity into small bowel loops.  With images carried out to 2 hours, no gallbladder activity is evident. IMPRESSION Findings concerning for cystic duct compromise     Echo (3/30/2022) -  Left ventricle size is normal. Increased wall thickness. Normal wall motion. Normal left ventricular systolic function with a visually estimated EF of greater than 65%. Normal diastolic function     Cardiology - CXR (3/28/2022) without congestion. The heart, mediastinum and pulmonary vasculature are normal. The lungs are well expanded and clear. The bony thorax is intact. When compared to the prior exam, there is no significant change. HS troponin negative x 2. NSR: HR 77, no ischemia.      Significant labs: (Labs from 3/30/2022  WBC 9.6  Neutrophils 84  aPTT 34.4  Creatinine 0.46     Patient seen by the surgeon and plan for laparoscopic cholecystectomy today     Patient taking Dilaudid every 4 hours have a detailed discussion with the patient patient daughter recommend to continue discussed about the side effects and the complications        0/3/0896  Colon and Rectal Surgery: Dr. Justin Abbott conducted laparoscopic cholecystectomy yesterday with no complications. Other Findings: Significant omental adhesions and significant inflammatory changes at the infundibulum.     Post-surgery patient stated her neck was hurting and it felt like a knot. Patient received one time dose of Flexeril 5mg. Today, the patient stats that her neck pain and \"knot sensation\" has resolved.      Pts daughter is also requesting that the pt does not receive Linzess until they can establish a regular bowel program.      Today, the patient complains of a nausea, fatigue, and soreness at the surgery laparoscopic incision location.  However, she denies fever, cough, or other associated symptoms.  Denies leg swelling unilaterally, history of PE/DVT, or pleuritic chest pain.     Patient taking Dilaudid every 4 hours have a detailed discussion with the patient patient daughter recommend to continue discussed about the side effects and the complications     Significant labs:  WBC 9.5  Neutrophils 68  BUN 5  Creatinine 0.52          Past Medical History:   Diagnosis Date    Arthritis      Depression      Epilepsy ([address]      Hypertension      Ill-defined condition       neurofibromatosis    Thyroid disease                 Past Surgical History:   Procedure Laterality Date    HX GYN        HX ORTHOPAEDIC        NEUROLOGICAL PROCEDURE UNLISTED                     Prior to Admission medications    Medication Sig Start Date End Date Taking? Authorizing Provider   venlafaxine [facility]) 75 mg tablet Take 75 mg by mouth daily.     Yes Provider, Historical   ergocalciferol (DrisdoL) 1,250 mcg (50,000 unit) capsule Take 50,000 Units by mouth every thirty (30) days. Take on the 15th day of the month     Yes Provider, Historical   linaCLOtide (Linzess) 145 mcg cap capsule Take 145 mcg by mouth Daily (before breakfast).     Yes Other, MD Henry   senna-docusate (PERICOLACE) 8.6-50 mg per tablet Take 2 Tablets by mouth daily.     Yes Other, MD Henry   atorvastatin (LIPITOR) 40 mg tablet Take 40 mg by mouth daily.     Yes Provider, Historical   Xtampza ER 36 mg capsule Take 1 Capsule by mouth two (2) times a day. 9/19/21   Yes Provider, Historical   gabapentin (NEURONTIN) 800 mg tablet Take 400 mg by mouth three (3) times daily.  9/9/21   Yes Provider, Historical   famotidine (Pepcid) 20 mg tablet Take 20 mg by mouth two (2) times a day.     Yes Other, MD Henry   celecoxib (CELEBREX) 200 mg capsule Take 100 mg by mouth two (2) times a day.     Yes Other, MD Henry   aspirin 81 mg chewable tablet Take 81 mg by mouth nightly.     Yes Other, MD Henry   carBAMazepine ER (CARBATROL ER) 300 mg capsule Take 300 mg by mouth two (2) times a day.     Yes Provider, Historical   carvedilol (COREG) 6.25 mg tablet Take  by mouth two (2) times daily (with meals).     Yes Provider, Historical   levothyroxine (SYNTHROID) 125 mcg tablet Take  by mouth Daily (before breakfast).     Yes Provider, Historical   busPIRone (BUSPAR) 30 mg tablet Take 30 mg by mouth two (2) times a day.     Yes Provider, Historical   venlafaxine-SR (EFFEXOR XR) 150 mg capsule Take 150 mg by mouth daily.     Yes Provider, Historical   naloxone 8 mg/actuation spry 4 mg by Nasal route as needed.       Other, MD Henry   oxyCODONE-acetaminophen (Percocet) 7.5-325 mg per tablet Take 1 Tablet by mouth every eight (8) hours as needed for Pain.       Henry Santamaria MD   loratadine (Claritin) 10 mg tablet Take 10 mg by mouth daily as needed for Allergies.       Henry Santamaria MD   diclofenac (FLECTOR) 1.3 % pt12 1 Patch by TransDERmal route every twelve (12) hours every twelve (12) hours.       Henry Santamaria MD   diclofenac (Voltaren) 1 % gel Apply 4 g to affected area four (4) times daily.       Henry Santamaria MD               Allergies   Allergen Reactions    Codeine Nausea and Vomiting    Methadone Hives    Morphine Other (comments)       psychosis               Family History   Problem Relation Age of Onset    Cancer Mother           glioblastoma    Lung Disease Father      Cancer Father           mesothelioma, testicular cancer         Social History              Socioeconomic History    Marital status: UNKNOWN    Highest education level: Associate degree: occupational, technical, or vocational program   Tobacco Use    Smoking status: Never Smoker    Smokeless tobacco: Never Used   Vaping Use    Vaping Use: Former    Substances: CBD (only for 2 months)   Substance and Sexual Activity    Alcohol use: No    Drug use: Never            ROS  Constitutional:  no fever,  no chills,  no sweats, No weakness, No fatigue, No decreased activity. Eye: No recent visual problem, No icterus, No discharge, No double vision.   Ear/Nose/Mouth/Throat: No decreased hearing, No ear pain, No nasal congestion, No sore throat. Respiratory: No shortness of breath, No cough, No sputum production, No hemoptysis, No wheezing, No cyanosis. Cardiovascular:  chest pain, No palpitations, No bradycardia, No tachycardia, No peripheral edema, No syncope. Gastrointestinal: No nausea,  No vomiting, No diarrhea, No constipation, No heartburn,   abdominal pain. Genitourinary: No dysuria, No hematuria, No change in urine stream, No urethral discharge, No lesions. Hematology/Lymphatics: No bruising tendency, No bleeding tendency, No petechiae, No swollen lymph glands. Endocrine: No excessive thirst, No polyuria, No cold intolerance, No heat intolerance, No excessive hunger. Immunologic: Not immunocompromised, No recurrent fevers, No recurrent infections. Musculoskeletal: No back pain, No neck pain, No joint pain, No muscle pain, No claudication, No decreased range of motion, No trauma. Integumentary: No rash, No pruritus, No abrasions. Neurologic: Alert and oriented X4, No abnormal balance, No headache, No confusion, No numbness, No tingling.   Psychiatric: No anxiety, No depression, No bijan.        Objective      Visit Vitals  BP (!) 80/51 (BP 1 Location: Left upper arm, BP Patient Position: Semi fowlers) Comment: notifed nurse and took twice   Pulse 88   Temp 98.2 °F (36.8 °C)   Resp 18   Ht 5' 1\" (1.549 m)   Wt 61.7 kg (136 lb)   SpO2 95%   Breastfeeding No   BMI 25.70 kg/m²    O2 Flow Rate (L/min): 2 l/min O2 Device: None (Room air)     Physical Exam:   Physical Exam  General: well appearing, no acute distress  Head: Normal  Face: Nornal  HEENT: atraumatic, PERRLA, moist mucosa, normal pharynx, normal tonsils and adenoids, normal tongue, no fluid in sinuses  Neck: Trachea midline, no carotid bruit, no masses  Chest: Normal.  Respiratory: Normal chest wall expansion, CTA B, no r/r/w, no rubs  Cardiovascular: RRR, no m/r/g, Normal S1 and S2  Abdomen: Soft, non tender, non-distended, normal bowel sounds in all quadrants, no hepatosplenomegaly, no tympany.    Genitourinary: No inguinal hernia, normal external gentalia,  no renal angle tenderness  Rectal: deferred  Musculoskeletal: normal ROM in upper and lower extremities, No joint swelling. Integumentary: Warm, dry, and pink, with no rash, purpura, or petechia  Heme/Lymph: No lymphadenopathy, no bruises  Neurological:Cranial Nerves II-XII grossly intact, no gross motor or sensory deficit  Psychiatric: Cooperative with normal mood, affect, and cognition        Intake & Output:  Current Shift: 04/01 0701 - 04/01 1900  In: 500 [P.O.:500]  Out: 40 [Drains:40]  Last three shifts: 03/30 1901 - 04/01 0700  In: 1800 [I.V.:1800]  Out: 215 [Drains:65]     Lab/Data Review: All lab results for the last 24 hours reviewed.         24 Hour Results:     Recent Results         Recent Results (from the past 24 hour(s))   CBC WITH AUTOMATED DIFF     Collection Time: 04/01/22  7:01 AM   Result Value Ref Range     WBC 9.5 3.6 - 11.0 K/uL     RBC 4.27 3.80 - 5.20 M/uL     HGB 12.0 11.5 - 16.0 g/dL     HCT 35.3 35.0 - 47.0 %     MCV 82.7 80.0 - 99.0 FL     MCH 28.1 26.0 - 34.0 PG     MCHC 34.0 30.0 - 36.5 g/dL     RDW 13.8 11.5 - 14.5 %     PLATELET 741 817 - 147 K/uL     MPV 10.2 8.9 - 12.9 FL     NRBC 0.0 0.0  WBC     ABSOLUTE NRBC 0.00 0.00 - 0.01 K/uL     NEUTROPHILS 68 32 - 75 %     LYMPHOCYTES 13 12 - 49 %     MONOCYTES 17 (H) 5 - 13 %     EOSINOPHILS 0 0 - 7 %     BASOPHILS 1 0 - 1 %     IMMATURE GRANULOCYTES 1 (H) 0 - 0.5 %     ABS. NEUTROPHILS 6.5 1.8 - 8.0 K/UL     ABS. LYMPHOCYTES 1.3 0.8 - 3.5 K/UL     ABS. MONOCYTES 1.6 (H) 0.0 - 1.0 K/UL     ABS. EOSINOPHILS 0.0 0.0 - 0.4 K/UL     ABS. BASOPHILS 0.1 0.0 - 0.1 K/UL     ABS. IMM.  GRANS. 0.1 (H) 0.00 - 0.04 K/UL     DF AUTOMATED     METABOLIC PANEL, COMPREHENSIVE     Collection Time: 04/01/22  7:01 AM   Result Value Ref Range     Sodium 137 136 - 145 mmol/L     Potassium 3.3 (L) 3.5 - 5.1 mmol/L     Chloride 104 97 - 108 mmol/L     CO2 18 (L) 21 - 32 mmol/L     Anion gap 15 5 - 15 mmol/L     Glucose 79 65 - 100 mg/dL     BUN 5 (L) 6 - 20 mg/dL     Creatinine 0.52 (L) 0.55 - 1.02 mg/dL     BUN/Creatinine ratio 10 (L) 12 - 20       GFR est AA >60 >60 ml/min/1.73m2     GFR est non-AA >60 >60 ml/min/1.73m2     Calcium 8.6 8.5 - 10.1 mg/dL     Bilirubin, total 0.6 0.2 - 1.0 mg/dL     AST (SGOT) 50 (H) 15 - 37 U/L     ALT (SGPT) 37 12 - 78 U/L     Alk. phosphatase 113 45 - 117 U/L     Protein, total 5.8 (L) 6.4 - 8.2 g/dL     Albumin 3.2 (L) 3.5 - 5.0 g/dL     Globulin 2.6 2.0 - 4.0 g/dL     A-G Ratio 1.2 1.1 - 2.2                 Imaging:     NM HEPATOBILIARY DUCT SCAN   Final Result   Findings concerning for cystic duct compromise.       CT ABD PELV WO CONT   Final Result   Gallbladder wall and pericystic changes. Mild prominence of the   common bile duct. This represents change from the prior study. Ultrasound   earlier this evening shows cholelithiasis. Cholecystitis and/or cholangitis   should be considered clinically. Consider radionuclide hepatobiliary scan   depending on clinical setting                   US GALLBLADDER   Final Result   Limited by bowel gas. 1. Cholelithiasis. No sonographic evidence of cholecystitis. However the common   bile duct is dilated which may be chronic.       CTA CHEST W OR W WO CONT   Final Result   1. No pulmonary embolus. 2. Interval increase of peribronchial and perivascular soft tissue thickening   possibly lymphoid or neurofibromatosis in origin. Unchanged left lung   bronchovascular nodule, posterior mediastinal and retroperitoneal abnormal soft   tissue. 3. Bilateral hazy airspace edema or pneumonia. 4. Haziness of the gallbladder.  Correlate clinically for cholecystitis.       XR CHEST PORT   Final Result   No acute process or active disease.              Assessment      Chest pain rule out MI  Gallstone  Right upper quadrant pain s/p   laparoscopic cholecystectomy  History of neurofibromatosis  Hypertension  Hypothyroidism  Seizure disorder  Hyperlipidemia  Anxiety disorder  Chronic pain syndrome        CT abdomen - showed gallbladder wall and pericystic changes. HIDA - Common bile duct activity noted with free spill of activity into small bowel loops. Findings concerning for cystic duct compromise  Echo (3/30/2022) -  Left ventricle size is normal. Increased wall thickness. Normal wall motion. Normal left ventricular systolic function with a visually estimated EF of greater than 65%. Normal diastolic function        Dr. Ozzy Greco conducted laparoscopic cholecystectomy yesterday with no complications. Other Findings: Significant omental adhesions and significant inflammatory changes at the infundibulum. Plan      Medications  Lipitor 40 mg daily  BuSpar 30 mg twice a day  Carbamazepine  mg twice a day  Coreg 3.125 twice a day  Pepcid 20 twice daily  Neurontin 400 mg 3 times a day  Levothyroxine 125 mcg daily  Linzess 145 mg daily  Zosyn 3.375 IV every 8 hours  Effexor  mg daily        1. Continue IV fluids  2. Cardiology is surgical and GI consult  3. Discussed with the patient daughter/concern about pain medication at this time   4. We will continue Dilaudid 1 mg every 4 hours as needed  5.   Advance diet as tolerated     Possible discharge home tomorrow discussed with surgery           Signed By: Maya Field MD      April 1, 2022

## 2022-04-02 NOTE — PROGRESS NOTES
HPI: Mimi hdz(n) 59 y. o. female with PMH significant for arthritis, depression, epilepsy, hypertension, neurofibromatosis, and thyroid disease who presents with chest pain.       Upon presentation, patient stated her symptoms began that morning. She describes her pain being in the center of her chest and radiates to the back and shoulder. Patient complained of her pain which awakened her at 5:30 this morning, she states the pain is 9/10, constant, without exacerbating symptoms. The patient is currently on HYDROmorphone (DILAUDID) injection 1 mg. She states the Dilaudid is greatly helping with her pain management.  No fever, cough, or other associated symptoms.  Denies leg swelling unilaterally, history of PE/DVT, or pleuritic chest pain.     Originally, a ultrasound was conducted (3/28/2022 @22:05) revealing cholelithiasis and the common bile duct is dilated which may be chronic. However, the U/S gallbladder was limited by bowel gas.     The recent, CT ABD Pelvis w/o contrast (3/28/2022 @22:55) revealed Gallbladder wall and pericystic changes. Mild prominence of the common bile duct. Thus, cholecystitis and/or cholangitis should be considered clinically. Patient is scheduled for MRCP today.      Surgery was consulted in the ER seen by the surgeon ordered MRI of the abdomen and the HIDA scan        3/30/2022  Pt continues to report pain being in the center of her chest and radiates to the back and shoulder. She states the pain is 9/10, constant, without exacerbating symptoms. The patient is currently on HYDROmorphone (DILAUDID) injection 1 mg.  She states the Dilaudid is greatly helping with her pain management.  No fever, cough, or other associated symptoms.  Denies leg swelling unilaterally, history of PE/DVT, or pleuritic chest pain.     Patient daughter and sister indicate Dilaudid 1 mg every 4 hours as needed for her chronic pain/discussed the side effects she understand     Cardiology - CXR (3/28/2022) without congestion. The heart, mediastinum and pulmonary vasculature are normal. The lungs are well expanded and clear. The bony thorax is intact. When compared to the prior exam, there is no significant change. HS troponin negative x 2. NSR: HR 77, no ischemia.      General Surgery: HIDA did not visualize GB but has free spillage in to duodenum. Yesterday, patient's dilaudid was changed from 0.5mg q 4hrs instead of 1mg. However, the patient reports increased pain. Patient's dilaudid was returned to 1mg q 4hrs this morning.      HIDA 3/29/22: There is a normal distribution of activity through the liver. Common bile duct activity noted with free spill of activity into small bowel loops.  With images carried out to 2 hours, no gallbladder activity is evident. IMPRESSION Findings concerning for cystic duct compromise     Significant labs:   WBC 9.6  Neutrophils 84  aPTT 34.4  Creatinine 0.46        3/31/2022  Pt continues to report pain being in the center of her chest and radiates to the back and shoulder. She states the pain is 9/10, constant, without exacerbating symptoms. The patient is currently on HYDROmorphone (DILAUDID) injection 1 mg. She states the Dilaudid is greatly helping with her pain management.       Today, the patient complains of a sinus headache.  However, she denies fever, cough, or other associated symptoms.  Denies leg swelling unilaterally, history of PE/DVT, or pleuritic chest pain.     Patient daughter and sister indicate Dilaudid 1 mg every 4 hours as needed for her chronic pain/discussed the side effects she understand     Colon and Rectal Surgery: Patient previously requested Dr. Marlon Hammer to be involved with her case; Dr. Marlon Hammer has seen the patient and has recommended a laparoscopic cholecystectomy which is scheduled for this afternoon.      General Surgery: HIDA did not visualize GB but has free spillage in to duodenum.      HIDA 3/29/22: There is a normal distribution of activity through the liver. Common bile duct activity noted with free spill of activity into small bowel loops.  With images carried out to 2 hours, no gallbladder activity is evident. IMPRESSION Findings concerning for cystic duct compromise     Echo (3/30/2022) -  Left ventricle size is normal. Increased wall thickness. Normal wall motion. Normal left ventricular systolic function with a visually estimated EF of greater than 65%. Normal diastolic function     Cardiology - CXR (3/28/2022) without congestion. The heart, mediastinum and pulmonary vasculature are normal. The lungs are well expanded and clear. The bony thorax is intact. When compared to the prior exam, there is no significant change. HS troponin negative x 2. NSR: HR 77, no ischemia.      Significant labs: (Labs from 3/30/2022  WBC 9.6  Neutrophils 84  aPTT 34.4  Creatinine 0.46     Patient seen by the surgeon and plan for laparoscopic cholecystectomy today     Patient taking Dilaudid every 4 hours have a detailed discussion with the patient patient daughter recommend to continue discussed about the side effects and the complications        8/0/4546  Colon and Rectal Surgery: Dr. Naomi Ambrocio conducted laparoscopic cholecystectomy yesterday with no complications. Other Findings: Significant omental adhesions and significant inflammatory changes at the infundibulum.     Post-surgery patient stated her neck was hurting and it felt like a knot. Patient received one time dose of Flexeril 5mg. Today, the patient stats that her neck pain and \"knot sensation\" has resolved.      Pts daughter is also requesting that the pt does not receive Linzess until they can establish a regular bowel program.      , the patient complains of a nausea, fatigue, and soreness at the surgery laparoscopic incision location.  However, she denies fever, cough, or other associated symptoms.  Denies leg swelling unilaterally, history of PE/DVT, or pleuritic chest pain.     Patient taking Dilaudid every 4 hours have a detailed discussion with the patient patient daughter recommend to continue discussed about the side effects and the complications    4/2     Patient alert awake denies any chest pain shortness of air nausea no vomiting  Still a lot of drainage from WANG drainage  Potassium is low     Significant labs:  WBC 9.5  Neutrophils 68  BUN 5  Creatinine 0.52          Past Medical History:   Diagnosis Date    Arthritis      Depression      Epilepsy ([facility] [address]      Hypertension      Ill-defined condition       neurofibromatosis    Thyroid disease                 Past Surgical History:   Procedure Laterality Date    HX GYN        HX ORTHOPAEDIC        NEUROLOGICAL PROCEDURE UNLISTED                     Prior to Admission medications    Medication Sig Start Date End Date Taking? Authorizing Provider   venlafajenaro [facility]) 75 mg tablet Take 75 mg by mouth daily.     Yes Provider, Historical   ergocalciferol (DrisdoL) 1,250 mcg (50,000 unit) capsule Take 50,000 Units by mouth every thirty (30) days. Take on the 15th day of the month     Yes Provider, Historical   linaCLOtide (Linzess) 145 mcg cap capsule Take 145 mcg by mouth Daily (before breakfast).     Yes Other, MD Henry   senna-docusate (PERICOLACE) 8.6-50 mg per tablet Take 2 Tablets by mouth daily.     Yes Other, MD Henry   atorvastatin (LIPITOR) 40 mg tablet Take 40 mg by mouth daily.     Yes Provider, Historical   Xtampza ER 36 mg capsule Take 1 Capsule by mouth two (2) times a day. 9/19/21   Yes Provider, Historical   gabapentin (NEURONTIN) 800 mg tablet Take 400 mg by mouth three (3) times daily.  9/9/21   Yes Provider, Historical   famotidine (Pepcid) 20 mg tablet Take 20 mg by mouth two (2) times a day.     Yes Other, MD Henry   celecoxib (CELEBREX) 200 mg capsule Take 100 mg by mouth two (2) times a day.     Yes Other, MD Henry   aspirin 81 mg chewable tablet Take 81 mg by mouth nightly.     Yes Other, MD Henry   carBAMazepine ER (CARBATROL ER) 300 mg capsule Take 300 mg by mouth two (2) times a day.     Yes Provider, Historical   carvedilol (COREG) 6.25 mg tablet Take  by mouth two (2) times daily (with meals).     Yes Provider, Historical   levothyroxine (SYNTHROID) 125 mcg tablet Take  by mouth Daily (before breakfast).     Yes Provider, Historical   busPIRone (BUSPAR) 30 mg tablet Take 30 mg by mouth two (2) times a day.     Yes Provider, Historical   venlafaxine-SR (EFFEXOR XR) 150 mg capsule Take 150 mg by mouth daily.     Yes Provider, Historical   naloxone 8 mg/actuation spry 4 mg by Nasal route as needed.       Other, MD Henry   oxyCODONE-acetaminophen (Percocet) 7.5-325 mg per tablet Take 1 Tablet by mouth every eight (8) hours as needed for Pain.       Henry Santamaria MD   loratadine (Claritin) 10 mg tablet Take 10 mg by mouth daily as needed for Allergies.       OtherHenry MD   diclofenac (FLECTOR) 1.3 % pt12 1 Patch by TransDERmal route every twelve (12) hours every twelve (12) hours.       Other, MD Henry   diclofenac (Voltaren) 1 % gel Apply 4 g to affected area four (4) times daily.       Henry Santamaria MD               Allergies   Allergen Reactions    Codeine Nausea and Vomiting    Methadone Hives    Morphine Other (comments)       psychosis               Family History   Problem Relation Age of Onset    Cancer Mother           glioblastoma    Lung Disease Father      Cancer Father           mesothelioma, testicular cancer         Social History              Socioeconomic History    Marital status: UNKNOWN    Highest education level: Associate degree: occupational, technical, or vocational program   Tobacco Use    Smoking status: Never Smoker    Smokeless tobacco: Never Used   Vaping Use    Vaping Use: Former    Substances: CBD (only for 2 months)   Substance and Sexual Activity    Alcohol use: No    Drug use: Never            ROS  Constitutional:  no fever,  no chills,  no sweats, No weakness, No fatigue, No decreased activity.   Eye: No recent visual problem, No icterus, No discharge, No double vision. Ear/Nose/Mouth/Throat: No decreased hearing, No ear pain, No nasal congestion, No sore throat. Respiratory: No shortness of breath, No cough, No sputum production, No hemoptysis, No wheezing, No cyanosis. Cardiovascular:  chest pain, No palpitations, No bradycardia, No tachycardia, No peripheral edema, No syncope. Gastrointestinal: No nausea,  No vomiting, No diarrhea, No constipation, No heartburn,   abdominal pain. Genitourinary: No dysuria, No hematuria, No change in urine stream, No urethral discharge, No lesions. Hematology/Lymphatics: No bruising tendency, No bleeding tendency, No petechiae, No swollen lymph glands. Endocrine: No excessive thirst, No polyuria, No cold intolerance, No heat intolerance, No excessive hunger. Immunologic: Not immunocompromised, No recurrent fevers, No recurrent infections. Musculoskeletal: No back pain, No neck pain, No joint pain, No muscle pain, No claudication, No decreased range of motion, No trauma. Integumentary: No rash, No pruritus, No abrasions. Neurologic: Alert and oriented X4, No abnormal balance, No headache, No confusion, No numbness, No tingling.   Psychiatric: No anxiety, No depression, No bijan.        Objective      Visit Vitals  BP (!) 80/51 (BP 1 Location: Left upper arm, BP Patient Position: Semi fowlers) Comment: notifed nurse and took twice   Pulse 88   Temp 98.2 °F (36.8 °C)   Resp 18   Ht 5' 1\" (1.549 m)   Wt 61.7 kg (136 lb)   SpO2 95%   Breastfeeding No   BMI 25.70 kg/m²    O2 Flow Rate (L/min): 2 l/min O2 Device: None (Room air)     Physical Exam:   Physical Exam  General: well appearing, no acute distress  Head: Normal  Face: Nornal  HEENT: atraumatic, PERRLA, moist mucosa, normal pharynx, normal tonsils and adenoids, normal tongue, no fluid in sinuses  Neck: Trachea midline, no carotid bruit, no masses  Chest: Normal.  Respiratory: Normal chest wall expansion, CTA B, no r/r/w, no rubs  Cardiovascular: RRR, no m/r/g, Normal S1 and S2  Abdomen: Soft, non tender, non-distended, normal bowel sounds in all quadrants, no hepatosplenomegaly, no tympany.    Genitourinary: No inguinal hernia, normal external gentalia,  no renal angle tenderness  Rectal: deferred  Musculoskeletal: normal ROM in upper and lower extremities, No joint swelling. Integumentary: Warm, dry, and pink, with no rash, purpura, or petechia  Heme/Lymph: No lymphadenopathy, no bruises  Neurological:Cranial Nerves II-XII grossly intact, no gross motor or sensory deficit  Psychiatric: Cooperative with normal mood, affect, and cognition        Intake & Output:  Current Shift: 04/01 0701 - 04/01 1900  In: 500 [P.O.:500]  Out: 40 [Drains:40]  Last three shifts: 03/30 1901 - 04/01 0700  In: 1800 [I.V.:1800]  Out: 215 [Drains:65]     Lab/Data Review: All lab results for the last 24 hours reviewed.         24 Hour Results:     Recent Results         Recent Results (from the past 24 hour(s))   CBC WITH AUTOMATED DIFF     Collection Time: 04/01/22  7:01 AM   Result Value Ref Range     WBC 9.5 3.6 - 11.0 K/uL     RBC 4.27 3.80 - 5.20 M/uL     HGB 12.0 11.5 - 16.0 g/dL     HCT 35.3 35.0 - 47.0 %     MCV 82.7 80.0 - 99.0 FL     MCH 28.1 26.0 - 34.0 PG     MCHC 34.0 30.0 - 36.5 g/dL     RDW 13.8 11.5 - 14.5 %     PLATELET 577 530 - 102 K/uL     MPV 10.2 8.9 - 12.9 FL     NRBC 0.0 0.0  WBC     ABSOLUTE NRBC 0.00 0.00 - 0.01 K/uL     NEUTROPHILS 68 32 - 75 %     LYMPHOCYTES 13 12 - 49 %     MONOCYTES 17 (H) 5 - 13 %     EOSINOPHILS 0 0 - 7 %     BASOPHILS 1 0 - 1 %     IMMATURE GRANULOCYTES 1 (H) 0 - 0.5 %     ABS. NEUTROPHILS 6.5 1.8 - 8.0 K/UL     ABS. LYMPHOCYTES 1.3 0.8 - 3.5 K/UL     ABS. MONOCYTES 1.6 (H) 0.0 - 1.0 K/UL     ABS. EOSINOPHILS 0.0 0.0 - 0.4 K/UL     ABS. BASOPHILS 0.1 0.0 - 0.1 K/UL     ABS. IMM.  GRANS. 0.1 (H) 0.00 - 0.04 K/UL     DF AUTOMATED     METABOLIC PANEL, COMPREHENSIVE     Collection Time: 04/01/22 7:01 AM   Result Value Ref Range     Sodium 137 136 - 145 mmol/L     Potassium 3.3 (L) 3.5 - 5.1 mmol/L     Chloride 104 97 - 108 mmol/L     CO2 18 (L) 21 - 32 mmol/L     Anion gap 15 5 - 15 mmol/L     Glucose 79 65 - 100 mg/dL     BUN 5 (L) 6 - 20 mg/dL     Creatinine 0.52 (L) 0.55 - 1.02 mg/dL     BUN/Creatinine ratio 10 (L) 12 - 20       GFR est AA >60 >60 ml/min/1.73m2     GFR est non-AA >60 >60 ml/min/1.73m2     Calcium 8.6 8.5 - 10.1 mg/dL     Bilirubin, total 0.6 0.2 - 1.0 mg/dL     AST (SGOT) 50 (H) 15 - 37 U/L     ALT (SGPT) 37 12 - 78 U/L     Alk. phosphatase 113 45 - 117 U/L     Protein, total 5.8 (L) 6.4 - 8.2 g/dL     Albumin 3.2 (L) 3.5 - 5.0 g/dL     Globulin 2.6 2.0 - 4.0 g/dL     A-G Ratio 1.2 1.1 - 2.2                 Imaging:     NM HEPATOBILIARY DUCT SCAN   Final Result   Findings concerning for cystic duct compromise.       CT ABD PELV WO CONT   Final Result   Gallbladder wall and pericystic changes. Mild prominence of the   common bile duct. This represents change from the prior study. Ultrasound   earlier this evening shows cholelithiasis. Cholecystitis and/or cholangitis   should be considered clinically. Consider radionuclide hepatobiliary scan   depending on clinical setting                   US GALLBLADDER   Final Result   Limited by bowel gas. 1. Cholelithiasis. No sonographic evidence of cholecystitis. However the common   bile duct is dilated which may be chronic.       CTA CHEST W OR W WO CONT   Final Result   1. No pulmonary embolus. 2. Interval increase of peribronchial and perivascular soft tissue thickening   possibly lymphoid or neurofibromatosis in origin. Unchanged left lung   bronchovascular nodule, posterior mediastinal and retroperitoneal abnormal soft   tissue. 3. Bilateral hazy airspace edema or pneumonia. 4. Haziness of the gallbladder.  Correlate clinically for cholecystitis.       XR CHEST PORT   Final Result   No acute process or active disease.         Assessment      Chest pain rule out MI  Gallstone  Right upper quadrant pain s/p   laparoscopic cholecystectomy  History of neurofibromatosis  Hypertension  Hypothyroidism  Seizure disorder  Hyperlipidemia  Anxiety disorder  Chronic pain syndrome  Hypokalemia        CT abdomen - showed gallbladder wall and pericystic changes. HIDA - Common bile duct activity noted with free spill of activity into small bowel loops. Findings concerning for cystic duct compromise  Echo (3/30/2022) -  Left ventricle size is normal. Increased wall thickness. Normal wall motion. Normal left ventricular systolic function with a visually estimated EF of greater than 65%. Normal diastolic function        Dr. Ozzy Greco conducted laparoscopic cholecystectomy yesterday with no complications. Other Findings: Significant omental adhesions and significant inflammatory changes at the infundibulum. Plan      Medications  Lipitor 40 mg daily  BuSpar 30 mg twice a day  Carbamazepine  mg twice a day  Coreg 3.125 twice a day  Pepcid 20 twice daily  Neurontin 400 mg 3 times a day  Levothyroxine 125 mcg daily  Linzess 145 mg daily  Zosyn 3.375 IV every 8 hours  Effexor  mg daily  Replace potassium     1. Continue IV fluids  2. Cardiology is surgical and GI consult  3. Discussed with the patient daughter/concern about pain medication at this time   4. We will continue Dilaudid 1 mg every 4 hours as needed  5.   Advance diet as tolerated     Possible discharge home tomorrow discussed with surgery           Signed By: Maya Field MD      April 1, 2022

## 2022-04-03 VITALS
TEMPERATURE: 97.9 F | OXYGEN SATURATION: 97 % | SYSTOLIC BLOOD PRESSURE: 105 MMHG | HEIGHT: 61 IN | BODY MASS INDEX: 25.68 KG/M2 | WEIGHT: 136 LBS | HEART RATE: 85 BPM | RESPIRATION RATE: 18 BRPM | DIASTOLIC BLOOD PRESSURE: 63 MMHG

## 2022-04-03 LAB
ALBUMIN SERPL-MCNC: 2.9 G/DL (ref 3.5–5)
ALBUMIN/GLOB SERPL: 1.1 {RATIO} (ref 1.1–2.2)
ALP SERPL-CCNC: 102 U/L (ref 45–117)
ALT SERPL-CCNC: 34 U/L (ref 12–78)
ANION GAP SERPL CALC-SCNC: 6 MMOL/L (ref 5–15)
AST SERPL W P-5'-P-CCNC: 27 U/L (ref 15–37)
BASOPHILS # BLD: 0 K/UL (ref 0–0.1)
BASOPHILS NFR BLD: 0 % (ref 0–1)
BILIRUB SERPL-MCNC: 0.4 MG/DL (ref 0.2–1)
BUN SERPL-MCNC: 4 MG/DL (ref 6–20)
BUN/CREAT SERPL: 9 (ref 12–20)
CA-I BLD-MCNC: 8.2 MG/DL (ref 8.5–10.1)
CHLORIDE SERPL-SCNC: 102 MMOL/L (ref 97–108)
CO2 SERPL-SCNC: 31 MMOL/L (ref 21–32)
CREAT SERPL-MCNC: 0.44 MG/DL (ref 0.55–1.02)
DIFFERENTIAL METHOD BLD: ABNORMAL
EOSINOPHIL # BLD: 0.4 K/UL (ref 0–0.4)
EOSINOPHIL NFR BLD: 8 % (ref 0–7)
ERYTHROCYTE [DISTWIDTH] IN BLOOD BY AUTOMATED COUNT: 14.4 % (ref 11.5–14.5)
GLOBULIN SER CALC-MCNC: 2.7 G/DL (ref 2–4)
GLUCOSE SERPL-MCNC: 97 MG/DL (ref 65–100)
HCT VFR BLD AUTO: 29.7 % (ref 35–47)
HGB BLD-MCNC: 9.8 G/DL (ref 11.5–16)
IMM GRANULOCYTES # BLD AUTO: 0.1 K/UL (ref 0–0.04)
IMM GRANULOCYTES NFR BLD AUTO: 1 % (ref 0–0.5)
LYMPHOCYTES # BLD: 1.1 K/UL (ref 0.8–3.5)
LYMPHOCYTES NFR BLD: 19 % (ref 12–49)
MCH RBC QN AUTO: 28 PG (ref 26–34)
MCHC RBC AUTO-ENTMCNC: 33 G/DL (ref 30–36.5)
MCV RBC AUTO: 84.9 FL (ref 80–99)
MONOCYTES # BLD: 0.7 K/UL (ref 0–1)
MONOCYTES NFR BLD: 11 % (ref 5–13)
NEUTS SEG # BLD: 3.6 K/UL (ref 1.8–8)
NEUTS SEG NFR BLD: 61 % (ref 32–75)
NRBC # BLD: 0 K/UL (ref 0–0.01)
NRBC BLD-RTO: 0 PER 100 WBC
PLATELET # BLD AUTO: 322 K/UL (ref 150–400)
PMV BLD AUTO: 10 FL (ref 8.9–12.9)
POTASSIUM SERPL-SCNC: 3.5 MMOL/L (ref 3.5–5.1)
PROT SERPL-MCNC: 5.6 G/DL (ref 6.4–8.2)
RBC # BLD AUTO: 3.5 M/UL (ref 3.8–5.2)
SODIUM SERPL-SCNC: 139 MMOL/L (ref 136–145)
WBC # BLD AUTO: 5.8 K/UL (ref 3.6–11)

## 2022-04-03 PROCEDURE — 36415 COLL VENOUS BLD VENIPUNCTURE: CPT

## 2022-04-03 PROCEDURE — 80053 COMPREHEN METABOLIC PANEL: CPT

## 2022-04-03 PROCEDURE — 74011250636 HC RX REV CODE- 250/636: Performed by: COLON & RECTAL SURGERY

## 2022-04-03 PROCEDURE — 74011250637 HC RX REV CODE- 250/637: Performed by: FAMILY MEDICINE

## 2022-04-03 PROCEDURE — 74011250636 HC RX REV CODE- 250/636: Performed by: FAMILY MEDICINE

## 2022-04-03 PROCEDURE — 85025 COMPLETE CBC W/AUTO DIFF WBC: CPT

## 2022-04-03 PROCEDURE — 97165 OT EVAL LOW COMPLEX 30 MIN: CPT

## 2022-04-03 PROCEDURE — 97530 THERAPEUTIC ACTIVITIES: CPT

## 2022-04-03 PROCEDURE — 99024 POSTOP FOLLOW-UP VISIT: CPT | Performed by: COLON & RECTAL SURGERY

## 2022-04-03 RX ORDER — OXYCODONE HYDROCHLORIDE 5 MG/1
5 TABLET ORAL
Qty: 24 TABLET | Refills: 0 | Status: SHIPPED | OUTPATIENT
Start: 2022-04-03 | End: 2022-04-08

## 2022-04-03 RX ADMIN — GABAPENTIN 400 MG: 400 CAPSULE ORAL at 08:11

## 2022-04-03 RX ADMIN — GABAPENTIN 400 MG: 400 CAPSULE ORAL at 16:00

## 2022-04-03 RX ADMIN — BUSPIRONE HYDROCHLORIDE 30 MG: 10 TABLET ORAL at 08:11

## 2022-04-03 RX ADMIN — HYDROMORPHONE HYDROCHLORIDE 1 MG: 1 INJECTION, SOLUTION INTRAMUSCULAR; INTRAVENOUS; SUBCUTANEOUS at 03:58

## 2022-04-03 RX ADMIN — CARBAMAZEPINE 300 MG: 300 CAPSULE, EXTENDED RELEASE ORAL at 08:11

## 2022-04-03 RX ADMIN — SODIUM CHLORIDE 50 ML/HR: 9 INJECTION, SOLUTION INTRAVENOUS at 08:15

## 2022-04-03 RX ADMIN — FAMOTIDINE 20 MG: 20 TABLET, FILM COATED ORAL at 08:11

## 2022-04-03 RX ADMIN — VENLAFAXINE HYDROCHLORIDE 150 MG: 75 CAPSULE, EXTENDED RELEASE ORAL at 08:10

## 2022-04-03 RX ADMIN — HYDROMORPHONE HYDROCHLORIDE 1 MG: 1 INJECTION, SOLUTION INTRAMUSCULAR; INTRAVENOUS; SUBCUTANEOUS at 07:58

## 2022-04-03 RX ADMIN — ATORVASTATIN CALCIUM 40 MG: 40 TABLET, FILM COATED ORAL at 08:11

## 2022-04-03 RX ADMIN — HYDROMORPHONE HYDROCHLORIDE 1 MG: 1 INJECTION, SOLUTION INTRAMUSCULAR; INTRAVENOUS; SUBCUTANEOUS at 16:00

## 2022-04-03 RX ADMIN — ACETAMINOPHEN 650 MG: 325 TABLET ORAL at 06:54

## 2022-04-03 RX ADMIN — HYDROMORPHONE HYDROCHLORIDE 1 MG: 1 INJECTION, SOLUTION INTRAMUSCULAR; INTRAVENOUS; SUBCUTANEOUS at 11:56

## 2022-04-03 RX ADMIN — LEVOTHYROXINE SODIUM 125 MCG: 0.03 TABLET ORAL at 08:11

## 2022-04-03 NOTE — PROGRESS NOTES
[facility] are unable to accept until they run patient's insurance in the morning. Patient is able to discharge home and CM will follow up in the morning on accepting [address]. Primary RN and daughter aware. Discharge plan of care/case management plan validated with provider discharge order. Medicare pt has received, reviewed, and signed 2nd IM letter informing them of their right to appeal the discharge. Signed copied has been placed on pt bedside chart.     Copy left with discharge paperwork per daughter's request.

## 2022-04-03 NOTE — PROGRESS NOTES
HPI: Corey Arnold a(n) 59 y. o. female with PMH significant for arthritis, depression, epilepsy, hypertension, neurofibromatosis, and thyroid disease who presents with chest pain.       Upon presentation, patient stated her symptoms began that morning. She describes her pain being in the center of her chest and radiates to the back and shoulder. Patient complained of her pain which awakened her at 5:30 this morning, she states the pain is 9/10, constant, without exacerbating symptoms. The patient is currently on HYDROmorphone (DILAUDID) injection 1 mg. She states the Dilaudid is greatly helping with her pain management.  No fever, cough, or other associated symptoms.  Denies leg swelling unilaterally, history of PE/DVT, or pleuritic chest pain.     Originally, a ultrasound was conducted (3/28/2022 @22:05) revealing cholelithiasis and the common bile duct is dilated which may be chronic. However, the U/S gallbladder was limited by bowel gas.     The recent, CT ABD Pelvis w/o contrast (3/28/2022 @22:55) revealed Gallbladder wall and pericystic changes. Mild prominence of the common bile duct. Thus, cholecystitis and/or cholangitis should be considered clinically. Patient is scheduled for MRCP today.      Surgery was consulted in the ER seen by the surgeon ordered MRI of the abdomen and the HIDA scan        3/30/2022  Pt continues to report pain being in the center of her chest and radiates to the back and shoulder. She states the pain is 9/10, constant, without exacerbating symptoms. The patient is currently on HYDROmorphone (DILAUDID) injection 1 mg.  She states the Dilaudid is greatly helping with her pain management.  No fever, cough, or other associated symptoms.  Denies leg swelling unilaterally, history of PE/DVT, or pleuritic chest pain.     Patient daughter and sister indicate Dilaudid 1 mg every 4 hours as needed for her chronic pain/discussed the side effects she understand     Cardiology - CXR (3/28/2022) without congestion. The heart, mediastinum and pulmonary vasculature are normal. The lungs are well expanded and clear. The bony thorax is intact. When compared to the prior exam, there is no significant change. HS troponin negative x 2. NSR: HR 77, no ischemia.      General Surgery: HIDA did not visualize GB but has free spillage in to duodenum. Yesterday, patient's dilaudid was changed from 0.5mg q 4hrs instead of 1mg. However, the patient reports increased pain. Patient's dilaudid was returned to 1mg q 4hrs this morning.      HIDA 3/29/22: There is a normal distribution of activity through the liver. Common bile duct activity noted with free spill of activity into small bowel loops.  With images carried out to 2 hours, no gallbladder activity is evident. IMPRESSION Findings concerning for cystic duct compromise     Significant labs:   WBC 9.6  Neutrophils 84  aPTT 34.4  Creatinine 0.46        3/31/2022  Pt continues to report pain being in the center of her chest and radiates to the back and shoulder. She states the pain is 9/10, constant, without exacerbating symptoms. The patient is currently on HYDROmorphone (DILAUDID) injection 1 mg. She states the Dilaudid is greatly helping with her pain management.       Today, the patient complains of a sinus headache.  However, she denies fever, cough, or other associated symptoms.  Denies leg swelling unilaterally, history of PE/DVT, or pleuritic chest pain.     Patient daughter and sister indicate Dilaudid 1 mg every 4 hours as needed for her chronic pain/discussed the side effects she understand     Colon and Rectal Surgery: Patient previously requested Dr. Poppy Meredith to be involved with her case; Dr. Poppy Meredith has seen the patient and has recommended a laparoscopic cholecystectomy which is scheduled for this afternoon.      General Surgery: HIDA did not visualize GB but has free spillage in to duodenum.      HIDA 3/29/22: There is a normal distribution of activity through the liver. Common bile duct activity noted with free spill of activity into small bowel loops.  With images carried out to 2 hours, no gallbladder activity is evident. IMPRESSION Findings concerning for cystic duct compromise     Echo (3/30/2022) -  Left ventricle size is normal. Increased wall thickness. Normal wall motion. Normal left ventricular systolic function with a visually estimated EF of greater than 65%. Normal diastolic function     Cardiology - CXR (3/28/2022) without congestion. The heart, mediastinum and pulmonary vasculature are normal. The lungs are well expanded and clear. The bony thorax is intact. When compared to the prior exam, there is no significant change. HS troponin negative x 2. NSR: HR 77, no ischemia.      Significant labs: (Labs from 3/30/2022  WBC 9.6  Neutrophils 84  aPTT 34.4  Creatinine 0.46     Patient seen by the surgeon and plan for laparoscopic cholecystectomy today     Patient taking Dilaudid every 4 hours have a detailed discussion with the patient patient daughter recommend to continue discussed about the side effects and the complications        3/6/2921  Colon and Rectal Surgery: Dr. Jacob Aguilar conducted laparoscopic cholecystectomy yesterday with no complications. Other Findings: Significant omental adhesions and significant inflammatory changes at the infundibulum.     Post-surgery patient stated her neck was hurting and it felt like a knot. Patient received one time dose of Flexeril 5mg. Today, the patient stats that her neck pain and \"knot sensation\" has resolved.      Pts daughter is also requesting that the pt does not receive Linzess until they can establish a regular bowel program.      , the patient complains of a nausea, fatigue, and soreness at the surgery laparoscopic incision location.  However, she denies fever, cough, or other associated symptoms.  Denies leg swelling unilaterally, history of PE/DVT, or pleuritic chest pain.     Patient taking Dilaudid every 4 hours have a detailed discussion with the patient patient daughter recommend to continue discussed about the side effects and the complications    4/2     Patient alert awake denies any chest pain shortness of air nausea no vomiting  Still a lot of drainage from WANG drainage  Potassium is low     Significant labs:  WBC 9.5  Neutrophils 68  BUN 5  Creatinine 0.52          Past Medical History:   Diagnosis Date    Arthritis      Depression      Epilepsy ([facility] [address]      Hypertension      Ill-defined condition       neurofibromatosis    Thyroid disease                 Past Surgical History:   Procedure Laterality Date    HX GYN        HX ORTHOPAEDIC        NEUROLOGICAL PROCEDURE UNLISTED                     Prior to Admission medications    Medication Sig Start Date End Date Taking? Authorizing Provider   venlafajenaro [facility]) 75 mg tablet Take 75 mg by mouth daily.     Yes Provider, Historical   ergocalciferol (DrisdoL) 1,250 mcg (50,000 unit) capsule Take 50,000 Units by mouth every thirty (30) days. Take on the 15th day of the month     Yes Provider, Historical   linaCLOtide (Linzess) 145 mcg cap capsule Take 145 mcg by mouth Daily (before breakfast).     Yes Other, MD Henry   senna-docusate (PERICOLACE) 8.6-50 mg per tablet Take 2 Tablets by mouth daily.     Yes Other, MD Henry   atorvastatin (LIPITOR) 40 mg tablet Take 40 mg by mouth daily.     Yes Provider, Historical   Xtampza ER 36 mg capsule Take 1 Capsule by mouth two (2) times a day. 9/19/21   Yes Provider, Historical   gabapentin (NEURONTIN) 800 mg tablet Take 400 mg by mouth three (3) times daily.  9/9/21   Yes Provider, Historical   famotidine (Pepcid) 20 mg tablet Take 20 mg by mouth two (2) times a day.     Yes Other, MD Henry   celecoxib (CELEBREX) 200 mg capsule Take 100 mg by mouth two (2) times a day.     Yes Other, MD Henry   aspirin 81 mg chewable tablet Take 81 mg by mouth nightly.     Yes Other, MD Henry   carBAMazepine ER (CARBATROL ER) 300 mg capsule Take 300 mg by mouth two (2) times a day.     Yes Provider, Historical   carvedilol (COREG) 6.25 mg tablet Take  by mouth two (2) times daily (with meals).     Yes Provider, Historical   levothyroxine (SYNTHROID) 125 mcg tablet Take  by mouth Daily (before breakfast).     Yes Provider, Historical   busPIRone (BUSPAR) 30 mg tablet Take 30 mg by mouth two (2) times a day.     Yes Provider, Historical   venlafaxine-SR (EFFEXOR XR) 150 mg capsule Take 150 mg by mouth daily.     Yes Provider, Historical   naloxone 8 mg/actuation spry 4 mg by Nasal route as needed.       Other, MD Henry   oxyCODONE-acetaminophen (Percocet) 7.5-325 mg per tablet Take 1 Tablet by mouth every eight (8) hours as needed for Pain.       Henry Santamaria MD   loratadine (Claritin) 10 mg tablet Take 10 mg by mouth daily as needed for Allergies.       OtherHenry MD   diclofenac (FLECTOR) 1.3 % pt12 1 Patch by TransDERmal route every twelve (12) hours every twelve (12) hours.       Other, MD Henry   diclofenac (Voltaren) 1 % gel Apply 4 g to affected area four (4) times daily.       Henry Santamaria MD               Allergies   Allergen Reactions    Codeine Nausea and Vomiting    Methadone Hives    Morphine Other (comments)       psychosis               Family History   Problem Relation Age of Onset    Cancer Mother           glioblastoma    Lung Disease Father      Cancer Father           mesothelioma, testicular cancer         Social History              Socioeconomic History    Marital status: UNKNOWN    Highest education level: Associate degree: occupational, technical, or vocational program   Tobacco Use    Smoking status: Never Smoker    Smokeless tobacco: Never Used   Vaping Use    Vaping Use: Former    Substances: CBD (only for 2 months)   Substance and Sexual Activity    Alcohol use: No    Drug use: Never            ROS  Constitutional:  no fever,  no chills,  no sweats, No weakness, No fatigue, No decreased activity.   Eye: No recent visual problem, No icterus, No discharge, No double vision. Ear/Nose/Mouth/Throat: No decreased hearing, No ear pain, No nasal congestion, No sore throat. Respiratory: No shortness of breath, No cough, No sputum production, No hemoptysis, No wheezing, No cyanosis. Cardiovascular:  chest pain, No palpitations, No bradycardia, No tachycardia, No peripheral edema, No syncope. Gastrointestinal: No nausea,  No vomiting, No diarrhea, No constipation, No heartburn,   abdominal pain. Genitourinary: No dysuria, No hematuria, No change in urine stream, No urethral discharge, No lesions. Hematology/Lymphatics: No bruising tendency, No bleeding tendency, No petechiae, No swollen lymph glands. Endocrine: No excessive thirst, No polyuria, No cold intolerance, No heat intolerance, No excessive hunger. Immunologic: Not immunocompromised, No recurrent fevers, No recurrent infections. Musculoskeletal: No back pain, No neck pain, No joint pain, No muscle pain, No claudication, No decreased range of motion, No trauma. Integumentary: No rash, No pruritus, No abrasions. Neurologic: Alert and oriented X4, No abnormal balance, No headache, No confusion, No numbness, No tingling.   Psychiatric: No anxiety, No depression, No bijan.        Objective      Visit Vitals  BP (!) 80/51 (BP 1 Location: Left upper arm, BP Patient Position: Semi fowlers) Comment: notifed nurse and took twice   Pulse 88   Temp 98.2 °F (36.8 °C)   Resp 18   Ht 5' 1\" (1.549 m)   Wt 61.7 kg (136 lb)   SpO2 95%   Breastfeeding No   BMI 25.70 kg/m²    O2 Flow Rate (L/min): 2 l/min O2 Device: None (Room air)     Physical Exam:   Physical Exam  General: well appearing, no acute distress  Head: Normal  Face: Nornal  HEENT: atraumatic, PERRLA, moist mucosa, normal pharynx, normal tonsils and adenoids, normal tongue, no fluid in sinuses  Neck: Trachea midline, no carotid bruit, no masses  Chest: Normal.  Respiratory: Normal chest wall expansion, CTA B, no r/r/w, no rubs  Cardiovascular: RRR, no m/r/g, Normal S1 and S2  Abdomen: Soft, non tender, non-distended, normal bowel sounds in all quadrants, no hepatosplenomegaly, no tympany.    Genitourinary: No inguinal hernia, normal external gentalia,  no renal angle tenderness  Rectal: deferred  Musculoskeletal: normal ROM in upper and lower extremities, No joint swelling. Integumentary: Warm, dry, and pink, with no rash, purpura, or petechia  Heme/Lymph: No lymphadenopathy, no bruises  Neurological:Cranial Nerves II-XII grossly intact, no gross motor or sensory deficit  Psychiatric: Cooperative with normal mood, affect, and cognition        Intake & Output:  Current Shift: 04/01 0701 - 04/01 1900  In: 500 [P.O.:500]  Out: 40 [Drains:40]  Last three shifts: 03/30 1901 - 04/01 0700  In: 1800 [I.V.:1800]  Out: 215 [Drains:65]     Lab/Data Review: All lab results for the last 24 hours reviewed.         24 Hour Results:     Recent Results         Recent Results (from the past 24 hour(s))   CBC WITH AUTOMATED DIFF     Collection Time: 04/01/22  7:01 AM   Result Value Ref Range     WBC 9.5 3.6 - 11.0 K/uL     RBC 4.27 3.80 - 5.20 M/uL     HGB 12.0 11.5 - 16.0 g/dL     HCT 35.3 35.0 - 47.0 %     MCV 82.7 80.0 - 99.0 FL     MCH 28.1 26.0 - 34.0 PG     MCHC 34.0 30.0 - 36.5 g/dL     RDW 13.8 11.5 - 14.5 %     PLATELET 909 859 - 130 K/uL     MPV 10.2 8.9 - 12.9 FL     NRBC 0.0 0.0  WBC     ABSOLUTE NRBC 0.00 0.00 - 0.01 K/uL     NEUTROPHILS 68 32 - 75 %     LYMPHOCYTES 13 12 - 49 %     MONOCYTES 17 (H) 5 - 13 %     EOSINOPHILS 0 0 - 7 %     BASOPHILS 1 0 - 1 %     IMMATURE GRANULOCYTES 1 (H) 0 - 0.5 %     ABS. NEUTROPHILS 6.5 1.8 - 8.0 K/UL     ABS. LYMPHOCYTES 1.3 0.8 - 3.5 K/UL     ABS. MONOCYTES 1.6 (H) 0.0 - 1.0 K/UL     ABS. EOSINOPHILS 0.0 0.0 - 0.4 K/UL     ABS. BASOPHILS 0.1 0.0 - 0.1 K/UL     ABS. IMM.  GRANS. 0.1 (H) 0.00 - 0.04 K/UL     DF AUTOMATED     METABOLIC PANEL, COMPREHENSIVE     Collection Time: 04/01/22 7:01 AM   Result Value Ref Range     Sodium 137 136 - 145 mmol/L     Potassium 3.3 (L) 3.5 - 5.1 mmol/L     Chloride 104 97 - 108 mmol/L     CO2 18 (L) 21 - 32 mmol/L     Anion gap 15 5 - 15 mmol/L     Glucose 79 65 - 100 mg/dL     BUN 5 (L) 6 - 20 mg/dL     Creatinine 0.52 (L) 0.55 - 1.02 mg/dL     BUN/Creatinine ratio 10 (L) 12 - 20       GFR est AA >60 >60 ml/min/1.73m2     GFR est non-AA >60 >60 ml/min/1.73m2     Calcium 8.6 8.5 - 10.1 mg/dL     Bilirubin, total 0.6 0.2 - 1.0 mg/dL     AST (SGOT) 50 (H) 15 - 37 U/L     ALT (SGPT) 37 12 - 78 U/L     Alk. phosphatase 113 45 - 117 U/L     Protein, total 5.8 (L) 6.4 - 8.2 g/dL     Albumin 3.2 (L) 3.5 - 5.0 g/dL     Globulin 2.6 2.0 - 4.0 g/dL     A-G Ratio 1.2 1.1 - 2.2                 Imaging:     NM HEPATOBILIARY DUCT SCAN   Final Result   Findings concerning for cystic duct compromise.       CT ABD PELV WO CONT   Final Result   Gallbladder wall and pericystic changes. Mild prominence of the   common bile duct. This represents change from the prior study. Ultrasound   earlier this evening shows cholelithiasis. Cholecystitis and/or cholangitis   should be considered clinically. Consider radionuclide hepatobiliary scan   depending on clinical setting                   US GALLBLADDER   Final Result   Limited by bowel gas. 1. Cholelithiasis. No sonographic evidence of cholecystitis. However the common   bile duct is dilated which may be chronic.       CTA CHEST W OR W WO CONT   Final Result   1. No pulmonary embolus. 2. Interval increase of peribronchial and perivascular soft tissue thickening   possibly lymphoid or neurofibromatosis in origin. Unchanged left lung   bronchovascular nodule, posterior mediastinal and retroperitoneal abnormal soft   tissue. 3. Bilateral hazy airspace edema or pneumonia. 4. Haziness of the gallbladder.  Correlate clinically for cholecystitis.       XR CHEST PORT   Final Result   No acute process or active disease.         Assessment      Chest pain rule out MI  Gallstone  Right upper quadrant pain s/p   laparoscopic cholecystectomy  History of neurofibromatosis  Hypertension  Hypothyroidism  Seizure disorder  Hyperlipidemia  Anxiety disorder  Chronic pain syndrome  Hypokalemia        CT abdomen - showed gallbladder wall and pericystic changes. HIDA - Common bile duct activity noted with free spill of activity into small bowel loops. Findings concerning for cystic duct compromise  Echo (3/30/2022) -  Left ventricle size is normal. Increased wall thickness. Normal wall motion. Normal left ventricular systolic function with a visually estimated EF of greater than 65%. Normal diastolic function        Dr. Bruce Otero conducted laparoscopic cholecystectomy yesterday with no complications. Other Findings: Significant omental adhesions and significant inflammatory changes at the infundibulum. Plan      Medications  Lipitor 40 mg daily  BuSpar 30 mg twice a day  Carbamazepine  mg twice a day  Coreg 3.125 twice a day  Pepcid 20 twice daily  Neurontin 400 mg 3 times a day  Levothyroxine 125 mcg daily  Linzess 145 mg daily  Zosyn 3.375 IV every 8 hours  Effexor  mg daily  Replace potassium     1. Continue IV fluids  2. Cardiology is surgical and GI consult  3. Discussed with the patient daughter/concern about pain medication at this time   4. We will continue Dilaudid 1 mg every 4 hours as needed  5.   Advance diet as tolerated     Possible discharge home tomorrow discussed with surgery           Signed By: Trae Douglass MD      April 1, 2022

## 2022-04-03 NOTE — PROGRESS NOTES
Progress Note    Patient: Seb Witt MRN: [id number]  SSN: [id number]    YOB: 1960  Age: 59 y.o. Sex: female      Admit Date: 3/28/2022    LOS: 4 days     Subjective:   Postoperative day 3  Patient seen in bed  No complaints    Objective:     Vitals:    04/02/22 1957 04/03/22 0230 04/03/22 0747 04/03/22 1156   BP: 117/67 119/69 (!) 108/59 113/65   Pulse: 82 85 86 94   Resp: 14 14 16 16   Temp: 97.5 °F (36.4 °C) 99.5 °F (37.5 °C) 98.1 °F (36.7 °C) 98.5 °F (36.9 °C)   SpO2: 97% 95% 98% 98%   Weight:       Height:            Intake and Output:  Current Shift: 04/03 0701 - 04/03 1900  In: -   Out: 6972 [Urine:1380; Drains:65]  Last three shifts: 04/01 1901 - 04/03 0700  In: 2120 [P.O.:2120]  Out: 3160 [Urine:3000; Drains:160]    Review of Systems:  ROS     Physical Exam:   Abdomen is soft, nondistended, probably tender incisions, WANG drain serosanguineous drainage    Lab/Data Review:  Recent Results (from the past 24 hour(s))   CBC WITH AUTOMATED DIFF    Collection Time: 04/03/22  6:53 AM   Result Value Ref Range    WBC 5.8 3.6 - 11.0 K/uL    RBC 3.50 (L) 3.80 - 5.20 M/uL    HGB 9.8 (L) 11.5 - 16.0 g/dL    HCT 29.7 (L) 35.0 - 47.0 %    MCV 84.9 80.0 - 99.0 FL    MCH 28.0 26.0 - 34.0 PG    MCHC 33.0 30.0 - 36.5 g/dL    RDW 14.4 11.5 - 14.5 %    PLATELET 591 214 - 976 K/uL    MPV 10.0 8.9 - 12.9 FL    NRBC 0.0 0.0  WBC    ABSOLUTE NRBC 0.00 0.00 - 0.01 K/uL    NEUTROPHILS 61 32 - 75 %    LYMPHOCYTES 19 12 - 49 %    MONOCYTES 11 5 - 13 %    EOSINOPHILS 8 (H) 0 - 7 %    BASOPHILS 0 0 - 1 %    IMMATURE GRANULOCYTES 1 (H) 0 - 0.5 %    ABS. NEUTROPHILS 3.6 1.8 - 8.0 K/UL    ABS. LYMPHOCYTES 1.1 0.8 - 3.5 K/UL    ABS. MONOCYTES 0.7 0.0 - 1.0 K/UL    ABS. EOSINOPHILS 0.4 0.0 - 0.4 K/UL    ABS. BASOPHILS 0.0 0.0 - 0.1 K/UL    ABS. IMM.  GRANS. 0.1 (H) 0.00 - 0.04 K/UL    DF AUTOMATED     METABOLIC PANEL, COMPREHENSIVE    Collection Time: 04/03/22  6:53 AM   Result Value Ref Range    Sodium 139 136 - 145 mmol/L    Potassium 3.5 3.5 - 5.1 mmol/L    Chloride 102 97 - 108 mmol/L    CO2 31 21 - 32 mmol/L    Anion gap 6 5 - 15 mmol/L    Glucose 97 65 - 100 mg/dL    BUN 4 (L) 6 - 20 mg/dL    Creatinine 0.44 (L) 0.55 - 1.02 mg/dL    BUN/Creatinine ratio 9 (L) 12 - 20      GFR est AA >60 >60 ml/min/1.73m2    GFR est non-AA >60 >60 ml/min/1.73m2    Calcium 8.2 (L) 8.5 - 10.1 mg/dL    Bilirubin, total 0.4 0.2 - 1.0 mg/dL    AST (SGOT) 27 15 - 37 U/L    ALT (SGPT) 34 12 - 78 U/L    Alk.  phosphatase 102 45 - 117 U/L    Protein, total 5.6 (L) 6.4 - 8.2 g/dL    Albumin 2.9 (L) 3.5 - 5.0 g/dL    Globulin 2.7 2.0 - 4.0 g/dL    A-G Ratio 1.1 1.1 - 2.2            Assessment:     Active Problems:    Chest pain (3/28/2022)        Plan:   Doing well  May be discharged home  Drain removed  Prescription sent for oral analgesics to form  Follow my office 10 days    Signed By: Tatiana Osorio MD     April 3, 2022

## 2022-04-03 NOTE — PROGRESS NOTES
Problem: Self Care Deficits Care Plan (Adult)  Goal: *Acute Goals and Plan of Care (Insert Text)  Description: Pt will be independence sup<->sit in prep for EOB ADL's  Pt will be independence  sit<-> prep for toilet transfer  Pt will be independence  toilet transfer with LRAD  Pt will be independence  toileting/cloth mgmt LRAD  Pt will be independence  grooming standing sink  Pt will be independence bathing sitting/standing sink LRAD  Pt will be MI dipti UE HEP in prep for self care tasks    Outcome: Not Met     OCCUPATIONAL THERAPY EVALUATION  Patient: Ned Infante (7 y.o. female)  Date: 4/3/2022  Primary Diagnosis: Chest pain [R07.9]  Procedure(s) (LRB):  CHOLECYSTECTOMY LAPAROSCOPIC (N/A) 3 Days Post-Op   Precautions: falls, seizures      ASSESSMENT  Pt is 58 y/o F came to [facility] with chest pain radiating to her back and shoulder and adm 3/28/22 for MI r/o. S/p lab martine 4/1/22. Pt has hx of arthritis, depression, epilepsy, HTN, neurofibromatosis, thyroid disease. Pt received walking to the bathroom with a CNA without DME/AD. A&Ox4 and agreeable for OT eval/tx. Per pt report, pt lives with her daughter in a 2 level home with 12 steps to the second level bed/bath setup. She has a RW at home, no other DME. She reports she feels she is about at her baselinen ow. She is IND for self care and MOD IND for functional transfers/mobility. Pt currently presents with decreased standing balance, safety, insight, and generalized weakness. Patient is mildly impulsive with standing up quickly and without regard for safety. Patient requires close CGA for walking to bathroom w/o AD. CGA toilet transfer on/off with one LOB while standing from toilet too quickly. Patient is SBA to marj new gown standing in bathroom. Setup and SBA for oral hygiene and washing hands standing at sink. CGA back to bed. Patient immediately stated she has to use restroom again and was CGA second time for toileting routine.  Daughter in room at end of evaluation, all questions answered. Call bell within reach. Pt would benefit from skilled OT services while at [facility] in order to increase safety and independence with self care and functional transfers/mobility. Recommend discharge to home w/ family assist and [address] when medically appropriate. Other factors to consider for discharge: family support, severity of symptoms        PLAN :  Recommendations and Planned Interventions: self care training, functional mobility training, therapeutic exercise, balance training, therapeutic activities, patient education and home safety training    Frequency/Duration: Patient will be followed by occupational therapy 2-3x/week to address goals. Recommendation for discharge: (in order for the patient to meet his/her long term goals)  Home with [address] [place] [address]    This discharge recommendation:  Has not yet been discussed the attending provider and/or case management    IF patient discharges home will need the following DME: patient owns DME required for discharge       SUBJECTIVE:   Patient stated oh I think I need to go again.     OBJECTIVE DATA SUMMARY:   HISTORY:   Past Medical History:   Diagnosis Date    Arthritis     Depression     Epilepsy ([facility] [address]     Hypertension     Ill-defined condition     neurofibromatosis    Thyroid disease      Past Surgical History:   Procedure Laterality Date    HX GYN      HX ORTHOPAEDIC      NEUROLOGICAL PROCEDURE UNLISTED         Expanded or extensive additional review of patient history:     Home Situation  Home Environment: Private residence  # Steps to Enter: 3  One/Two Story Residence: Two story  # of Interior Steps: 12  Living Alone: No  Support Systems: Child(aishwarya)  Patient Expects to be Discharged to[de-identified] Home  Current DME Used/Available at Home: Walker, rolling    PLOF: Pt IND for ADLS/IADLS, MOD IND with mobility prior to admission.      Hand dominance: Right    EXAMINATION OF PERFORMANCE DEFICITS:  Cognitive/Behavioral Status:  Neurologic State: Alert  Orientation Level: Oriented X4  Cognition: Follows commands  Perception: Appears intact  Perseveration: No perseveration noted  Safety/Judgement: Fall prevention    Hearing: Auditory  Auditory Impairment: Deaf,Hard of hearing, right side,Hearing aid(s) (deaf in left ear)  Hearing Aids/Status: At bedside    Range of Motion:  AROM: Generally decreased, functional    Strength:  Strength: Generally decreased, functional    Coordination:  Fine Motor Skills-Upper: Left Intact; Right Intact    Gross Motor Skills-Upper: Left Intact; Right Intact    Tone & Sensation:  Tone: Normal    Balance:  Sitting: Intact  Standing: Impaired  Standing - Static: Constant support;Good  Standing - Dynamic : Fair;Unsupported    Functional Mobility and Transfers for ADLs:  Bed Mobility:  Rolling: Contact guard assistance  Supine to Sit: Contact guard assistance  Sit to Supine: Contact guard assistance  Scooting: Contact guard assistance    Transfers:  Sit to Stand: Contact guard assistance  Stand to Sit: Contact guard assistance  Bathroom Mobility: Contact guard assistance  Toilet Transfer : Contact guard assistance    ADL Assessment:  Feeding: Independent    Oral Facial Hygiene/Grooming: Stand-by assistance    Upper Body Dressing: Stand-by assistance    Lower Body Dressing: Stand-by assistance    Toileting: Contact guard assistance    ADL Intervention and task modifications:  Feeding  Feeding Assistance: Independent    Grooming  Grooming Assistance: Stand-by assistance  Position Performed: Standing  Washing Hands: Supervision  Brushing Teeth: Supervision    Upper Body [address] Gown: Stand-by assistance    Lower Body Dressing Assistance  Socks: Stand-by assistance  Leg Crossed Method Used: Yes  Position Performed: Seated edge of bed    Toileting  Toileting Assistance: Contact guard assistance  Bladder Hygiene: Stand-by assistance  Bowel Hygiene: Stand-by assistance  Clothing Management: Contact guard assistance    Cognitive Retraining  Safety/Judgement: [address] AM-PACTM \"6 Clicks\"                                                       Daily Activity Inpatient Short Form  How much help from another person does the patient currently need. .. Total; A Lot A Little None   1. Putting on and taking off regular lower body clothing? []  1 []  2 []  3 [x]  4   2. Bathing (including washing, rinsing, drying)? []  1 []  2 []  3 [x]  4   3. Toileting, which includes using toilet, bedpan or urinal? [] 1 []  2 []  3 [x]  4   4. Putting on and taking off regular upper body clothing? []  1 []  2 []  3 [x]  4   5. Taking care of personal grooming such as brushing teeth? []  1 []  2 []  3 [x]  4   6. Eating meals? []  1 []  2 []  3 [x]  4   © , Trustees of [facility] [employer], under license to [employer]. All rights reserved     Score:      Interpretation of Tool:  Represents clinically-significant functional categories (i.e. Activities of daily living). Percentage of Impairment CH    0%   CI    1-19% CJ    20-39% CK    40-59% CL    60-79% CM    80-99% CN     100%   [facility]  Score 6-24 24 23 20-22 15-19 10-14 7-9 6        Occupational Therapy Evaluation Charge Determination   History Examination Decision-Making   LOW Complexity : Brief history review  LOW Complexity : 1-3 performance deficits relating to physical, cognitive , or psychosocial skils that result in activity limitations and / or participation restrictions  LOW Complexity : No comorbidities that affect functional and no verbal or physical assistance needed to complete eval tasks       Based on the above components, the patient evaluation is determined to be of the following complexity level: LOW   Pain Ratin/10    Activity Tolerance:   Good  Please refer to the flowsheet for vital signs taken during this treatment.     After treatment patient left in no apparent distress: Supine in bed    COMMUNICATION/EDUCATION:   The patients plan of care was discussed with: Registered nurse. Patient/family have participated as able in goal setting and plan of care. This patients plan of care is appropriate for delegation to [facility].     Thank you for this referral.  Phi Askew OT  Time Calculation: 27 mins

## 2022-04-03 NOTE — PROGRESS NOTES
Patient received discharge instructions and informed to make follow up appointments tomorrow. Patient discharged home with daughter. IV and telemetry removed.

## 2022-04-03 NOTE — PROGRESS NOTES
CM spoke with primary RN who states that patient will be discharged today. CM reviewed chart and spoke with patient and patient's daughter. [facility] to get patient's strength up so that she can participate in the outpatient therapy program with [facility]. Choice letter was signed and placed on chart. Referrals have been sent.

## 2022-04-03 NOTE — DISCHARGE INSTRUCTIONS
Patient Education        Chest Pain: Care Instructions  Your Care Instructions     There are many things that can cause chest pain. Some are not serious and will get better on their own in a few days. But some kinds of chest pain need more testing and treatment. Your doctor may have recommended a follow-up visit in the next 8 to 12 hours. If you are not getting better, you may need more tests or treatment. Even though your doctor has released you, you still need to watch for any problems. The doctor carefully checked you, but sometimes problems can develop later. If you have new symptoms or if your symptoms do not get better, get medical care right away. If you have worse or different chest pain or pressure that lasts more than 5 minutes or you passed out (lost consciousness), call 911 or seek other emergency help right away. A medical visit is only one step in your treatment. Even if you feel better, you still need to do what your doctor recommends, such as going to all suggested follow-up appointments and taking medicines exactly as directed. This will help you recover and help prevent future problems. How can you care for yourself at home? · Rest until you feel better. · Take your medicine exactly as prescribed. Call your doctor if you think you are having a problem with your medicine. · Do not drive after taking a prescription pain medicine. When should you call for help? Call 911 if:     · You passed out (lost consciousness).     · You have severe difficulty breathing.     · You have symptoms of a heart attack. These may include:  ? Chest pain or pressure, or a strange feeling in your chest.  ? Sweating. ? Shortness of breath. ? Nausea or vomiting. ? Pain, pressure, or a strange feeling in your back, neck, jaw, or upper belly or in one or both shoulders or arms. ? Lightheadedness or sudden weakness. ? A fast or irregular heartbeat.   After you call 911, the  may tell you to chew 1 adult-strength or 2 to 4 low-dose aspirin. Wait for an ambulance. Do not try to drive yourself. Call your doctor today if:     · You have any trouble breathing.     · Your chest pain gets worse.     · You are dizzy or lightheaded, or you feel like you may faint.     · You are not getting better as expected.     · You are having new or different chest pain. Where can you learn more? Go to http://www.oden.com/  Enter A120 in the search box to learn more about \"Chest Pain: Care Instructions. \"  Current as of: July 1, 2021               Content Version: 13.2  © 1241-7980 [employer]. Care instructions adapted under license by [employer] (which disclaims liability or warranty for this information). If you have questions about a medical condition or this instruction, always ask your healthcare professional. [place] [address] any warranty or liability for your use of this information. May shower, no tub baths  A  Patient Education        Gallbladder Removal Surgery: What to Expect at [address] [place] [address]  After your surgery, you will likely feel weak and tired for several days after you return home. Your belly may be swollen. If you had laparoscopic surgery, it's normal to also have some shoulder pain. This is caused by the air that your doctor put in your belly to help see your organs better. You may have gas or need to burp a lot at first. A few people get diarrhea. The diarrhea usually goes away in 2 to 4 weeks, but it may last longer. How quickly you recover depends on whether you had a laparoscopic or open surgery. · For a laparoscopic surgery, most people can go back to work or their normal routine in 1 to 2 weeks. But it may take longer, depending on the type of work you do. · For an open surgery, it will probably take 4 to 6 weeks before you get back to your normal routine.   This care sheet gives you a general idea about how long it will take for you to recover. However, each person recovers at a different pace. Follow the steps below to get better as quickly as possible. How can you care for yourself at home? Activity    · Rest when you feel tired. Getting enough sleep will help you recover.     · Try to walk each day. Start out by walking a little more than you did the day before. Walking helps prevent blood clots in your legs and pneumonia.     · For about 2 to 4 weeks, avoid lifting anything that would make you strain. This may include a child, heavy grocery bags and milk containers, a heavy briefcase or backpack, cat litter or dog food bags, or a vacuum .     · Avoid strenuous activities, such as biking, jogging, weightlifting, and aerobic exercise, until your doctor says it is okay.     · Ask your doctor when you can drive again.     · For a laparoscopic surgery, most people can go back to work or their normal routine in 1 to 2 weeks, but it may take longer. For an open surgery, it will probably take 4 to 6 weeks before you get back to your normal routine.     · Your doctor will tell you when you can have sex again. Diet    · When you feel like eating, start with small amounts of food. You may want to avoid fatty foods like fried foods or cheese for a while. They can cause symptoms, such as diarrhea or bloating.     · Drink plenty of fluids (unless your doctor tells you not to).     · You may notice that your bowel movements are not regular right after your surgery. This is common. Try to avoid constipation and straining with bowel movements. You may want to take a fiber supplement every day. If you have not had a bowel movement after a couple of days, ask your doctor about taking a mild laxative. Medicines    · Your doctor will tell you if and when you can restart your medicines.  You will also be given instructions about taking any new medicines.     · If you take aspirin or some other blood thinner, ask your doctor if and when to start taking it again. Make sure that you understand exactly what your doctor wants you to do.     · Be safe with medicines. Read and follow all instructions on the label. ? If the doctor gave you a prescription medicine for pain, take it as prescribed. ? If you are not taking a prescription pain medicine, ask your doctor if you can take an over-the-counter medicine. ? Do not take two or more pain medicines at the same time unless the doctor told you to. Many pain medicines contain acetaminophen, which is Tylenol. Too much Tylenol can be harmful.     · If you think your pain medicine is making you sick to your stomach:  ? Take your medicine after meals (unless your doctor tells you not to). ? Ask your doctor for a different pain medicine.     · If your doctor prescribed antibiotics, take them as directed. Do not stop taking them just because you feel better. You need to take the full course of antibiotics. Incision care    · If you have strips of tape on the cut (incision) the doctor made, leave the tape on for a week or until it falls off.     · You may shower 24 to 48 hours after surgery, if your doctor okays it. Pat the incision dry. Do not take a bath for the first 2 weeks, or until your doctor tells you it's okay.     · You may have staples to hold the cut together. Keep them dry until your doctor takes them out. This is usually in 7 to 10 days.     · Keep the area clean and dry. You may cover it with a gauze bandage if it oozes fluid or rubs against clothing. Change the bandage every day. Ice    · To reduce swelling and pain, put ice or a cold pack on your belly for 10 to 20 minutes at a time. Do this every 1 to 2 hours. Put a thin cloth between the ice and your skin. Follow-up care is a key part of your treatment and safety. Be sure to make and go to all appointments, and call your doctor if you are having problems.  It's also a good idea to know your test results and keep a list of the medicines you take. When should you call for help? Call 911 anytime you think you may need emergency care. For example, call if:    · You passed out (lost consciousness).     · You are short of breath. .   Call your doctor now or seek immediate medical care if:    · You are sick to your stomach and cannot drink fluids.     · You have pain that does not get better when you take your pain medicine.     · You cannot pass stools or gas.     · You have signs of infection, such as:  ? Increased pain, swelling, warmth, or redness. ? Red streaks leading from the incision. ? Pus draining from the incision. ? A fever.     · Bright red blood has soaked through the bandage over your incision.     · You have loose stitches, or your incision comes open.     · You have signs of a blood clot in your leg (called a deep vein thrombosis), such as:  ? Pain in your calf, back of knee, thigh, or groin. ? Redness and swelling in your leg or groin. Watch closely for any changes in your health, and be sure to contact your doctor if you have any problems. Where can you learn more? Go to http://www.gray.com/  Enter F357 in the search box to learn more about \"Gallbladder Removal Surgery: What to Expect at Home. \"  Current as of: September 8, 2021               Content Version: 13.2  © 2006-2022 [employer]. Care instructions adapted under license by [employer] (which disclaims liability or warranty for this information). If you have questions about a medical condition or this instruction, always ask your healthcare professional. [place] [address] any warranty or liability for your use of this information.        void strenuous activity

## 2022-04-03 NOTE — PROGRESS NOTES
Discharge plan of care/case management plan validated with provider discharge order. Case management to follow up with patient Monday regarding home health. PIV removed. RX sent to pharmacy and follow up appointment made. Vitals stable. Patient family to transport patient home.

## 2022-04-04 NOTE — PROGRESS NOTES
CM followed up with patient's daughter and [facility] [place] on 4/4/22.  cannot set up [facility] because of patient being set up with [facility]. CM informed patient's daughter of the services that [facility] can provide, and gave daughter the 's number. Patient's daughter is going to follow up with [facility].

## 2022-04-13 ENCOUNTER — OFFICE VISIT (OUTPATIENT)
Dept: SURGERY | Age: 62
End: 2022-04-13
Payer: MEDICARE

## 2022-04-13 VITALS
OXYGEN SATURATION: 97 % | DIASTOLIC BLOOD PRESSURE: 57 MMHG | HEART RATE: 92 BPM | BODY MASS INDEX: 25.64 KG/M2 | RESPIRATION RATE: 16 BRPM | HEIGHT: 61 IN | WEIGHT: 135.8 LBS | TEMPERATURE: 97.8 F | SYSTOLIC BLOOD PRESSURE: 81 MMHG

## 2022-04-13 DIAGNOSIS — Z12.11 SCREENING FOR COLON CANCER: Primary | ICD-10-CM

## 2022-04-13 DIAGNOSIS — K81.9 CHOLECYSTITIS: ICD-10-CM

## 2022-04-13 PROCEDURE — 99024 POSTOP FOLLOW-UP VISIT: CPT | Performed by: COLON & RECTAL SURGERY

## 2022-04-13 NOTE — PROGRESS NOTES
1. Have you been to the ER, urgent care clinic since your last visit? Hospitalized since your last visit? No    2. Have you seen or consulted any other health care providers outside of the [address] [place] [address] since your last visit? Include any pap smears or colon screening.  No     Visit Vitals  BP (!) 81/57 (BP 1 Location: Left arm)   Pulse 92   Temp 97.8 °F (36.6 °C) (Temporal)   Resp 16   Ht 5' 1\" (1.549 m)   Wt 135 lb 12.8 oz (61.6 kg)   SpO2 97%   BMI 25.66 kg/m²       Chief Complaint   Patient presents with    Cholesterol Problem    Follow-up

## 2022-04-16 NOTE — DISCHARGE SUMMARY
Discharge Summary       PATIENT ID: Neisha Bridges  MRN: [id number]   YOB: 1960    DATE OF ADMISSION: 3/28/2022  6:17 PM    DATE OF DISCHARGE:   PRIMARY CARE PROVIDER: Henry Santamaria MD     ATTENDING PHYSICIAN: Lili Hudson  DISCHARGING PROVIDER: Lili Hudson      CONSULTATIONS: IP CONSULT TO CARDIOLOGY  IP CONSULT TO GASTROENTEROLOGY  IP CONSULT TO GENERAL SURGERY    PROCEDURES/SURGERIES: Procedure(s):  CHOLECYSTECTOMY LAPAROSCOPIC    ADMITTING DIAGNOSES:    Patient Active Problem List    Diagnosis Date Noted    Chest pain 03/28/2022    Aspiration pneumonia ([facility] [address] 11/03/2021    Calculus of gallbladder without cholecystitis without obstruction 11/03/2021    Abdominal pain 09/23/2021    Chronic pain syndrome 08/10/2017    Neurofibromatosis ([facility] 08/10/2017       DISCHARGE DIAGNOSES / PLAN:      Chest pain rule out MI  Gallstone  Right upper quadrant pain s/p   laparoscopic cholecystectomy  History of neurofibromatosis  Hypertension  Hypothyroidism  Seizure disorder  Hyperlipidemia  Anxiety disorder  Chronic pain syndrome  Hypokalemia        CT abdomen - showed gallbladder wall and pericystic changes. HIDA - Common bile duct activity noted with free spill of activity into small bowel loops. Findings concerning for cystic duct compromise  Echo (3/30/2022) -  Left ventricle size is normal. Increased wall thickness. Normal wall motion. Normal left ventricular systolic function with a visually estimated EF of greater than 56%. [facility] diastolic function        Dr. Lakshmi Mendoza conducted laparoscopic cholecystectomy yesterday with no complications. ADDITIONAL CARE RECOMMENDATIONS:         DISCHARGE MEDICATIONS:  Discharge Medication List as of 4/3/2022  4:58 PM      START taking these medications    Details   oxyCODONE IR (ROXICODONE) 5 mg immediate release tablet Take 1 Tablet by mouth every four (4) hours as needed for Pain for up to 5 days.  Max Daily Amount: 30 mg., Normal, Disp-24 Tablet, R-0 CONTINUE these medications which have NOT CHANGED    Details   ergocalciferol (DrisdoL) 1,250 mcg (50,000 unit) capsule Take 50,000 Units by mouth every thirty (30) days. Take on the 15th day of the month, Historical Med      linaCLOtide (Linzess) 145 mcg cap capsule Take 145 mcg by mouth Daily (before breakfast). , Historical Med      senna-docusate (PERICOLACE) 8.6-50 mg per tablet Take 2 Tablets by mouth daily. , Historical Med      oxyCODONE-acetaminophen (Percocet) 7.5-325 mg per tablet Take 1 Tablet by mouth every eight (8) hours as needed for Pain., Historical Med      loratadine (Claritin) 10 mg tablet Take 10 mg by mouth daily as needed for Allergies. , Historical Med      diclofenac (FLECTOR) 1.3 % pt12 1 Patch by TransDERmal route every twelve (12) hours every twelve (12) hours. , Historical Med      diclofenac (Voltaren) 1 % gel Apply 4 g to affected area four (4) times daily. , Historical Med      atorvastatin (LIPITOR) 40 mg tablet Take 40 mg by mouth daily. , Historical Med      Xtampza ER 36 mg capsule Take 1 Capsule by mouth two (2) times a day., Historical Med, FANNY      gabapentin (NEURONTIN) 800 mg tablet Take 400 mg by mouth three (3) times daily. , Historical Med      famotidine (Pepcid) 20 mg tablet Take 20 mg by mouth two (2) times a day., Historical Med      celecoxib (CELEBREX) 200 mg capsule Take 100 mg by mouth two (2) times a day., Historical Med      aspirin 81 mg chewable tablet Take 81 mg by mouth nightly., Historical Med      carBAMazepine ER (CARBATROL ER) 300 mg capsule Take 300 mg by mouth two (2) times a day., Historical Med      carvedilol (COREG) 6.25 mg tablet Take  by mouth two (2) times daily (with meals). , Historical Med      levothyroxine (SYNTHROID) 125 mcg tablet Take  by mouth Daily (before breakfast). , Historical Med      busPIRone (BUSPAR) 30 mg tablet Take 30 mg by mouth two (2) times a day., Historical Med      venlafaxine-SR (EFFEXOR XR) 150 mg capsule Take 150 mg by mouth daily. , Historical Med      venlafaxine (EFFEXOR) 75 mg tablet Take 75 mg by mouth daily. , Historical Med      naloxone 8 mg/actuation spry 4 mg by Nasal route as needed., Historical Med               NOTIFY YOUR PHYSICIAN FOR ANY OF THE FOLLOWING:   Fever over 101 degrees for 24 hours. Chest pain, shortness of breath, fever, chills, nausea, vomiting, diarrhea, change in mentation, falling, weakness, bleeding. Severe pain or pain not relieved by medications. Or, any other signs or symptoms that you may have questions about. DISPOSITION:  x  Home With:   OT  PT  HH  RN       Long term SNF/Inpatient Rehab    Independent/assisted living    Hospice    Other:       PATIENT CONDITION AT DISCHARGE: Stable      PHYSICAL EXAMINATION AT DISCHARGE:  General:          Alert, cooperative, no distress, appears stated age. HEENT:           Atraumatic, anicteric sclerae, pink conjunctivae                          No oral ulcers, mucosa moist, throat clear, dentition fair  Neck:               Supple, symmetrical  Lungs:             Clear to auscultation bilaterally. No Wheezing or Rhonchi. No rales. Chest wall:      No tenderness  No Accessory muscle use. Heart:              Regular  rhythm,  No  murmur   No edema  Abdomen:        Soft, non-tender. Not distended. Bowel sounds normal  Extremities:     No cyanosis. No clubbing,                            Skin turgor normal, Capillary refill normal  Skin:                Not pale. Not Jaundiced  No rashes   Psych:             Not anxious or agitated. Neurologic:      Alert, moves all extremities, answers questions appropriately and responds to commands     NM HEPATOBILIARY DUCT SCAN   Final Result   Findings concerning for cystic duct compromise. CT ABD PELV WO CONT   Final Result   Gallbladder wall and pericystic changes. Mild prominence of the   common bile duct. This represents change from the prior study.  Ultrasound   earlier this evening shows cholelithiasis. Cholecystitis and/or cholangitis   should be considered clinically. Consider radionuclide hepatobiliary scan   depending on clinical setting                US GALLBLADDER   Final Result   Limited by bowel gas. 1. Cholelithiasis. No sonographic evidence of cholecystitis. However the common   bile duct is dilated which may be chronic. CTA CHEST W OR W WO CONT   Final Result   1. No pulmonary embolus. 2. Interval increase of peribronchial and perivascular soft tissue thickening   possibly lymphoid or neurofibromatosis in origin. Unchanged left lung   bronchovascular nodule, posterior mediastinal and retroperitoneal abnormal soft   tissue. 3. Bilateral hazy airspace edema or pneumonia. 4. Haziness of the gallbladder. Correlate clinically for cholecystitis. XR CHEST PORT   Final Result   No acute process or active disease. No results found for this or any previous visit (from the past 24 hour(s)). HOSPITAL COURSE:    Jakob hzd(n) 59 y. o. female with PMH significant for arthritis, depression, epilepsy, hypertension, neurofibromatosis, and thyroid disease who presents with chest pain.       Upon presentation, patient stated her symptoms began that morning. She describes her pain being in the center of her chest and radiates to the back and shoulder. Patient complained of her pain which awakened her at 5:30 this morning, she states the pain is 9/10, constant, without exacerbating symptoms. The patient is currently on HYDROmorphone (DILAUDID) injection 1 mg.  She states the Dilaudid is greatly helping with her pain management.  No fever, cough, or other associated symptoms.  Denies leg swelling unilaterally, history of PE/DVT, or pleuritic chest pain.     Originally, a ultrasound was conducted (3/28/2022 @22:05) revealing cholelithiasis and the common bile duct is dilated which may be chronic. However, the U/S gallbladder was limited by bowel gas.     The recent, CT ABD Pelvis w/o contrast (3/28/2022 @22:55) revealed Gallbladder wall and pericystic changes. Mild prominence of the common bile duct. Thus, cholecystitis and/or cholangitis should be considered clinically. Patient is scheduled for MRCP today.      Surgery was consulted in the ER seen by the surgeon ordered MRI of the abdomen and the HIDA scan        3/30/2022  Pt continues to report pain being in the center of her chest and radiates to the back and shoulder. She states the pain is 9/10, constant, without exacerbating symptoms. The patient is currently on HYDROmorphone (DILAUDID) injection 1 mg. She states the Dilaudid is greatly helping with her pain management.  No fever, cough, or other associated symptoms.  Denies leg swelling unilaterally, history of PE/DVT, or pleuritic chest pain.     Patient daughter and sister indicate Dilaudid 1 mg every 4 hours as needed for her chronic pain/discussed the side effects she understand     Cardiology - CXR (3/28/2022) without congestion. The heart, mediastinum and pulmonary vasculature are normal. The lungs are well expanded and clear. The bony thorax is intact. When compared to the prior exam, there is no significant change. HS troponin negative x 2. NSR: HR 77, no ischemia.      General Surgery: HIDA did not visualize GB but has free spillage in to duodenum. Yesterday, patient's dilaudid was changed from 0.5mg q 4hrs instead of 1mg. However, the patient reports increased pain. Patient's dilaudid was returned to 1mg q 4hrs this morning.      HIDA 3/29/22: There is a normal distribution of activity through the liver. Common bile duct activity noted with free spill of activity into small bowel loops.  With images carried out to 2 hours, no gallbladder activity is evident.  IMPRESSION Findings concerning for cystic duct compromise     Significant labs:   WBC 9.6  Neutrophils 84  aPTT 34.4  Creatinine 0.46        3/31/2022  Pt continues to report pain being in the center of her chest and radiates to the back and shoulder. She states the pain is 9/10, constant, without exacerbating symptoms. The patient is currently on HYDROmorphone (DILAUDID) injection 1 mg. She states the Dilaudid is greatly helping with her pain management.       Today, the patient complains of a sinus headache. However, she denies fever, cough, or other associated symptoms.  Denies leg swelling unilaterally, history of PE/DVT, or pleuritic chest pain.     Patient daughter and sister indicate Dilaudid 1 mg every 4 hours as needed for her chronic pain/discussed the side effects she understand     Colon and Rectal Surgery: Patient previously requested Dr. Mary Carmen Gayle to be involved with her case; Dr. Mary Carmen Gayle has seen the patient and has recommended a laparoscopic cholecystectomy which is scheduled for this afternoon.      General Surgery: HIDA did not visualize GB but has free spillage in to duodenum.      HIDA 3/29/22: There is a normal distribution of activity through the liver. Common bile duct activity noted with free spill of activity into small bowel loops.  With images carried out to 2 hours, no gallbladder activity is evident. IMPRESSION Findings concerning for cystic duct compromise     Echo (3/30/2022) -  Left ventricle size is normal. Increased wall thickness. Normal wall motion. Normal left ventricular systolic function with a visually estimated EF of greater than 39%. LXQYEX diastolic function     Cardiology - CXR (3/28/2022) without congestion. The heart, mediastinum and pulmonary vasculature are normal. The lungs are well expanded and clear. The bony thorax is intact. When compared to the prior exam, there is no significant change. HS troponin negative x 2.  NSR: HR 77, no ischemia.      Significant labs: (Labs from 3/30/2022  WBC 9.6  Neutrophils 84  aPTT 34.4  Creatinine 0.46     Patient seen by the surgeon and plan for laparoscopic cholecystectomy today     Patient taking Dilaudid every 4 hours have a detailed discussion with the patient patient daughter recommend to continue discussed about the side effects and the complications        1/1/3785  Colon and Rectal Surgery: Dr. Deepali Winn conducted laparoscopic cholecystectomy yesterday with no complications. Other Findings: Significant omental adhesions and significant inflammatory changes at the infundibulum.     Post-surgery patient stated her neck was hurting and it felt like a knot. Patient received one time dose of Flexeril 5mg. Today, the patient stats that her neck pain and \"knot sensation\" has resolved.      Pts daughter is also requesting that the pt does not receive Linzess until they can establish a regular bowel program.      , the patient complains of a nausea, fatigue, and soreness at the surgery laparoscopic incision location.  However, she denies fever, cough, or other associated symptoms.  Denies leg swelling unilaterally, history of PE/DVT, or pleuritic chest pain.     Patient taking Dilaudid every 4 hours have a detailed discussion with the patient patient daughter recommend to continue discussed about the side effects and the complications     4/2      Patient alert awake denies any chest pain shortness of air nausea no vomiting  Still a lot of drainage from WANG drainage  Potassium is low     Pt cleared by surgury and discharge home          Signed:   Ricky Patel MD  4/15/2022  11:58 PM   .

## 2022-04-17 PROBLEM — K81.9 CHOLECYSTITIS: Status: ACTIVE | Noted: 2022-04-17

## 2022-04-17 NOTE — PROGRESS NOTES
OFFICE VISIT NOTE    Rogelio Cerrato is a 59 y.o. female who presents to the office today for:    Chief Complaint   Patient presents with    Cholesterol Problem    Follow-up       The patient comes in today for follow-up status post laparoscopic cholecystectomy March 31, 2022. She denies any significant abdominal pain. She is tolerating a diet with no nausea or vomiting. She denies diarrhea, she has she has a chronic history of constipation. She denies any complications at incisions. She does complain of discomfort at times of the nodular mass in the right lateral upper abdominal lower chest wall.       Past Medical History:   Diagnosis Date    Arthritis     Depression     Epilepsy ([address]     Hypertension     Ill-defined condition     neurofibromatosis    Thyroid disease        Past Surgical History:   Procedure Laterality Date    HX GYN      HX LAP CHOLECYSTECTOMY  03/31/2022        HX ORTHOPAEDIC      NEUROLOGICAL PROCEDURE UNLISTED         Family History   Problem Relation Age of Onset    Cancer Mother         glioblastoma    Lung Disease Father     Cancer Father         mesothelioma, testicular cancer       Social History     Socioeconomic History    Marital status: UNKNOWN     Spouse name: Not on file    Number of children: Not on file    Years of education: Not on file    Highest education level: Associate degree: occupational, technical, or vocational program   Occupational History    Not on file   Tobacco Use    Smoking status: Never Smoker    Smokeless tobacco: Never Used   Vaping Use    Vaping Use: Former    Substances: CBD (only for 2 months)   Substance and Sexual Activity    Alcohol use: No    Drug use: Never    Sexual activity: Not on file   Other Topics Concern   [address] Service Not Asked    Blood Transfusions Not Asked    Caffeine Concern Not Asked    Occupational Exposure Not Asked   [facility] Hazards Not Asked    Sleep Concern Not Asked    Stress Concern Not Asked    Weight Concern Not Asked    Special Diet Not Asked    Back Care Not Asked    Exercise Not Asked    Bike Helmet Not Asked   [address] Not Asked    Self-Exams Not Asked   Social History Narrative    Not on file     Social Determinants of Health     Financial Resource Strain:     Difficulty of Paying Living Expenses: Not on file   Food Insecurity:     Worried About Running Out of Food in the Last Year: Not on file    Migue of Food in the Last Year: Not on file   Transportation Needs:     Lack of Transportation (Medical): Not on file    Lack of Transportation (Non-Medical): Not on file   Physical Activity:     Days of Exercise per Week: Not on file    Minutes of Exercise per Session: Not on file   Stress:     Feeling of Stress : Not on file   Social Connections:     Frequency of Communication with Friends and Family: Not on file    Frequency of Social Gatherings with Friends and Family: Not on file    Attends Scientologist Services: Not on file    Active Member of [address] [place] [address] or Organizations: Not on file    Attends Club or Organization Meetings: Not on file    Marital Status: Not on file   Intimate Partner Violence:     Fear of Current or Ex-Partner: Not on file    Emotionally Abused: Not on file    Physically Abused: Not on file    Sexually Abused: Not on file   Housing Stability:     Unable to Pay for Housing in the Last Year: Not on file    Number of [place] in the Last Year: Not on file    Unstable Housing in the Last Year: Not on file       Allergies   Allergen Reactions    Codeine Nausea and Vomiting    Methadone Hives    Morphine Other (comments)     psychosis       Current Outpatient Medications   Medication Sig    ergocalciferol (DrisdoL) 1,250 mcg (50,000 unit) capsule Take 50,000 Units by mouth every thirty (30) days.  Take on the 15th day of the month    linaCLOtide (Linzess) 145 mcg cap capsule Take 145 mcg by mouth Daily (before breakfast).  senna-docusate (PERICOLACE) 8.6-50 mg per tablet Take 2 Tablets by mouth daily.  oxyCODONE-acetaminophen (Percocet) 7.5-325 mg per tablet Take 1 Tablet by mouth every eight (8) hours as needed for Pain.  loratadine (Claritin) 10 mg tablet Take 10 mg by mouth daily as needed for Allergies.  diclofenac (FLECTOR) 1.3 % pt12 1 Patch by TransDERmal route every twelve (12) hours every twelve (12) hours.  diclofenac (Voltaren) 1 % gel Apply 4 g to affected area four (4) times daily.  atorvastatin (LIPITOR) 40 mg tablet Take 40 mg by mouth daily. Suraj Laughlin ER 36 mg capsule Take 1 Capsule by mouth two (2) times a day.  gabapentin (NEURONTIN) 800 mg tablet Take 400 mg by mouth three (3) times daily.  famotidine (Pepcid) 20 mg tablet Take 20 mg by mouth two (2) times a day.  celecoxib (CELEBREX) 200 mg capsule Take 100 mg by mouth two (2) times a day.  aspirin 81 mg chewable tablet Take 81 mg by mouth nightly.  carBAMazepine ER (CARBATROL ER) 300 mg capsule Take 300 mg by mouth two (2) times a day.  carvedilol (COREG) 6.25 mg tablet Take  by mouth two (2) times daily (with meals).  levothyroxine (SYNTHROID) 125 mcg tablet Take  by mouth Daily (before breakfast).  busPIRone (BUSPAR) 30 mg tablet Take 30 mg by mouth two (2) times a day.  venlafaxine-SR (EFFEXOR XR) 150 mg capsule Take 150 mg by mouth daily.  venlafaxine (EFFEXOR) 75 mg tablet Take 75 mg by mouth daily. (Patient not taking: Reported on 4/13/2022)    naloxone 8 mg/actuation spry 4 mg by Nasal route as needed. No current facility-administered medications for this visit. Review of Systems   All other systems reviewed and are negative.       BP (!) 81/57 (BP 1 Location: Left arm)   Pulse 92   Temp 97.8 °F (36.6 °C) (Temporal)   Resp 16   Ht 5' 1\" (1.549 m)   Wt 135 lb 12.8 oz (61.6 kg)   SpO2 97%   BMI 25.66 kg/m²     Physical Exam  Abdominal:      Comments: Abdomen is soft, non-tender, non-distended. Incisions clean and intact   Musculoskeletal:        Arms:       Comments: Approximately 2cm nodular mass consistent with neurofibroma right lateral chest/abdominal wall         Problem List Items Addressed This Visit        Digestive    Cholecystitis      Other Visit Diagnoses     Screening for colon cancer    -  Primary    Relevant Orders    COLOGUARD TEST (FECAL DNA COLORECTAL CANCER SCREENING)          Assessment and Plan:    I am pleased to see that Gucci Holman is doing well following her surgery. I reviewed the pathology with her and her daughter was present with her today. The pathology demonstrated:    Gallbladder, cholecystectomy with resection of a portion of liver:     Cholelithiasis.       Acute hemorrhagic cholecystitis with necrosis, superimposed upon chronic   cholecystitis.       Serosal fibrous adhesions of the gallbladder, with adherence to liver. Nonspecific chronic inflammation of portal triads with evidence of   cholestasis. I discussed excision of the mass in the right lateral lower chest wall/upper abdominal wall and told her that like to wait at least another month to allow to heal fully from her laparoscopic procedure. Also discussed colonoscopy with the patient. All to have 1 however she is amenable to having the Cologuard test that and I have ordered one for her. She will come back and see me in 1 month.               Annette Delcid MD

## 2022-04-27 ENCOUNTER — TELEPHONE (OUTPATIENT)
Dept: SURGERY | Age: 62
End: 2022-04-27

## 2022-04-27 NOTE — TELEPHONE ENCOUNTER
I spoke with Benjamin Bronson at [place] today at [phone]. I asked him to refax the results of patient's test to [phone].

## 2022-05-04 ENCOUNTER — OFFICE VISIT (OUTPATIENT)
Dept: SURGERY | Age: 62
End: 2022-05-04
Payer: MEDICARE

## 2022-05-04 VITALS
TEMPERATURE: 97.8 F | SYSTOLIC BLOOD PRESSURE: 93 MMHG | DIASTOLIC BLOOD PRESSURE: 55 MMHG | HEART RATE: 79 BPM | BODY MASS INDEX: 26.71 KG/M2 | HEIGHT: 61 IN | OXYGEN SATURATION: 97 % | WEIGHT: 141.5 LBS

## 2022-05-04 DIAGNOSIS — K81.9 CHOLECYSTITIS: ICD-10-CM

## 2022-05-04 DIAGNOSIS — K80.20 CALCULUS OF GALLBLADDER WITHOUT CHOLECYSTITIS WITHOUT OBSTRUCTION: Primary | ICD-10-CM

## 2022-05-04 PROCEDURE — G8510 SCR DEP NEG, NO PLAN REQD: HCPCS | Performed by: COLON & RECTAL SURGERY

## 2022-05-04 PROCEDURE — 99213 OFFICE O/P EST LOW 20 MIN: CPT | Performed by: COLON & RECTAL SURGERY

## 2022-05-04 PROCEDURE — G8427 DOCREV CUR MEDS BY ELIG CLIN: HCPCS | Performed by: COLON & RECTAL SURGERY

## 2022-05-04 PROCEDURE — 3017F COLORECTAL CA SCREEN DOC REV: CPT | Performed by: COLON & RECTAL SURGERY

## 2022-05-04 PROCEDURE — G8419 CALC BMI OUT NRM PARAM NOF/U: HCPCS | Performed by: COLON & RECTAL SURGERY

## 2022-05-04 PROCEDURE — G9899 SCRN MAM PERF RSLTS DOC: HCPCS | Performed by: COLON & RECTAL SURGERY

## 2022-05-17 ENCOUNTER — HOSPITAL ENCOUNTER (OUTPATIENT)
Dept: MRI IMAGING | Age: 62
Discharge: HOME | End: 2022-05-17
Payer: MEDICARE

## 2022-05-17 VITALS — BODY MASS INDEX: 26.45 KG/M2 | WEIGHT: 140 LBS

## 2022-05-17 DIAGNOSIS — Q85.01 NEUROFIBROMATOSIS, TYPE 1 (HCC): ICD-10-CM

## 2022-05-17 PROCEDURE — 74011250636 HC RX REV CODE- 250/636: Performed by: RADIOLOGY

## 2022-05-17 PROCEDURE — 72158 MRI LUMBAR SPINE W/O & W/DYE: CPT

## 2022-05-17 PROCEDURE — A9577 INJ MULTIHANCE: HCPCS | Performed by: RADIOLOGY

## 2022-05-17 PROCEDURE — 72157 MRI CHEST SPINE W/O & W/DYE: CPT

## 2022-05-17 RX ADMIN — GADOBENATE DIMEGLUMINE 13 ML: 529 INJECTION, SOLUTION INTRAVENOUS at 14:51

## 2022-05-18 ENCOUNTER — TELEPHONE (OUTPATIENT)
Dept: SURGERY | Age: 62
End: 2022-05-18

## 2022-05-18 NOTE — TELEPHONE ENCOUNTER
Steven Escobar called today from the patient PCP office. She will need toknow when will the patient be schedule for her colonoscopy so she can call transportation. Steven Escobar can be reach at [phone].

## 2022-05-18 NOTE — TELEPHONE ENCOUNTER
Kaley Soler from [facility] which is the patients PCP. She states she is trying to get transportation set up that Ms. Prabha Mason informed her she is having a procedure done on June 21st but she doesn't have an exact time. I informed her that the hospital will normally call the patient closer to the procedure to give that. She said okay perfect she will discuss this with Ms. Prabha Mason.

## 2022-05-20 ENCOUNTER — HOSPITAL ENCOUNTER (OUTPATIENT)
Dept: MRI IMAGING | Age: 62
Discharge: HOME | End: 2022-05-17
Payer: MEDICARE

## 2022-05-20 VITALS — BODY MASS INDEX: 26.45 KG/M2 | WEIGHT: 140 LBS

## 2022-05-20 DIAGNOSIS — Q85.01 NEUROFIBROMATOSIS, TYPE 1 (HCC): ICD-10-CM

## 2022-05-20 PROCEDURE — A9577 INJ MULTIHANCE: HCPCS | Performed by: RADIOLOGY

## 2022-05-20 PROCEDURE — 74011250636 HC RX REV CODE- 250/636: Performed by: RADIOLOGY

## 2022-05-20 PROCEDURE — 72156 MRI NECK SPINE W/O & W/DYE: CPT

## 2022-05-20 PROCEDURE — 70553 MRI BRAIN STEM W/O & W/DYE: CPT

## 2022-05-20 RX ADMIN — GADOBENATE DIMEGLUMINE 13 ML: 529 INJECTION, SOLUTION INTRAVENOUS at 12:35

## 2022-05-25 ENCOUNTER — HOSPITAL ENCOUNTER (OUTPATIENT)
Dept: MRI IMAGING | Age: 62
Discharge: HOME | End: 2022-05-25
Payer: MEDICARE

## 2022-05-25 DIAGNOSIS — M25.559 CHRONIC HIP PAIN: ICD-10-CM

## 2022-05-25 DIAGNOSIS — G89.29 CHRONIC HIP PAIN: ICD-10-CM

## 2022-05-25 DIAGNOSIS — M25.551 RIGHT HIP PAIN: ICD-10-CM

## 2022-05-25 PROCEDURE — 73721 MRI JNT OF LWR EXTRE W/O DYE: CPT

## 2022-06-09 ENCOUNTER — TELEPHONE (OUTPATIENT)
Dept: SURGERY | Age: 62
End: 2022-06-09

## 2022-06-09 RX ORDER — POLYETHYLENE GLYCOL 3350, SODIUM SULFATE ANHYDROUS, SODIUM BICARBONATE, SODIUM CHLORIDE, POTASSIUM CHLORIDE 236; 22.74; 6.74; 5.86; 2.97 G/4L; G/4L; G/4L; G/4L; G/4L
POWDER, FOR SOLUTION ORAL
Qty: 4000 ML | Refills: 0 | Status: SHIPPED | OUTPATIENT
Start: 2022-06-09

## 2022-06-09 NOTE — TELEPHONE ENCOUNTER
Spoke with Rama Mcdaniel she asked if I could send the Golytely prescription to the facility pharmacy so they can get that filled.

## 2022-06-09 NOTE — TELEPHONE ENCOUNTER
Olivia Haskins called wanted know clarify the orders that were sent for colonoscopy prep for Ms. Grayson Cleary, please call back [phone], thanks

## 2022-06-15 RX ORDER — SODIUM CHLORIDE, SODIUM LACTATE, POTASSIUM CHLORIDE, CALCIUM CHLORIDE 600; 310; 30; 20 MG/100ML; MG/100ML; MG/100ML; MG/100ML
50 INJECTION, SOLUTION INTRAVENOUS CONTINUOUS
Status: DISCONTINUED | OUTPATIENT
Start: 2022-06-15 | End: 2022-06-15

## 2022-06-20 ENCOUNTER — TELEPHONE (OUTPATIENT)
Dept: SURGERY | Age: 62
End: 2022-06-20

## 2022-06-20 NOTE — TELEPHONE ENCOUNTER
Called Ms. Elder Mullins she states she is currently on the toilet now and she will see Ector Aguirre tomorrow.

## 2022-06-21 ENCOUNTER — HOSPITAL ENCOUNTER (OUTPATIENT)
Age: 62
Setting detail: OUTPATIENT SURGERY
Discharge: HOME | End: 2022-06-21
Attending: COLON & RECTAL SURGERY | Admitting: COLON & RECTAL SURGERY
Payer: MEDICARE

## 2022-06-21 ENCOUNTER — APPOINTMENT (OUTPATIENT)
Dept: ENDOSCOPY | Age: 62
End: 2022-06-21
Attending: COLON & RECTAL SURGERY
Payer: MEDICARE

## 2022-06-21 ENCOUNTER — ANESTHESIA EVENT (OUTPATIENT)
Dept: ENDOSCOPY | Age: 62
End: 2022-06-21
Payer: MEDICARE

## 2022-06-21 ENCOUNTER — ANESTHESIA (OUTPATIENT)
Dept: ENDOSCOPY | Age: 62
End: 2022-06-21
Payer: MEDICARE

## 2022-06-21 VITALS
WEIGHT: 145 LBS | DIASTOLIC BLOOD PRESSURE: 80 MMHG | RESPIRATION RATE: 14 BRPM | HEIGHT: 61 IN | OXYGEN SATURATION: 100 % | SYSTOLIC BLOOD PRESSURE: 137 MMHG | BODY MASS INDEX: 27.38 KG/M2 | HEART RATE: 84 BPM | TEMPERATURE: 97.7 F

## 2022-06-21 PROCEDURE — 74011250636 HC RX REV CODE- 250/636: Performed by: COLON & RECTAL SURGERY

## 2022-06-21 PROCEDURE — 76040000008: Performed by: COLON & RECTAL SURGERY

## 2022-06-21 PROCEDURE — 2709999900 HC NON-CHARGEABLE SUPPLY: Performed by: COLON & RECTAL SURGERY

## 2022-06-21 PROCEDURE — 76060000033 HC ANESTHESIA 1 TO 1.5 HR: Performed by: COLON & RECTAL SURGERY

## 2022-06-21 PROCEDURE — 77030019988 HC FCPS ENDOSC DISP BSC -B: Performed by: COLON & RECTAL SURGERY

## 2022-06-21 PROCEDURE — 74011250636 HC RX REV CODE- 250/636: Performed by: NURSE ANESTHETIST, CERTIFIED REGISTERED

## 2022-06-21 RX ORDER — PROPOFOL 10 MG/ML
INJECTION, EMULSION INTRAVENOUS
Status: DISCONTINUED
Start: 2022-06-21 | End: 2022-06-21 | Stop reason: HOSPADM

## 2022-06-21 RX ORDER — SODIUM CHLORIDE, SODIUM LACTATE, POTASSIUM CHLORIDE, CALCIUM CHLORIDE 600; 310; 30; 20 MG/100ML; MG/100ML; MG/100ML; MG/100ML
50 INJECTION, SOLUTION INTRAVENOUS CONTINUOUS
Status: DISCONTINUED | OUTPATIENT
Start: 2022-06-21 | End: 2022-06-22 | Stop reason: HOSPADM

## 2022-06-21 RX ORDER — PROPOFOL 10 MG/ML
INJECTION, EMULSION INTRAVENOUS AS NEEDED
Status: DISCONTINUED | OUTPATIENT
Start: 2022-06-21 | End: 2022-06-21 | Stop reason: HOSPADM

## 2022-06-21 RX ORDER — SODIUM CHLORIDE, SODIUM LACTATE, POTASSIUM CHLORIDE, CALCIUM CHLORIDE 600; 310; 30; 20 MG/100ML; MG/100ML; MG/100ML; MG/100ML
INJECTION, SOLUTION INTRAVENOUS
Status: DISCONTINUED | OUTPATIENT
Start: 2022-06-21 | End: 2022-06-21 | Stop reason: HOSPADM

## 2022-06-21 RX ADMIN — PROPOFOL 25 MG: 10 INJECTION, EMULSION INTRAVENOUS at 10:00

## 2022-06-21 RX ADMIN — PROPOFOL 25 MG: 10 INJECTION, EMULSION INTRAVENOUS at 09:56

## 2022-06-21 RX ADMIN — PROPOFOL 25 MG: 10 INJECTION, EMULSION INTRAVENOUS at 09:38

## 2022-06-21 RX ADMIN — PROPOFOL 25 MG: 10 INJECTION, EMULSION INTRAVENOUS at 09:32

## 2022-06-21 RX ADMIN — PROPOFOL 50 MG: 10 INJECTION, EMULSION INTRAVENOUS at 09:20

## 2022-06-21 RX ADMIN — SODIUM CHLORIDE, POTASSIUM CHLORIDE, SODIUM LACTATE AND CALCIUM CHLORIDE: 600; 310; 30; 20 INJECTION, SOLUTION INTRAVENOUS at 09:20

## 2022-06-21 RX ADMIN — PROPOFOL 25 MG: 10 INJECTION, EMULSION INTRAVENOUS at 09:49

## 2022-06-21 RX ADMIN — PROPOFOL 25 MG: 10 INJECTION, EMULSION INTRAVENOUS at 09:24

## 2022-06-21 RX ADMIN — PROPOFOL 25 MG: 10 INJECTION, EMULSION INTRAVENOUS at 09:47

## 2022-06-21 RX ADMIN — PROPOFOL 25 MG: 10 INJECTION, EMULSION INTRAVENOUS at 09:41

## 2022-06-21 RX ADMIN — PROPOFOL 25 MG: 10 INJECTION, EMULSION INTRAVENOUS at 09:29

## 2022-06-21 RX ADMIN — PROPOFOL 25 MG: 10 INJECTION, EMULSION INTRAVENOUS at 09:51

## 2022-06-21 RX ADMIN — PROPOFOL 50 MG: 10 INJECTION, EMULSION INTRAVENOUS at 09:35

## 2022-06-21 RX ADMIN — SODIUM CHLORIDE, POTASSIUM CHLORIDE, SODIUM LACTATE AND CALCIUM CHLORIDE 50 ML/HR: 600; 310; 30; 20 INJECTION, SOLUTION INTRAVENOUS at 08:38

## 2022-06-21 RX ADMIN — PROPOFOL 50 MG: 10 INJECTION, EMULSION INTRAVENOUS at 09:52

## 2022-06-21 RX ADMIN — PROPOFOL 25 MG: 10 INJECTION, EMULSION INTRAVENOUS at 09:43

## 2022-06-21 NOTE — ANESTHESIA POSTPROCEDURE EVALUATION
Procedure(s):  COLONOSCOPY.    total IV anesthesia, MAC    Anesthesia Post Evaluation      Multimodal analgesia: multimodal analgesia not used between 6 hours prior to anesthesia start to PACU discharge  Patient location during evaluation: bedside (Endoscopy suite)  Patient participation: complete - patient cannot participate  Level of consciousness: responsive to light touch  Pain score: 0  Pain management: adequate  Airway patency: patent  Anesthetic complications: no  Cardiovascular status: acceptable  Respiratory status: acceptable and nasal cannula  Hydration status: acceptable  Comments: This patient remained on the stretcher. The patient was handed off to the endoscopy nursing team.  All questions regarding pre-, intra-, and postoperative care were answered. Post anesthesia nausea and vomiting:  none  Final Post Anesthesia Temperature Assessment:  Normothermia (36.0-37.5 degrees C)      INITIAL Post-op Vital signs: No vitals data found for the desired time range.

## 2022-06-21 NOTE — ANESTHESIA PREPROCEDURE EVALUATION
Relevant Problems   RESPIRATORY SYSTEM   (+) Aspiration pneumonia (HCC)       Anesthetic History   No history of anesthetic complications            Review of Systems / Medical History  Patient summary reviewed, nursing notes reviewed and pertinent labs reviewed    Pulmonary  Within defined limits                 Neuro/Psych     seizures    Psychiatric history     Cardiovascular    Hypertension          Hyperlipidemia      Comments: Echo:  Left Ventricle: Left ventricle size is normal. Increased wall thickness. Normal wall motion. Normal left ventricular systolic function with a visually estimated EF of greater than 65%. Normal diastolic function.    GI/Hepatic/Renal  Within defined limits              Endo/Other      Hypothyroidism  Arthritis     Other Findings            Past Medical History:   Diagnosis Date    Arthritis     Depression     Epilepsy ([address]     Hypertension     Ill-defined condition     neurofibromatosis    Thyroid disease        Past Surgical History:   Procedure Laterality Date    HX GYN      HX LAP CHOLECYSTECTOMY  03/31/2022        HX ORTHOPAEDIC      NEUROLOGICAL PROCEDURE UNLISTED         Current Outpatient Medications   Medication Instructions    aspirin 81 mg, Oral, EVERY BEDTIME    atorvastatin (LIPITOR) 40 mg, Oral, DAILY    busPIRone (BUSPAR) 30 mg, Oral, 2 TIMES DAILY    carBAMazepine ER (CARBATROL ER) 300 mg, Oral, 2 TIMES DAILY    carvedilol (COREG) 6.25 mg tablet 2 TIMES DAILY WITH MEALS    celecoxib (CELEBREX) 100 mg, Oral, 2 TIMES DAILY    diclofenac (FLECTOR) 1.3 % pt12 1 Patch, TransDERmal, EVERY 12 HOURS    diclofenac (VOLTAREN) 4 g, Topical, 4 TIMES DAILY    ergocalciferol (DRISDOL) 50,000 Units, EVERY 30 DAYS    famotidine (PEPCID) 20 mg, Oral, 2 TIMES DAILY    gabapentin (NEURONTIN) 400 mg, Oral, 3 TIMES DAILY    levothyroxine (SYNTHROID) 125 mcg tablet Oral, DAILY BEFORE BREAKFAST    linaCLOtide (LINZESS) 145 mcg, Oral, DAILY BEFORE BREAKFAST  loratadine (CLARITIN) 10 mg, Oral, DAILY AS NEEDED    naloxone 4 mg, Nasal, AS NEEDED    oxyCODONE-acetaminophen (Percocet) 7.5-325 mg per tablet 1 Tablet, Oral, EVERY 8 HOURS AS NEEDED    PEG 3350-Electrolytes (GO-LYTELY) 236-22.74-6.74 -5.86 gram suspension As directed the day before your procedure    senna-docusate (PERICOLACE) 8.6-50 mg per tablet 2 Tablets, Oral, DAILY    venlafaxine (EFFEXOR) 75 mg, Oral, DAILY    venlafaxine-SR (EFFEXOR XR) 150 mg, Oral, DAILY    Xtampza ER 36 mg capsule 1 Capsule, Oral, 2 TIMES DAILY       Current Facility-Administered Medications   Medication Dose Route Frequency    lactated Ringers infusion  50 mL/hr IntraVENous CONTINUOUS       Patient Vitals for the past 24 hrs:   Temp Pulse Resp BP SpO2   06/21/22 0809 36.5 °C (97.7 °F) 80 18 122/67 100 %       Lab Results   Component Value Date/Time    WBC 5.8 04/03/2022 06:53 AM    HGB 9.8 (L) 04/03/2022 06:53 AM    HCT 29.7 (L) 04/03/2022 06:53 AM    PLATELET 931 54/61/9373 06:53 AM    MCV 84.9 04/03/2022 06:53 AM     Lab Results   Component Value Date/Time    Sodium 139 04/03/2022 06:53 AM    Potassium 3.5 04/03/2022 06:53 AM    Chloride 102 04/03/2022 06:53 AM    CO2 31 04/03/2022 06:53 AM    Anion gap 6 04/03/2022 06:53 AM    Glucose 97 04/03/2022 06:53 AM    BUN 4 (L) 04/03/2022 06:53 AM    Creatinine 0.44 (L) 04/03/2022 06:53 AM    BUN/Creatinine ratio 9 (L) 04/03/2022 06:53 AM    GFR est AA >60 04/03/2022 06:53 AM    GFR est non-AA >60 04/03/2022 06:53 AM    Calcium 8.2 (L) 04/03/2022 06:53 AM     No results found for: APTT, PTP, INR, INREXT, INREXT  Lab Results   Component Value Date/Time    Glucose 97 04/03/2022 06:53 AM    Glucose (POC) 96 10/01/2021 04:05 PM     Physical Exam    Airway  Mallampati: I  TM Distance: 4 - 6 cm  Neck ROM: normal range of motion   Mouth opening: Normal     Cardiovascular    Rhythm: regular  Rate: normal         Dental    Dentition: Edentulous     Pulmonary  Breath sounds clear to auscultation               Abdominal  GI exam deferred       Other Findings            Anesthetic Plan    ASA: 3  Anesthesia type: total IV anesthesia and MAC          Induction: Intravenous  Anesthetic plan and risks discussed with: Patient and Family

## 2022-06-21 NOTE — PERIOP NOTES
Patient alert and oriented x4, VS stable, 6/10 lower back pain. Daughter at bedside. Bed in low position, call bell within reach.

## 2022-09-21 ENCOUNTER — OFFICE VISIT (OUTPATIENT)
Dept: NEUROLOGY | Age: 62
End: 2022-09-21
Payer: MEDICAID

## 2022-09-21 DIAGNOSIS — F41.9 ANXIETY AND DEPRESSION: ICD-10-CM

## 2022-09-21 DIAGNOSIS — Q85.00 NEUROFIBROMATOSIS (HCC): ICD-10-CM

## 2022-09-21 DIAGNOSIS — G25.79 SEROTONIN SYNDROME: ICD-10-CM

## 2022-09-21 DIAGNOSIS — R41.3 SHORT-TERM MEMORY LOSS: ICD-10-CM

## 2022-09-21 DIAGNOSIS — G31.84 MILD COGNITIVE IMPAIRMENT: Primary | ICD-10-CM

## 2022-09-21 DIAGNOSIS — G89.4 CHRONIC PAIN SYNDROME: ICD-10-CM

## 2022-09-21 DIAGNOSIS — G89.29 OTHER CHRONIC PAIN: ICD-10-CM

## 2022-09-21 DIAGNOSIS — F32.A ANXIETY AND DEPRESSION: ICD-10-CM

## 2022-09-21 DIAGNOSIS — R47.89 WORD FINDING DIFFICULTY: ICD-10-CM

## 2022-09-21 DIAGNOSIS — G40.211 PARTIAL SYMPTOMATIC EPILEPSY WITH COMPLEX PARTIAL SEIZURES, INTRACTABLE, WITH STATUS EPILEPTICUS (HCC): ICD-10-CM

## 2022-09-21 DIAGNOSIS — R90.89 ABNORMAL BRAIN MRI: ICD-10-CM

## 2022-09-21 PROCEDURE — 90791 PSYCH DIAGNOSTIC EVALUATION: CPT | Performed by: CLINICAL NEUROPSYCHOLOGIST

## 2022-09-21 NOTE — PROGRESS NOTES
[address] [facility],[address]  [address]   [place] [address] [place] [facility]   [place]    [phone] Office   [phone] Fax      Neuropsychology    Initial Diagnostic Interview Note      Referral:  DrDora Radha Wiggins is a 57 y.o. right handed   female who was accompanied to the initial clinical interview on 9/21/22. Please refer to her medical records for details pertaining to her history. At the start of the appointment, I reviewed the patient's [facility] Epic Chart (including Media scanned in from previous providers) for the active Problem List, all pertinent Past Medical Hx, medications, recent radiologic and laboratory findings. In addition, I reviewed pt's documented Immunization Record and Encounter History. She completed an Associate's Degree and is a retired Xray Tech. The patient was diagnosed with neurofibromatosis type I at age 41. She was living in [place]. She has had cervical decompression surgery. She has chronic pain in her spine and extremities and also has had balance problems. Pain management is an ongoing concern. She was on a high dose of fentanyl patch and oxycodone for a period of time. No tumors were noted in the brain. She also has seizure disorder and her seizures are partial complex and staring spells. She has no known tonic-clonic events with loss of consciousness. She just had EMG done by Dr. Trae Montgomery at [facility]. His study found issues consistent with a diffuse chronic lumbosacral plexopathy versus sensorimotor polyneuropathy. Changes seen were deemed mostly axonal with mild demyelinating features often seen in cases of neurofibromatosis. She has noticed the onset of memory problems and word finding difficulties which started about two years ago and has been getting worse. Forgets the content of conversations. She is unable to recall names.  She has a hard time coming up with words in general. She is concerned about it. It seems to be changing. Her children don't let her drive because \"they are trying to control me\" so she just gave up, doesn't have a car, and thus does not drive. She takes her own medications and does her own finances and such. Her ability to take her own medications has improved now that [facility] has her meds packaged like she can do it per pack. She had a larger issue with medications in the years past as it wasn't organized and she had a psychotic break two or three years ago and was seen in an inpatient psychiatric setting. She has been diagnosed as having serotonin syndrome. MRI OF THE BRAIN WITH AND WITHOUT CONTRAST:     CLINICAL HISTORY: Neurofibromatosis. Pre- and postgadolinium axial, coronal and sagittal images were obtained. Diffusion-weighted images were also obtained. 13 ml of MultiHance were injected  intravenously. Available scans are compared with a previous MRI of brain examination of April 2019. Ventricle are midline and of normal size. Diffusion-weighted images show no  acute infarction or cytotoxic edema. Gradient echo images show no intra or  extra-axial hemorrhage. No intra or extra-axial neoplasm in the supratentorial  structures is noted. Pituitary gland is of normal size. Scans through the posterior fossa show what appears to be postoperative changes  over the left internal auditory canals with linear enhancement and more  consistent with postoperative changes. The enhancement is slightly more  prominent in the fundus of the internal auditory canal and minimal residual  tumor cannot be excluded. No acute infarction or hemorrhage were demonstrated. Signal void is noted in both internal carotid arteries and the basilar artery  consistent with patency of these vessels . Cerebellar tonsils are at the level of the foramen magnum in a satisfactory  position. Both orbits have a normal MR appearance. IMPRESSION  Postoperative changes over the left internal auditory canal show  minimal linear enhancement and also minimal residual enhancement in the fundus. Residual tumor in the fundus cannot be excluded. Recommendations follow-up MRI of the posterior fossa and brain with and without  intravenous contrast and with thin scans through the internal auditory canals. Neuropsychological Mental Status Exam (NMSE):      Historian: Good  Praxis: No UE apraxia  R/L Orientation: Intact to self and to other  Dress: within normal limits   Weight: overweight  Appearance/Hygiene: within normal limits   Gait:Uses rollator   Assistive Devices: Rollator, glasses  Mood: within normal limits   Affect: within normal limits   Comprehension: within normal limits   Thought Process: within normal limits   Expressive Language: within normal limits   Receptive Language: within normal limits   Motor:  No cognitive or motor perseveration  ETOH: Denied, can't drink anymore.   She loved beer  Tobacco: Denied  Illicit: Denied  SI/HI: Denied current  Psychosis: Denied  Insight: Within normal limits  Judgment: Within normal limits  Other Psych:      Past Medical History:   Diagnosis Date    Arthritis     Depression     Epilepsy ([facility] [address]     Hypertension     Ill-defined condition     neurofibromatosis    Thyroid disease        Past Surgical History:   Procedure Laterality Date    COLONOSCOPY N/A 6/21/2022    COLONOSCOPY performed by Nunu Snow MD at [address] GYN      HX LAP CHOLECYSTECTOMY  03/31/2022        HX ORTHOPAEDIC      NEUROLOGICAL PROCEDURE UNLISTED         Allergies   Allergen Reactions    Codeine Nausea and Vomiting    Methadone Hives    Morphine Other (comments)     psychosis       Family History   Problem Relation Age of Onset    Cancer Mother         glioblastoma    Lung Disease Father     Cancer Father         mesothelioma, testicular cancer       Social History     Tobacco Use    Smoking status: Never    Smokeless tobacco: Never   Vaping Use    Vaping Use: Former    Substances: CBD (only for 2 months)   Substance Use Topics    Alcohol use: No    Drug use: Never       Current Outpatient Medications   Medication Sig Dispense Refill    PEG 3350-Electrolytes (GO-LYTELY) 236-22.74-6.74 -5.86 gram suspension As directed the day before your procedure 4000 mL 0    venlafaxine (EFFEXOR) 75 mg tablet Take 75 mg by mouth daily. ergocalciferol (DrisdoL) 1,250 mcg (50,000 unit) capsule Take 50,000 Units by mouth every thirty (30) days. Take on the 15th day of the month (Patient not taking: Reported on 5/4/2022)      linaCLOtide (Linzess) 145 mcg cap capsule Take 145 mcg by mouth Daily (before breakfast). senna-docusate (PERICOLACE) 8.6-50 mg per tablet Take 2 Tablets by mouth daily. naloxone 8 mg/actuation spry 4 mg by Nasal route as needed. (Patient not taking: Reported on 6/21/2022)      oxyCODONE-acetaminophen (Percocet) 7.5-325 mg per tablet Take 1 Tablet by mouth every eight (8) hours as needed for Pain.      loratadine (Claritin) 10 mg tablet Take 10 mg by mouth daily as needed for Allergies. (Patient not taking: Reported on 6/21/2022)      diclofenac (FLECTOR) 1.3 % pt12 1 Patch by TransDERmal route every twelve (12) hours every twelve (12) hours. diclofenac (Voltaren) 1 % gel Apply 4 g to affected area four (4) times daily. atorvastatin (LIPITOR) 40 mg tablet Take 40 mg by mouth daily. Xtampza ER 36 mg capsule Take 1 Capsule by mouth two (2) times a day.      gabapentin (NEURONTIN) 800 mg tablet Take 400 mg by mouth three (3) times daily. famotidine (Pepcid) 20 mg tablet Take 20 mg by mouth two (2) times a day. celecoxib (CELEBREX) 200 mg capsule Take 100 mg by mouth two (2) times a day. aspirin 81 mg chewable tablet Take 81 mg by mouth nightly. carBAMazepine ER (CARBATROL ER) 300 mg capsule Take 300 mg by mouth two (2) times a day.       carvedilol (COREG) 6.25 mg tablet Take  by mouth two (2) times daily (with meals). (Patient not taking: Reported on 6/15/2022)      levothyroxine (SYNTHROID) 125 mcg tablet Take  by mouth Daily (before breakfast). busPIRone (BUSPAR) 30 mg tablet Take 30 mg by mouth two (2) times a day. venlafaxine-SR (EFFEXOR XR) 150 mg capsule Take 150 mg by mouth daily. Plan:  Obtain authorization for testing from insurance company. Report to follow once testing, scoring, and interpretation completed. ? Organic based neurocognitive issues versus mood disorder or combination of same. ? Problems organic, functional, or both? This note will not be viewable in [address]

## 2022-09-30 ENCOUNTER — OFFICE VISIT (OUTPATIENT)
Dept: NEUROLOGY | Age: 62
End: 2022-09-30

## 2022-09-30 DIAGNOSIS — G31.84 MILD COGNITIVE IMPAIRMENT: Primary | ICD-10-CM

## 2022-10-19 NOTE — ED NOTES
Report called to Santiago Saldana RN who is taking report for primary RN. SBAR provided. Pt alert, oriented, transferring to floor via stretcher. Principal Discharge DX:	Threatened    1

## 2022-10-20 ENCOUNTER — OFFICE VISIT (OUTPATIENT)
Dept: NEUROLOGY | Age: 62
End: 2022-10-20
Payer: MEDICAID

## 2022-10-20 DIAGNOSIS — G31.84 MILD COGNITIVE IMPAIRMENT: Primary | ICD-10-CM

## 2022-10-20 DIAGNOSIS — R41.840 ATTENTION DEFICIT: ICD-10-CM

## 2022-10-20 DIAGNOSIS — R90.89 ABNORMAL BRAIN MRI: ICD-10-CM

## 2022-10-20 DIAGNOSIS — F32.A ANXIETY AND DEPRESSION: ICD-10-CM

## 2022-10-20 DIAGNOSIS — G89.29 OTHER CHRONIC PAIN: ICD-10-CM

## 2022-10-20 DIAGNOSIS — R41.3 FUNCTIONAL MEMORY PROBLEM: ICD-10-CM

## 2022-10-20 DIAGNOSIS — F41.9 ANXIETY AND DEPRESSION: ICD-10-CM

## 2022-10-20 PROCEDURE — 96139 PSYCL/NRPSYC TST TECH EA: CPT | Performed by: CLINICAL NEUROPSYCHOLOGIST

## 2022-10-20 PROCEDURE — 96132 NRPSYC TST EVAL PHYS/QHP 1ST: CPT | Performed by: CLINICAL NEUROPSYCHOLOGIST

## 2022-10-20 PROCEDURE — 96138 PSYCL/NRPSYC TECH 1ST: CPT | Performed by: CLINICAL NEUROPSYCHOLOGIST

## 2022-10-20 PROCEDURE — 96133 NRPSYC TST EVAL PHYS/QHP EA: CPT | Performed by: CLINICAL NEUROPSYCHOLOGIST

## 2022-10-20 PROCEDURE — 96136 PSYCL/NRPSYC TST PHY/QHP 1ST: CPT | Performed by: CLINICAL NEUROPSYCHOLOGIST

## 2022-10-20 PROCEDURE — 96137 PSYCL/NRPSYC TST PHY/QHP EA: CPT | Performed by: CLINICAL NEUROPSYCHOLOGIST

## 2022-10-24 ENCOUNTER — TRANSCRIBE ORDER (OUTPATIENT)
Dept: SCHEDULING | Age: 62
End: 2022-10-24

## 2022-10-24 DIAGNOSIS — Z12.31 VISIT FOR SCREENING MAMMOGRAM: Primary | ICD-10-CM

## 2022-10-31 NOTE — PROGRESS NOTES
[address] [facility],[address]Th [address]  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 right handed   female who was accompanied to the initial clinical interview on 9/21/22. Please refer to her medical records for details pertaining to her history. At the start of the appointment, I reviewed the patient's [facility] Epic Chart (including Media scanned in from previous providers) for the active Problem List, all pertinent Past Medical Hx, medications, recent radiologic and laboratory findings. In addition, I reviewed pt's documented Immunization Record and Encounter History. She completed an Associate's Degree and is a retired Xray Tech. The patient was diagnosed with neurofibromatosis type I at age 41. She was living in [place]. She has had cervical decompression surgery. She has chronic pain in her spine and extremities and also has had balance problems. Pain management is an ongoing concern. She was on a high dose of fentanyl patch and oxycodone for a period of time. No tumors were noted in the brain. She also has seizure disorder and her seizures are partial complex and staring spells. She has no known tonic-clonic events with loss of consciousness. She just had EMG done by Dr. Ousmane Workman at [facility]. His study found issues consistent with a diffuse chronic lumbosacral plexopathy versus sensorimotor polyneuropathy. Changes seen were deemed mostly axonal with mild demyelinating features often seen in cases of neurofibromatosis. She has noticed the onset of memory problems and word finding difficulties which started about two years ago and has been getting worse. Forgets the content of conversations. She is unable to recall names.  She has a hard time coming up with words in general. She is concerned about it. It seems to be changing. Her children don't let her drive because \"they are trying to control me\" so she just gave up, doesn't have a car, and thus does not drive. She takes her own medications and does her own finances and such. Her ability to take her own medications has improved now that [facility] has her meds packaged like she can do it per pack. She had a larger issue with medications in the years past as it wasn't organized and she had a psychotic break two or three years ago and was seen in an inpatient psychiatric setting. She has been diagnosed as having serotonin syndrome. MRI OF THE BRAIN WITH AND WITHOUT CONTRAST:     CLINICAL HISTORY: Neurofibromatosis. Pre- and postgadolinium axial, coronal and sagittal images were obtained. Diffusion-weighted images were also obtained. 13 ml of MultiHance were injected  intravenously. Available scans are compared with a previous MRI of brain examination of April 2019. Ventricle are midline and of normal size. Diffusion-weighted images show no  acute infarction or cytotoxic edema. Gradient echo images show no intra or  extra-axial hemorrhage. No intra or extra-axial neoplasm in the supratentorial  structures is noted. Pituitary gland is of normal size. Scans through the posterior fossa show what appears to be postoperative changes  over the left internal auditory canals with linear enhancement and more  consistent with postoperative changes. The enhancement is slightly more  prominent in the fundus of the internal auditory canal and minimal residual  tumor cannot be excluded. No acute infarction or hemorrhage were demonstrated. Signal void is noted in both internal carotid arteries and the basilar artery  consistent with patency of these vessels . Cerebellar tonsils are at the level of the foramen magnum in a satisfactory  position. Both orbits have a normal MR appearance.      IMPRESSION  Postoperative changes over the left internal auditory canal show  minimal linear enhancement and also minimal residual enhancement in the fundus. Residual tumor in the fundus cannot be excluded. Recommendations follow-up MRI of the posterior fossa and brain with and without  intravenous contrast and with thin scans through the internal auditory canals. Neuropsychological Mental Status Exam (NMSE):      Historian: Good  Praxis: No UE apraxia  R/L Orientation: Intact to self and to other  Dress: within normal limits   Weight: overweight  Appearance/Hygiene: within normal limits   Gait:Uses rollator   Assistive Devices: Rollator, glasses  Mood: within normal limits   Affect: within normal limits   Comprehension: within normal limits   Thought Process: within normal limits   Expressive Language: within normal limits   Receptive Language: within normal limits   Motor:  No cognitive or motor perseveration  ETOH: Denied, can't drink anymore.   She loved beer  Tobacco: Denied  Illicit: Denied  SI/HI: Denied current  Psychosis: Denied  Insight: Within normal limits  Judgment: Within normal limits  Other Psych:      Past Medical History:   Diagnosis Date    Arthritis     Depression     Epilepsy ([facility] [address]     Hypertension     Ill-defined condition     neurofibromatosis    Thyroid disease        Past Surgical History:   Procedure Laterality Date    COLONOSCOPY N/A 6/21/2022    COLONOSCOPY performed by Cameron Banks MD at [address] [place] [address]      HX LAP CHOLECYSTECTOMY  03/31/2022        HX ORTHOPAEDIC      NEUROLOGICAL PROCEDURE UNLISTED         Allergies   Allergen Reactions    Codeine Nausea and Vomiting    Methadone Hives    Morphine Other (comments)     psychosis       Family History   Problem Relation Age of Onset    Cancer Mother         glioblastoma    Lung Disease Father     Cancer Father         mesothelioma, testicular cancer       Social History     Tobacco Use    Smoking status: Never    Smokeless tobacco: Never   Vaping Use    Vaping Use: Former    Substances: CBD (only for 2 months)   Substance Use Topics    Alcohol use: No    Drug use: Never       Current Outpatient Medications   Medication Sig Dispense Refill    PEG 3350-Electrolytes (GO-LYTELY) 236-22.74-6.74 -5.86 gram suspension As directed the day before your procedure 4000 mL 0    venlafaxine (EFFEXOR) 75 mg tablet Take 75 mg by mouth daily. ergocalciferol (DrisdoL) 1,250 mcg (50,000 unit) capsule Take 50,000 Units by mouth every thirty (30) days. Take on the 15th day of the month (Patient not taking: Reported on 5/4/2022)      linaCLOtide (Linzess) 145 mcg cap capsule Take 145 mcg by mouth Daily (before breakfast). senna-docusate (PERICOLACE) 8.6-50 mg per tablet Take 2 Tablets by mouth daily. naloxone 8 mg/actuation spry 4 mg by Nasal route as needed. (Patient not taking: Reported on 6/21/2022)      oxyCODONE-acetaminophen (Percocet) 7.5-325 mg per tablet Take 1 Tablet by mouth every eight (8) hours as needed for Pain.      loratadine (Claritin) 10 mg tablet Take 10 mg by mouth daily as needed for Allergies. (Patient not taking: Reported on 6/21/2022)      diclofenac (FLECTOR) 1.3 % pt12 1 Patch by TransDERmal route every twelve (12) hours every twelve (12) hours. diclofenac (Voltaren) 1 % gel Apply 4 g to affected area four (4) times daily. atorvastatin (LIPITOR) 40 mg tablet Take 40 mg by mouth daily. Xtampza ER 36 mg capsule Take 1 Capsule by mouth two (2) times a day.      gabapentin (NEURONTIN) 800 mg tablet Take 400 mg by mouth three (3) times daily. famotidine (Pepcid) 20 mg tablet Take 20 mg by mouth two (2) times a day. celecoxib (CELEBREX) 200 mg capsule Take 100 mg by mouth two (2) times a day. aspirin 81 mg chewable tablet Take 81 mg by mouth nightly. carBAMazepine ER (CARBATROL ER) 300 mg capsule Take 300 mg by mouth two (2) times a day.       carvedilol (COREG) 6.25 mg tablet Take  by mouth two (2) times daily (with meals). (Patient not taking: Reported on 6/15/2022)      levothyroxine (SYNTHROID) 125 mcg tablet Take  by mouth Daily (before breakfast). busPIRone (BUSPAR) 30 mg tablet Take 30 mg by mouth two (2) times a day. venlafaxine-SR (EFFEXOR XR) 150 mg capsule Take 150 mg by mouth daily. Plan:  Obtain authorization for testing from insurance company. Report to follow once testing, scoring, and interpretation completed. ? Organic based neurocognitive issues versus mood disorder or combination of same. ? Problems organic, functional, or both? This note will not be viewable in [address] Neuropsychological Evaluation  Patient Testing 9/30/22 and 10/20/22 Report Completed 10/31/22  A Psychometrist Assisted w/ portions of this evaluation while under my direct supervision    Please refer to the patient's initial interview progress note (copied above) and her medical records for details pertaining to her history. Today's neuropsychological test scores follow: The following assessment procedures were performed:      Neuropsychologist Performed, Interpreted, & Reported: Neuropsychological Mental Status Exam, Revised Memory & Behavior Checklist, Mini Mental State Exam, Clock Drawing Test, Test Of Premorbid Functioning, Ana-Melzack Pain Questionnaire,  History Taking  & Clinical Interview With The Patient, SARAH, CPT-III, Review Of Available Records. Psychometrist Administered & Neuropsychologist Interpreted & Neuropsychologist Reported: Finger Tapping Test, Grooved Pegboard Test, DAPS, YARELI,  Wechsler Adult Intelligence Scale - IV, Verbal Fluency Tests, Reji & Reji - Revised, Trailmaking Test Parts A & B, [place] Verbal Learning Test - 3, Mihir Complex Figure Test, Gregorio Depression Inventory - II, Gregorio Anxiety Inventory,.       Test Findings:  Note:  The patients raw data have been compared with currently available norms which include demographic corrections for age, gender, and/or education. Sometimes, the patients scores are compared to demographically similar individuals as close to the patients age, education level, etc., as possible. \"Average\" is viewed as being +/- 1 standard deviation (SD) from the stated mean for a particular test score. \"Low average\" is viewed as being between 1 and 2 SD below the mean, and above average is viewed as being 1 and 2 SD above the mean. Scores falling in the borderline range (between 1-1/2 and 2 SD below the mean) are viewed with particular attention as to whether they are normal or abnormal neurocognitive test scores. Other methods of inference in analyzing the test data are also utilized, including the pattern and range of scores in the profile, bilateral motor functions, and the presence, if any, of pathognomonic signs. Behaviorally, the patient was friendly and cooperative and appeared motivated to perform well during this examination. On the first day of testing, the patient had a partial seizure and testing was subsequently discontinued for the day. The alternative measure of memory was administered the next time. She has hearing aids and auditory attention issues could not be attributable to hearing versus attention problems so that score is not reported. On an assessment of posttraumatic stress, the patient reported that none of the index trauma has affected her currently and that test was appropriately discontinued. Within this context, the results of this evaluation are viewed as a valid reflection of the patients actual neurocognitive and emotional status. Her structured word list fluency, as assessed by the FAS Test, was within the below average range with a T score of 41. Category fluency was moderately impaired with a T score 28. Confrontation naming, as assessed by the [facility], was within the above average range with a T score of 62.   This pattern of performance is indicative of a patient who is at increased risk for day-to-day problems with verbal fluency. Confrontation naming is normal.     The patient was administered the [facility] Continuous Performance Test - III, a computer-administered test of sustained attention, and review of the subscales within this instrument revealed moderate concern for inattentiveness without impulsivity. This pattern of performance is indicative of a patient who is at increased risk for day-to-day problems with sustained attention. The patient was administered the Wechsler Adult Intelligence Scale - IV. See Appendix I for full breakdown of IQ test scores (scanned into media section of this EMR). As can be seen, there was no clinically significant difference between her low average range Working Memory Index score of 83 (13th %ile) and her average range Processing Speed Index score of 92 (30th %ile). Her Verbal Comprehension Index score of 100 (50th %ile) was within the average range. Her Perceptual Reasoning Index score of 113 (81st %ile) was within the high average range. IQ domain scores vary from the low average to high average range of functioning. Working memory is lower than what would be expected based on her performance on a task estimating premorbid functioning levels. This often signals significant functional/mood interference. The patient was administered the [place] Verbal Learning Test  - 3 and generated a normal range and positive learning curve over five repeated auditory word list learning trials. An interference trial was within normal limits. Free and cued, short and long delayed recall were within or above the normal range. Recognition and forced choice recall were within normal limits. This pattern of performance is not indicative of a patient who is at increased risk for day-to-day problems with auditory learning and/or memory.        The patients performance on the copy portion of the Mihir Complex Figure Test was within normal limits  (>16th %ile). Recall for the complex design was within normal range after both short and long delays. Recognition recall was high average. This pattern of performance is not indicative of a patient who is at increased risk for day-to-day problems with visual organization or visual memory. Simple timed visual motor sequencing (Trailmaking Test Part A) was within the average range with a T score of 50. Her performance on a similar, but more complex task of timed visual motor sequencing (Trailmaking Test Part B) was within the mildly to moderately impaired range with a T score of 34. She made only 2 sequencing errors on this latter completed test.  Taken together, this pattern of performance is not indicative of a patient who is at increased risk for day-to-day problems with executive functioning. Attention problems are noted, however. Fine motor dexterity, as assessed by the [facility], was within the mildly to moderately impaired range bilaterally.  strength was mildly impaired bilaterally. This does not raise particular concern for a focal or lateralized brain dysfunction. The patient rated her current level of pain as \"2/5-discomforting\" on the Ana-Melzack Pain Questionnaire. She reported pain in her shoulders, neck, spine, wrists, fingers, left thigh, left hip, knees, feet, vagina, and anus. She reported drowsiness and constipation along with fitful sleep. James Razo Her Gregorio Depression Inventory -II score of 21 was within the moderately depressed range. Her Gregorio Anxiety Inventory score of 24 reflected moderate anxiety. She denied distress associated with Trauma on the Detailed Assessment of Post Traumatic Stress. The patient was also administered the Personality Assessment Inventory and generated a valid profile for interpretation. Within this context, she is quite concerned about physical functioning and health matters.   Marked depression is noted. Self-concept is stable and positive. She is distant in those relationships that are maintained. Her self-reported level of treatment motivation is very low. The quality of her depression seems primarily marked by physiological features, such as disturbance in sleep, decrease of level of energy and sexual interest, loss of appetite and weight. Concurrently, she does not appear to be reporting as significant a degree of dysphoria or thoughts of worthlessness and hopelessness. She may not recognize those symptoms as signs of dysphoria and stress or may be repressing the cognitive experience of unhappiness to some extent. Impressions & Recommendations: This patient generated a mixed normal/abnormal range Neuropsychological Evaluation with respect to neurocognitive functioning. In this regard, impairments are noted for verbal fluency, sustained attention, and bilateral motor skills. Otherwise, her performances across the all other neurocognitive domains assessed are well within or above the normal range. IQ domain scores are variable and consistent with functional interference in a bright individual.  Memory scores are quite good. From an emotional standpoint, there is at least moderate depression. Thankfully, there is no evidence of an organic based memory disorder here. Instead, there is significant functional interference and possible chronic underlying attention deficit. In addition to continued medical care, my recommendations include psychiatric medication management for depression along with active engagement in counseling, it may also be wise to consider an appropriate medication for attention for her if this is not medically contraindicated. Of course, caution is advised in selecting an appropriate medication for attention for her, given her history of seizures.   With an improvement in mood and attention should come with it and improvement in day-to-day functional memory. If not, follow-up neuropsychological evaluation would then be indicated. I am not concerned about competency/capacity, driving, day-to-day supervision, etc.  We now have extensive baseline neurocognitive and psychologic data on her. Follow up as needed. Clinical correlation is, of course, indicated. I will discuss these findings with the patient when she follows up with me in the near future. A follow up Neuropsychological Evaluation is indicated on a prn basis, especially if there are any cognitive and/or emotional changes. DIAGNOSES:   MCI Without Memory Loss      ADHD, Inattentive      Major Depression - Moderate      Other Chronic Pain       The above information is based upon information currently available to me. If there is any additional information of which I am currently unaware, I would be more than happy to review it upon having it made available to me. Thank you for the opportunity to see this interesting individual.     Sincerely,       Kali Posadas. Giovanni Thomas PsyD, EdS    CC: Other, MD Henry     Time Documentation:      75807 x1 &  68057 x 1 Neuropsych testing/data gathering by Neuropsychologist (60 minutes)     [phone] x 1  96139 x 7 Test Administration/Data Gathering By Technician: (4 hours). 25497 x 1 (first 30 minutes), 40552 x 7 (each additional 30 minutes)    96132 x 1  96133 x 1 Testing Evaluation Services by Neuropsychologist (1 hour, 50 minutes) 96132 x 1 (first hour), 96133 x 1 (50 minutes)    Definitions:      19308/14450:  Neurobehavioral Status Exam, Clinical interview. Clinical assessment of thinking, reasoning and judgment, by neuropsychologist, both face to face time with patient and time interpreting those test results and reporting, first and subsequent hours)    62820/30326: Neuropsychological Test Administration by Technician/Psychometrist, first 30 minutes and each additional 30 minutes. The above includes: Record review.   Review of history provided by patient. Review of collaborative information. Testing by Clinician. Review of raw data. Scoring. Report writing of individual tests administered by Clinician. Integration of individual tests administered by psychometrist with NSE/testing by clinician, review of records/history/collaborative information, case Conceptualization, treatment planning, clinical decision making, report writing, coordination Of Care. Psychometry test codes as time spent by psychometrist administering and scoring neurocognitive/psychological tests under supervision of neuropsychologist.  Integral services including scoring of raw data, data interpretation, case conceptualization, report writing etcetera were initiated after the patient finished testing/raw data collected and was completed on the date the report was signed.

## 2022-10-31 NOTE — PROGRESS NOTES
Patient arrived for testing but had a partial seizure in the minutes of testing and thus that issue was addressed and she was sent home to rest and returned on a second date. Report to follow.

## 2022-11-14 ENCOUNTER — TRANSCRIBE ORDER (OUTPATIENT)
Dept: SCHEDULING | Age: 62
End: 2022-11-14

## 2022-11-14 ENCOUNTER — OFFICE VISIT (OUTPATIENT)
Dept: NEUROLOGY | Age: 62
End: 2022-11-14
Payer: MEDICAID

## 2022-11-14 DIAGNOSIS — R41.3 FUNCTIONAL MEMORY PROBLEM: ICD-10-CM

## 2022-11-14 DIAGNOSIS — G31.84 MILD COGNITIVE IMPAIRMENT: Primary | ICD-10-CM

## 2022-11-14 DIAGNOSIS — R74.8 ACID PHOSPHATASE ELEVATED: Primary | ICD-10-CM

## 2022-11-14 DIAGNOSIS — F32.A ANXIETY AND DEPRESSION: ICD-10-CM

## 2022-11-14 DIAGNOSIS — R41.840 ATTENTION DEFICIT: ICD-10-CM

## 2022-11-14 DIAGNOSIS — F41.9 ANXIETY AND DEPRESSION: ICD-10-CM

## 2022-11-14 DIAGNOSIS — R90.89 ABNORMAL BRAIN MRI: ICD-10-CM

## 2022-11-14 DIAGNOSIS — G89.29 OTHER CHRONIC PAIN: ICD-10-CM

## 2022-11-14 PROCEDURE — 90832 PSYTX W PT 30 MINUTES: CPT | Performed by: CLINICAL NEUROPSYCHOLOGIST

## 2022-11-14 NOTE — PROGRESS NOTES
Prior to seeing the patient I reviewed the records, including the previously completed report, the records in [place], and any updated visits from other providers since I saw the patient last.      Today, I engaged in a psychoeducational and supportive and cognitive/behavioral psychotherapy session with the patient. I provided psychotherapy in the form of psychoeducation and support with respect to the results of the recent Neuropsychological Evaluation, including discussing individual tests as well as patient's areas of neurocognitive strength versus weakness. We discussed, in detail, the following: This patient generated a mixed normal/abnormal range Neuropsychological Evaluation with respect to neurocognitive functioning. In this regard, impairments are noted for verbal fluency, sustained attention, and bilateral motor skills. Otherwise, her performances across the all other neurocognitive domains assessed are well within or above the normal range. IQ domain scores are variable and consistent with functional interference in a bright individual.  Memory scores are quite good. From an emotional standpoint, there is at least moderate depression. Thankfully, there is no evidence of an organic based memory disorder here. Instead, there is significant functional interference and possible chronic underlying attention deficit. In addition to continued medical care, my recommendations include psychiatric medication management for depression along with active engagement in counseling, it may also be wise to consider an appropriate medication for attention for her if this is not medically contraindicated. Of course, caution is advised in selecting an appropriate medication for attention for her, given her history of seizures. With an improvement in mood and attention should come with it and improvement in day-to-day functional memory.   If not, follow-up neuropsychological evaluation would then be indicated. I am not concerned about competency/capacity, driving, day-to-day supervision, etc.  We now have extensive baseline neurocognitive and psychologic data on her. Follow up as needed. Clinical correlation is, of course, indicated. I will discuss these findings with the patient when she follows up with me in the near future. A follow up Neuropsychological Evaluation is indicated on a prn basis, especially if there are any cognitive and/or emotional changes. DIAGNOSES:                         MCI Without Memory Loss                                                  ADHD, Inattentive                                                  Major Depression - Moderate                                                  Other Chronic Pain        Education was provided regarding my diagnostic impressions, and we discussed treatment plan/options. I also answered numerous questions related to the clinical findings, including discussing various methods to improve cognition and mood. Counseling provided regarding mood and cognition. CBT and supportive psychotherapy techniques were utilized. Supportive/Cognitive Behavioral/Solution Focused psychotherapy provided  Discussed rational versus irrational thinking patterns and their consequences. Discussed healthy/adaptive and unhealthy/maladaptive coping. The patient needs to follow with [facility] ([facility]), psychiatry for sure and counseling. Reassured of test results and also suggest tx of attentional concerns. ere addressed include:     The patient had the following concerns which I deferred to their referring provider: meds for mood/cognition      Time spent today: 20

## 2022-12-05 ENCOUNTER — HOSPITAL ENCOUNTER (OUTPATIENT)
Dept: MAMMOGRAPHY | Age: 62
Discharge: HOME | End: 2022-12-05
Attending: HOSPITALIST
Payer: MEDICARE

## 2022-12-05 DIAGNOSIS — Z12.31 VISIT FOR SCREENING MAMMOGRAM: ICD-10-CM

## 2022-12-05 PROCEDURE — 77067 SCR MAMMO BI INCL CAD: CPT

## 2022-12-20 ENCOUNTER — HOSPITAL ENCOUNTER (OUTPATIENT)
Dept: ULTRASOUND IMAGING | Age: 62
Discharge: HOME | End: 2022-12-20
Payer: MEDICARE

## 2022-12-20 DIAGNOSIS — R74.8 ACID PHOSPHATASE ELEVATED: ICD-10-CM

## 2022-12-20 PROCEDURE — 76700 US EXAM ABDOM COMPLETE: CPT

## 2023-03-29 ENCOUNTER — OFFICE VISIT (OUTPATIENT)
Dept: PODIATRY | Age: 63
End: 2023-03-29

## 2023-03-29 VITALS
DIASTOLIC BLOOD PRESSURE: 70 MMHG | SYSTOLIC BLOOD PRESSURE: 127 MMHG | TEMPERATURE: 97.7 F | BODY MASS INDEX: 27.38 KG/M2 | HEART RATE: 106 BPM | HEIGHT: 61 IN | WEIGHT: 145 LBS

## 2023-03-29 DIAGNOSIS — L98.9 SKIN LESION OF FOOT: Primary | ICD-10-CM

## 2023-03-29 NOTE — PROGRESS NOTES
1. Have you been to the ER, urgent care clinic since your last visit? Hospitalized since your last visit? No    2. Have you seen or consulted any other health care providers outside of the [address] [place] [address] since your last visit? Include any pap smears or colon screening.  No    Chief Complaint   Patient presents with    Wound Check     Lesion on left 2nd toe

## 2023-03-29 NOTE — PROGRESS NOTES
[facility]     Patient Name: Yessica Seymour    : 1960    Visit Date: 3/29/2023    Office Visit Note    Subjective:         Patient is a 57 y.o. female who is being seen in office initial visit for pain to left second toe due to skin lesion. Patient states that she noticed the skin to the dorsal aspect of the toe for over the last year. Patient is worried because lesion bleeds. Past Medical History:   Diagnosis Date    Arthritis     Depression     Epilepsy ([address]     Hypertension     Ill-defined condition     neurofibromatosis    Thyroid disease      Past Surgical History:   Procedure Laterality Date    COLONOSCOPY N/A 2022    COLONOSCOPY performed by Kelle Bullock MD at [facility]    HX GYN      HX LAP CHOLECYSTECTOMY  2022        HX ORTHOPAEDIC      AL UNLISTED NEUROLOGICAL/NEUROMUSCULAR DX [address]         Family History   Problem Relation Age of Onset    Cancer Mother         glioblastoma    Lung Disease Father     Cancer Father         mesothelioma, testicular cancer      Social History     Tobacco Use    Smoking status: Never    Smokeless tobacco: Never   Substance Use Topics    Alcohol use: No     Allergies   Allergen Reactions    Codeine Nausea and Vomiting    Methadone Hives    Morphine Other (comments)     psychosis     Prior to Admission medications    Medication Sig Start Date End Date Taking? Authorizing Provider   venlafaxine [facility]) 75 mg tablet Take 75 mg by mouth daily. Yes Provider, Historical   senna-docusate (PERICOLACE) 8.6-50 mg per tablet Take 2 Tablets by mouth daily. Yes Other, MD Henry   Xtampza ER 36 mg capsule Take 1 Capsule by mouth two (2) times a day. 21  Yes Provider, Historical   gabapentin (NEURONTIN) 800 mg tablet Take 400 mg by mouth three (3) times daily. 21  Yes Provider, Historical   celecoxib (CELEBREX) 200 mg capsule Take 100 mg by mouth two (2) times a day.    Yes Other, MD Henry   carBAMazepine ER (CARBATROL ER) 300 mg capsule Take 300 mg by mouth two (2) times a day. Yes Provider, Historical   levothyroxine (SYNTHROID) 125 mcg tablet Take  by mouth Daily (before breakfast). Yes Provider, Historical   busPIRone (BUSPAR) 30 mg tablet Take 30 mg by mouth two (2) times a day. Yes Provider, Historical   venlafaxine-SR (EFFEXOR-XR) 150 mg capsule Take 150 mg by mouth daily. Yes Provider, Historical       Review of Systems   Constitutional:  Negative for chills, diaphoresis, fever and malaise/fatigue. Respiratory:  Negative for cough, hemoptysis, sputum production, shortness of breath and wheezing. No cyanosis   Cardiovascular:  Negative for chest pain, palpitations, claudication and leg swelling. Gastrointestinal:  Negative for abdominal pain, constipation, diarrhea, heartburn, nausea and vomiting. Genitourinary:  Negative for frequency. Musculoskeletal:  Negative for back pain, joint pain, myalgias and neck pain. Skin:  Negative for itching and rash. Neurological:  Negative for tingling and headaches. Alert and oriented x 4. Endo/Heme/Allergies:  Negative for polydipsia. Psychiatric/Behavioral:  Negative for depression and memory loss. The patient is not nervous/anxious. Objective:     Visit Vitals  /70 (BP 1 Location: Left upper arm, BP Patient Position: Sitting, BP Cuff Size: Adult)   Pulse (!) 106   Temp 97.7 °F (36.5 °C) (Temporal)   Ht 5' 1\" (1.549 m)   Wt 145 lb (65.8 kg)   BMI 27.40 kg/m²       GEN: Pt is AAOx4 and in NAD. No dressing noted to B/L LE. No family noted at [facility]  DERM: No wounds noted to B/L LE. No dry skin noted to B/L LE. No proximal lymphatic streaking noted to B/L LE. NCSOI noted to B/L LE  VASC: Pedal pulses (DP/PT) palpable to B/L LE. CFT<3sec to all digits of B/L LE. Pedal hair growth noted to the level of the digits for B/L LE. Skin temp is warm to warm from proximal to distal for B/L LE. Neg homans/hans signs to B/L LE.  No varicosities or telangectasias noted to B/L LE.  NEURO: Protective and epicritic sensations grossly intact to B/L LE  MSK: (-) POP, No gross deformities. Good muscle tone and bulk noted to B/L LE.  PSYCH: Cooperative with normal mood and affect     Data Review: No results found for this or any previous visit (from the past 24 hour(s)). Assessment:     Diagnoses and all orders for this visit:    1. Skin lesion of foot  -     BIOPSY OF SKIN LESION         Plan:     Patient seen and evaluated in the office. Discussed with patient about different possibilities of what skin lesion can be. Discussed with patient that most likely skin lesion is a mucoid cyst we will know more with a skin biopsy for this time recommend patient to obtain a skin biopsy  Using a 15 blade a skin biopsy was performed to the dorsal aspect of the left second toe. Hemostasis was achieved with direct pressure a bandage was placed at this time.   Patient to follow-up after biopsy results are received           Josiane Son DPM, CW, [address] [place] [address] TESSReunion Rehabilitation Hospital Phoenixfrancoise , [address] [place] [address]  O: [phone]  F: [phone]

## 2023-05-02 ENCOUNTER — OFFICE VISIT (OUTPATIENT)
Dept: PODIATRY | Age: 63
End: 2023-05-02
Payer: MEDICARE

## 2023-05-02 VITALS
WEIGHT: 145 LBS | BODY MASS INDEX: 27.38 KG/M2 | DIASTOLIC BLOOD PRESSURE: 77 MMHG | SYSTOLIC BLOOD PRESSURE: 151 MMHG | TEMPERATURE: 97.9 F | HEIGHT: 61 IN | HEART RATE: 104 BPM

## 2023-05-02 DIAGNOSIS — L98.9 SKIN LESION OF FOOT: Primary | ICD-10-CM

## 2023-05-02 PROCEDURE — G8419 CALC BMI OUT NRM PARAM NOF/U: HCPCS | Performed by: PODIATRIST

## 2023-05-02 PROCEDURE — G8432 DEP SCR NOT DOC, RNG: HCPCS | Performed by: PODIATRIST

## 2023-05-02 PROCEDURE — 3017F COLORECTAL CA SCREEN DOC REV: CPT | Performed by: PODIATRIST

## 2023-05-02 PROCEDURE — 99213 OFFICE O/P EST LOW 20 MIN: CPT | Performed by: PODIATRIST

## 2023-05-02 PROCEDURE — G9899 SCRN MAM PERF RSLTS DOC: HCPCS | Performed by: PODIATRIST

## 2023-05-02 PROCEDURE — G8427 DOCREV CUR MEDS BY ELIG CLIN: HCPCS | Performed by: PODIATRIST

## 2023-05-22 RX ORDER — CELECOXIB 200 MG/1
100 CAPSULE ORAL 2 TIMES DAILY
COMMUNITY

## 2023-05-22 RX ORDER — LEVOTHYROXINE SODIUM 0.12 MG/1
TABLET ORAL
COMMUNITY

## 2023-05-22 RX ORDER — CARBAMAZEPINE 300 MG/1
300 CAPSULE, EXTENDED RELEASE ORAL 2 TIMES DAILY
COMMUNITY

## 2023-05-22 RX ORDER — VENLAFAXINE 75 MG/1
75 TABLET ORAL DAILY
COMMUNITY

## 2023-05-22 RX ORDER — BUSPIRONE HYDROCHLORIDE 30 MG/1
30 TABLET ORAL 2 TIMES DAILY
COMMUNITY

## 2023-05-22 RX ORDER — GABAPENTIN 800 MG/1
400 TABLET ORAL 3 TIMES DAILY
COMMUNITY
Start: 2021-09-09

## 2023-05-22 RX ORDER — AMOXICILLIN 250 MG
2 CAPSULE ORAL DAILY
COMMUNITY

## 2023-05-22 RX ORDER — VENLAFAXINE HYDROCHLORIDE 150 MG/1
150 CAPSULE, EXTENDED RELEASE ORAL DAILY
COMMUNITY

## 2023-05-22 RX ORDER — OXYCODONE 36 MG/1
1 CAPSULE, EXTENDED RELEASE ORAL 2 TIMES DAILY
COMMUNITY
Start: 2021-09-19

## 2023-07-10 ENCOUNTER — OFFICE VISIT (OUTPATIENT)
Age: 63
End: 2023-07-10
Payer: COMMERCIAL

## 2023-07-10 VITALS
DIASTOLIC BLOOD PRESSURE: 75 MMHG | BODY MASS INDEX: 31.74 KG/M2 | WEIGHT: 168 LBS | HEART RATE: 108 BPM | SYSTOLIC BLOOD PRESSURE: 131 MMHG

## 2023-07-10 DIAGNOSIS — Z01.419 ENCOUNTER FOR WELL WOMAN EXAM WITH ROUTINE GYNECOLOGICAL EXAM: Primary | ICD-10-CM

## 2023-07-10 DIAGNOSIS — Z12.4 SCREENING FOR CERVICAL CANCER: ICD-10-CM

## 2023-07-10 DIAGNOSIS — N90.89 VULVAR LESION: ICD-10-CM

## 2023-07-10 PROCEDURE — 99386 PREV VISIT NEW AGE 40-64: CPT | Performed by: OBSTETRICS & GYNECOLOGY

## 2023-07-10 RX ORDER — LINACLOTIDE 290 UG/1
CAPSULE, GELATIN COATED ORAL
COMMUNITY

## 2023-07-10 RX ORDER — CLOTRIMAZOLE AND BETAMETHASONE DIPROPIONATE 10; .64 MG/G; MG/G
CREAM TOPICAL
Qty: 45 G | Refills: 0 | Status: SHIPPED | OUTPATIENT
Start: 2023-07-10

## 2023-07-10 RX ORDER — PSEUDOEPHEDRINE HCL 30 MG
TABLET ORAL
COMMUNITY

## 2023-07-10 NOTE — PROGRESS NOTES
Jung Hardwick is a 57 y.o. female returns for an annual exam     Chief Complaint   Patient presents with    Annual Exam       No LMP recorded. Patient is postmenopausal.  Her periods are nonexistent in flow. Problems: problems - has not been able to get pap smear in years due to \"cervix not being seen on exam\"  Birth Control: abstinence and post menopausal status. Last Pap: normal obtained unknown year(s) ago. She does not have a history of HOSSEIN 2, 3 or cervical cancer. Last Mammogram: had a recent mammogram 2023 which was negative for malignancy per pt.
tenderness present, no adnexal masses present  Perineum: perineum within normal limits, no evidence of trauma, no rashes or skin lesions present  Anus: anus within normal limits, no hemorrhoids present  Inguinal Lymph Nodes: no lymphadenopathy present    Skin  General Inspection: no rash, no lesions identified    Neurologic/Psychiatric  Mental Status:  Orientation: grossly oriented to person, place and time  Mood and Affect: mood normal, affect appropriate        Assessment & Plan:  Routine gynecologic examination. Pap/HPV today  Whitish discoloration right labum majora. ?yeast.  Florin Barton. ROT ~4wks for rechck, bx if not improved/resolved. States nl bx ~15yrs ago. Counseled re: diet, exercise, healthy lifestyle  Return for yearly wellness visits  Rec screening mammo. BR-1 (12/16/22)  Patient verbalized understanding      Orders Placed This Encounter    PAP IG, Aptima HPV and rfx 16/18,45 ([id number])     Standing Status:   Future     Standing Expiration Date:   7/10/2024     Order Specific Question:   Pap Source? (Required)     Answer:   CERVIX     Order Specific Question:   Pap Source? (Required)     Answer:   ENDOCERVIX     Order Specific Question:   Pap collection method? (Required     Answer:   brush     Order Specific Question:   Pap collection method? (Required     Answer:   spatula     Order Specific Question:   Menstrual Status ? Answer:   Postmenopausal [2]     Order Specific Question:   Previous Treatment?           (Required)     Answer:   NONE

## 2023-07-13 LAB
CYTOLOGIST CVX/VAG CYTO: NORMAL
CYTOLOGY CVX/VAG DOC CYTO: NORMAL
CYTOLOGY CVX/VAG DOC THIN PREP: NORMAL
DX ICD CODE: NORMAL
HPV GENOTYPE REFLEX: NORMAL
HPV I/H RISK 4 DNA CVX QL PROBE+SIG AMP: NEGATIVE
Lab: NORMAL
OTHER STN SPEC: NORMAL
STAT OF ADQ CVX/VAG CYTO-IMP: NORMAL

## 2023-10-10 ENCOUNTER — OFFICE VISIT (OUTPATIENT)
Age: 63
End: 2023-10-10
Payer: COMMERCIAL

## 2023-10-10 VITALS
WEIGHT: 160 LBS | HEART RATE: 81 BPM | SYSTOLIC BLOOD PRESSURE: 110 MMHG | DIASTOLIC BLOOD PRESSURE: 62 MMHG | BODY MASS INDEX: 30.23 KG/M2

## 2023-10-10 DIAGNOSIS — Z71.1 WORRIED WELL: ICD-10-CM

## 2023-10-10 DIAGNOSIS — N90.89 VULVAR LESION: Primary | ICD-10-CM

## 2023-10-10 LAB
BILIRUBIN, URINE, POC: NEGATIVE
BLOOD URINE, POC: NEGATIVE
GLUCOSE URINE, POC: NEGATIVE
KETONES, URINE, POC: NEGATIVE
LEUKOCYTE ESTERASE, URINE, POC: NEGATIVE
NITRITE, URINE, POC: NEGATIVE
PH, URINE, POC: 5 (ref 4.6–8)
PROTEIN,URINE, POC: NEGATIVE
SPECIFIC GRAVITY, URINE, POC: 1.02 (ref 1–1.03)
URINALYSIS CLARITY, POC: CLEAR
URINALYSIS COLOR, POC: YELLOW
UROBILINOGEN, POC: NORMAL

## 2023-10-10 PROCEDURE — 1123F ACP DISCUSS/DSCN MKR DOCD: CPT | Performed by: OBSTETRICS & GYNECOLOGY

## 2023-10-10 PROCEDURE — 81002 URINALYSIS NONAUTO W/O SCOPE: CPT | Performed by: OBSTETRICS & GYNECOLOGY

## 2023-10-10 PROCEDURE — 99213 OFFICE O/P EST LOW 20 MIN: CPT | Performed by: OBSTETRICS & GYNECOLOGY

## 2023-10-13 LAB
BACTERIA SPEC CULT: ABNORMAL
CC UR VC: ABNORMAL
SERVICE CMNT-IMP: ABNORMAL

## 2023-10-13 RX ORDER — NITROFURANTOIN 25; 75 MG/1; MG/1
100 CAPSULE ORAL 2 TIMES DAILY
Qty: 14 CAPSULE | Refills: 0 | Status: SHIPPED | OUTPATIENT
Start: 2023-10-13 | End: 2023-10-20

## 2024-02-08 ENCOUNTER — HOSPITAL ENCOUNTER (OUTPATIENT)
Facility: HOSPITAL | Age: 64
Discharge: HOME | End: 2024-02-08
Payer: COMMERCIAL

## 2024-02-08 VITALS — WEIGHT: 160 LBS | BODY MASS INDEX: 30.21 KG/M2 | HEIGHT: 61 IN

## 2024-02-08 DIAGNOSIS — Z12.31 SCREENING MAMMOGRAM FOR HIGH-RISK PATIENT: ICD-10-CM

## 2024-02-08 PROCEDURE — 77063 BREAST TOMOSYNTHESIS BI: CPT

## 2024-02-08 PROCEDURE — 77067 SCR MAMMO BI INCL CAD: CPT

## 2025-03-13 ENCOUNTER — HOSPITAL ENCOUNTER (OUTPATIENT)
Facility: HOSPITAL | Age: 65
Discharge: HOME | End: 2025-03-16
Payer: COMMERCIAL

## 2025-03-13 DIAGNOSIS — Z00.00 ROUTINE GENERAL MEDICAL EXAMINATION AT A HEALTH CARE FACILITY: ICD-10-CM

## 2025-03-13 PROCEDURE — 77080 DXA BONE DENSITY AXIAL: CPT

## (undated) DEVICE — TOWEL SURG W17XL27IN STD BLU COT NONFENESTRATED PREWASHED

## (undated) DEVICE — APPLIER CLP M/L SHFT DIA5MM 15 LIG LIGAMAX 5

## (undated) DEVICE — ULNAR NERVE PROTECTOR FOAM POSITIONER: Brand: CARDINAL HEALTH

## (undated) DEVICE — THE ENDO CARRY-ON PROCEDURE KIT CONTAINS ALL OF THE SUPPLIES AND INFECTION PREVENTION PRODUCTS NEEDED FOR ENDOSCOPIC PROCEDURES: Brand: ENDO CARRY-ON PROCEDURE KIT

## (undated) DEVICE — SOLUTION IRRIG 500ML 0.9% SOD CHL USP POUR PLAS BTL

## (undated) DEVICE — CORD ES L10FT MPLR LAP

## (undated) DEVICE — SUT MONOCRYL PLUS UD 4-0 --

## (undated) DEVICE — Device

## (undated) DEVICE — POWDER HEMOSTAT GEL 3.0GR -- SURGICEL

## (undated) DEVICE — SUT ETHLN 2-0 18IN FS BLK --

## (undated) DEVICE — SPONGE HEMOSTAT CELLULS 4X8IN -- SURGICEL

## (undated) DEVICE — SUTURE SZ 0 27IN 5/8 CIR UR-6  TAPER PT VIOLET ABSRB VICRYL J603H

## (undated) DEVICE — DRAPE,REIN 53X77,STERILE: Brand: MEDLINE

## (undated) DEVICE — MASK ANES INF SZ 2 PREM TAIL VLV INFL PRT UNSCENTED SGL PT

## (undated) DEVICE — LAPAROSCOPIC SCISSORS: Brand: EPIX LAPAROSCOPIC SCISSORS

## (undated) DEVICE — MARYLAND JAW LAPAROSCOPIC SEALER/DIVIDER COATED: Brand: LIGASURE

## (undated) DEVICE — INTENDED FOR TISSUE SEPARATION, AND OTHER PROCEDURES THAT REQUIRE A SHARP SURGICAL BLADE TO PUNCTURE OR CUT.: Brand: BARD-PARKER SAFETY BLADES SIZE 11, STERILE

## (undated) DEVICE — SOL INJ SOD CL 0.9% 1000ML BG --

## (undated) DEVICE — LAPAROSCOPIC CHOLE PACK: Brand: MEDLINE INDUSTRIES, INC.

## (undated) DEVICE — TROCARS: Brand: KII® BALLOON BLUNT TIP SYSTEM

## (undated) DEVICE — TISSUE RETRIEVAL SYSTEM: Brand: INZII RETRIEVAL SYSTEM

## (undated) DEVICE — SYR LR LCK 1ML GRAD NSAF 30ML --

## (undated) DEVICE — Device: Brand: JELCO

## (undated) DEVICE — DISPOSABLE LAPAROSCOPIC CLIP APPLIER WITH 20 CLIPS.: Brand: EPIX® UNIVERSAL CLIP APPLIER

## (undated) DEVICE — SPONGE DRAIN NONWOVEN 4X4IN -- 2/PK

## (undated) DEVICE — BASIC SINGLE BASIN-LF: Brand: MEDLINE INDUSTRIES, INC.

## (undated) DEVICE — FCPS RAD JAW 4LC 240CM W/NDL -- BX/20 RADIAL JAW 4

## (undated) DEVICE — PREP SKN CHLRAPRP APL 26ML STR --

## (undated) DEVICE — TUBING INSUFFLATOR HEAT HUMIDIFIED SMK EVAC SET PNEUMOCLEAR

## (undated) DEVICE — GLOVE ORANGE PI 7 1/2   MSG9075

## (undated) DEVICE — INTENDED FOR TISSUE SEPARATION, AND OTHER PROCEDURES THAT REQUIRE A SHARP SURGICAL BLADE TO PUNCTURE OR CUT.: Brand: BARD-PARKER ® CARBON RIB-BACK BLADES

## (undated) DEVICE — GOWN,NON-REINFORCED,XXL: Brand: MEDLINE

## (undated) DEVICE — TROCAR: Brand: KII FIOS FIRST ENTRY

## (undated) DEVICE — SURGICEL ENDOSCP APPL

## (undated) DEVICE — GARMENT,MEDLINE,DVT,INT,CALF,MED, GEN2: Brand: MEDLINE

## (undated) DEVICE — 2, DISPOSABLE SUCTION/IRRIGATOR WITHOUT DISPOSABLE TIP: Brand: STRYKEFLOW

## (undated) DEVICE — DERMABOND SKIN ADH 0.7ML -- DERMABOND ADVANCED 12/BX

## (undated) DEVICE — VISUALIZATION SYSTEM: Brand: CLEARIFY

## (undated) DEVICE — TROCAR: Brand: KII SLEEVE

## (undated) DEVICE — INTENDED FOR TISSUE SEPARATION, AND OTHER PROCEDURES THAT REQUIRE A SHARP SURGICAL BLADE TO PUNCTURE OR CUT.: Brand: BARD-PARKER SAFETY BLADES SIZE 15, STERILE

## (undated) DEVICE — DRAIN SURG W10MMXL20CM SIL FULL PERF HUBLESS FLAT RADPQ

## (undated) DEVICE — SOUTHSIDE TURNOVER: Brand: MEDLINE INDUSTRIES, INC.